# Patient Record
Sex: FEMALE | Race: WHITE | NOT HISPANIC OR LATINO | Employment: OTHER | URBAN - METROPOLITAN AREA
[De-identification: names, ages, dates, MRNs, and addresses within clinical notes are randomized per-mention and may not be internally consistent; named-entity substitution may affect disease eponyms.]

---

## 2017-01-10 ENCOUNTER — OFFICE VISIT (OUTPATIENT)
Dept: OBGYN | Facility: CLINIC | Age: 37
End: 2017-01-10
Attending: NURSE PRACTITIONER
Payer: COMMERCIAL

## 2017-01-10 VITALS
TEMPERATURE: 96.3 F | WEIGHT: 196.8 LBS | HEART RATE: 96 BPM | SYSTOLIC BLOOD PRESSURE: 100 MMHG | HEIGHT: 68 IN | BODY MASS INDEX: 29.83 KG/M2 | DIASTOLIC BLOOD PRESSURE: 66 MMHG

## 2017-01-10 DIAGNOSIS — N61.0 MASTITIS IN FEMALE: Primary | ICD-10-CM

## 2017-01-10 DIAGNOSIS — B37.89 YEAST INFECTION OF NIPPLE, POSTPARTUM: ICD-10-CM

## 2017-01-10 PROCEDURE — 99211 OFF/OP EST MAY X REQ PHY/QHP: CPT | Mod: ZF

## 2017-01-10 RX ORDER — CLINDAMYCIN HCL 300 MG
300 CAPSULE ORAL 3 TIMES DAILY
Qty: 30 CAPSULE | Refills: 0 | Status: SHIPPED | OUTPATIENT
Start: 2017-01-10 | End: 2017-01-20

## 2017-01-10 RX ORDER — CLOTRIMAZOLE 1 %
CREAM (GRAM) TOPICAL 2 TIMES DAILY
Qty: 15 G | Refills: 1 | Status: SHIPPED | OUTPATIENT
Start: 2017-01-10 | End: 2017-05-19

## 2017-01-10 ASSESSMENT — PAIN SCALES - GENERAL: PAINLEVEL: SEVERE PAIN (6)

## 2017-01-10 NOTE — NURSING NOTE
Chief Complaint   Patient presents with     RECHECK   right breast pain- fighting mastitis for one week-  Had a lump in november-  Now this  Started one week ago   Fever comes and goes   Taking garlic  Honey    Now back.

## 2017-01-10 NOTE — PROGRESS NOTES
"Progress Note    SUBJECTIVE:  Priyanka Lundberg is an 36 year old , who presents for breast symptoms.    She is 6 months postpartum and exclusively breastfeeding (delivery date 2016)    1) Priyanka previously had a left upper outer breast lump and deep left axillary lump evaluated by clinical exam and ultrasound on 2017.  Findings were benign and there was no recommended imaging follow-up beyond routine mammogram starting at age 40. Prisca reports today that the lump in her left breast has resolved but the lump in her left axilla has gotten larger.  It has been present for ~1 month.    2) Right breast symptoms were first noted 1 week ago. Swelling, tenderness, and erythema were first noted on the right side of her right breast; this improved, but then the same symptoms appeared on the left side of her breast.  Pain today is a 6/10.  She reports feeling ~ 3 clogged milk ducts (2 on the right side of her nipple, near areola, and one on the left side of her breast), but these have now resolved.  Last week she felt like she had a fever, chills, body aches, and was fatigued and, again, yesterday, she felt these same symptoms, although she never took her temperature.  She has tried garlic, honey, heat packs, and has taken warm baths.  Overall the redness and swelling have decreased from yesterday and she is feeling less \"flu-like.\"  She has never had mastitis before.      3) Priyanka notes that her right areola became swollen 2-3 days ago.  Her right nipple is very itchy, with burning and stabbing pains.  Denies having any blisters or cracking.  Her son has not been diagnosed with thrush.  He was last at the doctor 1 month ago.    Priyanka is breastfeeding ~6-8 times per day.  She reports her son has had trouble latching since he was born.  She recently started switching positions for breastfeeding, otherwise had previously always used one type of hold.      Menstrual History: no menses since she " delivered  Contraception: condoms    Never had a mammogram  Denies hx of breast disease.    Significant Family hx:  - Maternal grandmother with breast cancer s/p mastectomy at age 79 (also had gastric and skin cancer)  - No hx of ovarian cancer  - Paternal grandfather had colon cancer    HISTORY:  Prescription Medications as of 1/10/2017             clindamycin (CLEOCIN) 300 MG capsule Take 1 capsule (300 mg) by mouth 3 times daily for 10 days    clotrimazole (LOTRIMIN) 1 % cream Apply topically 2 times daily    Prenatal Vit-Fe Fumarate-FA (PRENATAL MULTIVITAMIN  PLUS IRON) 27-0.8 MG TABS Take 1 tablet by mouth daily    cholecalciferol (VITAMIN D3) 5000 UNITS CAPS capsule Take 1 capsule (5,000 Units) by mouth daily Take one capsule daily.    ibuprofen (ADVIL,MOTRIN) 400 MG tablet Take 1-2 tablets (400-800 mg) by mouth every 6 hours as needed for other (cramping)        Allergies   Allergen Reactions     Lemon Flavor      Mustard Seed      Onion Difficulty breathing and GI Disturbance     [place] GI Disturbance     Penicillins Hives     Vanilla GI Disturbance     Immunization History   Administered Date(s) Administered     TDAP (BOOSTRIX AGES 10-64) 2016     Obstetric History       T1      TAB0   SAB0   E0   M0   L1      Past Medical History   Diagnosis Date     Hx of abnormal cervical Pap smear      Abdominal pain      abnormal histamine problem, multiple food allergies     Joint pain      was to have rheumatologist     Breathing problem 2007     shortness of breath after eating, ?allergies     Asthma      has rx for inhaler but says it doesnt work     Chronic nausea      IBS (irritable bowel syndrome)      lots of mucus in bowel with occassional bleeding     Heart murmur      closed by time she was 1     Anxiety and depression      on xanax     ADHD (attention deficit hyperactivity disorder)      on aderol     Past Surgical History   Procedure Laterality Date     No history of surgery   "     Family History   Problem Relation Age of Onset     Coronary Artery Disease Paternal Grandfather      Coronary Artery Disease Maternal Grandmother      Coronary Artery Disease Mother      Hypertension Father      Hyperlipidemia Father      CEREBROVASCULAR DISEASE Maternal Grandmother      Prostate Cancer Maternal Grandfather      Breast Cancer Maternal Grandmother      Other Cancer Maternal Grandmother      stomach, skin     Colon Cancer Paternal Grandfather      MENTAL ILLNESS Mother      bipolar, schitophrenic     MENTAL ILLNESS Sister      bipolar     Anxiety Disorder Mother      Anxiety Disorder Sister      Asthma Sister      OSTEOPOROSIS Mother      Genetic Disorder Father      scleraderma     Thyroid Disease Mother      DIABETES Mother      DIABETES Father      DIABETES Paternal Grandmother      Social History     Social History     Marital Status:      Spouse Name: N/A     Number of Children: N/A     Years of Education: N/A   ROS  ROS: 10 point ROS neg other than the symptoms noted above in the HPI.    EXAM:  Blood pressure 100/66, pulse 96, temperature 96.3  F (35.7  C), height 1.715 m (5' 7.5\"), weight 89.268 kg (196 lb 12.8 oz), currently breastfeeding. Body mass index is 30.35 kg/(m^2).  General appearance: Pleasant female in no acute distress; appears anxious  Neurological: normal gait, no tremor  Breast: Examined in a sitting and lying position.  Symmetrical without visible distortion; nipple inversion, nipple discharge, breast dimpling, or puckering.  No jaundice.     Right breast: erythema, warmth, and swelling at approximately 2-3 o'clock position and 8-9 o'clock position that extends approximately 7cm from nipple and are dense to palpation.  Areola swollen at ~2-3 o'clock. No discrete/ dominant  masses palpable.     Left breast: without swelling, erythema, or tenderness; no abnormal masses       Left Axilla: unable to palpate any axillary masses on deep palpation.    Right Axilla: " Slightly swollen lymph node noted on deep palpation    ASSESSMENT:  Encounter Diagnoses   Name Primary?     Mastitis in female Yes     Yeast infection of nipple, postpartum       PLAN:   Mastitis Treatment: Clindamycin 300mg TID x 10 days (Type of Antibiotic Treatment chosen based on allergy to Penicillin).  Patient was counseled that she should expect to see improvement in her symptoms in 48-72 hours.  Nipple Candiasis: Topical Miconazole BID x 2 weeks.  Patient to apply this to right nipple.  She was counseled to apply the cream after breastfeeding and to wipe off any excess residual noted on nipple if present prior to next feeding.  Patient instructed to notify her son's pediatrician that she is being treated for a yeast infection.  Encouraged breastfeeding ~every 3 hours and pumping as needed to empty breasts completely.  Encouraged breastfeeding in different positions and breast massage.  May apply heat prior to breastfeeding and cold compresses as needed to decrease swelling.   Reviewed warning signs and symptoms of an abscess to watch for.   Discussed patient's concern for increasing size of left axillary lymph node with Dr. Thornton. Left axillary area without palpable masses or lymphadenopathy today.  Given that benign-appearing lymph nodes were identified in the left axilla near the area of palpable concern by ultrasound on 12/7/16, Dr. Thornton stated that there was no indication for further imaging at this time.      Recommended follow-up with one of the midwives in 1 week.  Priyanka Verbalized understanding and agreement with visit plan.    25 minutes was spent in direct contact with the patient and > 50% of the time in patient education and coordination of care.    Desiree Gomes, JULIANNE, APRN, WHNP

## 2017-01-10 NOTE — Clinical Note
"1/10/2017       RE: Priyanka Lundberg  [address]  [place] [address]     Dear Colleague,    Thank you for referring your patient, Priyanka Lundberg, to the [facility] at [facility]. Please see a copy of my visit note below.    Progress Note    SUBJECTIVE:  Priyanka Lundberg is an 36 year old , who presents for breast symptoms.    She is 6 months postpartum and exclusively breastfeeding (delivery date 2016)    1) Priyanka previously had a left upper outer breast lump and deep left axillary lump evaluated by clinical exam and ultrasound on 2017.  Findings were benign and there was no recommended imaging follow-up beyond routine mammogram starting at age 40. Prisca reports today that the lump in her left breast has resolved but the lump in her left axilla has gotten larger.  It has been present for ~1 month.    2) Right breast symptoms were first noted 1 week ago. Swelling, tenderness, and erythema were first noted on the right side of her right breast; this improved, but then the same symptoms appeared on the left side of her breast.  Pain today is a 6/10.  She reports feeling ~ 3 clogged milk ducts (2 on the right side of her nipple, near areola, and one on the left side of her breast), but these have now resolved.  Last week she felt like she had a fever, chills, body aches, and was fatigued and, again, yesterday, she felt these same symptoms, although she never took her temperature.  She has tried garlic, honey, heat packs, and has taken warm baths.  Overall the redness and swelling have decreased from yesterday and she is feeling less \"flu-like.\"  She has never had mastitis before.      3) Priyanka notes that her right areola became swollen 2-3 days ago.  Her right nipple is very itchy, with burning and stabbing pains.  Denies having any blisters or cracking.  Her son has not been diagnosed with thrush.  He was last at the doctor 1 " month ago.    Priyanka is breastfeeding ~6-8 times per day.  She reports her son has had trouble latching since he was born.  She recently started switching positions for breastfeeding, otherwise had previously always used one type of hold.      Menstrual History: no menses since she delivered  Contraception: condoms    Never had a mammogram  Denies hx of breast disease.    Significant Family hx:  - Maternal grandmother with breast cancer s/p mastectomy at age 79 (also had gastric and skin cancer)  - No hx of ovarian cancer  - Paternal grandfather had colon cancer    HISTORY:  Prescription Medications as of 1/10/2017             clindamycin (CLEOCIN) 300 MG capsule Take 1 capsule (300 mg) by mouth 3 times daily for 10 days    clotrimazole (LOTRIMIN) 1 % cream Apply topically 2 times daily    Prenatal Vit-Fe Fumarate-FA (PRENATAL MULTIVITAMIN  PLUS IRON) 27-0.8 MG TABS Take 1 tablet by mouth daily    cholecalciferol (VITAMIN D3) 5000 UNITS CAPS capsule Take 1 capsule (5,000 Units) by mouth daily Take one capsule daily.    ibuprofen (ADVIL,MOTRIN) 400 MG tablet Take 1-2 tablets (400-800 mg) by mouth every 6 hours as needed for other (cramping)        Allergies   Allergen Reactions     Lemon Flavor      Mustard Seed      Onion Difficulty breathing and GI Disturbance     [place] GI Disturbance     Penicillins Hives     Vanilla GI Disturbance     Immunization History   Administered Date(s) Administered     TDAP (BOOSTRIX AGES 10-64) 2016     Obstetric History       T1      TAB0   SAB0   E0   M0   L1      Past Medical History   Diagnosis Date     Hx of abnormal cervical Pap smear      Abdominal pain      abnormal histamine problem, multiple food allergies     Joint pain      was to have rheumatologist     Breathing problem 2007     shortness of breath after eating, ?allergies     Asthma      has rx for inhaler but says it doesnt work     Chronic nausea      IBS (irritable bowel syndrome)       "lots of mucus in bowel with occassional bleeding     Heart murmur      closed by time she was 1     Anxiety and depression      on xanax     ADHD (attention deficit hyperactivity disorder)      on aderol     Past Surgical History   Procedure Laterality Date     No history of surgery       Family History   Problem Relation Age of Onset     Coronary Artery Disease Paternal Grandfather      Coronary Artery Disease Maternal Grandmother      Coronary Artery Disease Mother      Hypertension Father      Hyperlipidemia Father      CEREBROVASCULAR DISEASE Maternal Grandmother      Prostate Cancer Maternal Grandfather      Breast Cancer Maternal Grandmother      Other Cancer Maternal Grandmother      stomach, skin     Colon Cancer Paternal Grandfather      MENTAL ILLNESS Mother      bipolar, schitophrenic     MENTAL ILLNESS Sister      bipolar     Anxiety Disorder Mother      Anxiety Disorder Sister      Asthma Sister      OSTEOPOROSIS Mother      Genetic Disorder Father      scleraderma     Thyroid Disease Mother      DIABETES Mother      DIABETES Father      DIABETES Paternal Grandmother      Social History     Social History     Marital Status:      Spouse Name: N/A     Number of Children: N/A     Years of Education: N/A   ROS  ROS: 10 point ROS neg other than the symptoms noted above in the HPI.    EXAM:  Blood pressure 100/66, pulse 96, temperature 96.3  F (35.7  C), height 1.715 m (5' 7.5\"), weight 89.268 kg (196 lb 12.8 oz), currently breastfeeding. Body mass index is 30.35 kg/(m^2).  General appearance: Pleasant female in no acute distress; appears anxious  Neurological: normal gait, no tremor  Breast: Examined in a sitting and lying position.  Symmetrical without visible distortion; nipple inversion, nipple discharge, breast dimpling, or puckering.  No jaundice.     Right breast: erythema, warmth, and swelling at approximately 2-3 o'clock position and 8-9 o'clock position that extends approximately 7cm from " nipple and are dense to palpation.  Areola swollen at ~2-3 o'clock. No discrete/ dominant  masses palpable.     Left breast: without swelling, erythema, or tenderness; no abnormal masses       Left Axilla: unable to palpate any axillary masses on deep palpation.    Right Axilla: Slightly swollen lymph node noted on deep palpation    ASSESSMENT:  Encounter Diagnoses   Name Primary?     Mastitis in female Yes     Yeast infection of nipple, postpartum       PLAN:   Mastitis Treatment: Clindamycin 300mg TID x 10 days (Type of Antibiotic Treatment chosen based on allergy to Penicillin).  Patient was counseled that she should expect to see improvement in her symptoms in 48-72 hours.  Nipple Candiasis: Topical Miconazole BID x 2 weeks.  Patient to apply this to right nipple.  She was counseled to apply the cream after breastfeeding and to wipe off any excess residual noted on nipple if present prior to next feeding.  Patient instructed to notify her son's pediatrician that she is being treated for a yeast infection.  Encouraged breastfeeding ~every 3 hours and pumping as needed to empty breasts completely.  Encouraged breastfeeding in different positions and breast massage.  May apply heat prior to breastfeeding and cold compresses as needed to decrease swelling.   Reviewed warning signs and symptoms of an abscess to watch for.   Discussed patient's concern for increasing size of left axillary lymph node with Dr. Thornton. Left axillary area without palpable masses or lymphadenopathy today.  Given that benign-appearing lymph nodes were identified in the left axilla near the area of palpable concern by ultrasound on 12/7/16, Dr. Thornton stated that there was no indication for further imaging at this time.      Recommended follow-up with one of the midwives in 1 week.  Priyanka Verbalized understanding and agreement with visit plan.    25 minutes was spent in direct contact with the patient and > 50% of the time in patient  education and coordination of care.    Desiree Gomes, DNP, APRN, WHNP

## 2017-01-16 DIAGNOSIS — R79.89 LOW VITAMIN D LEVEL: Primary | ICD-10-CM

## 2017-01-18 ENCOUNTER — ONCOLOGY VISIT (OUTPATIENT)
Dept: ONCOLOGY | Facility: CLINIC | Age: 37
End: 2017-01-18
Attending: FAMILY MEDICINE
Payer: COMMERCIAL

## 2017-01-18 VITALS
SYSTOLIC BLOOD PRESSURE: 99 MMHG | HEART RATE: 73 BPM | DIASTOLIC BLOOD PRESSURE: 64 MMHG | TEMPERATURE: 98.8 F | OXYGEN SATURATION: 96 % | WEIGHT: 198.1 LBS | RESPIRATION RATE: 18 BRPM | BODY MASS INDEX: 30.55 KG/M2

## 2017-01-18 DIAGNOSIS — R22.31 AXILLARY LUMP, RIGHT: Primary | ICD-10-CM

## 2017-01-18 ASSESSMENT — PAIN SCALES - GENERAL: PAINLEVEL: MILD PAIN (2)

## 2017-01-18 NOTE — NURSING NOTE
"Priyanka Lundberg is a 36 year old female who presents for:  Chief Complaint   Patient presents with     Oncology Clinic Visit     Lump or Mass in Breast        Initial Vitals:  BP 99/64 mmHg  Pulse 73  Temp(Src) 98.8  F (37.1  C) (Oral)  Resp 18  Wt 89.858 kg (198 lb 1.6 oz)  SpO2 96% Estimated body mass index is 30.55 kg/(m^2) as calculated from the following:    Height as of 1/10/17: 1.715 m (5' 7.5\").    Weight as of this encounter: 89.858 kg (198 lb 1.6 oz).. There is no height on file to calculate BSA. BP completed using cuff size: large  Mild Pain (2) No LMP recorded. Patient is not currently having periods (Reason: Postpartum). Allergies and medications reviewed.     Medications: Medication refills not needed today.  Pharmacy name entered into [email or web address]: [id number] [place], [place] - [address] [place] [address] AV    Comments: Patient is at a pain level of 2 today, bilateral breast pain.     6 minutes for nursing intake (face to face time)   Dorothy Johnson CMA         "

## 2017-01-18 NOTE — PROGRESS NOTES
Priyanka is a 36 year old female who is 6 months postpartum and exclusively breastfeeding. She  was seen one month ago at the breast center when she noticed a left upper outer breast lump and deep left axillary lump - on US  normal lymph nodes were found and no other masses of concern.     She developed mastitis 2 weeks ago. She began treating this naturally with foods such as raw garlic. She was prescribed antibiotics 1 week ago but never took them because the mastitis was improving. She has right breast burning around the nipple that was diagnosed as a yeast infection. She is treating this naturally with vinegar. She is still breast feeding. She denies fever. She has right breast heaviness even after nursing. She also right axillary lymph nodes that she can palpate. She continues to palpate left breast lumps and left axillary lymph nodes.     Breast Cancer Risk Profile: Age: 36 year old, Gravity 1, Parity 1. Nursing her 6 month old.  Age at first birth 35.  Menarche 9.  LMP: premenopausal.  Previous Breast Biopsy # 0.  Personal History of Cancer No.  Family History; Breast cancer maternal grandmother with breast cancer s/p mastectomy at age 79, also with gastric and skin cancer; ovarian cancer negative; Colon Cancer paternal grandfather.  Hormones:  Negative   ROS  General:  None  Head/eyes:  None  Ears/Nose/Throat:  None  Breast: Nursing - lump(s)   Cardiovascular:  History of heart murmur  Respiratory:  None  Gastrointestinal:  Abdominal pain  Musculoskeletal:  Joint pain  Skin:  None  Neurological:  None  Mental Health:  Anxiety and depression  Endocrine:  None     Past Medical History:  Past Medical History   Diagnosis Date     Hx of abnormal cervical Pap smear 2011     Abdominal pain 2011     abnormal histamine problem, multiple food allergies     Joint pain 2015     was to have rheumatologist     Breathing problem 2007     shortness of breath after eating, ?allergies     Asthma      has rx for inhaler but  says it doesnt work     Chronic nausea      IBS (irritable bowel syndrome)      lots of mucus in bowel with occassional bleeding     Heart murmur      closed by time she was 1     Anxiety and depression      on xanax     ADHD (attention deficit hyperactivity disorder)      on aderol   Past Surgical History:  none  Medications:  Current Outpatient Prescriptions   Medication Sig Dispense Refill     cholecalciferol (VITAMIN D3) 5000 UNITS CAPS capsule Take 1 capsule (5,000 Units) by mouth daily Take one capsule daily. 90 capsule 3     clindamycin (CLEOCIN) 300 MG capsule Take 1 capsule (300 mg) by mouth 3 times daily for 10 days 30 capsule 0     clotrimazole (LOTRIMIN) 1 % cream Apply topically 2 times daily 15 g 1     ibuprofen (ADVIL,MOTRIN) 400 MG tablet Take 1-2 tablets (400-800 mg) by mouth every 6 hours as needed for other (cramping) 60 tablet 0     Prenatal Vit-Fe Fumarate-FA (PRENATAL MULTIVITAMIN  PLUS IRON) 27-0.8 MG TABS Take 1 tablet by mouth daily 30 tablet 12     Family Hx:   Family History   Problem Relation Age of Onset     Coronary Artery Disease Paternal Grandfather      Coronary Artery Disease Maternal Grandmother      Coronary Artery Disease Mother      Hypertension Father      Hyperlipidemia Father      CEREBROVASCULAR DISEASE Maternal Grandmother      Prostate Cancer Maternal Grandfather      Breast Cancer Maternal Grandmother      Other Cancer Maternal Grandmother      stomach, skin     Colon Cancer Paternal Grandfather      MENTAL ILLNESS Mother      bipolar, schitophrenic     MENTAL ILLNESS Sister      bipolar     Anxiety Disorder Mother      Anxiety Disorder Sister      Asthma Sister      OSTEOPOROSIS Mother      Genetic Disorder Father      scleraderma     Thyroid Disease Mother      DIABETES Mother      DIABETES Father      DIABETES Paternal Grandmother      Social History   Substance Use Topics     Smoking status: Never Smoker      Smokeless tobacco: Not on file     Alcohol Use: 0.0 oz/week      0 Standard drinks or equivalent per week      Comment: outside of pregnancy, social   Vitals:   There were no vitals taken for this visit.  Physical Exam:  Constitutional: no distress, comfortable and pleasant   Pyschological: Alert/Oriented  Eyes: anicteric, normal extra-ocular movements  Breast: Examined in a sitting and lying position.  Symmetrical without visible distortion, swelling or rashes.  No nipple inversion, nipple discharge, breast dimpling or puckering.  Breast tissue is heterogenous dense [nursing breasts].  No abnormal masses noted. Two right axillary lymph nodes palpated.   Skin: no concerning lesions, no jaundice  Neurological: Normal gait, no tremor  RESULTS:  FINDINGS:  Right US 2017L    No suspicious findings in the left breast upper outer  quadrant area of palpable concern. Benign-appearing lymph nodes are identified in the left axilla, near  the areas of palpable concern.IMPRESSION: BI-RADS CATEGORY: 2 - Benign Finding(s)  Diagnoses and associated orders for this visit:  36 year old  6 month post partum nursing with a axillary lump, right  right axillary ultrasound today:  1) Reviewed images today with radiologist and no concerning findings.    2) Reassurance to patient that US is normal and lymph nodes are normal findings  3) Advised follow-up with any new concerns

## 2017-03-23 ENCOUNTER — OFFICE VISIT (OUTPATIENT)
Dept: OBGYN | Facility: CLINIC | Age: 37
End: 2017-03-23
Attending: OBSTETRICS & GYNECOLOGY
Payer: COMMERCIAL

## 2017-03-23 VITALS
HEIGHT: 68 IN | WEIGHT: 197.1 LBS | HEART RATE: 74 BPM | DIASTOLIC BLOOD PRESSURE: 74 MMHG | BODY MASS INDEX: 29.87 KG/M2 | SYSTOLIC BLOOD PRESSURE: 107 MMHG

## 2017-03-23 DIAGNOSIS — M25.50 PAIN IN JOINT, MULTIPLE SITES: ICD-10-CM

## 2017-03-23 DIAGNOSIS — N63.0 LUMP OR MASS IN BREAST: ICD-10-CM

## 2017-03-23 DIAGNOSIS — R23.9 SKIN CHANGE: ICD-10-CM

## 2017-03-23 DIAGNOSIS — R45.86 MOOD CHANGES: ICD-10-CM

## 2017-03-23 DIAGNOSIS — N94.89 VAGINAL BURNING: ICD-10-CM

## 2017-03-23 DIAGNOSIS — N64.4 BREAST PAIN: Primary | ICD-10-CM

## 2017-03-23 DIAGNOSIS — K58.9 IRRITABLE BOWEL SYNDROME, UNSPECIFIED TYPE: ICD-10-CM

## 2017-03-23 PROCEDURE — 99212 OFFICE O/P EST SF 10 MIN: CPT | Mod: ZF

## 2017-03-23 RX ORDER — FLUCONAZOLE 150 MG/1
150 TABLET ORAL ONCE
Qty: 1 TABLET | Refills: 0 | Status: SHIPPED | OUTPATIENT
Start: 2017-03-23 | End: 2017-03-23

## 2017-03-23 NOTE — LETTER
"3/23/2017       RE: Priyanka Lundberg  [address]  [place] [address]     Dear Colleague,    Thank you for referring your patient, Priyanka Lundberg, to the [facility] at [facility]. Please see a copy of my visit note below.    Gynecology Visit Note  3/23/17    Reason for visit: Multiple concerns    S: Patient is a 35 yo  who presents today with multiple concerns.  First, she has not been taking her Adderall for ADHD because of breastfeeding.  Because of this, she has been starting to feel more off from a mood standpoint and feels on edge sometimes.  She is wondering options for addressing this.  She also desires to have a mammogram.  She has had multiple issues with bilateral swollen axillary lymph nodes secondary to recurrent mastitis postpartum.  She last had an US of the left axillary lymph node in 1/2017 which was normal.  She continues to feel bumps and pain and is very worried about potential for cancer.      In addition to these issues, Priyanka has a history fo join pain and burning as well as IBS and was to have a colonoscopy prior to finding out she was pregnant.  She would like to establish care with a PCP to address these issues.  She also would like a dermatology referral as she has multiple moles and has some new ones that have developed and would like to have a skin check.    Lastly, patient states she has some burning at her vaginal opening that is c/w prior yeast infection she had with pregnancy.  Would like treatment for this.    O:  Vitals:    03/23/17 0753   BP: 107/74   Pulse: 74   Weight: 89.4 kg (197 lb 1.6 oz)   Height: 1.715 m (5' 7.5\")     General: NAD, A&Ox3  Resp: Non-labored breathing    PHQ-9: 12  BIN-7: 10    A/P: 35 yo  presents with multiple concerns  1) Mood changes: History of ADHD, on Adderall in past but not currently taking due to breastfeeding.  Discussed with her PHQ-9 and BIN-7 could consider " psychology referral versus starting low dose SSRI.  Patient would prefer to start with non-medical treatment, given referral to Psychology today.  2) Breast lumps and pain: Continued without improvement.  Ordered diagnostic mammogram and bilateral US today.  3) Joint pain/IBS: [facility] to [facility] care  4) Skin changes: Dermatology referral given today  5) Yeast vaginitis: Given Rx for Diflucan, if symptoms do not improve should return for exam and evaluation  6) RTC as needed with any concerns    Ana Paula Mcmahan MD

## 2017-03-23 NOTE — MR AVS SNAPSHOT
After Visit Summary   3/23/2017    Priyanka Lundberg    MRN: [id number]           Patient Information     Date Of Birth          1980        Visit Information        Provider Department      3/23/2017 7:45 AM Ana Paula Mcmahan MD [facility]        Today's Diagnoses     Breast pain    -  1    Mood changes (H)        Lump or mass in breast        Skin change        Pain in joint, multiple sites        Irritable bowel syndrome, unspecified type        Vaginal burning           Follow-ups after your visit        Additional Services     DERMATOLOGY REFERRAL       Your provider has referred you to: [facility]: Dermatology Clinic [facility] [phone]   [email or web address]    Please be aware that coverage of these services is subject to the terms and limitations of your health insurance plan.  Call member services at your health plan with any benefit or coverage questions.      Please bring the following with you to your appointment:    (1) Any X-Rays, CTs or MRIs which have been performed.  Contact the facility where they were done to arrange for  prior to your scheduled appointment.    (2) List of current medications  (3) This referral request   (4) Any documents/labs given to you for this referral            FAMILY PRACTICE REFERRAL       Your provider has referred you to: [facility]: [facility] [phone]   [email or web address]    Please be aware that coverage of these services is subject to the terms and limitations of your health insurance plan.  Call member services at your health plan with any benefit or coverage questions.      Please bring the following with you to your appointment:    (1) Any X-Rays, CTs or MRIs which have been performed.  Contact the facility where they were done to arrange for  prior to your scheduled appointment.    (2) List of current medications    (3) This referral request   (4) Any documents/labs given to you for this referral            PSYCHOLOGY REFERRAL       Your provider has referred you to:  Dr. Denise    Please be aware that coverage of these services is subject to the terms and limitations of your health insurance plan.  Call member services at your health plan with any benefit or coverage questions.      Please bring the following to your appointment:    >>   Any x-rays, CTs or MRIs which have been performed.  Contact the facility where they were done to arrange for  prior to your scheduled appointment.   >>   List of current medications   >>   This referral request   >>   Any documents/labs given to you for this referral                  Future tests that were ordered for you today     Open Future Orders        Priority Expected Expires Ordered    Mammo diagnostic  digital (bilateral) Routine  3/23/2018 3/23/2017    US Breast Bilateral Complete 4 Quadrants Routine  3/23/2018 3/23/2017            Who to contact     Please call your clinic at [phone] to:    Ask questions about your health    Make or cancel appointments    Discuss your medicines    Learn about your test results    Speak to your doctor   If you have compliments or concerns about an experience at your clinic, or if you wish to file a complaint, please contact [facility] Patient Relations at [phone] or email us at [email or web address]         Additional Information About Your Visit        Planbox[employer] Information     [employer] gives you secure access to your electronic health record. If you see a primary care provider, you can also send messages to your care team and make appointments. If you have questions, please call your primary care clinic.  If you do not have a primary care provider, please call [phone] and they will assist you.      [employer] is an electronic gateway that provides easy, online access to your medical records.  "With MyChart, you can request a clinic appointment, read your test results, renew a prescription or communicate with your care team.     To access your existing account, please contact your [facility] or call [phone] for assistance.        Care EveryWhere ID     This is your Care EveryWhere ID. This could be used by other organizations to access your [facility] medical records  [id number]        Your Vitals Were     Pulse Height BMI (Body Mass Index)             74 1.715 m (5' 7.5\") 30.41 kg/m2          Blood Pressure from Last 3 Encounters:   03/23/17 107/74   01/18/17 99/64   01/10/17 100/66    Weight from Last 3 Encounters:   03/23/17 89.4 kg (197 lb 1.6 oz)   01/18/17 89.9 kg (198 lb 1.6 oz)   01/10/17 89.3 kg (196 lb 12.8 oz)              We Performed the Following     DERMATOLOGY REFERRAL     FAMILY PRACTICE REFERRAL     PSYCHOLOGY REFERRAL          Today's Medication Changes          These changes are accurate as of: 3/23/17  8:15 AM.  If you have any questions, ask your nurse or doctor.               Start taking these medicines.        Dose/Directions    fluconazole 150 MG tablet   Commonly known as:  DIFLUCAN   Used for:  Vaginal burning   Started by:  Ana Paula Mcmahan MD        Dose:  150 mg   Take 1 tablet (150 mg) by mouth once for 1 dose   Quantity:  1 tablet   Refills:  0            Where to get your medicines      These medications were sent to [place] [address] [place] [address] [address]     Phone:  [phone]     fluconazole 150 MG tablet                Primary Care Provider    Physician No Ref-Primary       No address on file        Thank you!     Thank you for choosing [facility]  for your care. Our goal is always to provide you with excellent care. Hearing back from our patients is one way we can continue to improve our services. Please take a few minutes to complete the written survey " that you may receive in the mail after your visit with us. Thank you!             Your Updated Medication List - Protect others around you: Learn how to safely use, store and throw away your medicines at www.disposemymeds.org.          This list is accurate as of: 3/23/17  8:15 AM.  Always use your most recent med list.                   Brand Name Dispense Instructions for use    cholecalciferol 5000 UNITS Caps capsule    vitamin D3    90 capsule    Take 1 capsule (5,000 Units) by mouth daily Take one capsule daily.       clotrimazole 1 % cream    LOTRIMIN    15 g    Apply topically 2 times daily       fluconazole 150 MG tablet    DIFLUCAN    1 tablet    Take 1 tablet (150 mg) by mouth once for 1 dose       ibuprofen 400 MG tablet    ADVIL/MOTRIN    60 tablet    Take 1-2 tablets (400-800 mg) by mouth every 6 hours as needed for other (cramping)       prenatal multivitamin  plus iron 27-0.8 MG Tabs per tablet     30 tablet    Take 1 tablet by mouth daily

## 2017-03-23 NOTE — PROGRESS NOTES
"Gynecology Visit Note  3/23/17    Reason for visit: Multiple concerns    S: Patient is a 35 yo  who presents today with multiple concerns.  First, she has not been taking her Adderall for ADHD because of breastfeeding.  Because of this, she has been starting to feel more off from a mood standpoint and feels on edge sometimes.  She is wondering options for addressing this.  She also desires to have a mammogram.  She has had multiple issues with bilateral swollen axillary lymph nodes secondary to recurrent mastitis postpartum.  She last had an US of the left axillary lymph node in 1/2017 which was normal.  She continues to feel bumps and pain and is very worried about potential for cancer.      In addition to these issues, Priyanka has a history fo join pain and burning as well as IBS and was to have a colonoscopy prior to finding out she was pregnant.  She would like to establish care with a PCP to address these issues.  She also would like a dermatology referral as she has multiple moles and has some new ones that have developed and would like to have a skin check.    Lastly, patient states she has some burning at her vaginal opening that is c/w prior yeast infection she had with pregnancy.  Would like treatment for this.    O:  Vitals:    03/23/17 0753   BP: 107/74   Pulse: 74   Weight: 89.4 kg (197 lb 1.6 oz)   Height: 1.715 m (5' 7.5\")     General: NAD, A&Ox3  Resp: Non-labored breathing    PHQ-9: 12  BIN-7: 10    A/P: 35 yo  presents with multiple concerns  1) Mood changes: History of ADHD, on Adderall in past but not currently taking due to breastfeeding.  Discussed with her PHQ-9 and BIN-7 could consider psychology referral versus starting low dose SSRI.  Patient would prefer to start with non-medical treatment, given referral to Psychology today.  2) Breast lumps and pain: Continued without improvement.  Ordered diagnostic mammogram and bilateral US today.  3) Joint pain/IBS: [facility] to [facility] care  4) Skin changes: Dermatology referral given today  5) Yeast vaginitis: Given Rx for Diflucan, if symptoms do not improve should return for exam and evaluation  6) RTC as needed with any concerns    Ana Paula Mcmahan MD

## 2017-05-19 ENCOUNTER — APPOINTMENT (OUTPATIENT)
Dept: GENERAL RADIOLOGY | Facility: CLINIC | Age: 37
End: 2017-05-19
Attending: EMERGENCY MEDICINE

## 2017-05-19 ENCOUNTER — HOSPITAL ENCOUNTER (EMERGENCY)
Facility: CLINIC | Age: 37
Discharge: HOME | End: 2017-05-19
Attending: EMERGENCY MEDICINE | Admitting: EMERGENCY MEDICINE

## 2017-05-19 VITALS
BODY MASS INDEX: 30.4 KG/M2 | DIASTOLIC BLOOD PRESSURE: 72 MMHG | OXYGEN SATURATION: 98 % | WEIGHT: 197 LBS | SYSTOLIC BLOOD PRESSURE: 114 MMHG | RESPIRATION RATE: 18 BRPM | TEMPERATURE: 97.7 F

## 2017-05-19 DIAGNOSIS — R07.9 CHEST PAIN, UNSPECIFIED TYPE: ICD-10-CM

## 2017-05-19 LAB
ANION GAP SERPL CALCULATED.3IONS-SCNC: 7 MMOL/L (ref 3–14)
BASOPHILS # BLD AUTO: 0.1 10E9/L (ref 0–0.2)
BASOPHILS NFR BLD AUTO: 1 %
BUN SERPL-MCNC: 20 MG/DL (ref 7–30)
CALCIUM SERPL-MCNC: 9 MG/DL (ref 8.5–10.1)
CHLORIDE SERPL-SCNC: 106 MMOL/L (ref 94–109)
CO2 SERPL-SCNC: 30 MMOL/L (ref 20–32)
CREAT SERPL-MCNC: 0.72 MG/DL (ref 0.52–1.04)
DIFFERENTIAL METHOD BLD: NORMAL
EOSINOPHIL # BLD AUTO: 0.2 10E9/L (ref 0–0.7)
EOSINOPHIL NFR BLD AUTO: 3.2 %
ERYTHROCYTE [DISTWIDTH] IN BLOOD BY AUTOMATED COUNT: 13.3 % (ref 10–15)
GFR SERPL CREATININE-BSD FRML MDRD: 89 ML/MIN/1.7M2
GLUCOSE SERPL-MCNC: 101 MG/DL (ref 70–99)
HCT VFR BLD AUTO: 39.5 % (ref 35–47)
HGB BLD-MCNC: 13.3 G/DL (ref 11.7–15.7)
IMM GRANULOCYTES # BLD: 0 10E9/L (ref 0–0.4)
IMM GRANULOCYTES NFR BLD: 0.2 %
INTERPRETATION ECG - MUSE: NORMAL
LYMPHOCYTES # BLD AUTO: 2.9 10E9/L (ref 0.8–5.3)
LYMPHOCYTES NFR BLD AUTO: 47.8 %
MCH RBC QN AUTO: 29.6 PG (ref 26.5–33)
MCHC RBC AUTO-ENTMCNC: 33.7 G/DL (ref 31.5–36.5)
MCV RBC AUTO: 88 FL (ref 78–100)
MONOCYTES # BLD AUTO: 0.4 10E9/L (ref 0–1.3)
MONOCYTES NFR BLD AUTO: 6.9 %
NEUTROPHILS # BLD AUTO: 2.5 10E9/L (ref 1.6–8.3)
NEUTROPHILS NFR BLD AUTO: 40.9 %
NRBC # BLD AUTO: 0 10*3/UL
NRBC BLD AUTO-RTO: 0 /100
PLATELET # BLD AUTO: 234 10E9/L (ref 150–450)
POTASSIUM SERPL-SCNC: 3.7 MMOL/L (ref 3.4–5.3)
RBC # BLD AUTO: 4.5 10E12/L (ref 3.8–5.2)
SODIUM SERPL-SCNC: 143 MMOL/L (ref 133–144)
TROPONIN I SERPL-MCNC: NORMAL UG/L (ref 0–0.04)
WBC # BLD AUTO: 6 10E9/L (ref 4–11)

## 2017-05-19 PROCEDURE — 99285 EMERGENCY DEPT VISIT HI MDM: CPT | Mod: 25 | Performed by: EMERGENCY MEDICINE

## 2017-05-19 PROCEDURE — 93005 ELECTROCARDIOGRAM TRACING: CPT | Performed by: EMERGENCY MEDICINE

## 2017-05-19 PROCEDURE — 85025 COMPLETE CBC W/AUTO DIFF WBC: CPT | Performed by: EMERGENCY MEDICINE

## 2017-05-19 PROCEDURE — 93010 ELECTROCARDIOGRAM REPORT: CPT | Mod: Z6 | Performed by: EMERGENCY MEDICINE

## 2017-05-19 PROCEDURE — 80048 BASIC METABOLIC PNL TOTAL CA: CPT | Performed by: EMERGENCY MEDICINE

## 2017-05-19 PROCEDURE — 71020 XR CHEST 2 VW: CPT

## 2017-05-19 PROCEDURE — 84484 ASSAY OF TROPONIN QUANT: CPT | Performed by: EMERGENCY MEDICINE

## 2017-05-19 ASSESSMENT — ENCOUNTER SYMPTOMS
SHORTNESS OF BREATH: 0
CHILLS: 0
HEADACHES: 0
NAUSEA: 0
FEVER: 0
DYSURIA: 0
PALPITATIONS: 1
ABDOMINAL PAIN: 0
HEMATURIA: 0
VOMITING: 0
DIARRHEA: 0

## 2017-05-19 NOTE — ED AVS SNAPSHOT
[facility], Emergency Department    [address]    [place] [address]    Phone:  [phone]    Fax:  [phone]                                       Priyanka Lundberg   MRN: [id number]    Department:  [facility], Emergency Department   Date of Visit:  5/19/2017           Patient Information     Date Of Birth          1980        Your diagnoses for this visit were:     Chest pain, unspecified type        You were seen by Jameson Bro MD.        Discharge Instructions       Please make an appointment to follow up with Your Primary Care Provider as soon as possible.       *CHEST PAIN, UNCERTAIN CAUSE    Based on your exam today, the exact cause of your chest pain is not certain. Your condition does not seem serious at this time, and your pain does not appear to be coming from your heart. However, sometimes the signs of a serious problem take more time to appear. Therefore, watch for the warning signs listed below.  HOME CARE:  1. Rest today and avoid strenuous activity.  2. Take any prescribed medicine as directed.  FOLLOW UP with your doctor in 1-3 days.   GET PROMPT MEDICAL ATTENTION if any of the following occur:    A change in the type of pain: if it feels different, becomes more severe, lasts longer, or begins to spread into your shoulder, arm, neck, jaw or back    Shortness of breath or increased pain with breathing    Weakness, dizziness, or fainting    Cough with blood or dark colored sputum (phlegm)    Fever over 101  F (38.3  C)    Swelling, pain or redness in one leg    [address] [place] [address]. All rights reserved. This information is not intended as a substitute for professional medical care. Always follow your healthcare professional's instructions.      Future Appointments        Provider Department Dept Phone Center    6/6/2017 4:00 PM Richelle Braden MD [facility] [phone] [facility]    7/3/2017 3:00 PM  Tori Denise, PhD LP [facility]  [phone] [facility]      24 Hour Appointment Hotline       To make an appointment at any [facility], call [phone] ([phone]). If you don't have a family doctor or clinic, we will help you find one. [facility] are conveniently located to serve the needs of you and your family.             Review of your medicines      Our records show that you are taking the medicines listed below. If these are incorrect, please call your family doctor or clinic.        Dose / Directions Last dose taken    cholecalciferol 5000 UNITS Caps capsule   Commonly known as:  vitamin D3   Dose:  5000 Units   Quantity:  90 capsule        Take 1 capsule (5,000 Units) by mouth daily Take one capsule daily.   Refills:  3        ibuprofen 400 MG tablet   Commonly known as:  ADVIL/MOTRIN   Dose:  400-800 mg   Quantity:  60 tablet        Take 1-2 tablets (400-800 mg) by mouth every 6 hours as needed for other (cramping)   Refills:  0        prenatal multivitamin  plus iron 27-0.8 MG Tabs per tablet   Dose:  1 tablet   Quantity:  30 tablet        Take 1 tablet by mouth daily   Refills:  12                Procedures and tests performed during your visit     Basic metabolic panel    CBC with platelets differential    Chest XR,  PA & LAT    EKG 12 lead    Troponin I      Orders Needing Specimen Collection     None      Pending Results     Date and Time Order Name Status Description    5/19/2017 0014 EKG 12 lead Preliminary             Pending Culture Results     No orders found from 5/17/2017 to 5/20/2017.            Pending Results Instructions     If you had any lab results that were not finalized at the time of your Discharge, you can call the ED Lab Result RN at [phone]. You will be contacted by this team for any positive Lab results or changes in treatment. The nurses are available 7 days a week from 10A to 6:30P.  You can leave a message 24 hours per day  and they will return your call.        Thank you for choosing [facility]       Thank you for choosing [facility] for your care. Our goal is always to provide you with excellent care. Hearing back from our patients is one way we can continue to improve our services. Please take a few minutes to complete the written survey that you may receive in the mail after you visit with us. Thank you!        mSpothart Information     [email or web address] gives you secure access to your electronic health record. If you see a primary care provider, you can also send messages to your care team and make appointments. If you have questions, please call your primary care clinic.  If you do not have a primary care provider, please call [phone] and they will assist you.        Care EveryWhere ID     This is your Care EveryWhere ID. This could be used by other organizations to access your [facility] medical records  [id number]        After Visit Summary       This is your record. Keep this with you and show to your community pharmacist(s) and doctor(s) at your next visit.

## 2017-05-19 NOTE — ED AVS SNAPSHOT
[facility], Emergency Department    [address]    [facility] [address]    Phone:  [phone]    Fax:  [phone]                                       Priyanka Lundberg   MRN: [id number]    Department:  [facility], Emergency Department   Date of Visit:  5/19/2017           After Visit Summary Signature Page     I have received my discharge instructions, and my questions have been answered. I have discussed any challenges I see with this plan with the nurse or doctor.    ..........................................................................................................................................  Patient/Patient Representative Signature      ..........................................................................................................................................  Patient Representative Print Name and Relationship to Patient    ..................................................               ................................................  Date                                            Time    ..........................................................................................................................................  Reviewed by Signature/Title    ...................................................              ..............................................  Date                                                            Time

## 2017-05-19 NOTE — ED PROVIDER NOTES
"  History     Chief Complaint   Patient presents with     Chest Pain     HPI  Priyanka Lundberg is a 37 year old female with a medical history significant for anxiety who presents to the emergency department with a 4 day history of diffuse, dull chest pain and palpitations. Patient relates a history of intermittent chest pressure since giving birth 11 months ago. She states that she would develop a chest pressure \"like being choked, that blood collecting feeling from my chest up\" that was present when supine. Patient reports that this pressure became more of a pain on Monday (4 days ago) and has persisted since. She notes it has also been present when standing over the last 4 days as well, and states that the pain worsens with exertion. She also notes several days of fluttering, beat-skipping palpitations. Patient reports a history of hypokalemia and pleurisy, and notes that her palpitations and chest pain are reminiscent of both, respectively. She reports no history of diabetes, MI, or stroke. She is not on oral birth control. No family history of coagulopathy. She notes that her grandmother had an MI at age 40.     I have reviewed the Medications, Allergies, Past Medical and Surgical History, and Social History in the [employer] system.    PAST MEDICAL HISTORY:   Past Medical History:   Diagnosis Date     Abdominal pain 2011    abnormal histamine problem, multiple food allergies     ADHD (attention deficit hyperactivity disorder)     on aderol     Anxiety and depression     on xanax     Breathing problem 2007    shortness of breath after eating, ?allergies     Chronic nausea      Heart murmur     closed by time she was 1     Hx of abnormal cervical Pap smear 2011     IBS (irritable bowel syndrome)     lots of mucus in bowel with occassional bleeding     Joint pain 2015    was to have rheumatologist       PAST SURGICAL HISTORY:   Past Surgical History:   Procedure Laterality Date     NO HISTORY OF SURGERY         FAMILY " HISTORY:   Family History   Problem Relation Age of Onset     Coronary Artery Disease Paternal Grandfather      Colon Cancer Paternal Grandfather      Coronary Artery Disease Maternal Grandmother      CEREBROVASCULAR DISEASE Maternal Grandmother      Breast Cancer Maternal Grandmother      Other Cancer Maternal Grandmother      stomach, skin     Coronary Artery Disease Mother      MENTAL ILLNESS Mother      bipolar, schitophrenic     Anxiety Disorder Mother      OSTEOPOROSIS Mother      Thyroid Disease Mother      DIABETES Mother      Hypertension Father      Hyperlipidemia Father      Genetic Disorder Father      scleraderma     DIABETES Father      Prostate Cancer Maternal Grandfather      MENTAL ILLNESS Sister      bipolar     Anxiety Disorder Sister      Asthma Sister      DIABETES Paternal Grandmother        SOCIAL HISTORY:   Social History   Substance Use Topics     Smoking status: Never Smoker     Smokeless tobacco: Not on file     Alcohol use 0.0 oz/week     0 Standard drinks or equivalent per week      Comment: outside of pregnancy, social       Patient's Medications   New Prescriptions    No medications on file   Previous Medications    CHOLECALCIFEROL (VITAMIN D3) 5000 UNITS CAPS CAPSULE    Take 1 capsule (5,000 Units) by mouth daily Take one capsule daily.    IBUPROFEN (ADVIL,MOTRIN) 400 MG TABLET    Take 1-2 tablets (400-800 mg) by mouth every 6 hours as needed for other (cramping)    PRENATAL VIT-FE FUMARATE-FA (PRENATAL MULTIVITAMIN  PLUS IRON) 27-0.8 MG TABS    Take 1 tablet by mouth daily   Modified Medications    No medications on file   Discontinued Medications    CLOTRIMAZOLE (LOTRIMIN) 1 % CREAM    Apply topically 2 times daily          Allergies   Allergen Reactions     Lemon Flavor      Mustard Seed      Onion Difficulty breathing and GI Disturbance     Dawson GI Disturbance     Penicillins Hives     Vanilla GI Disturbance       Review of Systems   Constitutional: Negative for chills and fever.    Respiratory: Negative for shortness of breath.    Cardiovascular: Positive for chest pain and palpitations.   Gastrointestinal: Negative for abdominal pain, diarrhea, nausea and vomiting.   Genitourinary: Negative for dysuria and hematuria.   Skin: Negative for rash.   Neurological: Negative for headaches.       Physical Exam   BP: 134/71  Heart Rate: 69  Temp: 97.7  F (36.5  C)  Resp: 18  Weight: 89.4 kg (197 lb)  SpO2: 96 %  Physical Exam   Constitutional: She is oriented to person, place, and time. No distress.   HENT:   Head: Normocephalic and atraumatic.   Mouth/Throat: Oropharynx is clear and moist. No oropharyngeal exudate.   Eyes: Conjunctivae are normal. Pupils are equal, round, and reactive to light.   Neck: Normal range of motion. Neck supple.   Cardiovascular: Normal rate and intact distal pulses.    Pulmonary/Chest: Effort normal. No respiratory distress. She has no wheezes. She has no rales. She exhibits no tenderness.   Abdominal: Soft. There is no tenderness. There is no rebound and no guarding.   Musculoskeletal: Normal range of motion. She exhibits no edema.   Neurological: She is alert and oriented to person, place, and time.   Skin: Skin is warm and dry. No rash noted. She is not diaphoretic.   Psychiatric: She has a normal mood and affect. Her behavior is normal. Thought content normal.   Nursing note and vitals reviewed.      ED Course     ED Course     Procedures       12:20 AM  The patient was seen and examined by Jameson Bro MD in Room 3.          EKG Interpretation:      Interpreted by Jameson Bro  Time reviewed: 030  Symptoms at time of EKG: chest pain  Rhythm: normal sinus   Rate: normal  Axis: normal  Ectopy: none  Conduction: normal  ST Segments/ T Waves: No ST-T wave changes  Q Waves: none  Comparison to prior: Unchanged    Clinical Impression: normal EKG          Critical Care time:  none               Labs Ordered and Resulted from Time of ED Arrival Up to the Time of  Departure from the ED - No data to display         Assessments & Plan (with Medical Decision Making)   1.  Chest pain    26 yo F who presents with heart flutters and chest pain.  She has had some variation of the symptoms for several months.  Her EKG and troponin showed no evidence of ischemia.  A chest xray was normal.  She has no risk factors for PE and her vital signs are normal.  Given the chronicity of her symptoms, she will be discharged with primary care follow up for possible outpatient stress test or other evaluation.    I have reviewed the nursing notes.    I have reviewed the findings, diagnosis, plan and need for follow up with the patient.    New Prescriptions    No medications on file       Final diagnoses:   None     IRubens, am serving as a trained medical scribe to document services personally performed by Jameson Bro MD, based on the provider's statements to me.   Jameson SANDOVAL MD, was physically present and have reviewed and verified the accuracy of this note documented by Rubens Quezada.  5/19/2017   [facility], [place], EMERGENCY DEPARTMENT     Jameson Bro MD  05/19/17 8439

## 2017-05-19 NOTE — DISCHARGE INSTRUCTIONS
Please make an appointment to follow up with Your Primary Care Provider as soon as possible.       *CHEST PAIN, UNCERTAIN CAUSE    Based on your exam today, the exact cause of your chest pain is not certain. Your condition does not seem serious at this time, and your pain does not appear to be coming from your heart. However, sometimes the signs of a serious problem take more time to appear. Therefore, watch for the warning signs listed below.  HOME CARE:  1. Rest today and avoid strenuous activity.  2. Take any prescribed medicine as directed.  FOLLOW UP with your doctor in 1-3 days.   GET PROMPT MEDICAL ATTENTION if any of the following occur:    A change in the type of pain: if it feels different, becomes more severe, lasts longer, or begins to spread into your shoulder, arm, neck, jaw or back    Shortness of breath or increased pain with breathing    Weakness, dizziness, or fainting    Cough with blood or dark colored sputum (phlegm)    Fever over 101  F (38.3  C)    Swelling, pain or redness in one leg    [id number] [place] [address]. All rights reserved. This information is not intended as a substitute for professional medical care. Always follow your healthcare professional's instructions.

## 2017-06-06 ENCOUNTER — OFFICE VISIT (OUTPATIENT)
Dept: DERMATOLOGY | Facility: CLINIC | Age: 37
End: 2017-06-06

## 2017-06-06 VITALS — DIASTOLIC BLOOD PRESSURE: 68 MMHG | SYSTOLIC BLOOD PRESSURE: 123 MMHG | HEART RATE: 66 BPM

## 2017-06-06 DIAGNOSIS — L70.8 OTHER ACNE: Primary | ICD-10-CM

## 2017-06-06 DIAGNOSIS — D22.9 BENIGN NEVUS: ICD-10-CM

## 2017-06-06 RX ORDER — CLINDAMYCIN PHOSPHATE 10 UG/ML
LOTION TOPICAL DAILY
Qty: 60 ML | Refills: 5 | Status: SHIPPED | OUTPATIENT
Start: 2017-06-06 | End: 2017-08-23

## 2017-06-06 ASSESSMENT — PAIN SCALES - GENERAL: PAINLEVEL: NO PAIN (0)

## 2017-06-06 NOTE — NURSING NOTE
"Dermatology Rooming Note    Priyanka Lundberg's goals for this visit include:   Chief Complaint   Patient presents with     Derm Problem     Priyanka comes to clinic today for a skin exam. States \"I wanted some moles checked.\"     Claudia Short, [facility]    "

## 2017-06-06 NOTE — MR AVS SNAPSHOT
After Visit Summary   6/6/2017    Priyanka Lundberg    MRN: [id number]           Patient Information     Date Of Birth          1980        Visit Information        Provider Department      6/6/2017 4:00 PM Richelle Braden MD [facility]        Today's Diagnoses     Other acne    -  1      Care Instructions    Try non-comedogenic makeup lines. Almay    Dermatology Bleach Bath instructions--reduces bacteria and inflammation    Type: Regular bleach used for clothing    1/4 cup concentrated bleach  or 1/2 cup plain bleach for a full tub 2- 3 times weekly        If child is swimming at least 2 - 3 times weekly bleach baths are not needed.     The ABCDEs of Melanoma    Skin cancer can develop anywhere on the skin. Ask someone for help when checking your skin, especially in hard to see places. If you notice a mole different from others, or that changes, enlarges, itches, or bleeds (even if it is small), you should see a dermatologist.          Recommendations for dry skin and dermatitis   1. Bathe or shower daily in lukewarm water  2. Use a gentle non-soap detergent cleanser  - Soaps are alkaline (which can irritate sensitive skin) and remove natural moisturizing factors   - Recommended products, in no particular order, include:   - Bars:    - Aveeno Moisturizing Bar    - Cetaphil Gentle Cleansing Bar    - Dove Sensitive Skin Unscented Beauty Bar    - Olay Ultra Moisture Bar   - Liquid Cleansers:    - Aquanil Cleanser    - CeraVe Hydrating Cleanser    - Cetaphil Gentle Skin Cleanser  - Avoid scented soaps or bath additives unless your doctor tells you otherwise  - Focus on washing the face, underarms, and underwear areas; other sites usually do not need frequent washing  3. Rinse off thoroughly, then pat dry until skin is slightly damp  4. Apply moisturizer to damp skin within 3-5 minutes of exiting the bath/shower  - Recommended products, in no particular order, include:   - Lotions  (thinner/lighter, but may be less effective)    - AmLactin Cerapeutic Restoring Body Lotion    - CeraVe Facial Moisturizing Lotion (AM and/or PM)    - Lubriderm Advanced Therapy Lotion   - Creams (thicker, likely the best balance of effectiveness and feel)    - AmLactin Ultra Hydrating Body Cream    - Aveeno Eczema Therapy Moisturizing Cream    - Aveeno Eczema Therapy Itch Relief Balm    - CeraVe Itch Relief Moisturizing Cream   - Ointments (thickest)    - Vaseline  5. If prescribed a topical steroid medication, this may be applied before or after the moisturizer (whichever order you prefer)  6. Reapply moisturizer one or two additional times throughout the day when dry skin is present; once this improves, reduce to daily or every other day as needed to prevent recurrence  7. If dry skin or dermatitis is present on the hands, keep moisturizer near the sink and apply after washing and drying your hands  8. A humidifier may be helpful during the winter months (when ambient humidity is very low)            Follow-ups after your visit        Follow-up notes from your care team     Return in about 6 months (around 12/6/2017).      Your next 10 appointments already scheduled     Jul 03, 2017  3:00 PM CDT   New Patient Visit with Tori Denise, PhD    [facility]  ([facility])    [place] [address]  [address] [id number] [phone]              Who to contact     Please call your clinic at [phone] to:    Ask questions about your health    Make or cancel appointments    Discuss your medicines    Learn about your test results    Speak to your doctor   If you have compliments or concerns about an experience at your clinic, or if you wish to file a complaint, please contact [facility] Patient Relations at [phone] or email us at [email or web address]         Additional Information About Your Visit         [employer] Information     [employer] gives you secure access to your electronic health record. If you see a primary care provider, you can also send messages to your care team and make appointments. If you have questions, please call your primary care clinic.  If you do not have a primary care provider, please call [phone] and they will assist you.      [employer] is an electronic gateway that provides easy, online access to your medical records. With [employer], you can request a clinic appointment, read your test results, renew a prescription or communicate with your care team.     To access your existing account, please contact your [facility] or call [phone] for assistance.        Care EveryWhere ID     This is your Care EveryWhere ID. This could be used by other organizations to access your [facility] medical records  [id number]        Your Vitals Were     Pulse                   66            Blood Pressure from Last 3 Encounters:   06/06/17 123/68   05/19/17 114/72   03/23/17 107/74    Weight from Last 3 Encounters:   05/19/17 89.4 kg (197 lb)   03/23/17 89.4 kg (197 lb 1.6 oz)   01/18/17 89.9 kg (198 lb 1.6 oz)              Today, you had the following     No orders found for display         Today's Medication Changes          These changes are accurate as of: 6/6/17  4:36 PM.  If you have any questions, ask your nurse or doctor.               Start taking these medicines.        Dose/Directions    clindamycin 1 % lotion   Commonly known as:  CLEOCIN T   Used for:  Other acne   Started by:  Richelle Braden MD        Apply topically daily   Quantity:  60 mL   Refills:  5            Where to get your medicines      These medications were sent to [facility] [address] [place] [address] & [address] [address]    Hours:  24-hours Phone:  [phone]     clindamycin 1 % lotion                 Primary Care Provider    Physician No Ref-Primary       No address on file        Thank you!     Thank you for choosing [facility] DERMATOLOGY  for your care. Our goal is always to provide you with excellent care. Hearing back from our patients is one way we can continue to improve our services. Please take a few minutes to complete the written survey that you may receive in the mail after your visit with us. Thank you!             Your Updated Medication List - Protect others around you: Learn how to safely use, store and throw away your medicines at www.disposemymeds.org.          This list is accurate as of: 6/6/17  4:36 PM.  Always use your most recent med list.                   Brand Name Dispense Instructions for use    cholecalciferol 5000 UNITS Caps capsule    vitamin D3    90 capsule    Take 1 capsule (5,000 Units) by mouth daily Take one capsule daily.       clindamycin 1 % lotion    CLEOCIN T    60 mL    Apply topically daily       ibuprofen 400 MG tablet    ADVIL/MOTRIN    60 tablet    Take 1-2 tablets (400-800 mg) by mouth every 6 hours as needed for other (cramping)       prenatal multivitamin  plus iron 27-0.8 MG Tabs per tablet     30 tablet    Take 1 tablet by mouth daily

## 2017-06-06 NOTE — LETTER
"6/6/2017       RE: Priyanka Lundberg  [address]  [place] [address]     Dear Colleague,    Thank you for referring your patient, Priyanka Lundberg, to the [facility] at [facility]. Please see a copy of my visit note below.    [facility] Dermatology Note      Dermatology Problem List:  1.Acne vulgaris  2. Family history of melanoma    Encounter Date: Jun 6, 2017    CC:   Chief Complaint   Patient presents with     Derm Problem     Priyanka comes to clinic today for a skin exam. States \"I wanted some moles checked.\"         History of Present Illness:  Ms. Priyanka Lundberg is a 37 year old female who presents as a referral from Dr. Ana Paula Mcmahan. She is here for evaluation of some dark spots. She also notes hx of problem skin. She notes tiny cysts all over her body that seem to get infected. She notes frequent ingrown hairs on her lower legs. Currently she uses gentle soap. She has tried edwin and fields and amlactin. She notes dark spots on her right hip and right shoulder. She denies any pain or bleeding. Does have some itching.     Past Medical History:   Patient Active Problem List   Diagnosis     Elderly primigravida in first trimester     HRP (high risk pregnancy), first trimester     First trimester bleeding     Gastrointestinal problem     Joint pain     L4-L5 disc bulge     Attention deficit hyperactivity disorder (ADHD), combined type     Depression with anxiety     Vaginal discharge     Supervision of high-risk pregnancy of elderly multigravida, second trimester     + GBS culture at 36 weeks: may not opt for IV abx. (risks reviewed)     Encounter for triage in pregnant patient     PROM (premature rupture of membranes)     Postpartum care and examination     History of abnormal cervical Pap smear     Past Medical History:   Diagnosis Date     Abdominal pain 2011    abnormal histamine problem, multiple food allergies     ADHD " (attention deficit hyperactivity disorder)     on aderol     Anxiety and depression     on xanax     Breathing problem 2007    shortness of breath after eating, ?allergies     Chronic nausea      Heart murmur     closed by time she was 1     Hx of abnormal cervical Pap smear 2011     IBS (irritable bowel syndrome)     lots of mucus in bowel with occassional bleeding     Joint pain 2015    was to have rheumatologist     Past Surgical History:   Procedure Laterality Date     NO HISTORY OF SURGERY         Social History:  The patient works as a . The patient denies use of tanning beds.    Family History:  Mother, aunt, and grandmother with melanoma.   Family history of atopy.     Medications:  Current Outpatient Prescriptions   Medication Sig Dispense Refill     cholecalciferol (VITAMIN D3) 5000 UNITS CAPS capsule Take 1 capsule (5,000 Units) by mouth daily Take one capsule daily. 90 capsule 3     ibuprofen (ADVIL,MOTRIN) 400 MG tablet Take 1-2 tablets (400-800 mg) by mouth every 6 hours as needed for other (cramping) 60 tablet 0     Prenatal Vit-Fe Fumarate-FA (PRENATAL MULTIVITAMIN  PLUS IRON) 27-0.8 MG TABS Take 1 tablet by mouth daily 30 tablet 12        Allergies   Allergen Reactions     Lemon Flavor      Mustard Seed      Onion Difficulty breathing and GI Disturbance     Bartholomew GI Disturbance     Penicillins Hives     Vanilla GI Disturbance         Review of Systems:  General: No recent infections, fevers, chills, malaise, arthralgias, unexplained weight/appetite changes  Skin: No new/changing moles, nothing bleeding/nonhealing/painful/tender/rapidly-growing.  Lymphatic: No subcutaneous lumps/bumps.      Physical exam:  Vitals: /68 (BP Location: Left arm, Patient Position: Chair, Cuff Size: Adult Regular)  Pulse 66  GEN: Well-appearing female, no discomfort or distress, cooperative with the exam  SKIN: Total skin excluding the undergarment areas was performed. The exam included the head/face,  neck, both arms, chest, back, abdomen, both legs, digits and/or nails.   -There are superifical acneiform papules with intermixed open and closed comedones on the face. Scattered inflammatory papules, resolving hyperpigmented macules on the lower legs upper extremities, chest, back.   -Few regular brown pigmented macules and papules are identified on the chest, back, upper extremities, right hip.   -Scattered brown macules on sun exposed areas.  -No other lesions of concern on areas examined.     Impression/Plan:  1. Acne vulgaris. Could be a hormonal component as patient complains of excessive hair growth on the face. Would consider checking hormone levels when she stops breast feeding. Likely also an intermittent component of folliculitis on the lower legs and upper extremities.  In the meantime, our treatment options are a bit limited. Will start topical clindamycin for the face and scattered areas on the body as needed if this is approved by her pediatrician. Discussed starting bleach baths as a safe, easy addition to her skin care regimen that will reduce bacterial load and incidence of secondary infections. Discussed gentle skin care and non-comedogenic makeup lines.   - clindamycin lotion daily prn  - bleach baths 2-3x per week    2. benign nevi  - ABCDs of melanoma were discussed and self skin checks were advised.   - encouraged daily sunscreen use      CC Dr. Ana Paula Mcmahan on close of this encounter.    Follow-up: 6 months.      Discussed and examined with [facility] staff dermatologist Dr. Richelle Braden.    Ana Paula Downing MD  PGY-2, Dermatology      Staff Involved:  Resident(Ana Paula Downing)/Staff(as above)      Patient was seen and examined with the dermatology resident. I agree with the history, review of systems, physical examination, assessments and plan.    Richelle Braden MD  Professor and  Chair  Department of Dermatology  [facility]

## 2017-06-06 NOTE — PATIENT INSTRUCTIONS
Try non-comedogenic makeup lines. [place]    Dermatology Bleach Bath instructions--reduces bacteria and inflammation    Type: Regular bleach used for clothing    1/4 cup concentrated bleach  or 1/2 cup plain bleach for a full tub 2- 3 times weekly        If child is swimming at least 2 - 3 times weekly bleach baths are not needed.     The ABCDEs of Melanoma    Skin cancer can develop anywhere on the skin. Ask someone for help when checking your skin, especially in hard to see places. If you notice a mole different from others, or that changes, enlarges, itches, or bleeds (even if it is small), you should see a dermatologist.          Recommendations for dry skin and dermatitis   1. Bathe or shower daily in lukewarm water  2. Use a gentle non-soap detergent cleanser  - Soaps are alkaline (which can irritate sensitive skin) and remove natural moisturizing factors   - Recommended products, in no particular order, include:   - Bars:    - Aveeno Moisturizing Bar    - Cetaphil Gentle Cleansing Bar    - Dove Sensitive Skin Unscented Beauty Bar    - Olay Ultra Moisture Bar   - Liquid Cleansers:    - Aquanil Cleanser    - CeraVe Hydrating Cleanser    - Cetaphil Gentle Skin Cleanser  - Avoid scented soaps or bath additives unless your doctor tells you otherwise  - Focus on washing the face, underarms, and underwear areas; other sites usually do not need frequent washing  3. Rinse off thoroughly, then pat dry until skin is slightly damp  4. Apply moisturizer to damp skin within 3-5 minutes of exiting the bath/shower  - Recommended products, in no particular order, include:   - Lotions (thinner/lighter, but may be less effective)    - AmLactin Cerapeutic Restoring Body Lotion    - CeraVe Facial Moisturizing Lotion (AM and/or PM)    - Lubriderm Advanced Therapy Lotion   - Creams (thicker, likely the best balance of effectiveness and feel)    - AmLactin Ultra Hydrating Body Cream    - Aveeno Eczema Therapy Moisturizing Cream    -  Aveeno Eczema Therapy Itch Relief Balm    - CeraVe Itch Relief Moisturizing Cream   - Ointments (thickest)    - Vaseline  5. If prescribed a topical steroid medication, this may be applied before or after the moisturizer (whichever order you prefer)  6. Reapply moisturizer one or two additional times throughout the day when dry skin is present; once this improves, reduce to daily or every other day as needed to prevent recurrence  7. If dry skin or dermatitis is present on the hands, keep moisturizer near the sink and apply after washing and drying your hands  8. A humidifier may be helpful during the winter months (when ambient humidity is very low)

## 2017-06-06 NOTE — PROGRESS NOTES
"[facility] Dermatology Note      Dermatology Problem List:  1.Acne vulgaris  2. Family history of melanoma    Encounter Date: Jun 6, 2017    CC:   Chief Complaint   Patient presents with     Derm Problem     Priyanka comes to clinic today for a skin exam. States \"I wanted some moles checked.\"         History of Present Illness:  Ms. Priyanka Lundberg is a 37 year old female who presents as a referral from Dr. Ana Paula Mcmahan. She is here for evaluation of some dark spots. She also notes hx of problem skin. She notes tiny cysts all over her body that seem to get infected. She notes frequent ingrown hairs on her lower legs. Currently she uses gentle soap. She has tried edwin and fields and amlactin. She notes dark spots on her right hip and right shoulder. She denies any pain or bleeding. Does have some itching.     Past Medical History:   Patient Active Problem List   Diagnosis     Elderly primigravida in first trimester     HRP (high risk pregnancy), first trimester     First trimester bleeding     Gastrointestinal problem     Joint pain     L4-L5 disc bulge     Attention deficit hyperactivity disorder (ADHD), combined type     Depression with anxiety     Vaginal discharge     Supervision of high-risk pregnancy of elderly multigravida, second trimester     + GBS culture at 36 weeks: may not opt for IV abx. (risks reviewed)     Encounter for triage in pregnant patient     PROM (premature rupture of membranes)     Postpartum care and examination     History of abnormal cervical Pap smear     Past Medical History:   Diagnosis Date     Abdominal pain 2011    abnormal histamine problem, multiple food allergies     ADHD (attention deficit hyperactivity disorder)     on aderol     Anxiety and depression     on xanax     Breathing problem 2007    shortness of breath after eating, ?allergies     Chronic nausea      Heart murmur     closed by time she was 1     Hx of abnormal cervical Pap smear 2011     IBS " (irritable bowel syndrome)     lots of mucus in bowel with occassional bleeding     Joint pain 2015    was to have rheumatologist     Past Surgical History:   Procedure Laterality Date     NO HISTORY OF SURGERY         Social History:  The patient works as a . The patient denies use of tanning beds.    Family History:  Mother, aunt, and grandmother with melanoma.   Family history of atopy.     Medications:  Current Outpatient Prescriptions   Medication Sig Dispense Refill     cholecalciferol (VITAMIN D3) 5000 UNITS CAPS capsule Take 1 capsule (5,000 Units) by mouth daily Take one capsule daily. 90 capsule 3     ibuprofen (ADVIL,MOTRIN) 400 MG tablet Take 1-2 tablets (400-800 mg) by mouth every 6 hours as needed for other (cramping) 60 tablet 0     Prenatal Vit-Fe Fumarate-FA (PRENATAL MULTIVITAMIN  PLUS IRON) 27-0.8 MG TABS Take 1 tablet by mouth daily 30 tablet 12        Allergies   Allergen Reactions     Lemon Flavor      Mustard Seed      Onion Difficulty breathing and GI Disturbance     Henderson GI Disturbance     Penicillins Hives     Vanilla GI Disturbance         Review of Systems:  General: No recent infections, fevers, chills, malaise, arthralgias, unexplained weight/appetite changes  Skin: No new/changing moles, nothing bleeding/nonhealing/painful/tender/rapidly-growing.  Lymphatic: No subcutaneous lumps/bumps.      Physical exam:  Vitals: /68 (BP Location: Left arm, Patient Position: Chair, Cuff Size: Adult Regular)  Pulse 66  GEN: Well-appearing female, no discomfort or distress, cooperative with the exam  SKIN: Total skin excluding the undergarment areas was performed. The exam included the head/face, neck, both arms, chest, back, abdomen, both legs, digits and/or nails.   -There are superifical acneiform papules with intermixed open and closed comedones on the face. Scattered inflammatory papules, resolving hyperpigmented macules on the lower legs upper extremities, chest, back.   -Few  regular brown pigmented macules and papules are identified on the chest, back, upper extremities, right hip.   -Scattered brown macules on sun exposed areas.  -No other lesions of concern on areas examined.     Impression/Plan:  1. Acne vulgaris. Could be a hormonal component as patient complains of excessive hair growth on the face. Would consider checking hormone levels when she stops breast feeding. Likely also an intermittent component of folliculitis on the lower legs and upper extremities.  In the meantime, our treatment options are a bit limited. Will start topical clindamycin for the face and scattered areas on the body as needed if this is approved by her pediatrician. Discussed starting bleach baths as a safe, easy addition to her skin care regimen that will reduce bacterial load and incidence of secondary infections. Discussed gentle skin care and non-comedogenic makeup lines.   - clindamycin lotion daily prn  - bleach baths 2-3x per week    2. benign nevi  - ABCDs of melanoma were discussed and self skin checks were advised.   - encouraged daily sunscreen use      CC Dr. Ana Paula Mcmahan on close of this encounter.    Follow-up: 6 months.      Discussed and examined with [facility] staff dermatologist Dr. Richelle Braden.    Ana Paula Downing MD  PGY-2, Dermatology      Staff Involved:  Resident(Ana Paula Downing)/Staff(as above)      Patient was seen and examined with the dermatology resident. I agree with the history, review of systems, physical examination, assessments and plan.    Richelle Braden MD  Professor and  Chair  Department of Dermatology  [facility]

## 2017-07-03 ENCOUNTER — OFFICE VISIT (OUTPATIENT)
Dept: PSYCHOLOGY | Facility: CLINIC | Age: 37
End: 2017-07-03
Attending: PSYCHOLOGIST
Payer: COMMERCIAL

## 2017-07-03 DIAGNOSIS — F43.23 ADJUSTMENT DISORDER WITH MIXED ANXIETY AND DEPRESSED MOOD: Primary | ICD-10-CM

## 2017-07-03 PROCEDURE — 90791 PSYCH DIAGNOSTIC EVALUATION: CPT | Mod: ZP | Performed by: PSYCHOLOGIST

## 2017-07-03 NOTE — MR AVS SNAPSHOT
After Visit Summary   7/3/2017    Priyanka Lundberg    MRN: [id number]           Patient Information     Date Of Birth          1980        Visit Information        Provider Department      7/3/2017 3:00 PM Tori Denise, PhD [facility]         Today's Diagnoses     Adjustment disorder with mixed anxiety and depressed mood    -  1       Follow-ups after your visit        Your next 10 appointments already scheduled     Jul 31, 2017  3:00 PM CDT   Return Visit with Tori Denise PhD LP   [facility]  ([facility])    [facility]  [address]  [address]  [facility] [address]   [phone]            Aug 07, 2017  2:00 PM CDT   Return Visit with Tori Denise PhD LP   [facility]  ([facility])    [facility]  [address]  [address]  [facility] [address] [phone]            Aug 14, 2017  2:00 PM CDT   Return Visit with Tori Denise PhD    [facility]  ([facility])    [facility]  [address]  [address]  [facility] [address] [phone]            Aug 23, 2017  1:40 PM CDT   New Patient Visit with Sydney Ruiz MD   [facility] ([facility])    [address], [facility]  [facility] [address]   [phone]              Who to contact     Please call your clinic at [phone] to:    Ask questions about your health    Make or cancel appointments    Discuss your medicines    Learn about your test results    Speak to your doctor   If you have compliments or concerns about an experience at your clinic, or if you wish to file a complaint, please contact [facility] Patient Relations at [phone] or email us at [email or web address]         Additional Information About Your Visit        Clemente  Information     [email or web address] gives you secure access to your electronic health record. If you see a primary care provider, you can also send messages to your care team and make appointments. If you have questions, please call your primary care clinic.  If you do not have a primary care provider, please call [phone] and they will assist you.      [email or web address] is an electronic gateway that provides easy, online access to your medical records. With [email or web address], you can request a clinic appointment, read your test results, renew a prescription or communicate with your care team.     To access your existing account, please contact your [facility] or call [phone] for assistance.        Care EveryWhere ID     This is your Care EveryWhere ID. This could be used by other organizations to access your [facility] medical records  [id number]         Blood Pressure from Last 3 Encounters:   06/06/17 123/68   05/19/17 114/72   03/23/17 107/74    Weight from Last 3 Encounters:   05/19/17 89.4 kg (197 lb)   03/23/17 89.4 kg (197 lb 1.6 oz)   01/18/17 89.9 kg (198 lb 1.6 oz)              Today, you had the following     No orders found for display       Primary Care Provider    Physician No Ref-Primary       No address on file        Equal Access to Services     CHARISSE KERNS : Hadii sarah ku hadasho Soomaali, waaxda luqadaha, qaybta kaalmada adeegyada, cecy lopez . So [facility] [phone].    ATENCIÓN: Si habla español, tiene a urban disposición servicios gratuitos de asistencia lingüística. Llame al [phone].    We comply with applicable federal civil rights laws and [place] laws. We do not discriminate on the basis of race, color, national origin, age, disability sex, sexual orientation or gender identity.            Thank you!     Thank you for choosing [facility]   for your care. Our goal is always to provide you with excellent care. Hearing back from  our patients is one way we can continue to improve our services. Please take a few minutes to complete the written survey that you may receive in the mail after your visit with us. Thank you!             Your Updated Medication List - Protect others around you: Learn how to safely use, store and throw away your medicines at www.disposemymeds.org.          This list is accurate as of: 7/3/17  7:30 PM.  Always use your most recent med list.                   Brand Name Dispense Instructions for use Diagnosis    cholecalciferol 5000 UNITS Caps capsule    vitamin D3    90 capsule    Take 1 capsule (5,000 Units) by mouth daily Take one capsule daily.    Low vitamin D level       clindamycin 1 % lotion    CLEOCIN T    60 mL    Apply topically daily    Other acne       ibuprofen 400 MG tablet    ADVIL/MOTRIN    60 tablet    Take 1-2 tablets (400-800 mg) by mouth every 6 hours as needed for other (cramping)     (normal spontaneous vaginal delivery)       prenatal multivitamin  plus iron 27-0.8 MG Tabs per tablet     30 tablet    Take 1 tablet by mouth daily    HRP (high risk pregnancy), first trimester, Elderly primigravida in first trimester

## 2017-07-03 NOTE — PROGRESS NOTES
"INITIAL PSYCHOLOGY INTERVIEW AND TREATMENT PLAN (seen for 50 min)  Referred by: Self  Identifying information. Ms Lundberg is a 36yo  (2005) woman and mother of one 2yo son.  She was raised since the age of 8 yrs in [place], [place], attended college in [employer] (2137-3918/[employer]), return to [place] and on October 20, 2015, moved to [place] because she \"hated\" living in [place] (\"negative pit of depression\").  Her , Gregorio, whom she began dating in 1996, was born and raised in [place], initially objected to moving. The couple now live in [facility] in a loft/Art Space (\"love it\"). She has an older sister who lives in [place].  Parents live in [place]; both are described as \"alcoholic\"; mother has significant psychiatric problems; father is retired from the .  She is employed one day/week as a ; also works as a .  Her , whom she has know for 20+ yrs,  has a \"corporate job\" as a .  The couple had marital counseling in 2011.  She last saw an individual therapist in [place] (2011).  Information is estimated to be valid and reliable but incomplete.  Presenting problem.  \"attention deficit disorder. . ADD. . Short fuse. . Irritable with everyone. . Rage boiling up with my .  . say what's on my mind. . come across quirky\"  History of presenting problem.   ND#1.  Mood variability/emotional issues.  Ms Lundberg describes having medication for ADD in high school but not now because she is breast feeding.  She evidently was on Aderol for about 2 yrs while living in [place]. She describes needing to learn to manage her anger more effectively and that she has had \"anger my whole life\".   She describes herself as \"self-aware\", has had long-standing contact with mental health providers (\"in therapy since age 8 or 9\", \"grew up in the psych world\") because her mother is \"bi-polar and schizophrenic\".  She suggest that while living in [place] she " "was involved in drug use.   IMPRESSION. Challenging family history; reports more discontent in living in [place] than in living with her family.   AZ#2.  Relationship issues:  1) Frustration with .  Ms Lundberg notes how she sees herself as \"taking care of\" her .  She has become increasingly aware the past 10+ years that he is \"depressed\" and she feels as if she absorbs his mood.  She describes herself as \"walking on egg shells\" and that she is \"getting bratty\" and critical.   He complains about his job, which he \"hates\", but \"does nothing\" to change it or to change jobs.  She also describes him as \"amazing, sensitive, an alexx, loving, kind\".   2)  Mother.  Currently dying of cirrhosis which she reports of her having taken Depo coat; addicted to opiates, over weight (350#).  She sees her mother as her \"main frustration\".    3).  Few friends.  Sees herself as attracting \"dramatic\" people.  IMPRESSION. Frustration and dissatisfaction associated with relationships.  Additional family and personal history.  1)  - one of 5 children; \"hit by a care\", believes he has \"memory problems\".  Health.  \"terrible\" - arthritis, \"bad back\" (since age 15 yrs/soccer, dance team).  Has been referred for PT (\"helped if I did it\").  Mental status.  Ms. Lundberg came to the appointment with her infant son.  She was neatly/casually dressed, somewhat over weight, has red streaked hair, and presents herself in a spontaneous, intense, direct, and rapid manner. She is aware of and values direct communication.  She also values seeing a therapist - \"always. . someone to vent to, an anchor of sanity\".   Thought processes were somewhat scattered; it is difficult to assess to what extent as she was caring for her son at the same time as she was describing her concerns and this was distracting.  Attention, concentration, memory, orientation, and eye contact were satisfactory. Affect: intense.  Mood:  Dissatisfied, resentful/angry by " self-report, frustrated.  The overall impression is of an out-spoken, candid women who experienced growing up in a dysfunctional family; has taken steps to change her life; and is uncomfortable with her role in her own current family, including with her relationship with her .  Diagnosis    Axis I.  Adjustment disorder  Axis II.  Deferred  Axis III. Arthritis.  Axis IV. Psychosocial stress:  MODERATE - relationship issues, dysfuntional family of origin  Axis V.  GAF past yr:  85 Current GAF:  85  PLAN.  RT for follow up.  NOTE.  This is not until the end of July due to provider's schedule; this was discussed with her.

## 2017-07-08 PROBLEM — L70.8 OTHER ACNE: Status: ACTIVE | Noted: 2017-07-08

## 2017-07-08 PROBLEM — D22.9 BENIGN NEVUS: Status: ACTIVE | Noted: 2017-07-08

## 2017-08-23 ENCOUNTER — OFFICE VISIT (OUTPATIENT)
Dept: FAMILY MEDICINE | Facility: CLINIC | Age: 37
End: 2017-08-23

## 2017-08-23 VITALS
SYSTOLIC BLOOD PRESSURE: 116 MMHG | HEART RATE: 91 BPM | TEMPERATURE: 98.6 F | BODY MASS INDEX: 29.56 KG/M2 | DIASTOLIC BLOOD PRESSURE: 71 MMHG | OXYGEN SATURATION: 95 % | WEIGHT: 195.04 LBS | HEIGHT: 68 IN

## 2017-08-23 DIAGNOSIS — K59.09 OTHER CONSTIPATION: Primary | ICD-10-CM

## 2017-08-23 DIAGNOSIS — M79.10 MUSCLE ACHE: ICD-10-CM

## 2017-08-23 DIAGNOSIS — J35.8 TONSIL STONE: ICD-10-CM

## 2017-08-23 ASSESSMENT — PATIENT HEALTH QUESTIONNAIRE - PHQ9: SUM OF ALL RESPONSES TO PHQ QUESTIONS 1-9: 9

## 2017-08-23 ASSESSMENT — PAIN SCALES - GENERAL: PAINLEVEL: EXTREME PAIN (8)

## 2017-08-23 NOTE — PATIENT INSTRUCTIONS
Metamucil powder   Use it daily    Miralax powder  Use 1 cap daily x 4-6 days then back off if needed for loose stool And use 2-3x per week    Dulcolax Suppository from below to move stool out.     Irritated anus , itchy, use 1% hydrocortisone OINTMENT and apply at bedtime  Disposable wipes  External hemorrhoid  Soak in bath

## 2017-08-23 NOTE — MR AVS SNAPSHOT
After Visit Summary   8/23/2017    Priyanka Lundberg    MRN: [id number]           Patient Information     Date Of Birth          1980        Visit Information        Provider Department      8/23/2017 1:40 PM Sydney Ruiz MD [facility]        Today's Diagnoses     Other constipation    -  1    Tonsil stone        Muscle ache          Care Instructions    Metamucil powder   Use it daily    Miralax powder  Use 1 cap daily x 4-6 days then back off if needed for loose stool And use 2-3x per week    Dulcolax Suppository from below to move stool out.     Irritated anus , itchy, use 1% hydrocortisone OINTMENT and apply at bedtime  Disposable wipes  External hemorrhoid  Soak in bath          Follow-ups after your visit        Additional Services     GASTROENTEROLOGY ADULT REF CONSULT ONLY       Preferred Location:     Gilda Ballard   [facility] [phone]    Please be aware that coverage of these services is subject to the terms and limitations of your health insurance plan.  Call member services at your health plan with any benefit or coverage questions.  Any procedures must be performed at a [place] facility OR coordinated by your clinic's referral office.    Please bring the following with you to your appointment:    (1) Any X-Rays, CTs or MRIs which have been performed.  Contact the facility where they were done to arrange for  prior to your scheduled appointment.    (2) List of current medications   (3) This referral request   (4) Any documents/labs given to you for this referral            [facility] PT, HAND, AND CHIROPRACTIC REFERRAL       **This order will print in the [facility] Scheduling Office**    Physical Therapy, Hand Therapy and Chiropractic Care are available through:    *[facility]  *[facility]  *[facility]    Call one number to schedule at any of the above locations: [phone].    Your provider has  referred you to: Physical Therapy at [facility] or [facility] [place] [place]    Indication/Reason for Referral: upper back and neck pain  Onset of Illness: 2 yr  Therapy Orders: Evaluate and Treat  Special Programs: None  Special Request: Modalities: Myofascial release  None    [place] Tim      Additional Comments for the Therapist or Chiropractor: none    Please be aware that coverage of these services is subject to the terms and limitations of your health insurance plan.  Call member services at your health plan with any benefit or coverage questions.      Please bring the following to your appointment:    *Your personal calendar for scheduling future appointments  *Comfortable clothing            OTOLARYNGOLOGY REFERRAL       Your provider has referred you to: [facility] (Otolaryngology) - [place] [phone]  [email or web address]    Please be aware that coverage of these services is subject to the terms and limitations of your health insurance plan.  Call member services at your health plan with any benefit or coverage questions.      Please bring the following with you to your appointment:    (1) Any X-Rays, CTs or MRIs which have been performed.  Contact the facility where they were done to arrange for  prior to your scheduled appointment.   (2) List of current medications  (3) This referral request   (4) Any documents/labs given to you for this referral                  Future tests that were ordered for you today     Open Future Orders        Priority Expected Expires Ordered    XR Abdomen 2 Views Routine 8/23/2017 8/23/2018 8/23/2017            Who to contact     Please call your clinic at [phone] to:    Ask questions about your health    Make or cancel appointments    Discuss your medicines    Learn about your test results    Speak to your doctor   If you have compliments or concerns about an experience at your clinic, or if you wish to file  "a complaint, please contact [facility] Patient Relations at [phone] or email us at [email or web address]         Additional Information About Your Visit        [employer] Information     [employer] gives you secure access to your electronic health record. If you see a primary care provider, you can also send messages to your care team and make appointments. If you have questions, please call your primary care clinic.  If you do not have a primary care provider, please call [phone] and they will assist you.      [employer] is an electronic gateway that provides easy, online access to your medical records. With [employer], you can request a clinic appointment, read your test results, renew a prescription or communicate with your care team.     To access your existing account, please contact your [facility] or call [phone] for assistance.        Care EveryWhere ID     This is your Care EveryWhere ID. This could be used by other organizations to access your [place] medical records  [id number]        Your Vitals Were     Pulse Temperature Height Last Period Pulse Oximetry Breastfeeding?    91 98.6  F (37  C) (Oral) 5' 7.52\" (171.5 cm) 08/01/2017 (Exact Date) 95% Yes    BMI (Body Mass Index)                   30.08 kg/m2            Blood Pressure from Last 3 Encounters:   08/23/17 116/71   06/06/17 123/68   05/19/17 114/72    Weight from Last 3 Encounters:   08/23/17 195 lb 0.6 oz (88.5 kg)   05/19/17 197 lb (89.4 kg)   03/23/17 197 lb 1.6 oz (89.4 kg)              We Performed the Following     GASTROENTEROLOGY ADULT REF CONSULT ONLY     [facility] PT, HAND, AND CHIROPRACTIC REFERRAL     OTOLARYNGOLOGY REFERRAL        Primary Care Provider    Physician No Ref-Primary       No address on file        Equal Access to Services     CHARISSE KERNS : [place]ola [place], shima domingo, cecy ledbetter " ah. So [facility] [phone].    ATENCIÓN: Si habla sydnee, tiene a urban disposición servicios gratuitos de asistencia lingüística. Trey al [phone].    We comply with applicable federal civil rights laws and [place] laws. We do not discriminate on the basis of race, color, national origin, age, disability sex, sexual orientation or gender identity.            Thank you!     Thank you for choosing [facility]  for your care. Our goal is always to provide you with excellent care. Hearing back from our patients is one way we can continue to improve our services. Please take a few minutes to complete the written survey that you may receive in the mail after your visit with us. Thank you!             Your Updated Medication List - Protect others around you: Learn how to safely use, store and throw away your medicines at www.disposemymeds.org.          This list is accurate as of: 8/23/17  2:40 PM.  Always use your most recent med list.                   Brand Name Dispense Instructions for use Diagnosis    cholecalciferol 5000 UNITS Caps capsule    vitamin D3    90 capsule    Take 1 capsule (5,000 Units) by mouth daily Take one capsule daily.    Low vitamin D level       prenatal multivitamin plus iron 27-0.8 MG Tabs per tablet     30 tablet    Take 1 tablet by mouth daily    HRP (high risk pregnancy), first trimester, Elderly primigravida in first trimester

## 2017-08-23 NOTE — NURSING NOTE
"37 year old  Chief Complaint   Patient presents with     [facility] Care     Wants to set up new primary care.        Blood pressure 116/71, pulse 91, temperature 98.6  F (37  C), temperature source Oral, height 5' 7.52\" (171.5 cm), weight 195 lb 0.6 oz (88.5 kg), last menstrual period 08/01/2017, SpO2 95 %, currently breastfeeding. Body mass index is 30.08 kg/(m^2).  Patient Active Problem List   Diagnosis     Elderly primigravida in first trimester     HRP (high risk pregnancy), first trimester     First trimester bleeding     Gastrointestinal problem     Joint pain     L4-L5 disc bulge     Attention deficit hyperactivity disorder (ADHD), combined type     Depression with anxiety     Vaginal discharge     Supervision of high-risk pregnancy of elderly multigravida, second trimester     + GBS culture at 36 weeks: may not opt for IV abx. (risks reviewed)     Encounter for triage in pregnant patient     PROM (premature rupture of membranes)     Postpartum care and examination     History of abnormal cervical Pap smear     Other acne     Benign nevus       Wt Readings from Last 2 Encounters:   08/23/17 195 lb 0.6 oz (88.5 kg)   05/19/17 197 lb (89.4 kg)     BP Readings from Last 3 Encounters:   08/23/17 116/71   06/06/17 123/68   05/19/17 114/72         Current Outpatient Prescriptions   Medication     cholecalciferol (VITAMIN D3) 5000 UNITS CAPS capsule     Prenatal Vit-Fe Fumarate-FA (PRENATAL MULTIVITAMIN  PLUS IRON) 27-0.8 MG TABS     No current facility-administered medications for this visit.        Social History   Substance Use Topics     Smoking status: Never Smoker     Smokeless tobacco: Never Used     Alcohol use 0.0 oz/week     0 Standard drinks or equivalent per week      Comment: outside of pregnancy, social       There are no preventive care reminders to display for this patient.    Lab Results   Component Value Date    PAP NIL 09/08/2016       Chloé Lobo MA  August 23, 2017 2:05 PM    "

## 2017-08-23 NOTE — PROGRESS NOTES
Priyanka Lundberg is a 37 year old female here to establish care. She is for the following issues:    Abdominal pain/constipation  Priyanka is a 37-year-old woman who reports long-standing history of abdominal pain, bloating and constipation. This dates back to 2015. She had a workup in [place] with a gastroenterologist. They recommended a FODMAP diet  she admitted that she is not following this diet. At one point, and allergist told her she was having abdominal symptoms due to ingestion of peppers, onions, cantaloupe and peaches. Despite this news, she does not avoid these she reports a history of hemorrhoids and she wonders if she has prolapsing hemorrhoids. They are not painful or itchy. No current rectal bleeding. She gave birth to a baby boy one year ago. She is not currently using anything for constipation. We discussed using daily fiber, Miramax and Dulcolax suppositories if needed. She points to the right side of her abdomen and reports fullness and pressure their. Appetite has been slightly dampened but no vomiting.  She sometimes experiences a searing pain on the right hand side of her abdomen and is wondering if that is her gallbladder. Stools are sometimes pale and appearance but she has no stools and she does not correlate this sensation with intake of food. She reports she was supposed to have a colonoscopy 2 years ago but never went.    She stools 3x per week. Feels like her right lower abdomen is solid and uncomfortable. Sometimes passes little pellets, hx of hemorrhoids. Her mom had hx of prolapsed bowel.  She reports rectal leakage and has to wipe a lot. Rectal weston is irritated. Bright red. Was to have a colonoscopy (2 yrs ago) but it got cancelled. She only saw the GI doctors once in [place] but would like to find a GI doctor here.    Muscle Aches  She has a lot of muscular aching in her neck and upper back. She gets tension headaches. She sees a massage therapist. We discussed stress management and  "stretching exercises. She owns a therarcane. She is interested in myofascial release. She reports her right arm sometimes feels numb. No weakness    Tonsillar stones  She has a history of tonsillar stones and has recurrent throat irritation. Is occurring today, no current fever, no exudates. She is asking to see an ENT doctor to discuss. For now, I recommended warm saltwater gargles.    Prenatal counseling  She is currently breast-feeding her infant son. She is interested in becoming pregnant in the near future. She just resumed her menstrual cycles 2 months ago. Not currently using any contraception. She is on a prenatal vitamin.    HCM  she is up-to-date on pap, follows with OB[facility]GYN at the [facility]. Normal in 2016    Anxiety  She reports she has a history of anxiety and panic attacks. She is not on medication and she has not done any counseling. She reports that she eats when she is feeling stressed. Does not purge. She tells me her father was diagnosed yesterday with a neuroendocrine tumor. He is going to go to [facility]. He is 67 years old.    Social  , works as a hairdresser   Not sexually active  One son, born 7/2016    Habits  Tob: none  Etoh; None  Caffeine: 1/day  Calcium: cashew milk 2 per day    Allergies   She was told she was allergic to all peppers, onions, canteloupe, peaches, in [place]. This was diagnosed in 2015. She was told to eat a FODMAP diet. Allergist told her it caused abdominal inflammation, was having chronic bloating and constipation.    Lip complaint  She has a 6 month history of feeling a \"raw\" rough spot on upper right lip, no bleeding    PMH, PSH, FH, medications, allergies and immunizations are updated this visit.    Patient Active Problem List   Diagnosis     Gastrointestinal problem     Joint pain     L4-L5 disc bulge     Attention deficit hyperactivity disorder (ADHD), combined type     Depression with anxiety     Vaginal discharge     History of abnormal " "cervical Pap smear     Other acne     Benign nevus       Current Outpatient Prescriptions   Medication Sig Dispense Refill     cholecalciferol (VITAMIN D3) 5000 UNITS CAPS capsule Take 1 capsule (5,000 Units) by mouth daily Take one capsule daily. 90 capsule 3     Prenatal Vit-Fe Fumarate-FA (PRENATAL MULTIVITAMIN  PLUS IRON) 27-0.8 MG TABS Take 1 tablet by mouth daily 30 tablet 12       Allergies   Allergen Reactions     Food      Cantaloupe, cucumbers, green beans, and peppers.      Lemon Flavor      Mustard Seed      Onion Difficulty breathing and GI Disturbance     B[place] GI Disturbance     Penicillins Hives     Vanilla GI Disturbance        EXAM  /71 (BP Location: Right arm, Patient Position: Chair, Cuff Size: Adult Regular)  Pulse 91  Temp 98.6  F (37  C) (Oral)  Ht 5' 7.52\" (171.5 cm)  Wt 195 lb 0.6 oz (88.5 kg)  LMP 08/01/2017 (Exact Date)  SpO2 95%  Breastfeeding? Yes  BMI 30.08 kg/m2  Gen: Alert, pleasant, NAD, Overweight  HEENT:  Conjunctiva nl, TM normal bilaterally, OP clear. Tonsils are +1 bilaterally with pits, no exudates.  COR: S1,S2, no murmur  Lungs: CTA bilaterally, no rhonchi, wheezes or rales  Abdomen: generalized diffuse tenderness  Ext: no peripheral edema, pulses full  MS: point tenderness with palpation over SCM bilaterally, tight paracervical, subocciptial, trapezius m and rhomboids.     Assessment:  (K59.09) Other constipation  (primary encounter diagnosis)  Comment: chronic history of constipation  Plan: XR Abdomen 2 Views, GASTROENTEROLOGY ADULT REF         CONSULT ONLY        Recommend xray, daily fiber supplement, use of dulcolax suppository once or twice weekly. Daily use of Miralax.   Referred her on to see GI clinic.    (J35.8) Tonsil stone  Comment: longstanding history, local irritation with tonsillar stones  Plan: OTOLARYNGOLOGY REFERRAL        Refer to ENT for evaluation. Recommend warm salt water gargles    (M79.1) Muscle ache  Comment: neck and upper back " pain,  Plan: [facility] PT, HAND, AND CHIROPRACTIC REFERRAL        Refer for myofascial release with [facility] , demonstrated stretches today. Continue use of theracane    Total visit time today 40 minutes.  More than 50% of the time was spent in counseling on constipation, muscular aches, review of past history , family hx, surgical history.      Sydney Ruiz MD  Internal Medicine/Pediatrics

## 2017-09-19 ENCOUNTER — PRE VISIT (OUTPATIENT)
Dept: OTOLARYNGOLOGY | Facility: CLINIC | Age: 37
End: 2017-09-19

## 2017-09-19 NOTE — TELEPHONE ENCOUNTER
1.  Date/reason for appt: 10/6/17 - tonsil stones     2.  Referring provider: Dr. Sydney Ruiz    3.  Call to patient (Yes / No - short description): no    4.  Previous care at: [facility]

## 2017-09-22 ENCOUNTER — OFFICE VISIT (OUTPATIENT)
Dept: FAMILY MEDICINE | Facility: CLINIC | Age: 37
End: 2017-09-22

## 2017-09-22 VITALS
OXYGEN SATURATION: 94 % | TEMPERATURE: 98.1 F | HEIGHT: 68 IN | HEART RATE: 74 BPM | WEIGHT: 197.75 LBS | SYSTOLIC BLOOD PRESSURE: 95 MMHG | BODY MASS INDEX: 29.97 KG/M2 | DIASTOLIC BLOOD PRESSURE: 65 MMHG

## 2017-09-22 DIAGNOSIS — K64.4 EXTERNAL HEMORRHOIDS: ICD-10-CM

## 2017-09-22 DIAGNOSIS — R05.9 COUGH: Primary | ICD-10-CM

## 2017-09-22 DIAGNOSIS — Z23 NEEDS FLU SHOT: ICD-10-CM

## 2017-09-22 DIAGNOSIS — T78.40XS ALLERGIC REACTION, SEQUELA: ICD-10-CM

## 2017-09-22 RX ORDER — ALBUTEROL SULFATE 90 UG/1
2 AEROSOL, METERED RESPIRATORY (INHALATION) EVERY 4 HOURS PRN
Qty: 1 INHALER | Refills: 1 | Status: SHIPPED | OUTPATIENT
Start: 2017-09-22 | End: 2018-03-31

## 2017-09-22 ASSESSMENT — PAIN SCALES - GENERAL: PAINLEVEL: SEVERE PAIN (6)

## 2017-09-22 NOTE — NURSING NOTE
"37 year old  Chief Complaint   Patient presents with     Rectal Problem     Lesion     lip       Blood pressure 95/65, pulse 74, temperature 98.1  F (36.7  C), temperature source Oral, height 5' 7.52\" (171.5 cm), weight 197 lb 12 oz (89.7 kg), last menstrual period 08/01/2017, SpO2 94 %, currently breastfeeding. Body mass index is 30.5 kg/(m^2).  Patient Active Problem List   Diagnosis     Gastrointestinal problem     Joint pain     L4-L5 disc bulge     Attention deficit hyperactivity disorder (ADHD), combined type     Depression with anxiety     Vaginal discharge     History of abnormal cervical Pap smear     Other acne     Benign nevus       Wt Readings from Last 2 Encounters:   09/22/17 197 lb 12 oz (89.7 kg)   08/23/17 195 lb 0.6 oz (88.5 kg)     BP Readings from Last 3 Encounters:   09/22/17 95/65   08/23/17 116/71   06/06/17 123/68         Current Outpatient Prescriptions   Medication     cholecalciferol (VITAMIN D3) 5000 UNITS CAPS capsule     Prenatal Vit-Fe Fumarate-FA (PRENATAL MULTIVITAMIN  PLUS IRON) 27-0.8 MG TABS     No current facility-administered medications for this visit.        Social History   Substance Use Topics     Smoking status: Never Smoker     Smokeless tobacco: Never Used     Alcohol use 0.0 oz/week     0 Standard drinks or equivalent per week      Comment: outside of pregnancy, social       Health Maintenance Due   Topic Date Due     LIPID MONITORING Q1 YEAR  08/28/2013     INFLUENZA VACCINE (SYSTEM ASSIGNED)  09/01/2017       Lab Results   Component Value Date    PAP NIL 09/08/2016 September 22, 2017 4:36 PM    [facility] [place]      1.  Has the patient received the information for the influenza vaccine? YES    2.  Does the patient have any of the following contraindications?     Allergy to eggs? No     Allergic reaction to previous influenza vaccines? No     Any other problems to previous influenza vaccines? No     Paralyzed by Guillain-[place] " syndrome? No     Currently pregnant? NO     Current moderate or severe illness? No     Allergy to contact lens solution? No    3.  The vaccine has been administered in the usual fashion and the patient was instructed to wait 10 minutes before leaving the building in the event of an allergic reaction: YES    Vaccination given by Andrzej Ordonez CMA  .  Recorded by ANDRZEJ ORDONEZ

## 2017-09-22 NOTE — MR AVS SNAPSHOT
After Visit Summary   9/22/2017    Priyanka Lundberg    MRN: [id number]           Patient Information     Date Of Birth          1980        Visit Information        Provider Department      9/22/2017 4:20 PM Sydney Ruiz MD [facility]        Today's Diagnoses     Lesion of lip    -  1    Other constipation        Needs flu shot        Cough        Allergic reaction, sequela        External hemorrhoids          Care Instructions    Belly pain, constipation  Miralax powder, softens the stool   use 3-4 days in a row, then use 2-3 times a week    Fiber, continue daily  25-30gram per day      Hives on lip  Take Benadryl 25mg dose every 4-6 hr  (works fastest)  OR  Zyrtec 10mg daily OR Claritin 10mg    If you can't breathe, tongue swelling and throat swelling, call 911    Stomach pain, near the navel.   Think irritated stomach lining.  Could take Zantac or Prilosec daily for symptoms  TUMs is ok  Avoiding foods that trigger, like spicy, acidy food, citrus                Follow-ups after your visit        Additional Services     ALLERGY/ASTHMA ADULT REFERRAL       Your provider has referred you to:       [facility]: [facility] [place] [phone]   [email or web address]  [facility] [place] [phone]   [email or web address]    Please be aware that coverage of these services is subject to the terms and limitations of your health insurance plan.  Call member services at your health plan with any benefit or coverage questions.      Please bring the following with you to your appointment:    (1) Any X-Rays, CTs or MRIs which have been performed.  Contact the facility where they were done to arrange for  prior to your scheduled appointment.    (2) List of current medications  (3) This referral request   (4) Any documents/labs given to you for this referral            Colorectal Surgery Referral - [facility]       Your provider  has referred you to:     [facility] - [phone]  [place]    Please be aware that coverage of these services is subject to the terms and limitations of your health insurance plan.  Call member services at your health plan with any benefit or coverage questions.      Please bring the following to your appointment:    >>   Any x-rays, CTs or MRIs which have been performed.    >>   List of current medications   >>   This referral request   >>   Any documents/labs given to you for this referral                    Your next 10 appointments already scheduled     Oct 04, 2017  4:40 PM CDT   (Arrive by 4:25 PM)   [facility] with Ravi Roberts PT   [facility] ([facility])    [address] [place] [address] [address] [facility] [address] [phone]            Oct 06, 2017  4:00 PM CDT   (Arrive by 3:45 PM)   New Patient Visit with Rubens Anderson MD   [facility] ([facility])    [address] [place] [address] [phone]            Jan 03, 2018  5:30 PM CST   (Arrive by 5:15 PM)   New Patient Visit with SALLIE Casillas CNP   [facility] ([facility])    [address] [place] [address] [phone]              Who to contact     Please call your clinic at [phone] to:    Ask questions about your health    Make or cancel appointments    Discuss your medicines    Learn about your test results    Speak to your doctor   If you have compliments or concerns about an experience at your clinic, or if you wish to file a complaint, please contact [facility] Patient Relations at [phone] or email us at [email or web address]         Additional Information About Your Visit        MyChart Information     [employer] gives you secure access to your  "electronic health record. If you see a primary care provider, you can also send messages to your care team and make appointments. If you have questions, please call your primary care clinic.  If you do not have a primary care provider, please call [phone] and they will assist you.      [employer] is an electronic gateway that provides easy, online access to your medical records. With [employer], you can request a clinic appointment, read your test results, renew a prescription or communicate with your care team.     To access your existing account, please contact your [facility] or call [phone] for assistance.        Care EveryWhere ID     This is your Care EveryWhere ID. This could be used by other organizations to access your [facility] medical records  [id number]        Your Vitals Were     Pulse Temperature Height Last Period Pulse Oximetry BMI (Body Mass Index)    74 98.1  F (36.7  C) (Oral) 5' 7.52\" (171.5 cm) 08/01/2017 (Exact Date) 94% 30.5 kg/m2       Blood Pressure from Last 3 Encounters:   09/22/17 95/65   08/23/17 116/71   06/06/17 123/68    Weight from Last 3 Encounters:   09/22/17 197 lb 12 oz (89.7 kg)   08/23/17 195 lb 0.6 oz (88.5 kg)   05/19/17 197 lb (89.4 kg)              We Performed the Following     ALLERGY/ASTHMA ADULT REFERRAL     Colorectal [facility] Referral - [facility]     HC FLU VAC PRESRV FREE QUAD SPLIT VIR 3+YRS IM     INJECTION INTRAMUSCULAR OR SUB-Q          Today's Medication Changes          These changes are accurate as of: 9/22/17  5:15 PM.  If you have any questions, ask your nurse or doctor.               Start taking these medicines.        Dose/Directions    albuterol 108 (90 BASE) MCG/ACT Inhaler   Commonly known as:  PROAIR HFA/PROVENTIL HFA/VENTOLIN HFA   Used for:  Cough   Started by:  Sydney Ruiz MD        Dose:  2 puff   Inhale 2 puffs into the lungs every 4 hours as needed for shortness of breath / " dyspnea or wheezing   Quantity:  1 Inhaler   Refills:  1            Where to get your medicines      These medications were sent to [facility] [id number] [facility] - [place], [place] - [address] [facility]  [address] [facility] [address]     Phone:  [phone]     albuterol 108 (90 BASE) MCG/ACT Inhaler                Primary Care Provider Office Phone # Fax #    Sydney Crista Ruiz [phone]       [address] [place] [address]        Equal Access to Services     CHARISSE KERNS : Hadii aad ku hadasho Soomaali, waaxda luqadaha, qaybta kaalmada adeegyada, waxay idiin haypanchito lopez . So [facility] [phone].    ATENCIÓN: Si habla español, tiene a urban disposición servicios gratuitos de asistencia lingüística. [facility] [phone].    We comply with applicable federal civil rights laws and [place] laws. We do not discriminate on the basis of race, color, national origin, age, disability sex, sexual orientation or gender identity.            Thank you!     Thank you for choosing [facility]  for your care. Our goal is always to provide you with excellent care. Hearing back from our patients is one way we can continue to improve our services. Please take a few minutes to complete the written survey that you may receive in the mail after your visit with us. Thank you!             Your Updated Medication List - Protect others around you: Learn how to safely use, store and throw away your medicines at www.disposemymeds.org.          This list is accurate as of: 9/22/17  5:15 PM.  Always use your most recent med list.                   Brand Name Dispense Instructions for use Diagnosis    albuterol 108 (90 BASE) MCG/ACT Inhaler    PROAIR HFA/PROVENTIL HFA/VENTOLIN HFA    1 Inhaler    Inhale 2 puffs into the lungs every 4 hours as needed for shortness of breath / dyspnea or wheezing    Cough       cholecalciferol 5000 UNITS Caps capsule    vitamin D3    90 capsule    Take 1 capsule  (5,000 Units) by mouth daily Take one capsule daily.    Low vitamin D level       prenatal multivitamin plus iron 27-0.8 MG Tabs per tablet     30 tablet    Take 1 tablet by mouth daily    HRP (high risk pregnancy), first trimester, Elderly primigravida in first trimester

## 2017-09-22 NOTE — PATIENT INSTRUCTIONS
Belly pain, constipation  Miralax powder, softens the stool   use 3-4 days in a row, then use 2-3 times a week    Fiber, continue daily  25-30gram per day      Hives on lip  Take Benadryl 25mg dose every 4-6 hr  (works fastest)  OR  Zyrtec 10mg daily OR Claritin 10mg    If you can't breathe, tongue swelling and throat swelling, call 911    Stomach pain, near the navel.   Think irritated stomach lining.  Could take Zantac or Prilosec daily for symptoms  TUMs is ok  Avoiding foods that trigger, like spicy, acidy food, citrus

## 2017-09-22 NOTE — PROGRESS NOTES
Priyanka Lundberg is a 37 year old female here for the following issues:    Constipation   Priyanka is a 36 yo woman here for persistent constipation and hemorrhoids. She reports RUQ pain after eating fatty foods. Had ER visit this past May and xray was done . Points to navel and just to the right of her navel as to location of pain. No dysphagia.  She takes ibuprofen when she has discomfort.  She has used miralax and fiber supplements which seemed to help but she stopped using them. She would like to see colorectal surgeon as she is concerned she has prolapsing hemorrhoids. She has a previous history of C diff colitis, treated in 2014. She is worried she has food allergies.     Habits:  Caffeine 1/ day  Etoh: none  NSAID use occasionally    Viral symptoms  Feeling feverish today. Headaches off an on. Sometime so intense she will awaken from sleep.  Sometimes just a pain at the left parietal lobe. No vision changes, no vomiting. No weakness or paresthesias. She reports a previous hx of pleurisy. She has chest tightness and burning. She has used inhalers in the past. Denies current wheezing. She reports strong smells will trigger symptoms.     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 Father was just diagnosed with neuroendocrine tumor. She has a 2 yo child at home.  PHQ9 score = 6    Food allergies  She reports she is allergic to many foods such as onion, peaches, vanilla, mustard seed and lemon flavor. Also has problems with eating cantaloupe, cucumbers, green beans, peppers. She would like referral to see an allergist. She reports a history of developing hives over her top lip. Not present today, denies tongue swelling or difficulty breathing.     Patient Active Problem List   Diagnosis     Gastrointestinal problem     Joint pain     L4-L5 disc bulge     Attention deficit hyperactivity disorder  "(ADHD), combined type     Depression with anxiety     Vaginal discharge     History of abnormal cervical Pap smear     Other acne     Benign nevus       Current Outpatient Prescriptions   Medication Sig Dispense Refill     cholecalciferol (VITAMIN D3) 5000 UNITS CAPS capsule Take 1 capsule (5,000 Units) by mouth daily Take one capsule daily. 90 capsule 3     Prenatal Vit-Fe Fumarate-FA (PRENATAL MULTIVITAMIN  PLUS IRON) 27-0.8 MG TABS Take 1 tablet by mouth daily 30 tablet 12       Allergies   Allergen Reactions     Food      Cantaloupe, cucumbers, green beans, and peppers.      Lemon Flavor      Mustard Seed      Onion Difficulty breathing and GI Disturbance     [place] GI Disturbance     Penicillins Hives     Vanilla GI Disturbance        EXAM  BP 95/65 (BP Location: Right arm, Patient Position: Chair, Cuff Size: Adult Large)  Pulse 74  Temp 98.1  F (36.7  C) (Oral)  Ht 5' 7.52\" (171.5 cm)  Wt 197 lb 12 oz (89.7 kg)  LMP 08/01/2017 (Exact Date)  SpO2 94%  BMI 30.5 kg/m2  Gen: Alert, pleasant, NAD  HEENT: no lip or tongue swelling  Neck without LAD  COR: S1,S2, no murmur  Lungs: CTA bilaterally, no rhonchi, wheezes or rales  Abdomen: Soft, non tender, normal bowel sounds  Ext: no peripheral edema, pulses full      Assessment:  (R05) Cough (primary encourter dx)  Comment: chest tightness, cough  Plan: albuterol (PROAIR HFA/PROVENTIL HFA/VENTOLIN         HFA) 108 (90 BASE) MCG/ACT Inhaler        rx for albuterol MDI with spacer, discussed how to properly use medication    (Z23) Needs flu shot  Comment: per pt request  Plan: HC FLU VAC PRESRV FREE QUAD SPLIT VIR 3+YRS IM,        INJECTION INTRAMUSCULAR OR SUB-Q        given    (T78.40XS) Allergic reaction, sequela  Comment: hives over upper lip in the past. Sensitivity to lots of foods, vanilla  Plan: ALLERGY/ASTHMA ADULT REFERRAL        Refer to allergist for full evaluation , treatment plan    (K64.4) External hemorrhoids  Comment: longstanding issue  Plan: " [facility]        Recommend stool softeners, fiber supplement. Refer to colon and rectal surgeon to evaluate for prolapse    Sydney Ruiz MD  Internal Medicine/Pediatrics

## 2017-10-03 ASSESSMENT — PATIENT HEALTH QUESTIONNAIRE - PHQ9: SUM OF ALL RESPONSES TO PHQ QUESTIONS 1-9: 6

## 2017-11-22 ENCOUNTER — MYC MEDICAL ADVICE (OUTPATIENT)
Dept: FAMILY MEDICINE | Facility: CLINIC | Age: 37
End: 2017-11-22

## 2017-11-22 DIAGNOSIS — R20.2 PARESTHESIAS: Primary | ICD-10-CM

## 2017-11-22 DIAGNOSIS — R43.1 UNUSUAL SMELL IN NOSE: ICD-10-CM

## 2017-11-24 NOTE — TELEPHONE ENCOUNTER
KELLY Grimes,     I will send back to pt saying you are out until Monday, was thinking she could see another here today, but she is in [place] through this Holiday weekend.  I will Mychart her back that you are unavailable for comment until Monday. Also told her can go to ER where she is at if concerned about acuity of the headaches and changes in her body.       Radha

## 2017-12-09 ENCOUNTER — OFFICE VISIT (OUTPATIENT)
Dept: FAMILY MEDICINE | Facility: CLINIC | Age: 37
End: 2017-12-09

## 2017-12-09 VITALS
BODY MASS INDEX: 30.09 KG/M2 | DIASTOLIC BLOOD PRESSURE: 71 MMHG | TEMPERATURE: 97.7 F | OXYGEN SATURATION: 95 % | HEART RATE: 73 BPM | SYSTOLIC BLOOD PRESSURE: 121 MMHG | WEIGHT: 195.12 LBS

## 2017-12-09 DIAGNOSIS — J20.9 ACUTE BRONCHITIS, UNSPECIFIED ORGANISM: Primary | ICD-10-CM

## 2017-12-09 RX ORDER — AZITHROMYCIN 250 MG/1
TABLET, FILM COATED ORAL
Qty: 6 TABLET | Refills: 0 | Status: SHIPPED | OUTPATIENT
Start: 2017-12-09 | End: 2018-01-12

## 2017-12-09 RX ORDER — GUAIFENESIN 600 MG/1
TABLET, EXTENDED RELEASE ORAL
Qty: 30 TABLET | Refills: 0 | Status: SHIPPED | OUTPATIENT
Start: 2017-12-09 | End: 2018-01-12

## 2017-12-09 ASSESSMENT — PAIN SCALES - GENERAL: PAINLEVEL: NO PAIN (0)

## 2017-12-09 NOTE — PROGRESS NOTES
Piryanka Lundberg is a 37 year old female here for the following issues:    Cough   Priyanka started haivng a dry  cough 4 days ago. She is caring for her elderly parents. He father is currently undergoing chemotherapy for a neuroendocrine tumor. He dad was hospitalized this week due to high fever. Her mother was admitted to hospital at the [facility] . He mother has cirrhosis of the liver, she has bleeding complications .     No fever or chills, sore throat for a day.   Tingling in back and chest.  Albuterol inhaler, did not fill it yet  Chest  feels tight but no wheezing.   Some darker purulent cough.     Patient Active Problem List   Diagnosis     Gastrointestinal problem     Joint pain     L4-L5 disc bulge     Attention deficit hyperactivity disorder (ADHD), combined type     Depression with anxiety     Vaginal discharge     History of abnormal cervical Pap smear     Other acne     Benign nevus     History of Clostridium difficile colitis       Current Outpatient Prescriptions   Medication Sig Dispense Refill     albuterol (PROAIR HFA/PROVENTIL HFA/VENTOLIN HFA) 108 (90 BASE) MCG/ACT Inhaler Inhale 2 puffs into the lungs every 4 hours as needed for shortness of breath / dyspnea or wheezing 1 Inhaler 1     cholecalciferol (VITAMIN D3) 5000 UNITS CAPS capsule Take 1 capsule (5,000 Units) by mouth daily Take one capsule daily. 90 capsule 3     Prenatal Vit-Fe Fumarate-FA (PRENATAL MULTIVITAMIN  PLUS IRON) 27-0.8 MG TABS Take 1 tablet by mouth daily 30 tablet 12       Allergies   Allergen Reactions     Food      Cantaloupe, cucumbers, green beans, and peppers.      Lemon Flavor      Mustard Seed      Onion Difficulty breathing and GI Disturbance     [place] GI Disturbance     Penicillins Hives     Vanilla GI Disturbance        EXAM  /71 (BP Location: Right arm, Patient Position: Chair, Cuff Size: Adult Regular)  Pulse 73  Temp 97.7  F (36.5  C) (Oral)  Wt 195 lb 1.9 oz (88.5 kg)  LMP 11/20/2017 (Exact Date)   SpO2 95%  Breastfeeding? Yes  BMI 30.09 kg/m2  Gen: appears fatigued, but able to speak in full sentences  HEENT:  Conjunctiva nl, TM normal bilaterally, OP clear, no posterior erythema  COR: S1,S2, no murmur  Lungs: CTA bilaterally, no rhonchi, wheezes or rales      Assessment:  (J20.9) Acute bronchitis, unspecified organism  (primary encounter diagnosis)  Comment: coughing x 10 days, now with dark mucous, caring for elderly parents who are immunesupressed  Plan: azithromycin (ZITHROMAX) 250 MG tablet,         guaiFENesin (MUCINEX) 600 MG 12 hr tablet        Gave Rxbret MD if not feeling better over next 2-4 days    Sydney Ruiz MD  Internal Medicine/Pediatrics

## 2017-12-09 NOTE — NURSING NOTE
37 year old  Chief Complaint   Patient presents with     URI     Chest congestion and coughing up white and green phlegm. Onset Tuesday this week.       Blood pressure 121/71, pulse 73, temperature 97.7  F (36.5  C), temperature source Oral, weight 195 lb 1.9 oz (88.5 kg), last menstrual period 11/20/2017, SpO2 95 %, currently breastfeeding. Body mass index is 30.09 kg/(m^2).  Patient Active Problem List   Diagnosis     Gastrointestinal problem     Joint pain     L4-L5 disc bulge     Attention deficit hyperactivity disorder (ADHD), combined type     Depression with anxiety     Vaginal discharge     History of abnormal cervical Pap smear     Other acne     Benign nevus     History of Clostridium difficile colitis       Wt Readings from Last 2 Encounters:   12/09/17 195 lb 1.9 oz (88.5 kg)   09/22/17 197 lb 12 oz (89.7 kg)     BP Readings from Last 3 Encounters:   12/09/17 121/71   09/22/17 95/65   08/23/17 116/71         Current Outpatient Prescriptions   Medication     albuterol (PROAIR HFA/PROVENTIL HFA/VENTOLIN HFA) 108 (90 BASE) MCG/ACT Inhaler     cholecalciferol (VITAMIN D3) 5000 UNITS CAPS capsule     Prenatal Vit-Fe Fumarate-FA (PRENATAL MULTIVITAMIN  PLUS IRON) 27-0.8 MG TABS     No current facility-administered medications for this visit.        Social History   Substance Use Topics     Smoking status: Never Smoker     Smokeless tobacco: Never Used     Alcohol use 0.0 oz/week     0 Standard drinks or equivalent per week      Comment: outside of pregnancy, social       Health Maintenance Due   Topic Date Due     LIPID MONITORING Q1 YEAR  08/28/2013       Lab Results   Component Value Date    PAP NIL 09/08/2016       Chloé Lobo MA  December 9, 2017 10:30 AM

## 2017-12-09 NOTE — MR AVS SNAPSHOT
After Visit Summary   12/9/2017    Priyanka Lundberg    MRN: [id number]           Patient Information     Date Of Birth          1980        Visit Information        Provider Department      12/9/2017 10:20 AM Sydney Ruiz MD [facility]        Today's Diagnoses     Acute bronchitis, unspecified organism    -  1       Follow-ups after your visit        Your next 10 appointments already scheduled     Dec 28, 2017  7:40 AM CST   (Arrive by 7:25 AM)   [facility] Spine with Ayaka Mars PT   [facility] [place] ([facility])    [address] [place] [address] 5th [facility] [id number] [phone]            Jan 03, 2018  5:30 PM CST   (Arrive by 5:15 PM)   New Patient Visit with SALLIE Casillas [facility] ([facility])    [address] [place] [address]  4th [facility] [id number] [phone]            Jan 12, 2018  7:30 AM CST   (Arrive by 7:15 AM)   New Patient Visit with Demetri Denis MD   [facility] Neurology ([facility])    [address] [place] [address]  3rd [facility] [id number] [phone]            Jan 25, 2018  8:00 AM CST   (Arrive by 7:45 AM)   New Patient Visit with Diana Boothe MD   [facility] Nose [facility] Throat ([facility])    [address] [place] [address] [id number] [phone]              Who to contact     Please call your clinic at [phone] to:    Ask questions about your health    Make or cancel appointments    Discuss your medicines    Learn about your test results    Speak to your doctor   If you have compliments or concerns about an experience at your clinic, or if you wish to file a complaint, please contact [facility] Patient Relations at [phone] or email us at [email or web address]         Additional Information About  Your Visit        Glancehart Information     FreshT gives you secure access to your electronic health record. If you see a primary care provider, you can also send messages to your care team and make appointments. If you have questions, please call your primary care clinic.  If you do not have a primary care provider, please call [phone] and they will assist you.      FreshT is an electronic gateway that provides easy, online access to your medical records. With FreshT, you can request a clinic appointment, read your test results, renew a prescription or communicate with your care team.     To access your existing account, please contact your [facility] or call [phone] for assistance.        Care EveryWhere ID     This is your Care EveryWhere ID. This could be used by other organizations to access your [place] medical records  [id number]        Your Vitals Were     Pulse Temperature Last Period Pulse Oximetry Breastfeeding? BMI (Body Mass Index)    73 97.7  F (36.5  C) (Oral) 11/20/2017 (Exact Date) 95% Yes 30.09 kg/m2       Blood Pressure from Last 3 Encounters:   12/09/17 121/71   09/22/17 95/65   08/23/17 116/71    Weight from Last 3 Encounters:   12/09/17 195 lb 1.9 oz (88.5 kg)   09/22/17 197 lb 12 oz (89.7 kg)   08/23/17 195 lb 0.6 oz (88.5 kg)              Today, you had the following     No orders found for display         Today's Medication Changes          These changes are accurate as of: 12/9/17 10:46 AM.  If you have any questions, ask your nurse or doctor.               Start taking these medicines.        Dose/Directions    azithromycin 250 MG tablet   Commonly known as:  ZITHROMAX   Used for:  Acute bronchitis, unspecified organism   Started by:  Sydney Ruiz MD        Two tablets first day, then one tablet daily for four days.   Quantity:  6 tablet   Refills:  0       guaiFENesin 600 MG 12 hr tablet   Commonly known as:  MUCINEX   Used for:   Acute bronchitis, unspecified organism   Started by:  Sydney Ruiz MD        Take 600mg twice daily   Quantity:  30 tablet   Refills:  0            Where to get your medicines      These medications were sent to [facility] [address] [place], [place] - [address] [address]     Phone:  [phone]     azithromycin 250 MG tablet    guaiFENesin 600 MG 12 hr tablet                Primary Care Provider Office Phone # Fax #    Sydney Ruiz [phone]       [address] [place] [address]        Equal Access to Services     [facility]: Hadii aad ku hadasho Soomaali, waaxda luqadaha, qaybta kaalmada adeegyada, cecy lopez . So [facility] [phone].    ATENCIÓN: Si habla español, tiene a urban disposición servicios gratuitos de asistencia lingüística. [facility] [phone].    We comply with applicable federal civil rights laws and [place] laws. We do not discriminate on the basis of race, color, national origin, age, disability, sex, sexual orientation, or gender identity.            Thank you!     Thank you for choosing [facility]  for your care. Our goal is always to provide you with excellent care. Hearing back from our patients is one way we can continue to improve our services. Please take a few minutes to complete the written survey that you may receive in the mail after your visit with us. Thank you!             Your Updated Medication List - Protect others around you: Learn how to safely use, store and throw away your medicines at www.disposemymeds.org.          This list is accurate as of: 12/9/17 10:46 AM.  Always use your most recent med list.                   Brand Name Dispense Instructions for use Diagnosis    albuterol 108 (90 BASE) MCG/ACT Inhaler    PROAIR HFA/PROVENTIL HFA/VENTOLIN HFA    1 Inhaler    Inhale 2 puffs into the lungs every 4 hours as needed for shortness of breath / dyspnea or  wheezing    Cough       azithromycin 250 MG tablet    ZITHROMAX    6 tablet    Two tablets first day, then one tablet daily for four days.    Acute bronchitis, unspecified organism       cholecalciferol 5000 UNITS Caps capsule    vitamin D3    90 capsule    Take 1 capsule (5,000 Units) by mouth daily Take one capsule daily.    Low vitamin D level       guaiFENesin 600 MG 12 hr tablet    MUCINEX    30 tablet    Take 600mg twice daily    Acute bronchitis, unspecified organism       prenatal multivitamin plus iron 27-0.8 MG Tabs per tablet     30 tablet    Take 1 tablet by mouth daily    HRP (high risk pregnancy), first trimester, Elderly primigravida in first trimester

## 2017-12-18 NOTE — TELEPHONE ENCOUNTER
APPT INFO    Date /Time: 1/3/18  5:30PM    Reason for Appt: Sara ESTRADA    Ref Provider/Clinic: Other constipation   Are there internal records? Yes/No?  IF YES, list clinic names: [facility] Amor ESTRADA (referring) / Images in PACS      Are there outside records? Yes/No? Yes    Patient Contact (Y/N) & Call Details: No, pt has recs at [facility]   Action: Faxed cover sheet to re. recs      OUTSIDE RECORDS CHECKLIST     CLINIC NAME COMMENTS REC (x) IMG (x)   [facility]  Transferred recs scanned in Epic / Images in PACS  10/17/14, 6/17/15, 6/26/15  Received recs & fwd to [facility] Clinic X X

## 2017-12-19 NOTE — TELEPHONE ENCOUNTER
Records Received From:  [facility]     DATE/EXAM/LOCATION  (specify location if different)   Office Notes: 7/30/15, 6/17/15, 7/15/15, 6/11/15   Labs: 2015   Stool Test: 7/1/15

## 2017-12-28 ENCOUNTER — THERAPY VISIT (OUTPATIENT)
Dept: PHYSICAL THERAPY | Facility: CLINIC | Age: 37
End: 2017-12-28
Payer: COMMERCIAL

## 2017-12-28 DIAGNOSIS — M54.2 CERVICAL PAIN: Primary | ICD-10-CM

## 2017-12-28 DIAGNOSIS — M54.6 BILATERAL THORACIC BACK PAIN, UNSPECIFIED CHRONICITY: ICD-10-CM

## 2017-12-28 PROCEDURE — 97530 THERAPEUTIC ACTIVITIES: CPT | Mod: GP | Performed by: PHYSICAL THERAPIST

## 2017-12-28 PROCEDURE — 97110 THERAPEUTIC EXERCISES: CPT | Mod: GP | Performed by: PHYSICAL THERAPIST

## 2017-12-28 PROCEDURE — 97162 PT EVAL MOD COMPLEX 30 MIN: CPT | Mod: GP | Performed by: PHYSICAL THERAPIST

## 2017-12-28 NOTE — LETTER
"DEPARTMENT OF HEALTH AND HUMAN SERVICES  CENTERS FOR MEDICARE & MEDICAID SERVICES    PLAN/UPDATED PLAN OF PROGRESS FOR OUTPATIENT REHABILITATION    PATIENTS NAME:  Priyanka Lundberg     : 1980    PROVIDER NUMBER:    [id number]    [id number]     PROVIDER NAME: [facility] PHYSICAL THERAP[facility] [place]    MEDICAL RECORD NUMBER: [id number]     START OF CARE DATE:  SOC Date: 17   TYPE:  PT    PRIMARY/TREATMENT DIAGNOSIS: (Pertinent Medical Diagnosis)  Cervical pain  Bilateral thoracic back pain, unspecified chronicity    VISITS FROM START OF CARE:  Rxs Used: 1     KEY PT FINDINGS:  1) Decreased thoracic rotation bilaterally (left>right)  2) Impaired scapular strength  3) Trigger points and tight bands present in UT, LS and cervical paraspinals    Physical Therapy Initial Evaluation: Subjective History     Injury/Condition Details:  Presenting Complaint Body pain - neck, upper back, low back, hip   Onset Timing/Date 2017 - Date of most recent MD visit  2017 - Date of MD order  5 years ago - pain in the upper back   Mechanism Unknown reasons, cannot recall trauma. Reports has had 2+ decades of pain so she is used to the pain now.      Symptom Behavior Details    Primary Symptoms Constant symptoms; worsen with activity, pain (Location: \"Zigzag pattern\" right sided medial scapula to left lower neck and right upper neck - all reported to be knots, Quality: Sharp and Aching/Throbbing), stiffness, weakness. Numbness and tingling in the arms all the time (into index and pinky) and is increased with sleeping.    Worst Pain 10/10 (with \"mostly anything\")   Symptom Provocators Sleeping, reaching, lifting   Best Pain 5/10    Symptom Relievers Swimming, activity that is zero gravity   Time of day dependent? No   Recent symptom change? no change in symptoms         PATIENTS NAME:  Priyanka Lundberg   : 1980    Prior Testing/Intervention for current condition:  Prior Tests None   Prior Treatment PT -  in [place]      Lifestyle & General Medical History:  Employment  - 4 hours per week due to back pain.    Usual physical activities  (within past year) Has a 1 year old, does not exercise - due to lack of time and motivation.    Orthopaedic history 20 years ago back injury with hip injury.    Notable medical history See Epic Chart - overweight.      Objective:  Standing Alignment:    Cervical/Thoracic:  Forward head and thoracic kyphosis increased  Shoulder/UE:  Rounded shoulders  Flexibility/Screens:   Positive screens:  Cervical and ThoracicNegative screens: Shoulder   Upper Extremity:    Decreased left upper extremity flexibility at:  Pectoralis Major and Pectoralis Minor  Decreased right upper extremity flexibility present at:  Pectoralis Major and Pectoralis Minor  Spine:  Decreased left spine flexibility:  Upper Trap and Levator  Decreased right spine flexibility:  Upper Trap and Levator  Cervical/Thoracic Evaluation  AROM:  AROM Cervical:  Flexion:            No limitation  Extension:       No limitation  Rotation:         Left: Restricted but without pain     Right: No restriction, no pain  Side Bend:      Left: No restriction     Right:  No restriction  AROM Thoracic:  Flexion:               NT  Extension:          NT  Rotation:            Left: Significant restriction     Right: Significant restriction    Strength: Middle trap: 4-/5 bilaterally Rhomboid: 4-/5 bilaterally, Lower trap: 3/5 bilaterally  Headaches: none  Cervical Myotomes:  not assessed  Cervical Dermatomes:  not assessed  Cervical Palpation:    Tenderness present at Left:    Upper Trap; Levator; Erector Spinae and Suboccipitals  Tenderness present at Right:    Upper Trap; Levator; Erector Spinae and Suboccipitals    Assessment/Plan:    Patient is a 37 year old female with cervical and thoracic complaints.    Patient has the following significant findings with corresponding treatment plan.                Diagnosis 1:  Upper back  pain  Pain -  hot/cold therapy, manual therapy, STS, splint/taping/bracing/orthotics, self management and education  PATIENTS NAME:  Priyanka Lundberg   : 1980    Decreased ROM/flexibility - manual therapy, therapeutic exercise and home program  Decreased joint mobility - manual therapy, therapeutic exercise and home program  Decreased strength - therapeutic exercise, therapeutic activities and home program  Impaired muscle performance - neuro re-education and home program  Decreased function - therapeutic activities and home program  Impaired posture - neuro re-education and home program  Therapy Evaluation Codes:   1) History comprised of:   Personal factors that impact the plan of care:      Age, Coping style and Time since onset of symptoms.    Comorbidity factors that impact the plan of care are:      Pain at night/rest and Weakness.     Medications impacting care: None.  2) Examination of Body Systems comprised of:   Body structures and functions that impact the plan of care:      Cervical spine and Thoracic Spine.   Activity limitations that impact the plan of care are:      Bathing, Dressing, Lifting, Sleeping and Laying down.  3) Clinical presentation characteristics are:   Evolving/Changing.  4) Decision-Making    Moderate complexity using standardized patient assessment instrument and/or measureable assessment of functional outcome.  Cumulative Therapy Evaluation is: Moderate complexity.  Previous and current functional limitations:  (See Goal Flow Sheet for this information)    Short term and Long term goals: (See Goal Flow Sheet for this information)   Communication ability:  Patient appears to be able to clearly communicate and understand verbal and written communication and follow directions correctly.  Treatment Explanation - The following has been discussed with the patient:   RX ordered/plan of care  Anticipated outcomes  Possible risks and side effects  This patient would benefit from PT  "intervention to resume normal activities.   Rehab potential is good.  Frequency:  1 X week, once daily  Duration:  for 4 weeks  Discharge Plan:  Achieve all LTG.  Independent in home treatment program.  Reach maximal therapeutic benefit.  Please refer to the daily flowsheet for treatment today, total treatment time and time spent performing 1:1 timed codes.                     PATIENTS NAME:  Priyanka Lundberg   : 1980              Caregiver Signature/Credentials _____________________________ Date ________       Treating Provider: Ayaka Mars DPT, OCS   I have reviewed and certified the need for these services and plan of treatment while under my care.        PHYSICIAN'S SIGNATURE:   _____________________________________  Date___________    Sydney Ruiz MD    Certification period:  Beginning of Cert date period: 17 to  End of Cert period date: 18     Functional Level Progress Report: Please see attached \"Goal Flow sheet for Functional level.\"    ____X____ Continue Services or       ________ DC Services                Service dates: From  SOC Date: 17 date to present                         "

## 2017-12-28 NOTE — MR AVS SNAPSHOT
After Visit Summary   12/28/2017    Priyanka Lundberg    MRN: [id number]           Patient Information     Date Of Birth          1980        Visit Information        Provider Department      12/28/2017 7:40 AM Ayaka Mars PT [facility] [place]        Today's Diagnoses     Cervical pain    -  1    Bilateral thoracic back pain, unspecified chronicity           Follow-ups after your visit        Your next 10 appointments already scheduled     Jan 04, 2018  7:40 AM CST   [place] Spine with PARTHA Yun [facility] [place] ([facility])    [address] [place] [id number] [phone]            Jan 11, 2018  7:40 AM CST   [place] Spine with PARTHA Yun [facility] [place] ([facility])    [address] [place] [id number] [phone]            Jan 12, 2018  7:30 AM CST   (Arrive by 7:15 AM)   New Patient Visit with Demetri Denis MD   [facility] Neurology ([facility])    [address] [place] [id number] [phone]            Jan 25, 2018  8:00 AM CST   (Arrive by 7:45 AM)   New Patient Visit with Diana Boothe MD   [facility] Ear Nose and Throat ([facility])    [address] [place] [phone] [phone]            Feb 01, 2018  7:40 AM CST   [place] Spine with STEFFI Wiley [facility] [place] ([facility])    [address] [place] [address] [phone]              Who to contact     If you have questions or need follow up information about today's clinic visit or your schedule please contact [facility] PHYSICAL THERAPY [place] directly at [phone].  Normal or non-critical lab and imaging results will be communicated to you by MyChart, letter or phone  within 4 business days after the clinic has received the results. If you do not hear from us within 7 days, please contact the clinic through [employer] or phone. If you have a critical or abnormal lab result, we will notify you by phone as soon as possible.  Submit refill requests through [employer] or call your pharmacy and they will forward the refill request to us. Please allow 3 business days for your refill to be completed.          Additional Information About Your Visit        ScheduleSoftharAptana Information     [employer] gives you secure access to your electronic health record. If you see a primary care provider, you can also send messages to your care team and make appointments. If you have questions, please call your primary care clinic.  If you do not have a primary care provider, please call [phone] and they will assist you.        Care EveryWhere ID     This is your Care EveryWhere ID. This could be used by other organizations to access your [place] medical records  [id number]        Your Vitals Were     Last Period                   11/20/2017 (Exact Date)            Blood Pressure from Last 3 Encounters:   12/09/17 121/71   09/22/17 95/65   08/23/17 116/71    Weight from Last 3 Encounters:   12/09/17 88.5 kg (195 lb 1.9 oz)   09/22/17 89.7 kg (197 lb 12 oz)   08/23/17 88.5 kg (195 lb 0.6 oz)              We Performed the Following     HC PT EVAL, MODERATE COMPLEXITY     [place] CERT REPORT     THERAPEUTIC ACTIVITIES     THERAPEUTIC EXERCISES        Primary Care Provider Office Phone # Fax #    Sydney Crista Ruiz [phone]       [address] [place] [address]        Equal Access to Services     [facility]: Hadii aad ku hadasho Soomaali, waaxda luqadaha, qaybta kaalmada cecy sofia . So [facility] [phone].    ATENCIÓN: Si habla español, tiene a urban disposición servicios gratuitos de asistencia lingüística. Llame al [phone].    We comply  with applicable federal civil rights laws and [place] laws. We do not discriminate on the basis of race, color, national origin, age, disability, sex, sexual orientation, or gender identity.            Thank you!     Thank you for choosing [facility] PHYSICAL THERAPY [facility]  for your care. Our goal is always to provide you with excellent care. Hearing back from our patients is one way we can continue to improve our services. Please take a few minutes to complete the written survey that you may receive in the mail after your visit with us. Thank you!             Your Updated Medication List - Protect others around you: Learn how to safely use, store and throw away your medicines at www.disposemymeds.org.          This list is accurate as of: 12/28/17  2:21 PM.  Always use your most recent med list.                   Brand Name Dispense Instructions for use Diagnosis    albuterol 108 (90 BASE) MCG/ACT Inhaler    PROAIR HFA/PROVENTIL HFA/VENTOLIN HFA    1 Inhaler    Inhale 2 puffs into the lungs every 4 hours as needed for shortness of breath / dyspnea or wheezing    Cough       azithromycin 250 MG tablet    ZITHROMAX    6 tablet    Two tablets first day, then one tablet daily for four days.    Acute bronchitis, unspecified organism       cholecalciferol 5000 UNITS Caps capsule    vitamin D3    90 capsule    Take 1 capsule (5,000 Units) by mouth daily Take one capsule daily.    Low vitamin D level       guaiFENesin 600 MG 12 hr tablet    MUCINEX    30 tablet    Take 600mg twice daily    Acute bronchitis, unspecified organism       prenatal multivitamin plus iron 27-0.8 MG Tabs per tablet     30 tablet    Take 1 tablet by mouth daily    HRP (high risk pregnancy), first trimester, Elderly primigravida in first trimester

## 2017-12-28 NOTE — PROGRESS NOTES
"KEY PT FINDINGS:  1) Decreased thoracic rotation bilaterally (left>right)  2) Impaired scapular strength  3) Trigger points and tight bands present in UT, LS and cervical paraspinals    Physical Therapy Initial Evaluation: Subjective History     Injury/Condition Details:  Presenting Complaint Body pain - neck, upper back, low back, hip   Onset Timing/Date 12/19/2017 - Date of most recent MD visit  8/23/2017 - Date of MD order  5 years ago - pain in the upper back   Mechanism Unknown reasons, cannot recall trauma. Reports has had 2+ decades of pain so she is used to the pain now.      Symptom Behavior Details    Primary Symptoms Constant symptoms; worsen with activity, pain (Location: \"Zigzag pattern\" right sided medial scapula to left lower neck and right upper neck - all reported to be knots, Quality: Sharp and Aching/Throbbing), stiffness, weakness. Numbness and tingling in the arms all the time (into index and pinky) and is increased with sleeping.    Worst Pain 10/10 (with \"mostly anything\")   Symptom Provocators Sleeping, reaching, lifting   Best Pain 5/10    Symptom Relievers Swimming, activity that is zero gravity   Time of day dependent? No   Recent symptom change? no change in symptoms     Prior Testing/Intervention for current condition:  Prior Tests None   Prior Treatment PT - 2012/2013 in [place].      Lifestyle & General Medical History:  Employment  - 4 hours per week due to back pain.    Usual physical activities  (within past year) Has a 1 year old, does not exercise - due to lack of time and motivation.    Orthopaedic history 20 years ago back injury with hip injury.    Notable medical history See Epic Chart - overweight.        HPI                    Objective:  Standing Alignment:    Cervical/Thoracic:  Forward head and thoracic kyphosis increased  Shoulder/UE:  Rounded shoulders                  Flexibility/Screens:   Positive screens:  Cervical and ThoracicNegative screens: " Shoulder   Upper Extremity:    Decreased left upper extremity flexibility at:  Pectoralis Major and Pectoralis Minor    Decreased right upper extremity flexibility present at:  Pectoralis Major and Pectoralis Minor    Spine:  Decreased left spine flexibility:  Upper Trap and Levator    Decreased right spine flexibility:  Upper Trap and Levator                  Cervical/Thoracic Evaluation    AROM:  AROM Cervical:    Flexion:            No limitation  Extension:       No limitation  Rotation:         Left: Restricted but without pain     Right: No restriction, no pain  Side Bend:      Left: No restriction     Right:  No restriction  AROM Thoracic:    Flexion:               NT  Extension:          NT  Rotation:            Left: Significant restriction     Right: Significant restriction    Strength: Middle trap: 4-/5 bilaterally Rhomboid: 4-/5 bilaterally, Lower trap: 3/5 bilaterally  Headaches: none  Cervical Myotomes:  not assessed                      Cervical Dermatomes:  not assessed                    Cervical Palpation:    Tenderness present at Left:    Upper Trap; Levator; Erector Spinae and Suboccipitals  Tenderness present at Right:    Upper Trap; Levator; Erector Spinae and Suboccipitals                                                  General     ROS    Assessment/Plan:    Patient is a 37 year old female with cervical and thoracic complaints.    Patient has the following significant findings with corresponding treatment plan.                Diagnosis 1:  Upper back pain  Pain -  hot/cold therapy, manual therapy, STS, splint/taping/bracing/orthotics, self management and education  Decreased ROM/flexibility - manual therapy, therapeutic exercise and home program  Decreased joint mobility - manual therapy, therapeutic exercise and home program  Decreased strength - therapeutic exercise, therapeutic activities and home program  Impaired muscle performance - neuro re-education and home program  Decreased  function - therapeutic activities and home program  Impaired posture - neuro re-education and home program    Therapy Evaluation Codes:   1) History comprised of:   Personal factors that impact the plan of care:      Age, Coping style and Time since onset of symptoms.    Comorbidity factors that impact the plan of care are:      Pain at night/rest and Weakness.     Medications impacting care: None.  2) Examination of Body Systems comprised of:   Body structures and functions that impact the plan of care:      Cervical spine and Thoracic Spine.   Activity limitations that impact the plan of care are:      Bathing, Dressing, Lifting, Sleeping and Laying down.  3) Clinical presentation characteristics are:   Evolving/Changing.  4) Decision-Making    Moderate complexity using standardized patient assessment instrument and/or measureable assessment of functional outcome.  Cumulative Therapy Evaluation is: Moderate complexity.    Previous and current functional limitations:  (See Goal Flow Sheet for this information)    Short term and Long term goals: (See Goal Flow Sheet for this information)     Communication ability:  Patient appears to be able to clearly communicate and understand verbal and written communication and follow directions correctly.  Treatment Explanation - The following has been discussed with the patient:   RX ordered/plan of care  Anticipated outcomes  Possible risks and side effects  This patient would benefit from PT intervention to resume normal activities.   Rehab potential is good.    Frequency:  1 X week, once daily  Duration:  for 4 weeks  Discharge Plan:  Achieve all LTG.  Independent in home treatment program.  Reach maximal therapeutic benefit.    Please refer to the daily flowsheet for treatment today, total treatment time and time spent performing 1:1 timed codes.

## 2018-01-02 ENCOUNTER — CARE COORDINATION (OUTPATIENT)
Dept: GASTROENTEROLOGY | Facility: CLINIC | Age: 38
End: 2018-01-02

## 2018-01-02 NOTE — PROGRESS NOTES
Pt transferred from the triage line.  Pt said she has had an appt since August and was called and her appt was cancelled.  Pt rescheduled for 330 on January 3rd.

## 2018-01-03 ENCOUNTER — OFFICE VISIT (OUTPATIENT)
Dept: GASTROENTEROLOGY | Facility: CLINIC | Age: 38
End: 2018-01-03
Payer: COMMERCIAL

## 2018-01-03 ENCOUNTER — PRE VISIT (OUTPATIENT)
Dept: GASTROENTEROLOGY | Facility: CLINIC | Age: 38
End: 2018-01-03

## 2018-01-03 VITALS
OXYGEN SATURATION: 97 % | BODY MASS INDEX: 30.4 KG/M2 | WEIGHT: 200.6 LBS | SYSTOLIC BLOOD PRESSURE: 119 MMHG | HEART RATE: 80 BPM | TEMPERATURE: 98.3 F | HEIGHT: 68 IN | DIASTOLIC BLOOD PRESSURE: 63 MMHG

## 2018-01-03 DIAGNOSIS — R10.31 ABDOMINAL PAIN, RIGHT LOWER QUADRANT: ICD-10-CM

## 2018-01-03 DIAGNOSIS — F50.819 BINGE EATING DISORDER: ICD-10-CM

## 2018-01-03 DIAGNOSIS — K59.00 CONSTIPATION, UNSPECIFIED CONSTIPATION TYPE: ICD-10-CM

## 2018-01-03 DIAGNOSIS — R11.0 NAUSEA: Primary | ICD-10-CM

## 2018-01-03 DIAGNOSIS — K62.5 RECTAL BLEEDING: ICD-10-CM

## 2018-01-03 RX ORDER — POLYETHYLENE GLYCOL 3350 17 G/17G
1 POWDER, FOR SOLUTION ORAL DAILY
Qty: 510 G | Refills: 1 | Status: SHIPPED | OUTPATIENT
Start: 2018-01-03 | End: 2018-01-12

## 2018-01-03 ASSESSMENT — PAIN SCALES - GENERAL: PAINLEVEL: MODERATE PAIN (4)

## 2018-01-03 NOTE — PATIENT INSTRUCTIONS
"You have both lactose intolerance AND  You also have constipation from the dairy protein.    You have history of binge eating and of ADHD.    Referral is made for endocrinology : medical weight management.  These locations include [facility] and also [facility] (not sure where).    exercise  Decreases anxiety  Decreases pain  Deceases craving    You will need yoga/ movement/     Soluble fiber sops up water and gives soft stool and formed stool.    This also supports healthy gut bacteria.    Is a prebiotic that supports the \"pro-biotics\"  Psyllium (Metamucil) is most natural.   Powder - mix and drink immediately, -  or use the capsules or tablets.  or  Wheat dextrin (Benefiber) which has no taste or texture.   Available as powder, capsule, chewable.  When in doubt, return to powder.  If using the fiber discussed in clinic, start as discussed in clinic.  Or: Take the dose on the label.  If it causes distress, start at half the dose and work up to full dose.  If you have no improvement after two weeks, increase the dose and be sure to use it twice daily.  You may feel bloat at the beginning.   Improvement comes gradually.  Continue this regularly for a couple months before deciding whether it is helpful for you.    OR    At some point, when are you ready to eat nothing that you know causes reactions?    Colonoscopy and upper GI endoscopy (EGD) are recommended.  If you do not receive a call to schedule this within two business days, please call Endoscopy .     Return to GI Clinic 3 days after the biopsies. .        [facility]     Until February 22, 2018 [facility]   For follow-up appointments call Lilly [phone]  For GI Nurse Triage  [phone], then, \"For Medical Questions, option 3\"          "

## 2018-01-03 NOTE — NURSING NOTE
"Chief Complaint   Patient presents with     Consult     New Consultation       Vitals:    01/03/18 1535   BP: 119/63   BP Location: Left arm   Patient Position: Chair   Cuff Size: Adult Regular   Pulse: 80   Temp: 98.3  F (36.8  C)   SpO2: 97%   Weight: 200 lb 9.6 oz   Height: 5' 7.52\"       Body mass index is 30.94 kg/(m^2).    Kely Johnston                          "

## 2018-01-03 NOTE — TELEPHONE ENCOUNTER
APPT INFO    Date /Time: 1/12/18   Reason for Appt: Paresthesia/Smelling Things Not Present   Ref Provider/Clinic: Dr Ruiz Gibbon   Are there internal records? Yes/No?  IF YES, list clinic names: Yes  See Above   Are there outside records? Yes/No? No   Patient Contact (Y/N) & Call Details: No referred   Action: Reviewed records; Records are in EPIC     OUTSIDE RECORDS CHECKLIST     CLINIC NAME COMMENTS REC (x) IMG (x)

## 2018-01-03 NOTE — MR AVS SNAPSHOT
"              After Visit Summary   1/3/2018    Priyanka Lundberg    MRN: [id number]           Patient Information     Date Of Birth          1980        Visit Information        Provider Department      1/3/2018 3:30 PM Brooke Ballard, APRN CNP [facility]        Today's Diagnoses     Nausea    -  1    Abdominal pain, right lower quadrant        Binge eating disorder        Constipation, unspecified constipation type        Rectal bleeding          Care Instructions    You have both lactose intolerance AND  You also have constipation from the dairy protein.    You have history of binge eating and of ADHD.    Referral is made for endocrinology : medical weight management.  These locations include [facility] and also [facility] (not sure where).    exercise  Decreases anxiety  Decreases pain  Deceases craving    You will need yoga/ movement/     Soluble fiber sops up water and gives soft stool and formed stool.    This also supports healthy gut bacteria.    Is a prebiotic that supports the \"pro-biotics\"  Psyllium (Metamucil) is most natural.   Powder - mix and drink immediately, -  or use the capsules or tablets.  or  Wheat dextrin (Benefiber) which has no taste or texture.   Available as powder, capsule, chewable.  When in doubt, return to powder.  If using the fiber discussed in clinic, start as discussed in clinic.  Or: Take the dose on the label.  If it causes distress, start at half the dose and work up to full dose.  If you have no improvement after two weeks, increase the dose and be sure to use it twice daily.  You may feel bloat at the beginning.   Improvement comes gradually.  Continue this regularly for a couple months before deciding whether it is helpful for you.    OR    At some point, when are you ready to eat nothing that you know causes reactions?    Colonoscopy and upper GI endoscopy (EGD) are recommended.  If you do not receive a call to schedule this within two " "business days, please call Endoscopy .     Return to GI Clinic 3 days after the biopsies. .        [facility]     Until February 22, 2018 [facility]   For follow-up appointments call Lilly, [phone]  For GI Nurse Triage  [phone], then, \"For Medical Questions, option 3\"                  Follow-ups after your visit        Additional Services     ENDOCRINOLOGY ADULT REFERRAL       Your provider has referred you to: medical weight management    Please be aware that coverage of these services is subject to the terms and limitations of your health insurance plan.  Call member services at your health plan with any benefit or coverage questions.      Please bring the following to your appointment:    >>   Any x-rays, CTs or MRIs which have been performed.  Contact the facility where they were done to arrange for  prior to your scheduled appointment.  Any new CT, MRI or other procedures ordered by your specialist must be performed at a [facility] facility or coordinated by your clinic's referral office.    >>   List of current medications   >>   This referral request   >>   Any documents/labs given to you for this referral            GASTROENTEROLOGY ADULT REF PROCEDURE ONLY       Last Lab Result: Creatinine (mg/dL)       Date                     Value                 05/19/2017               0.72             ----------  Body mass index is 30.94 kg/(m^2).     Needed:  No  Language:  English    Patient will be contacted to schedule procedure.     Please be aware that coverage of these services is subject to the terms and limitations of your health insurance plan.  Call member services at your health plan with any benefit or coverage questions.  Any procedures must be performed at a [facility] facility OR coordinated by your clinic's referral office.    Please bring the following with you to your appointment:    (1) Any X-Rays, CTs or MRIs which have been performed. "  Contact the facility where they were done to arrange for  prior to your scheduled appointment.    (2) List of current medications   (3) This referral request   (4) Any documents/labs given to you for this referral                  Your next 10 appointments already scheduled     Jan 04, 2018  7:40 AM CST   [place] [facility] with Jia Kovacs PTA   [facility] [place] ([facility])    [address] [place] [address]   [phone]            Jan 11, 2018  7:40 AM CST   [place] [facility] with Jia Kovacs PTA   [facility] [place] ([facility])    [address] [place] [address]   [phone]            Jan 12, 2018  7:30 AM CST   (Arrive by 7:15 AM)   New Patient Visit with Demetri Denis MD   [facility] ([facility])    [address] [place] [address]  [facility] [address]   [phone]            Jan 25, 2018  8:00 AM CST   (Arrive by 7:45 AM)   New Patient Visit with Diana Boothe MD   [facility] ([facility])    [address] [place] [address]   [phone]            Feb 01, 2018  7:40 AM CST   [facility] with Ayaka Mars PT   [facility] [place] ([facility])    [address] [place] [address]   [phone]              Future tests that were ordered for you today     Open Future Orders        Priority Expected Expires Ordered    GASTROENTEROLOGY ADULT REF PROCEDURE ONLY Routine 1/12/2018 1/31/2018 1/3/2018    US Abdomen Limited Routine  1/3/2019 1/3/2018    Tissue transglutaminase antibody IgA Routine 1/12/2018 1/31/2018 1/3/2018    Deamidated Gliadin Peptide [place] IgA IgG Routine 1/12/2018 1/31/2018 1/3/2018    IgA Routine 1/12/2018 1/31/2018 1/3/2018    H pylori breath test Routine  "1/12/2018 1/31/2018 1/3/2018            Who to contact     Please call your clinic at [phone] to:    Ask questions about your health    Make or cancel appointments    Discuss your medicines    Learn about your test results    Speak to your doctor   If you have compliments or concerns about an experience at your clinic, or if you wish to file a complaint, please contact [facility] Patient Relations at [phone] or email us at [email or web address]         Additional Information About Your Visit        [employer] Information     [employer] gives you secure access to your electronic health record. If you see a primary care provider, you can also send messages to your care team and make appointments. If you have questions, please call your primary care clinic.  If you do not have a primary care provider, please call [phone] and they will assist you.      [employer] is an electronic gateway that provides easy, online access to your medical records. With [employer], you can request a clinic appointment, read your test results, renew a prescription or communicate with your care team.     To access your existing account, please contact your [facility] or call [phone] for assistance.        Care EveryWhere ID     This is your Care EveryWhere ID. This could be used by other organizations to access your [facility] medical records  [id number]        Your Vitals Were     Pulse Temperature Height Last Period Pulse Oximetry BMI (Body Mass Index)    80 98.3  F (36.8  C) 1.715 m (5' 7.52\") 11/20/2017 (Exact Date) 97% 30.94 kg/m2       Blood Pressure from Last 3 Encounters:   01/03/18 119/63   12/09/17 121/71   09/22/17 95/65    Weight from Last 3 Encounters:   01/03/18 91 kg (200 lb 9.6 oz)   12/09/17 88.5 kg (195 lb 1.9 oz)   09/22/17 89.7 kg (197 lb 12 oz)              We Performed the Following     ENDOCRINOLOGY ADULT REFERRAL          Today's Medication " Changes          These changes are accurate as of: 1/3/18  5:14 PM.  If you have any questions, ask your nurse or doctor.               Start taking these medicines.        Dose/Directions    polyethylene glycol powder   Commonly known as:  MIRALAX   Used for:  Constipation, unspecified constipation type   Started by:  Brooke Ballard APRN CNP        Dose:  1 capful   Take 17 g (1 capful) by mouth daily   Quantity:  510 g   Refills:  1            Where to get your medicines      These medications were sent to [place] [address] [place] [address]  [address] [facility] [address]     Phone:  [phone]     polyethylene glycol powder                Primary Care Provider Office Phone # Fax #    Sydney Ruiz [phone]       0 [address] [place] [address]        Equal Access to Services     CHARISSE KERNS : Nicola kwok Somilo, waaxda luqadaha, qaybta kaalmahollie sofia, cecy lopez . So [facility] [phone].    ATENCIÓN: Si habla español, tiene a urban disposición servicios gratuitos de asistencia lingüística. Trey al [phone].    We comply with applicable federal civil rights laws and [place] laws. We do not discriminate on the basis of race, color, national origin, age, disability, sex, sexual orientation, or gender identity.            Thank you!     Thank you for choosing [facility]  for your care. Our goal is always to provide you with excellent care. Hearing back from our patients is one way we can continue to improve our services. Please take a few minutes to complete the written survey that you may receive in the mail after your visit with us. Thank you!             Your Updated Medication List - Protect others around you: Learn how to safely use, store and throw away your medicines at www.disposemymeds.org.          This list is accurate as of: 1/3/18  5:14 PM.  Always use your  most recent med list.                   Brand Name Dispense Instructions for use Diagnosis    albuterol 108 (90 BASE) MCG/ACT Inhaler    PROAIR HFA/PROVENTIL HFA/VENTOLIN HFA    1 Inhaler    Inhale 2 puffs into the lungs every 4 hours as needed for shortness of breath / dyspnea or wheezing    Cough       azithromycin 250 MG tablet    ZITHROMAX    6 tablet    Two tablets first day, then one tablet daily for four days.    Acute bronchitis, unspecified organism       cholecalciferol 5000 UNITS Caps capsule    vitamin D3    90 capsule    Take 1 capsule (5,000 Units) by mouth daily Take one capsule daily.    Low vitamin D level       guaiFENesin 600 MG 12 hr tablet    MUCINEX    30 tablet    Take 600mg twice daily    Acute bronchitis, unspecified organism       polyethylene glycol powder    MIRALAX    510 g    Take 17 g (1 capful) by mouth daily    Constipation, unspecified constipation type       prenatal multivitamin plus iron 27-0.8 MG Tabs per tablet     30 tablet    Take 1 tablet by mouth daily    HRP (high risk pregnancy), first trimester, Elderly primigravida in first trimester

## 2018-01-03 NOTE — LETTER
"1/3/2018       RE: Priyanka Lundberg  [address]  [place] [address]     Dear Colleague,    Thank you for referring your patient, Priyanka Lundberg, to the [facility] at [facility]. Please see a copy of my visit note below.    CLINIC VISIT NOTE      CHIEF COMPLAINT:  Irritable bowel syndrome.      HISTORY OF PRESENT ILLNESS:  Priyanka (\" Villalobos-You'll \" ) is a 37-year-old woman given the diagnosis of IBS a couple years ago in [place], and advised to use the low-FODMAP diet.  She reports never having had a history of colonoscopy or upper GI endoscopy, but that colonoscopy was being planned as she moved to [place].      Priyanka has history of constipation and bloat, but above all has a dull right-sided pain at all times with nausea, which flares with increase in right-sided pain intensely for a few hours.  She has constant nausea for 4 years, but no vomiting as she does not allow it.  She may also have increase in nausea with ingestion, including even with water in the morning.  At times, she has an increase in the pain, the nausea, and then sees more blood on stool.  She knows that she does not tolerate bell peppers, vanilla or onion, as she has swelling of the lips from these.  She had allergy testing done with other foods listed as allergies, though she may tolerate some of them.      Priyanka has had bright red blood per rectum on a number of occasions, but not at all necessarily with firm or hard stool.  She does have hemorrhoids, but they have not felt as though there were active at the time of blood.  Rather, the bright red blood per rectum has seemed to occur after the intense increase in right lower quadrant pain.      Priyanka has tried the low-FODMAP diet for a while, and felt no better.  She does have increase in heartburn from some foods, but not others, but has not yet found what allows her abdomen to feel better.  She has joint " "pain always, and was told she has arthritis, though she is only 37-years old.  She did have a history of C. difficile colitis about 3 years ago, but none since.      MEDICAL HISTORY:  Reviewed and updated.  Attention deficit, anxiety and depression, joint pain, history of kidney stone, IBS, abdominal pain seeming separate from the IBS.  Chronic nausea of several years.      SURGICAL HISTORY:  Negative.     Medications: reviewed. Avoiding medication(s) for adhd since before pregnancy and now while lactating.  Adverse reactions: reviewed.     FAMILY HISTORY:  Father with rheumatoid arthritis and scleroderma, DM type 2, and others.  Mother with probable fibromyalgia, bipolar and schizophrenia, and others.  A sister with bipolar, anxiety, asthma, others.  Maternal grandmother with stomach cancer and skin cancer.  Paternal grandfather with colon cancer, age unknown.  Paternal aunt with scleroderma.      SOCIAL HISTORY:  Priyanka is a never tobacco user with low-dose social alcohol outside of pregnancy.  She does not use street drugs.  She is  with 1 son, 17 months.      REVIEW OF SYSTEMS:  Denies symptoms of acute illness or localized infection.  Describes difficulty in following a routine.  Describes low self-control with binge eating of unhealthy foods, including even foods she knows she may react to.  Reactions include redness and swelling of the lips from some foods.   Most significant as in HPI.  Not currently having any faintness or blackout type symptoms.  No numbness or tingling at this time.      OBJECTIVE:   VITAL SIGNS:  Reviewed.  BMI 31.  Has weighed higher once at least, has weight swings.  /63 (BP Location: Left arm, Patient Position: Chair, Cuff Size: Adult Regular)  Pulse 80  Temp 98.3  F (36.8  C)  Ht 1.715 m (5' 7.52\")  Wt 91 kg (200 lb 9.6 oz)  LMP 11/20/2017 (Exact Date)  SpO2 97%  BMI 30.94 kg/m2    GENERAL:  Clean, casually groomed and dressed, well-nourished woman who shows no " distress.  She is loquacious and represents herself well.   EYES:  Clear.   HEAD AND NECK:  Without adenopathy or nodularity.   BACK:  Without tenderness to palpitation or percussion.   CHEST:  Has good excursion and grossly normal breath sounds.   ABDOMEN:  Soft, minimally uncomfortable to firm palpation and percussion mid and left, tender to palpation in the right lower quadrant and mid right abdomen.  Phlegmon is not appreciated.   EXTREMITIES:  Without edema, cyanosis or clubbing.   and radial pulses are strong and equal.  Knee DTRs strong with good return.      RESULTS:  Where seen, all results are normal, including blood counts and basic metabolic panel.  This includes basic hemoglobin, WBC in 2015 and CBC in 2016, both seen through [facility].      ASSESSMENT:  A 37-year-old woman with a number of GI complaints including persistent low-level pain and tenderness in the right lower quadrant with exacerbations, which she associates with soft stool with blood and with increase in her nausea.  There is autoimmune disease in the family.  She has not had benefit from the low-FODMAP diet in the past, and has had only minimal benefit with avoiding foods that cause reactions.  Her food reactions that have been documented are oral allergy food symptoms.  She might possibly also have some abdominal food symptoms, though these are not clear.  She does have a history of erratic ingestion and binging, but never of bulimia.  Her weight has fluctuated significantly.  She has never had treatment for eating disorder, and attributes part of her eating disorder to her attention deficit.  She has not been on medication for attention deficit since just prior to pregnancy, and is still nursing at this time.      PLAN:   1.  Referral to Endocrinology for medical weight management.  She must decide when she will end her nursing in order to take medications.   2.  Laboratory studies to include tTG IgA, deamidated gliadin IgA  and IgG, IgA, H. pylori breath test.   3.  Ultrasound of the abdomen.   4.  Sed rate and CRP.   5.  MiraLax - provided as trial against the hard stool days.   6.  Gastroenterology referral for procedure, EGD and colonoscopy due to nausea and due to right lower quadrant pain, as well as bright red blood per rectum not associated with discomfort with her hemorrhoids, but rather with her right lower quadrant.      We discussed at some point having her really truly make an effort to avoid all foods that she knows she has a reaction to , as well as those to which she was told she has testable skin prick testing reaction to, at least for some time to establish which reactions represent real GI tract reaction.  I did not further detail food elimination and reintroduction.      Constantin was not earlier planning on an additional pregnancy or infant, but is now glad to be parenting with her , and they are discussing whether they may pursue that.      We discussed the assessment and plan, ways to eliminate or confirm any GI disease at this point prior to embarking on more aggressive management.  If she is indeed found to have inflammatory bowel disease, she will be referred to an IBD specialist within our group.      Total visit 45 minutes, with counseling time 25 minutes.         SALLIE BUTT CNP             D: 2018 16:37   T: 2018 07:40   MT: PRETTY      Name:     CONSTANTIN NUR   MRN:      [id number]        Account:      [id number]   :      1980           Service Date: 2018      Document: [id number]

## 2018-01-04 ENCOUNTER — THERAPY VISIT (OUTPATIENT)
Dept: PHYSICAL THERAPY | Facility: CLINIC | Age: 38
End: 2018-01-04
Payer: COMMERCIAL

## 2018-01-04 DIAGNOSIS — K59.00 CONSTIPATION, UNSPECIFIED CONSTIPATION TYPE: ICD-10-CM

## 2018-01-04 DIAGNOSIS — R11.0 NAUSEA: ICD-10-CM

## 2018-01-04 DIAGNOSIS — R10.31 ABDOMINAL PAIN, RIGHT LOWER QUADRANT: ICD-10-CM

## 2018-01-04 DIAGNOSIS — M54.6 BILATERAL THORACIC BACK PAIN, UNSPECIFIED CHRONICITY: ICD-10-CM

## 2018-01-04 DIAGNOSIS — M54.2 CERVICAL PAIN: ICD-10-CM

## 2018-01-04 LAB
CRP SERPL-MCNC: <2.9 MG/L (ref 0–8)
ERYTHROCYTE [SEDIMENTATION RATE] IN BLOOD BY WESTERGREN METHOD: 7 MM/H (ref 0–20)
IGA SERPL-MCNC: 192 MG/DL (ref 70–380)

## 2018-01-04 PROCEDURE — 97530 THERAPEUTIC ACTIVITIES: CPT | Mod: GP | Performed by: PHYSICAL THERAPY ASSISTANT

## 2018-01-04 PROCEDURE — 97110 THERAPEUTIC EXERCISES: CPT | Mod: GP | Performed by: PHYSICAL THERAPY ASSISTANT

## 2018-01-05 NOTE — PROGRESS NOTES
"CLINIC VISIT NOTE      CHIEF COMPLAINT:  Irritable bowel syndrome.      HISTORY OF PRESENT ILLNESS:  Priyanka (\" Villalobos-You'll \" ) is a 37-year-old woman given the diagnosis of IBS a couple years ago in [place], and advised to use the low-FODMAP diet.  She reports never having had a history of colonoscopy or upper GI endoscopy, but that colonoscopy was being planned as she moved to [place].      Priyanka has history of constipation and bloat, but above all has a dull right-sided pain at all times with nausea, which flares with increase in right-sided pain intensely for a few hours.  She has constant nausea for 4 years, but no vomiting as she does not allow it.  She may also have increase in nausea with ingestion, including even with water in the morning.  At times, she has an increase in the pain, the nausea, and then sees more blood on stool.  She knows that she does not tolerate bell peppers, vanilla or onion, as she has swelling of the lips from these.  She had allergy testing done with other foods listed as allergies, though she may tolerate some of them.      Priyanka has had bright red blood per rectum on a number of occasions, but not at all necessarily with firm or hard stool.  She does have hemorrhoids, but they have not felt as though there were active at the time of blood.  Rather, the bright red blood per rectum has seemed to occur after the intense increase in right lower quadrant pain.      Priyanka has tried the low-FODMAP diet for a while, and felt no better.  She does have increase in heartburn from some foods, but not others, but has not yet found what allows her abdomen to feel better.  She has joint pain always, and was told she has arthritis, though she is only 37-years old.  She did have a history of C. difficile colitis about 3 years ago, but none since.      MEDICAL HISTORY:  Reviewed and updated.  Attention deficit, anxiety and depression, joint pain, history of kidney stone, IBS, abdominal pain " "seeming separate from the IBS.  Chronic nausea of several years.      SURGICAL HISTORY:  Negative.     Medications: reviewed. Avoiding medication(s) for adhd since before pregnancy and now while lactating.  Adverse reactions: reviewed.     FAMILY HISTORY:  Father with rheumatoid arthritis and scleroderma, DM type 2, and others.  Mother with probable fibromyalgia, bipolar and schizophrenia, and others.  A sister with bipolar, anxiety, asthma, others.  Maternal grandmother with stomach cancer and skin cancer.  Paternal grandfather with colon cancer, age unknown.  Paternal aunt with scleroderma.      SOCIAL HISTORY:  Priyanka is a never tobacco user with low-dose social alcohol outside of pregnancy.  She does not use street drugs.  She is  with 1 son, 17 months.      REVIEW OF SYSTEMS:  Denies symptoms of acute illness or localized infection.  Describes difficulty in following a routine.  Describes low self-control with binge eating of unhealthy foods, including even foods she knows she may react to.  Reactions include redness and swelling of the lips from some foods.   Most significant as in HPI.  Not currently having any faintness or blackout type symptoms.  No numbness or tingling at this time.      OBJECTIVE:   VITAL SIGNS:  Reviewed.  BMI 31.  Has weighed higher once at least, has weight swings.  /63 (BP Location: Left arm, Patient Position: Chair, Cuff Size: Adult Regular)  Pulse 80  Temp 98.3  F (36.8  C)  Ht 1.715 m (5' 7.52\")  Wt 91 kg (200 lb 9.6 oz)  LMP 11/20/2017 (Exact Date)  SpO2 97%  BMI 30.94 kg/m2    GENERAL:  Clean, casually groomed and dressed, well-nourished woman who shows no distress.  She is loquacious and represents herself well.   EYES:  Clear.   HEAD AND NECK:  Without adenopathy or nodularity.   BACK:  Without tenderness to palpitation or percussion.   CHEST:  Has good excursion and grossly normal breath sounds.   ABDOMEN:  Soft, minimally uncomfortable to firm palpation " and percussion mid and left, tender to palpation in the right lower quadrant and mid right abdomen.  Phlegmon is not appreciated.   EXTREMITIES:  Without edema, cyanosis or clubbing.   and radial pulses are strong and equal.  Knee DTRs strong with good return.      RESULTS:  Where seen, all results are normal, including blood counts and basic metabolic panel.  This includes basic hemoglobin, WBC in 2015 and CBC in 2016, both seen through [facility].      ASSESSMENT:  A 37-year-old woman with a number of GI complaints including persistent low-level pain and tenderness in the right lower quadrant with exacerbations, which she associates with soft stool with blood and with increase in her nausea.  There is autoimmune disease in the family.  She has not had benefit from the low-FODMAP diet in the past, and has had only minimal benefit with avoiding foods that cause reactions.  Her food reactions that have been documented are oral allergy food symptoms.  She might possibly also have some abdominal food symptoms, though these are not clear.  She does have a history of erratic ingestion and binging, but never of bulimia.  Her weight has fluctuated significantly.  She has never had treatment for eating disorder, and attributes part of her eating disorder to her attention deficit.  She has not been on medication for attention deficit since just prior to pregnancy, and is still nursing at this time.      PLAN:   1.  Referral to Endocrinology for medical weight management.  She must decide when she will end her nursing in order to take medications.   2.  Laboratory studies to include tTG IgA, deamidated gliadin IgA and IgG, IgA, H. pylori breath test.   3.  Ultrasound of the abdomen.   4.  Sed rate and CRP.   5.  MiraLax - provided as trial against the hard stool days.   6.  Gastroenterology referral for procedure, EGD and colonoscopy due to nausea and due to right lower quadrant pain, as well as bright red blood per  rectum not associated with discomfort with her hemorrhoids, but rather with her right lower quadrant.      We discussed at some point having her really truly make an effort to avoid all foods that she knows she has a reaction to , as well as those to which she was told she has testable skin prick testing reaction to, at least for some time to establish which reactions represent real GI tract reaction.  I did not further detail food elimination and reintroduction.      Constantin was not earlier planning on an additional pregnancy or infant, but is now glad to be parenting with her , and they are discussing whether they may pursue that.      We discussed the assessment and plan, ways to eliminate or confirm any GI disease at this point prior to embarking on more aggressive management.  If she is indeed found to have inflammatory bowel disease, she will be referred to an IBD specialist within our group.      Total visit 45 minutes, with counseling time 25 minutes.         SALLIE BUTT CNP             D: 2018 16:37   T: 2018 07:40   MT: PRETTY      Name:     CONSTANTIN NUR   MRN:      [id number]        Account:      [id number]   :      1980           Service Date: 2018      Document: [id number]

## 2018-01-08 LAB
GLIADIN IGA SER-ACNC: 2 U/ML
GLIADIN IGG SER-ACNC: <1 U/ML
TTG IGA SER-ACNC: 1 U/ML

## 2018-01-12 ENCOUNTER — OFFICE VISIT (OUTPATIENT)
Dept: NEUROLOGY | Facility: CLINIC | Age: 38
End: 2018-01-12
Payer: COMMERCIAL

## 2018-01-12 ENCOUNTER — PRE VISIT (OUTPATIENT)
Dept: NEUROLOGY | Facility: CLINIC | Age: 38
End: 2018-01-12

## 2018-01-12 VITALS
OXYGEN SATURATION: 97 % | HEART RATE: 76 BPM | WEIGHT: 200.2 LBS | BODY MASS INDEX: 30.34 KG/M2 | HEIGHT: 68 IN | DIASTOLIC BLOOD PRESSURE: 78 MMHG | SYSTOLIC BLOOD PRESSURE: 117 MMHG

## 2018-01-12 DIAGNOSIS — G47.00 INSOMNIA, UNSPECIFIED TYPE: ICD-10-CM

## 2018-01-12 DIAGNOSIS — R11.0 NAUSEA: ICD-10-CM

## 2018-01-12 DIAGNOSIS — K59.00 CONSTIPATION, UNSPECIFIED CONSTIPATION TYPE: ICD-10-CM

## 2018-01-12 DIAGNOSIS — G44.219 EPISODIC TENSION-TYPE HEADACHE, NOT INTRACTABLE: Primary | ICD-10-CM

## 2018-01-12 ASSESSMENT — PAIN SCALES - GENERAL: PAINLEVEL: MODERATE PAIN (4)

## 2018-01-12 NOTE — PROGRESS NOTES
"REASON FOR VISIT:   This patient is a 37-year-old right-handed woman seen at the request of Dr. Ruiz for neurologic consultation with chief complaint of headache and also abnormal smells.        HISTORY OF PRESENT ILLNESS:   She reports that about 2 years ago she began to get headaches.  These would awaken her at 3:00 a.m. every night.  She then became pregnant.  Her sleep has become more broken up since she delivered her child.  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  She cannot say that she will awaken at night with the headache anymore because her disrupted sleep.  However, she continues to get the headache.  The headache evolved into a scalp tenderness.  She has headaches pretty much on a daily basis.  They are right temporal headache and also over the left eyebrow.  She describes it as a constant tension type headache.  She gets light flashes when she is trying to sleep.  Last weekend she was not feeling very well.  She was looking at cars.  She developed a \"television static\" in the right peripheral vision that lasted about 5 minutes.  She had had a headache both before and after this occurred.  Two summers ago she also had a similar episode of visual obscuration.  There was also a time when she was smelling cigarette smoke even though there was none in evidence.  Sometimes this would occur sporadically for an episode where her  might walk in and she would say she smelled like cigarette smoke, although he was not smoking.  Then, she had an episode that went on for about 2 weeks where she smelled it continuously.  That symptom seems to have resolved.  She does get a pins and needles in both her arms as well.  This is intermittent.  She has a sensation of sensory disturbance through her rib cage.  She continues to have these symptoms of sensory disturbance.  She does get occasional ringing in her ears.  She has no problem with speech, swallowing or with bowel or bladder control.  Her diet is " marginal.  Sometimes she has a good diet, and other times she will just be eating junk food on the run.  Her sleep is poor.  Her mood is short tempered, but not depressed.      PAST MEDICAL HISTORY:   Largely noncontributory.  She does not have high blood pressure, diabetes, thyroid or asthma.  She has not had pertinent surgery or trauma to the head or neck.  She is not pregnant.  She does not drink or smoke.  She is not on chronic medications.        SOCIAL HISTORY:   She is  with 1 child.  She was working as a , but now is mostly taking care of her parents.        FAMILY HISTORY:   Noncontributory.      PHYSICAL EXAMINATION:   GENERAL:  The patient is cooperative and in no distress.   VITAL SIGNS:  Her blood pressure is 117/78.     There are no carotid bruits but auscultation of the heart shows S1, S2, without murmur, rub or gallop.  Chest is clear to auscultation.     NEUROLOGIC:   The patient is alert, oriented and lucid.  Cranial nerve testing shows full visual fields to confrontation.  Funduscopic exam shows sharp discs bilaterally.  Visual acuity is 20/20 bilaterally.  Eye movements are complete and conjugate without nystagmus.  Pupils react to light.  Facies are symmetric.  Tongue protrudes in the midline.  Motor evaluation shows no pronator drift, normal finger tapping, finger-nose-finger and heel-knee-shin.  She has well preserved strength in the arms, hands, legs and feet.  Muscle stretch reflexes are normal-reduced and symmetric.  Toes are downgoing.  Sensory exam shows preserved vibration and temperature.  Romberg sign is absent.  She can walk on her heels, toes and tandem.      ASSESSMENT:  Headache and sensory disturbance.      DISCUSSION:  This patient is seen for evaluation of headache and sensory disturbance.  The sensory disturbance includes intermittent numbness and tingling in the arms especially but also abnormal smells that are not present.  The smell symptom has pretty much  gone away.  Her exam is normal.  Etiology of these symptoms is not clear to me.  For evaluation, I am going to obtain an MRI scan of the brain to evaluate the headache and make sure there is no structural lesion.  She does have prominent symptoms of insomnia and I am going to have her evaluated by a sleep consultant.  I will see her in followup in a month for reexamination.  I did discuss briefly medications with her, but she would just as soon hold off for now and I think that is reasonable.      Otilio Denis MD      cc:   Sydney Ruiz MD   [address] [address]         OTILIO DENIS MD             D: 2018 08:15   T: 2018 09:24   MT: KRIS      Name:     CONSTANTIN NUR   MRN:      [id number]        Account:      [id number]   :      1980           Service Date: 2018      Document: [id number]           addendum: reviewed MRI brain with pt.  Some sinus changes right maxillary/ethmoid, brain is normal.  F/u as sched.

## 2018-01-12 NOTE — MR AVS SNAPSHOT
After Visit Summary   1/12/2018    Priyanka Lundberg    MRN: [id number]           Patient Information     Date Of Birth          1980        Visit Information        Provider Department      1/12/2018 7:30 AM Demetri Denis MD [facility] Neurology        Today's Diagnoses     Episodic tension-type headache, not intractable    -  1    Insomnia, unspecified type           Follow-ups after your visit        Additional Services     SLEEP EVALUATION & MANAGEMENT REFERRAL - ADULT -Other (Respond in commments) ([facility])       Please be aware that coverage of these services is subject to the terms and limitations of your health insurance plan.  Call member services at your health plan with any benefit or coverage questions.      Please bring the following to your appointment:    >>   List of current medications   >>   This referral request   >>   Any documents/labs given to you for this referral                      Follow-up notes from your care team     Return in about 4 weeks (around 2/9/2018).      Your next 10 appointments already scheduled     Jan 21, 2018  9:15 AM CST   (Arrive by 9:00 AM)   MR BRAIN W/O & W CONTRAST with [id number]   [facility] ([facility])    [address] [place] [address] [phone]           Take your medicines as usual, unless your doctor tells you not to. Bring a list of your current medicines to your exam (including vitamins, minerals and over-the-counter drugs).  You will be given intravenous contrast for this exam. To prepare:   The day before your exam, drink extra fluids at least six 8-ounce glasses (unless your doctor tells you to restrict your fluids).   Have a blood test (creatinine test) within 30 days of your exam. Go to your clinic or Diagnostic Imaging Department for this test.  The MRI machine uses a strong magnet. Please wear clothes without metal (snaps, zippers). A sweatsuit works  well, or we may give you a hospital gown.  Please remove any body piercings and hair extensions before you arrive. You will also remove watches, jewelry, hairpins, wallets, dentures, partial dental plates and hearing aids. You may wear contact lenses, and you may be able to wear your rings. We have a safe place to keep your personal items, but it is safer to leave them at home.   **IMPORTANT** THE INSTRUCTIONS BELOW ARE ONLY FOR THOSE PATIENTS WHO HAVE BEEN TOLD THEY WILL RECEIVE SEDATION OR GENERAL ANESTHESIA DURING THEIR MRI PROCEDURE:  IF YOU WILL RECEIVE SEDATION (take medicine to help you relax during your exam):   You must get the medicine from your doctor before you arrive. Bring the medicine to the exam. Do not take it at home.   Arrive one hour early. Bring someone who can take you home after the test. Your medicine will make you sleepy. After the exam, you may not drive, take a bus or take a taxi by yourself.   No eating 8 hours before your exam. You may have clear liquids up until 4 hours before your exam. (Clear liquids include water, clear tea, black coffee and fruit juice without pulp.)  IF YOU WILL RECEIVE ANESTHESIA (be asleep for your exam):   Arrive 1 1/2 hours early. Bring someone who can take you home after the test. You may not drive, take a bus or take a taxi by yourself.   No eating 8 hours before your exam. You may have clear liquids up until 4 hours before your exam. (Clear liquids include water, clear tea, black coffee and fruit juice without pulp.)  Please call the Imaging Department at your exam site with any questions.            Jan 23, 2018  8:00 AM CST   New Sleep Patient with Lyndon Patterson MD   [facility] [place] Sleep Study ([facility])    [address] [place] [address]   [phone]            Jan 24, 2018  7:40 AM CST   [facility] PARTHA [facility] [place] ([facility])    [address] 5th Floor  [facility] [address]   [phone]            Jan 25, 2018  8:00 AM CST   (Arrive by 7:45 AM)   New Patient Visit with Diana Boothe MD   [facility] Ear Nose and Throat ([facility])    [address]  4th Floor  [facility] [address]   [phone]            Feb 22, 2018 10:30 AM CST   (Arrive by 10:15 AM)   Return Visit with Demetri Denis MD   [facility] Neurology ([facility])    [address] [place] [address]  3rd Floor  [facility] [address] [phone]              Future tests that were ordered for you today     Open Future Orders        Priority Expected Expires Ordered    SLEEP EVALUATION & MANAGEMENT REFERRAL - ADULT -Other (Respond in commments) ([facility]) Routine  1/12/2019 1/12/2018    MRI Brain w & w/o contrast Routine  1/12/2019 1/12/2018            Who to contact     Please call your clinic at [phone] to:    Ask questions about your health    Make or cancel appointments    Discuss your medicines    Learn about your test results    Speak to your doctor   If you have compliments or concerns about an experience at your clinic, or if you wish to file a complaint, please contact [facility] Patient Relations at [phone] or email us at [email or web address]         Additional Information About Your Visit        [employer] Information     Interwise gives you secure access to your electronic health record. If you see a primary care provider, you can also send messages to your care team and make appointments. If you have questions, please call your primary care clinic.  If you do not have a primary care provider, please call [phone] and they will assist you.      Interwise is an electronic gateway that provides easy, online access to your medical records. With Interwise, you can request a clinic appointment, read your  "test results, renew a prescription or communicate with your care team.     To access your existing account, please contact your [facility] or call [phone] for assistance.        Care EveryWhere ID     This is your Care EveryWhere ID. This could be used by other organizations to access your [facility] medical records  [id number]        Your Vitals Were     Pulse Height Pulse Oximetry BMI (Body Mass Index)          76 1.715 m (5' 7.5\") 97% 30.89 kg/m2         Blood Pressure from Last 3 Encounters:   01/12/18 117/78   01/03/18 119/63   12/09/17 121/71    Weight from Last 3 Encounters:   01/12/18 90.8 kg (200 lb 3.2 oz)   01/03/18 91 kg (200 lb 9.6 oz)   12/09/17 88.5 kg (195 lb 1.9 oz)               Primary Care Provider Office Phone # Fax #    Sydney Crista Ruiz [phone]        [address] [place] [address]        Equal Access to Services     [facility]: Hadii aad ku hadasho Soomaali, waaxda luqadaha, qaybta kaalmada adeegyada, cecy lopez . So [facility] [phone].    ATENCIÓN: Si habla español, tiene a urban disposición servicios gratuitos de asistencia lingüística. Anyiame al [phone].    We comply with applicable federal civil rights laws and [place] laws. We do not discriminate on the basis of race, color, national origin, age, disability, sex, sexual orientation, or gender identity.            Thank you!     Thank you for choosing [facility] NEUROLOGY  for your care. Our goal is always to provide you with excellent care. Hearing back from our patients is one way we can continue to improve our services. Please take a few minutes to complete the written survey that you may receive in the mail after your visit with us. Thank you!             Your Updated Medication List - Protect others around you: Learn how to safely use, store and throw away your medicines at www.disposemymeds.org.          This list is accurate as " of: 1/12/18  8:15 AM.  Always use your most recent med list.                   Brand Name Dispense Instructions for use Diagnosis    albuterol 108 (90 BASE) MCG/ACT Inhaler    PROAIR HFA/PROVENTIL HFA/VENTOLIN HFA    1 Inhaler    Inhale 2 puffs into the lungs every 4 hours as needed for shortness of breath / dyspnea or wheezing    Cough       cholecalciferol 5000 UNITS Caps capsule    vitamin D3    90 capsule    Take 1 capsule (5,000 Units) by mouth daily Take one capsule daily.    Low vitamin D level       prenatal multivitamin plus iron 27-0.8 MG Tabs per tablet     30 tablet    Take 1 tablet by mouth daily    HRP (high risk pregnancy), first trimester, Elderly primigravida in first trimester

## 2018-01-12 NOTE — LETTER
"1/12/2018       RE: Priyanka Lundberg  [address]  [place] [address]     Dear Colleague,    Thank you for referring your patient, Priyanka Lundberg, to the [facility] NEUROLOGY at [facility]. Please see a copy of my visit note below.    REASON FOR VISIT:   This patient is a 37-year-old right-handed woman seen at the request of Dr. Ruiz for neurologic consultation with chief complaint of headache and also abnormal smells.        HISTORY OF PRESENT ILLNESS:   She reports that about 2 years ago she began to get headaches.  These would awaken her at 3:00 a.m. every night.  She then became pregnant.  Her sleep has become more broken up since she delivered her child.  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  She cannot say that she will awaken at night with the headache anymore because her disrupted sleep.  However, she continues to get the headache.  The headache evolved into a scalp tenderness.  She has headaches pretty much on a daily basis.  They are right temporal headache and also over the left eyebrow.  She describes it as a constant tension type headache.  She gets light flashes when she is trying to sleep.  Last weekend she was not feeling very well.  She was looking at cars.  She developed a \"television static\" in the right peripheral vision that lasted about 5 minutes.  She had had a headache both before and after this occurred.  Two summers ago she also had a similar episode of visual obscuration.  There was also a time when she was smelling cigarette smoke even though there was none in evidence.  Sometimes this would occur sporadically for an episode where her  might walk in and she would say she smelled like cigarette smoke, although he was not smoking.  Then, she had an episode that went on for about 2 weeks where she smelled it continuously.  That symptom seems to have resolved.  She does get a pins and needles in both her arms as well.  " This is intermittent.  She has a sensation of sensory disturbance through her rib cage.  She continues to have these symptoms of sensory disturbance.  She does get occasional ringing in her ears.  She has no problem with speech, swallowing or with bowel or bladder control.  Her diet is marginal.  Sometimes she has a good diet, and other times she will just be eating junk food on the run.  Her sleep is poor.  Her mood is short tempered, but not depressed.      PAST MEDICAL HISTORY:   Largely noncontributory.  She does not have high blood pressure, diabetes, thyroid or asthma.  She has not had pertinent surgery or trauma to the head or neck.  She is not pregnant.  She does not drink or smoke.  She is not on chronic medications.        SOCIAL HISTORY:   She is  with 1 child.  She was working as a , but now is mostly taking care of her parents.        FAMILY HISTORY:   Noncontributory.      PHYSICAL EXAMINATION:   GENERAL:  The patient is cooperative and in no distress.   VITAL SIGNS:  Her blood pressure is 117/78.     There are no carotid bruits but auscultation of the heart shows S1, S2, without murmur, rub or gallop.  Chest is clear to auscultation.     NEUROLOGIC:   The patient is alert, oriented and lucid.  Cranial nerve testing shows full visual fields to confrontation.  Funduscopic exam shows sharp discs bilaterally.  Visual acuity is 20/20 bilaterally.  Eye movements are complete and conjugate without nystagmus.  Pupils react to light.  Facies are symmetric.  Tongue protrudes in the midline.  Motor evaluation shows no pronator drift, normal finger tapping, finger-nose-finger and heel-knee-shin.  She has well preserved strength in the arms, hands, legs and feet.  Muscle stretch reflexes are normal-reduced and symmetric.  Toes are downgoing.  Sensory exam shows preserved vibration and temperature.  Romberg sign is absent.  She can walk on her heels, toes and tandem.      ASSESSMENT:  Headache  and sensory disturbance.      DISCUSSION:  This patient is seen for evaluation of headache and sensory disturbance.  The sensory disturbance includes intermittent numbness and tingling in the arms especially but also abnormal smells that are not present.  The smell symptom has pretty much gone away.  Her exam is normal.  Etiology of these symptoms is not clear to me.  For evaluation, I am going to obtain an MRI scan of the brain to evaluate the headache and make sure there is no structural lesion.  She does have prominent symptoms of insomnia and I am going to have her evaluated by a sleep consultant.  I will see her in followup in a month for reexamination.  I did discuss briefly medications with her, but she would just as soon hold off for now and I think that is reasonable.         OTILIO DENIS MD             D: 2018 08:15   T: 2018 09:24   MT: KRIS      Name:     CONSTANTIN NUR   MRN:      [id number]        Account:      [id number]   :      1980           Service Date: 2018      Document: [id number]       Again, thank you for allowing me to participate in the care of your patient.      Sincerely,    Otilio Denis MD    cc:   Sydney Ruiz MD   [address] [address]

## 2018-01-12 NOTE — TELEPHONE ENCOUNTER
APPT INFO    Date /Time: 1/25/18 at 8AM   Reason for Appt: Tonsil stones   Ref Provider/Clinic: Sydney Ruiz   Are there internal records? Yes/No?  IF YES, list clinic names: [facility]   Are there outside records? Yes/No? No   Patient Contact (Y/N) & Call Details: No   Action: Chart reviewed

## 2018-01-21 ENCOUNTER — RADIANT APPOINTMENT (OUTPATIENT)
Dept: MRI IMAGING | Facility: CLINIC | Age: 38
End: 2018-01-21
Attending: PSYCHIATRY & NEUROLOGY
Payer: COMMERCIAL

## 2018-01-21 DIAGNOSIS — G44.219 EPISODIC TENSION-TYPE HEADACHE, NOT INTRACTABLE: ICD-10-CM

## 2018-01-21 RX ORDER — GADOBUTROL 604.72 MG/ML
10 INJECTION INTRAVENOUS ONCE
Status: DISCONTINUED | OUTPATIENT
Start: 2018-01-21 | End: 2018-01-21 | Stop reason: CLARIF

## 2018-01-22 LAB — UREA BREATH TEST QL: 36 DPM

## 2018-01-24 ENCOUNTER — CARE COORDINATION (OUTPATIENT)
Dept: NEUROLOGY | Facility: CLINIC | Age: 38
End: 2018-01-24

## 2018-01-24 NOTE — PROGRESS NOTES
Pt of Dr. Denis wanting to discuss MR results  Received: Yesterday       Coby Finch Angela, RN; Gretta Reed RN       Phone Number: [phone]                     Pt of Dr. Denis wanting to discuss MR results.   Pt can be reached at [phone].     Thank you!   Donte         1/24/18:  Message sent to Dr. Denis asking him to contact the patient with her results.

## 2018-01-25 ENCOUNTER — PRE VISIT (OUTPATIENT)
Dept: OTOLARYNGOLOGY | Facility: CLINIC | Age: 38
End: 2018-01-25

## 2018-02-12 ENCOUNTER — TELEPHONE (OUTPATIENT)
Dept: GASTROENTEROLOGY | Facility: CLINIC | Age: 38
End: 2018-02-12

## 2018-02-12 DIAGNOSIS — K59.09 OTHER CONSTIPATION: Primary | ICD-10-CM

## 2018-02-12 NOTE — TELEPHONE ENCOUNTER
Patient scheduled for colonoscopy and upper EGD    Indication for procedure. Abdominal pain and constipation    Referring Provider. LEONOR Ballard NP    ? yes    Arrival time verified? 9:30 am    Facility location verified? [address] [facility]    Instructions given regarding prep and procedure    Prep Type Golytely    Are you taking any anticoagulants or blood thinners? no    Instructions given? Yes, verbally    Electronic implanted devices? no    Pre procedure teaching completed? Yes    Transportation from procedure? Yes,     H&P / Pre op physical completed? Na    Stephanie Modi RN

## 2018-02-13 ENCOUNTER — TELEPHONE (OUTPATIENT)
Dept: GASTROENTEROLOGY | Facility: CLINIC | Age: 38
End: 2018-02-13

## 2018-02-13 ENCOUNTER — RADIANT APPOINTMENT (OUTPATIENT)
Dept: ULTRASOUND IMAGING | Facility: CLINIC | Age: 38
End: 2018-02-13
Attending: REGISTERED NURSE
Payer: COMMERCIAL

## 2018-02-13 DIAGNOSIS — R11.0 NAUSEA: ICD-10-CM

## 2018-02-13 DIAGNOSIS — R10.31 ABDOMINAL PAIN, RIGHT LOWER QUADRANT: ICD-10-CM

## 2018-02-13 NOTE — TELEPHONE ENCOUNTER
APPT INFO     Date /Time: 3/8/18 at 8 AM   Reason for Appt: Tonsil stones   Ref Provider/Clinic: Sydney Ruiz   Are there internal records? Yes/No?  IF YES, list clinic names: [facility]   Are there outside records? Yes/No? No   Patient Contact (Y/N) & Call Details: No   Action: Chart reviewed

## 2018-02-14 ENCOUNTER — HOSPITAL ENCOUNTER (OUTPATIENT)
Facility: AMBULATORY SURGERY CENTER | Age: 38
End: 2018-02-14
Attending: INTERNAL MEDICINE
Payer: COMMERCIAL

## 2018-02-14 ENCOUNTER — SURGERY (OUTPATIENT)
Age: 38
End: 2018-02-14

## 2018-02-14 VITALS
HEIGHT: 68 IN | DIASTOLIC BLOOD PRESSURE: 64 MMHG | SYSTOLIC BLOOD PRESSURE: 110 MMHG | WEIGHT: 200 LBS | OXYGEN SATURATION: 97 % | TEMPERATURE: 98.3 F | HEART RATE: 77 BPM | BODY MASS INDEX: 30.31 KG/M2 | RESPIRATION RATE: 15 BRPM

## 2018-02-14 LAB
COLONOSCOPY: NORMAL
UPPER GI ENDOSCOPY: NORMAL

## 2018-02-14 PROCEDURE — 88305 TISSUE EXAM BY PATHOLOGIST: CPT | Performed by: INTERNAL MEDICINE

## 2018-02-14 RX ORDER — DIPHENHYDRAMINE HYDROCHLORIDE 50 MG/ML
INJECTION INTRAMUSCULAR; INTRAVENOUS PRN
Status: DISCONTINUED | OUTPATIENT
Start: 2018-02-14 | End: 2018-02-14 | Stop reason: HOSPADM

## 2018-02-14 RX ORDER — ONDANSETRON 2 MG/ML
4 INJECTION INTRAMUSCULAR; INTRAVENOUS
Status: DISCONTINUED | OUTPATIENT
Start: 2018-02-14 | End: 2018-02-15 | Stop reason: HOSPADM

## 2018-02-14 RX ORDER — FENTANYL CITRATE 50 UG/ML
INJECTION, SOLUTION INTRAMUSCULAR; INTRAVENOUS PRN
Status: DISCONTINUED | OUTPATIENT
Start: 2018-02-14 | End: 2018-02-14 | Stop reason: HOSPADM

## 2018-02-14 RX ORDER — LIDOCAINE 40 MG/G
CREAM TOPICAL
Status: DISCONTINUED | OUTPATIENT
Start: 2018-02-14 | End: 2018-02-15 | Stop reason: HOSPADM

## 2018-02-14 RX ADMIN — DIPHENHYDRAMINE HYDROCHLORIDE 50 MG: 50 INJECTION INTRAMUSCULAR; INTRAVENOUS at 12:04

## 2018-02-14 RX ADMIN — FENTANYL CITRATE 50 MCG: 50 INJECTION, SOLUTION INTRAMUSCULAR; INTRAVENOUS at 12:32

## 2018-02-14 RX ADMIN — FENTANYL CITRATE 50 MCG: 50 INJECTION, SOLUTION INTRAMUSCULAR; INTRAVENOUS at 12:09

## 2018-02-14 RX ADMIN — FENTANYL CITRATE 50 MCG: 50 INJECTION, SOLUTION INTRAMUSCULAR; INTRAVENOUS at 12:16

## 2018-02-14 RX ADMIN — FENTANYL CITRATE 50 MCG: 50 INJECTION, SOLUTION INTRAMUSCULAR; INTRAVENOUS at 12:07

## 2018-02-14 NOTE — IP AVS SNAPSHOT
[facility]    [address] [place] [address]    Phone:  [phone]    Fax:  [phone]                                       After Visit Summary   2/14/2018    Priyanka Lundberg    MRN: [id number]           After Visit Summary Signature Page     I have received my discharge instructions, and my questions have been answered. I have discussed any challenges I see with this plan with the nurse or doctor.    ..........................................................................................................................................  Patient/Patient Representative Signature      ..........................................................................................................................................  Patient Representative Print Name and Relationship to Patient    ..................................................               ................................................  Date                                            Time    ..........................................................................................................................................  Reviewed by Signature/Title    ...................................................              ..............................................  Date                                                            Time

## 2018-02-14 NOTE — DISCHARGE INSTRUCTIONS
Discharge Instructions after Colonoscopy or Sigmoidoscopy       Today you had a __x__ Colonoscopy ____ Sigmoidoscopy       Activity and Diet   You were given medicine for pain. You may be dizzy or sleepy.   For 24 hours:     Do not drive or use heavy equipment.     Do not make important decisions.     Do not drink any alcohol.   You may return to your normal diet and medicines.       Discomfort     Air was placed in your colon during the exam in order to see it. Walking helps to pass the air.     You may take Tylenol (acetaminophen) for pain unless your doctor has told you not to.   Do not take aspirin or ibuprofen (Advil, Motrin, or other anti-inflammatory   drugs) for __7___ days.       Follow-up   _x___ We took small tissue samples or polyps to study. Your doctor will call you with the results within two weeks.       When to call:       Call right away if you have:     Unusual pain in belly or chest pain not relieved with passing air.     More than 1 to 2 Tablespoons of bleeding from your rectum.     Fever above 100.6  F (37.5  C).       If you have severe pain, bleeding, or shortness of breath, go to an emergency room.       If you have questions, call:   Monday to Friday, 7 a.m. to 4:30 p.m.   Endoscopy: [phone] (We may have to call you back)       After hours Hospital: [phone] (Ask for the GI fellow on call) Discharge Instructions after Colonoscopy or Sigmoidoscopy

## 2018-02-14 NOTE — IP AVS SNAPSHOT
MRN:[id number]                      After Visit Summary   2/14/2018    Priyanka Lundberg    MRN: [id number]           Thank you!     Thank you for choosing [facility] for your care. Our goal is always to provide you with excellent care. Hearing back from our patients is one way we can continue to improve our services. Please take a few minutes to complete the written survey that you may receive in the mail after you visit with us. Thank you!        Patient Information     Date Of Birth          1980        About your hospital stay     You were admitted on:  February 14, 2018 You last received care in the:  [facility]    You were discharged on:  February 14, 2018       Who to Call     For medical emergencies, please call 911.  For non-urgent questions about your medical care, please call your primary care provider or clinic, [phone]  For questions related to your surgery, please call your surgery clinic        Attending Provider     Provider Specialty    Joan Willson MD Hepatology       Primary Care Provider Office Phone # Fax #    Sydney Crista Ruiz [phone]      Your next 10 appointments already scheduled     Feb 15, 2018  8:00 AM CST   (Arrive by 7:45 AM)   [facility] with [facility] ([facility])    [address] [place] [id number] [phone]            Feb 22, 2018 10:30 AM CST   (Arrive by 10:15 AM)   Return Visit with Demetri Denis MD   [facility] Neurology ([facility])    [address] [place] [id number] [phone]            Mar 08, 2018  8:00 AM CST   (Arrive by 7:45 AM)   New Patient Visit with Diana Boothe MD   [facility] Ear Nose and Throat ([facility])    [address] [place] [id number]  "  [phone]              Further instructions from your care team       Discharge Instructions after Colonoscopy or Sigmoidoscopy       Today you had a __x__ Colonoscopy ____ Sigmoidoscopy       Activity and Diet   You were given medicine for pain. You may be dizzy or sleepy.   For 24 hours:     Do not drive or use heavy equipment.     Do not make important decisions.     Do not drink any alcohol.   You may return to your normal diet and medicines.       Discomfort     Air was placed in your colon during the exam in order to see it. Walking helps to pass the air.     You may take Tylenol (acetaminophen) for pain unless your doctor has told you not to.   Do not take aspirin or ibuprofen (Advil, Motrin, or other anti-inflammatory   drugs) for __7___ days.       Follow-up   _x___ We took small tissue samples or polyps to study. Your doctor will call you with the results within two weeks.       When to call:       Call right away if you have:     Unusual pain in belly or chest pain not relieved with passing air.     More than 1 to 2 Tablespoons of bleeding from your rectum.     Fever above 100.6  F (37.5  C).       If you have severe pain, bleeding, or shortness of breath, go to an emergency room.       If you have questions, call:   Monday to Friday, 7 a.m. to 4:30 p.m.   Endoscopy: [phone] (We may have to call you back)       After hours Hospital: [phone] (Ask for the GI fellow on call) Discharge Instructions after Colonoscopy or Sigmoidoscopy           Pending Results     No orders found from 2/12/2018 to 2/15/2018.            Admission Information     Date & Time Provider Department Dept. Phone    2/14/2018 Joan Willson MD [facility] [phone]      Your Vitals Were     Blood Pressure Pulse Temperature Respirations Height Weight    121/70 77 98.6  F (37  C) (Temporal) 16 1.715 m (5' 7.5\") 90.7 kg (200 lb)    Pulse Oximetry BMI (Body Mass Index)                97% 30.86 " kg/m2          MyChart Information     [employer]ie gives you secure access to your electronic health record. If you see a primary care provider, you can also send messages to your care team and make appointments. If you have questions, please call your primary care clinic.  If you do not have a primary care provider, please call [phone] and they will assist you.      Exie is an electronic gateway that provides easy, online access to your medical records. With [employer], you can request a clinic appointment, read your test results, renew a prescription or communicate with your care team.     To access your existing account, please contact your [facility] or call [phone] for assistance.        Care EveryWhere ID     This is your Care EveryWhere ID. This could be used by other organizations to access your [place] medical records  [id number]        Equal Access to Services     CHARISSE KERNS : Nicola Mesa, shima domingo, nathalie sofia, cecy marcum. So [facility] [phone].    ATENCIÓN: Si habla español, tiene a urban disposición servicios gratuitos de asistencia lingüística. Trey al [phone].    We comply with applicable federal civil rights laws and [place] laws. We do not discriminate on the basis of race, color, national origin, age, disability, sex, sexual orientation, or gender identity.               Review of your medicines      UNREVIEWED medicines. Ask your doctor about these medicines        Dose / Directions    albuterol 108 (90 BASE) MCG/ACT Inhaler   Commonly known as:  PROAIR HFA/PROVENTIL HFA/VENTOLIN HFA   Used for:  Cough        Dose:  2 puff   Inhale 2 puffs into the lungs every 4 hours as needed for shortness of breath / dyspnea or wheezing   Quantity:  1 Inhaler   Refills:  1       cholecalciferol 5000 UNITS Caps capsule   Commonly known as:  vitamin D3   Used for:  Low vitamin D level        Dose:   5000 Units   Take 1 capsule (5,000 Units) by mouth daily Take one capsule daily.   Quantity:  90 capsule   Refills:  3       prenatal multivitamin plus iron 27-0.8 MG Tabs per tablet   Used for:  HRP (high risk pregnancy), first trimester, Elderly primigravida in first trimester        Dose:  1 tablet   Take 1 tablet by mouth daily   Quantity:  30 tablet   Refills:  12                Protect others around you: Learn how to safely use, store and throw away your medicines at www.disposemymeds.org.             Medication List: This is a list of all your medications and when to take them. Check marks below indicate your daily home schedule. Keep this list as a reference.      Medications           Morning Afternoon Evening Bedtime As Needed    albuterol 108 (90 BASE) MCG/ACT Inhaler   Commonly known as:  PROAIR HFA/PROVENTIL HFA/VENTOLIN HFA   Inhale 2 puffs into the lungs every 4 hours as needed for shortness of breath / dyspnea or wheezing                                cholecalciferol 5000 UNITS Caps capsule   Commonly known as:  vitamin D3   Take 1 capsule (5,000 Units) by mouth daily Take one capsule daily.                                prenatal multivitamin plus iron 27-0.8 MG Tabs per tablet   Take 1 tablet by mouth daily

## 2018-02-14 NOTE — OR NURSING
Pt tolerated EGD with biopsies as well as a colonoscopy with biopsies and single polypectomy, very well. Piolyp was removed with a regular biopsie forcep

## 2018-02-15 ENCOUNTER — OFFICE VISIT (OUTPATIENT)
Dept: GASTROENTEROLOGY | Facility: CLINIC | Age: 38
End: 2018-02-15
Payer: COMMERCIAL

## 2018-02-15 VITALS
TEMPERATURE: 98.7 F | DIASTOLIC BLOOD PRESSURE: 62 MMHG | HEIGHT: 68 IN | WEIGHT: 204.2 LBS | SYSTOLIC BLOOD PRESSURE: 101 MMHG | HEART RATE: 78 BPM | OXYGEN SATURATION: 90 % | BODY MASS INDEX: 30.95 KG/M2

## 2018-02-15 DIAGNOSIS — R63.0 LOSS OF APPETITE: ICD-10-CM

## 2018-02-15 DIAGNOSIS — R11.0 NAUSEA: Primary | ICD-10-CM

## 2018-02-15 DIAGNOSIS — K59.00 CONSTIPATION, UNSPECIFIED CONSTIPATION TYPE: ICD-10-CM

## 2018-02-15 DIAGNOSIS — R33.9 INCOMPLETE BLADDER EMPTYING: ICD-10-CM

## 2018-02-15 DIAGNOSIS — R39.15 URINARY URGENCY: ICD-10-CM

## 2018-02-15 DIAGNOSIS — K64.9 HEMORRHOIDS, UNSPECIFIED HEMORRHOID TYPE: ICD-10-CM

## 2018-02-15 LAB — COPATH REPORT: NORMAL

## 2018-02-15 RX ORDER — SUCRALFATE 1 G/1
1 TABLET ORAL 4 TIMES DAILY PRN
Qty: 120 TABLET | Refills: 1 | Status: SHIPPED | OUTPATIENT
Start: 2018-02-15 | End: 2018-03-31

## 2018-02-15 ASSESSMENT — ENCOUNTER SYMPTOMS
TREMORS: 0
HYPERTENSION: 0
EYE WATERING: 0
SORE THROAT: 0
ORTHOPNEA: 1
MYALGIAS: 1
DYSURIA: 0
EYE IRRITATION: 0
NAUSEA: 1
VOMITING: 0
NECK PAIN: 1
BLOATING: 1
DIZZINESS: 0
LEG PAIN: 0
LOSS OF CONSCIOUSNESS: 0
SNORES LOUDLY: 1
FATIGUE: 1
DOUBLE VISION: 0
MEMORY LOSS: 0
SHORTNESS OF BREATH: 1
WEAKNESS: 0
EYE REDNESS: 0
MUSCLE CRAMPS: 0
BACK PAIN: 1
NUMBNESS: 1
POOR WOUND HEALING: 0
MUSCLE WEAKNESS: 1
WEIGHT LOSS: 0
DIARRHEA: 1
PARALYSIS: 0
COUGH: 0
FEVER: 0
POLYDIPSIA: 0
TINGLING: 1
ARTHRALGIAS: 1
DECREASED APPETITE: 0
STIFFNESS: 1
FLANK PAIN: 0
ALTERED TEMPERATURE REGULATION: 1
SEIZURES: 0
EYE PAIN: 0
BOWEL INCONTINENCE: 0
SINUS CONGESTION: 1
NIGHT SWEATS: 0
CHILLS: 0
ABDOMINAL PAIN: 1
SPUTUM PRODUCTION: 1
TASTE DISTURBANCE: 0
POLYPHAGIA: 0
HEMOPTYSIS: 0
HEMATURIA: 0
LIGHT-HEADEDNESS: 0
SKIN CHANGES: 0
EXERCISE INTOLERANCE: 0
SPEECH CHANGE: 0
CONSTIPATION: 1
WHEEZING: 0
DYSPNEA ON EXERTION: 0
SYNCOPE: 0
HOARSE VOICE: 0
RECTAL PAIN: 0
PALPITATIONS: 1
HEADACHES: 1
JAUNDICE: 0
TROUBLE SWALLOWING: 0
DISTURBANCES IN COORDINATION: 0
SINUS PAIN: 1
SMELL DISTURBANCE: 1
HEARTBURN: 1
NAIL CHANGES: 1
SLEEP DISTURBANCES DUE TO BREATHING: 0
POSTURAL DYSPNEA: 1
WEIGHT GAIN: 1
HALLUCINATIONS: 0
HYPOTENSION: 0
BLOOD IN STOOL: 1
DIFFICULTY URINATING: 1
INCREASED ENERGY: 0
NECK MASS: 0
COUGH DISTURBING SLEEP: 0

## 2018-02-15 ASSESSMENT — PAIN SCALES - GENERAL: PAINLEVEL: NO PAIN (0)

## 2018-02-15 NOTE — LETTER
2/15/2018       RE: Priyanka Lundberg  [address]  [place] [address]     Dear Colleague,    Thank you for referring your patient, Priyanka Lundberg, to the [facility] at [facility]. Please see a copy of my visit note below.    Service Date: 02/15/2018      CHIEF COMPLAINT:  Followup regarding irritable bowel syndrome, nausea, loss of appetite, abdominal complaints.      HISTORY OF PRESENT ILLNESS:  Priyanka (Umu'elizabeth) is a 37-year-old woman given the diagnosis of IBS several years ago in [place] and advised to use the low FODMAP diet.  She was ordered to a colonoscopy but did not succeed in that, as they moved to [place].  Her history is primarily one of constipation and bloat and dull right-sided pain at all times with nausea which flares in intensity for a few hours.  She has had constant nausea for 4 years, but no vomiting as she does not allow it.  For further details, please see previous notes.  She repeats that she has nausea on awakening, worsened with drinking liquids but does not allow herself to vomit.  She repeats that she has history of erratic ingestions and bingeing but never of bulimia.  Her abdominal pains include primarily right lower quadrant pain, right mid abdomen pain, bilateral lower pain which is somewhat reduced after a bowel movement.  She started the MiraLax advised a month ago and has had improved stool with much less hardness.  Her abdominal complaints continue about the same, however.      Priyanka spent a lot of January with her father who had a difficult death.  He  last Friday.  Nonetheless, she was able to complete her ultrasound this week, as well as her upper GI endoscopy and colonoscopy yesterday.  She thought the procedures went quite well.      MEDICAL HISTORY, MEDICATIONS, ADVERSE DRUG REACTIONS:  Reviewed.      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      REVIEW OF SYSTEMS:  She describes eating emotionally.  Has some head congestion with ear ringing and sinus pain, change in sense of smell, no severe illness.  She denies any significant anxiety is contributing to symptoms.  She has heartburn and nausea, both, abdominal pain, bloat, constipation and diarrhea, no change or increase in blood in stool.      OBJECTIVE:   VITAL SIGNS:  Reviewed.  Blood pressure is low to normal, normal heart rate.  She has no pain today.  BMI 31.   GENERAL:  Clean, casually groomed and dressed woman who is very pleasant and who shows some insight.   EYES:  Clear.   RESPIRATORY:  Breathing is calm and grossly normal.   ABDOMEN:  Nontender, examined only in the chair.      RESULTS:  Upper GI endoscopy visually normal with gastric biopsies showing some reactive gastropathy.      Colonoscopy with external and internal hemorrhoids, nonthrombosed, a minute sessile polyp in the descending colon now proved to be hyperplastic.      ASSESSMENT:  A 37-year-old woman with chronic nausea, various abdominal pains including some lower bilateral abdominal pains of a couple of hours which decreased after bowel movement, right abdominal pain with some burning sensation, right lower quadrant pain, which is at times more significant and is never gone, which makes her wonder about endometriosis, though she is not willing to undergo exploratory laparotomy.  She does have persistent nausea every morning which is worsened by ingestion of water and resolves adequately such that she can eat by lunchtime.  She does not have a specific upper abdominal discomfort increased after meals in a usual gallbladder function timeframe.  However, she also does not use ibuprofen, aspirin, naproxen or alcohol.  She has had relatives with gallbladder disease including her mother with cholecystectomy early 40s and father with neuroendocrine tumor in the gallbladder.      PLAN:   1.  HIDA scan.   2.  Gastric  emptying study.   3.  Trial of sucralfate as if for bile in the stomach.   4.  Referral to the [facility] regarding her constipation and also urinary urgency and her sense of incomplete bladder emptying.   5.  Return to GI Clinic is suggested, though it is not urgent.  She has met Dr. Willson.  I am sorry he is not a luminal provider.  We discussed the luminal providers that she may come to.   6.  Constantin is interested in consulting with Colon and Rectal Surgery for hemorrhoid treatment.      We discussed the assessment and plan.  I reviewed the results.  Written notes were provided.      Return to GI Clinic is suggested in 4 months, which she may cancel if not needed.      Total visit 25 minutes with counseling time 15 minutes.         SALLIE BUTT CNP             D: 02/15/2018   T: 2018   MT: IRIS      Name:     CONSTANTIN NUR   MRN:      [id number]        Account:      [id number]   :      1980           Service Date: 02/15/2018      Document: [id number]        Again, thank you for allowing me to participate in the care of your patient.      Sincerely,    SALLIE Casillas CNP

## 2018-02-15 NOTE — PATIENT INSTRUCTIONS
"Soluble fiber sops up water and gives soft stool and formed stool.   Often decreases or resolves cramping   This also supports healthy gut bacteria.    Is a prebiotic that supports the \"pro-biotics\"  Psyllium (Metamucil) is most natural.   Powder - mix and drink immediately, -  or use the capsules or tablets.  or  Wheat dextrin (Benefiber) which has no taste or texture.   Available as powder, capsule, chewable.  When in doubt, return to powder.  Take the dose on the label.  If it causes distress, start at half the dose and work up to full dose.  If you have no improvement after two weeks, increase the dose and be sure to use it twice daily.  You may feel bloat at the beginning.   Improvement comes gradually.  Continue this regularly for a couple months before deciding whether it is helpful for you.    This may be used with polyethylene glycol (Miralax)   Perhaps you can evenutualy get off the polyethylene glycol (Miralax)         [[[ Against visceral sensitivity and chronic pain without disease some types of chronic nausea    amitriptyline - 10 mg (very low dose), is most commonly used.    take every night. start with 1 pill     after two weeks, if no benefit then increase to two pills      report at 4 weeks whether it works at this dose.     Return to clinic in 6-8 weeks.     (Usual dose is 20, 25, or 30 mg.      Maximum dose in GI is 50 mg daily) ]]]      Because of your nausea  In order to study the gall bladder function of filling and emptying, a Nuclear Medicine study called \"hepatobiliary scan\" or \"hida scan\" is done.    To prepare:  Do not eat or drink anything for four hours before the study.  Check in at the [facility] waiting room, in the  [facility].  [address] [facility] [place]  To schedule or re-schedule your appointment, call .      Gastric emptying study  A Gastric Emptying study is recommended.    A gastric emptying scan checks the time it takes for your stomach to " empty. You will eat scrambled eggs and toast that contain a tiny amount of radioactive material. We will then take a set of pictures for the next 11/2 to 4 hours. Please allow 2 to 5 hours for this exam.    You may schedule this at .  Come to the [facility] waiting room.    Preparation  Do not eat or drink after midnight before your study.  Do not use medications against nausea for 48 hours before this study.   These include ondansetron (Zofran), prochlorperazine (Compazine) and others.  If you are using metoclopramide (Reglan) or erythromycin, call GI if you do not know  whether you are to be on the medication or off of it for the study.    IF you have bile coming back up to the stomach, that could explain some abnormal biopsy reports from the stomach and the nausea and any gastroparesis.    NAUSEA may be from constipation /fecal overload, gall bladder dysfunction, stomach acid, gastroparesis, anxiety, some meds, ....    sometimes nausea is from acid. Consider trial of omeprazole (Prilosec) 20 mg or lansoprazole (Prevacid) 30 mg once daily   Or ranitidine (Zantac) 150 mg two times daily.    Follow lifestyle precautions against acid reflux (GERD)    Do not overeat.   Avoid meals and snacks within three or four hours of lying down.   Avoid alcohol and mint. Decrease or quit smoking.   Do not drink carbonation - it IS acid. Decrease or quit caffeine.   If you have nighttime symptoms, elevate the head of the bed    first 3 inches, then 6 to 8 inches.    A foam wedge from the hip may be helpful, if a person lies on their back.    Pillows do NOT work. The entire back must be straight.   Lose weight if you are overweight.    Even ten pounds weight loss can make a difference.  Some people also have specific triggers: tomato, spice, chocolate, citrus/orange juice.      Gall bladder symptoms are primarily shortly after meals and gradulally resolving over a couple hours, above  the waist, mid or right upper abdomen     Nausea from gall bladder dysfunction may also be from bile coming back up into the stomach at additional times.    A portion of your nausea may be related to bile in the stomach.  Sucralfate binds the bile.  Take this 3-4 times daily   Always at bedtime.   Between meals, especially on an empty stomach.   Two hours after a meal, an hour or more before a meal.    If you are on levothyroxine,  you must take that first thing in the morning and start this 2 hours later.  Sucralfate slurry: place pill in a cup with 1-2 Tablespoons water. Let sit a few minutes. Mash the pill.   Take 4 times a day, always at bedtime and also between meals.      Against constipation   Water, exercise, especially after meals,  soluble fiber,  polyethylene glycol (Miralax)     [[[ Trial of linaclotide (Linzess) against constipation    NOT COVERED, nor is (Amitiza) lubiprostone.  Linaclotide (Linzess) against constipation  Stop everything else against constipation when you start this.  After three days, you may restart other agents as needed.  Take one pill daily, in AM 1/2 hour or one hour before you eat.   Taking this closer to your meal increases risk of diarrhea reaction.]]]       Consider making an appointment with Colon and Rectal Surgery regarding hemorrhoids.  An option to consider is banding of hemorrhoids. This is a procedure done in clinic.   There is no colon preparation for this treatment and no need to take additional time off work. It usually requires two or three brief visits.    To schedule with Colon and Rectal Surgery, call [phone].  To schedule regarding hemorrhoids, ask for Heather Stephens NP.      [facility]   Does studies to see whether the nerves and muscles of the rectum and pelvic floor function well for passing bowel movements.  It is located at [address] [place] [address] on [facility] [place].  If you have not heard from them within two weeks,  call GI as below.  ( If you have scheduled with them and need to change the appointment:   Call  )    If there is entero (intestine) to gastro (stomach) reflux of bile to the stomach  A portion of your nausea may be related to bile in the stomach.  Sucralfate binds the bile.  Take this 3-4 times daily   Always at bedtime.   Between meals, especially on an empty stomach.   Two hours after a meal, an hour or more before a meal.    [[[ If you are on levothyroxine,  you must take that first thing in the morning and start this 2 hours later. ]]]        [facility]     Until February 22, 2018 shelter  For appointments, call .    To speak with a GI nurse press option 3.

## 2018-02-15 NOTE — MR AVS SNAPSHOT
"              After Visit Summary   2/15/2018    Priyanka Lundberg    MRN: [id number]           Patient Information     Date Of Birth          1980        Visit Information        Provider Department      2/15/2018 8:00 AM Brooke Ballard, APRN CNP [facility]        Today's Diagnoses     Nausea    -  1    Loss of appetite        Constipation, unspecified constipation type        Urinary urgency        Incomplete bladder emptying          Care Instructions    Soluble fiber sops up water and gives soft stool and formed stool.   Often decreases or resolves cramping   This also supports healthy gut bacteria.    Is a prebiotic that supports the \"pro-biotics\"  Psyllium (Metamucil) is most natural.   Powder - mix and drink immediately, -  or use the capsules or tablets.  or  Wheat dextrin (Benefiber) which has no taste or texture.   Available as powder, capsule, chewable.  When in doubt, return to powder.  Take the dose on the label.  If it causes distress, start at half the dose and work up to full dose.  If you have no improvement after two weeks, increase the dose and be sure to use it twice daily.  You may feel bloat at the beginning.   Improvement comes gradually.  Continue this regularly for a couple months before deciding whether it is helpful for you.    This may be used with polyethylene glycol (Miralax)   Perhaps you can evenutualy get off the polyethylene glycol (Miralax)         [[[ Against visceral sensitivity and chronic pain without disease some types of chronic nausea    amitriptyline - 10 mg (very low dose), is most commonly used.    take every night. start with 1 pill     after two weeks, if no benefit then increase to two pills      report at 4 weeks whether it works at this dose.     Return to clinic in 6-8 weeks.     (Usual dose is 20, 25, or 30 mg.      Maximum dose in GI is 50 mg daily) ]]]      Because of your nausea  In order to study the gall bladder function of " "filling and emptying, a Nuclear Medicine study called \"hepatobiliary scan\" or \"hida scan\" is done.    To prepare:  Do not eat or drink anything for four hours before the study.  Check in at the [facility] waiting room, in the  [facility].  [address] [facility]  To schedule or re-schedule your appointment, call .      Gastric emptying study  A Gastric Emptying study is recommended.    A gastric emptying scan checks the time it takes for your stomach to empty. You will eat scrambled eggs and toast that contain a tiny amount of radioactive material. We will then take a set of pictures for the next 11/2 to 4 hours. Please allow 2 to 5 hours for this exam.    You may schedule this at .  Come to the [facility] waiting room.    Preparation  Do not eat or drink after midnight before your study.  Do not use medications against nausea for 48 hours before this study.   These include ondansetron (Zofran), prochlorperazine (Compazine) and others.  If you are using metoclopramide (Reglan) or erythromycin, call GI if you do not know  whether you are to be on the medication or off of it for the study.    IF you have bile coming back up to the stomach, that could explain some abnormal biopsy reports from the stomach and the nausea and any gastroparesis.    NAUSEA may be from constipation /fecal overload, gall bladder dysfunction, stomach acid, gastroparesis, anxiety, some meds, ....    sometimes nausea is from acid. Consider trial of omeprazole (Prilosec) 20 mg or lansoprazole (Prevacid) 30 mg once daily   Or ranitidine (Zantac) 150 mg two times daily.    Follow lifestyle precautions against acid reflux (GERD)    Do not overeat.   Avoid meals and snacks within three or four hours of lying down.   Avoid alcohol and mint. Decrease or quit smoking.   Do not drink carbonation - it IS acid. Decrease or quit caffeine.   If you have nighttime symptoms, " elevate the head of the bed    first 3 inches, then 6 to 8 inches.    A foam wedge from the hip may be helpful, if a person lies on their back.    Pillows do NOT work. The entire back must be straight.   Lose weight if you are overweight.    Even ten pounds weight loss can make a difference.  Some people also have specific triggers: tomato, spice, chocolate, citrus/orange juice.      Gall bladder symptoms are primarily shortly after meals and gradulally resolving over a couple hours, above the waist, mid or right upper abdomen     Nausea from gall bladder dysfunction may also be from bile coming back up into the stomach at additional times.    A portion of your nausea may be related to bile in the stomach.  Sucralfate binds the bile.  Take this 3-4 times daily   Always at bedtime.   Between meals, especially on an empty stomach.   Two hours after a meal, an hour or more before a meal.    If you are on levothyroxine,  you must take that first thing in the morning and start this 2 hours later.  Sucralfate slurry: place pill in a cup with 1-2 Tablespoons water. Let sit a few minutes. Mash the pill.   Take 4 times a day, always at bedtime and also between meals.      Against constipation   Water, exercise, especially after meals,  soluble fiber,  polyethylene glycol (Miralax)     [[[ Trial of linaclotide (Linzess) against constipation    NOT COVERED, nor is (Amitiza) lubiprostone.  Linaclotide (Linzess) against constipation  Stop everything else against constipation when you start this.  After three days, you may restart other agents as needed.  Take one pill daily, in AM 1/2 hour or one hour before you eat.   Taking this closer to your meal increases risk of diarrhea reaction.]]]       Consider making an appointment with Colon and Rectal Surgery regarding hemorrhoids.  An option to consider is banding of hemorrhoids. This is a procedure done in clinic.   There is no colon preparation for this treatment and no need to  take additional time off work. It usually requires two or three brief visits.    To schedule with Colon and Rectal Surgery, call [phone].  To schedule regarding hemorrhoids, ask for Heather Stephens NP.      [facility]   Does studies to see whether the nerves and muscles of the rectum and pelvic floor function well for passing bowel movements.  It is located at [address] [place] [address] on [facility] in [place].  If you have not heard from them within two weeks, call GI as below.  ( If you have scheduled with them and need to change the appointment:   Call  )    If there is entero (intestine) to gastro (stomach) reflux of bile to the stomach  A portion of your nausea may be related to bile in the stomach.  Sucralfate binds the bile.  Take this 3-4 times daily   Always at bedtime.   Between meals, especially on an empty stomach.   Two hours after a meal, an hour or more before a meal.    [[[ If you are on levothyroxine,  you must take that first thing in the morning and start this 2 hours later. ]]]        [facility]     Until February 22, 2018 prison  For appointments, call .    To speak with a GI nurse press option 3.                   Follow-ups after your visit        Additional Services     Other Specialty Referral                 Follow-up notes from your care team     Return in about 4 months (around 6/15/2018), or if symptoms worsen or fail to improve.      Your next 10 appointments already scheduled     Feb 22, 2018 10:30 AM CST   (Arrive by 10:15 AM)   Return Visit with Demetri Denis MD   [facility] ([facility])    [address] [place] [address] [id number] [phone]            Mar 08, 2018  8:00 AM CST   (Arrive by 7:45 AM)   New Patient Visit with Diana Boothe MD   [facility] ([facility])     "[address] [id number] [phone]              Future tests that were ordered for you today     Open Future Orders        Priority Expected Expires Ordered    NM Hepatobiliary Scan w GB EF STAT  2/15/2019 2/15/2018    NM Gastric Emptying STAT  2/15/2019 2/15/2018            Who to contact     Please call your clinic at [phone] to:    Ask questions about your health    Make or cancel appointments    Discuss your medicines    Learn about your test results    Speak to your doctor            Additional Information About Your Visit        [employer] Information     [employer] gives you secure access to your electronic health record. If you see a primary care provider, you can also send messages to your care team and make appointments. If you have questions, please call your primary care clinic.  If you do not have a primary care provider, please call [phone] and they will assist you.      [employer] is an electronic gateway that provides easy, online access to your medical records. With [employer], you can request a clinic appointment, read your test results, renew a prescription or communicate with your care team.     To access your existing account, please contact your [facility] or call [phone] for assistance.        Care EveryWhere ID     This is your Care EveryWhere ID. This could be used by other organizations to access your [facility] medical records  [id number]        Your Vitals Were     Pulse Temperature Height Pulse Oximetry BMI (Body Mass Index)       78 98.7  F (37.1  C) 5' 7.5\" 90% 31.51 kg/m2        Blood Pressure from Last 3 Encounters:   02/15/18 101/62   02/14/18 110/64   01/12/18 117/78    Weight from Last 3 Encounters:   02/15/18 204 lb 3.2 oz   02/14/18 200 lb   01/12/18 200 lb 3.2 oz              We Performed the Following     Other Specialty Referral          Today's Medication Changes          These changes are accurate as " of 2/15/18  9:34 AM.  If you have any questions, ask your nurse or doctor.               Start taking these medicines.        Dose/Directions    sucralfate 1 GM tablet   Commonly known as:  CARAFATE   Used for:  Nausea   Started by:  Brooke Ballard APRN CNP        Dose:  1 g   Take 1 tablet (1 g) by mouth 4 times daily as needed   Quantity:  120 tablet   Refills:  1            Where to get your medicines      Some of these will need a paper prescription and others can be bought over the counter.  Ask your nurse if you have questions.     Bring a paper prescription for each of these medications     sucralfate 1 GM tablet                Primary Care Provider Office Phone # Fax #    Sydney Ruiz [phone]       [address] [place] [id number]        Equal Access to Services     CHARISSE KERNS : Nicola Mesa, wafranklin luqadaha, qaybta kaalmahollie sofia, cecy lopez . So [facility] [phone].    ATENCIÓN: Si habla español, tiene a urban disposición servicios gratuitos de asistencia lingüística. Llame al [phone].    We comply with applicable federal civil rights laws and [place] laws. We do not discriminate on the basis of race, color, national origin, age, disability, sex, sexual orientation, or gender identity.            Thank you!     Thank you for choosing [facility]  for your care. Our goal is always to provide you with excellent care. Hearing back from our patients is one way we can continue to improve our services. Please take a few minutes to complete the written survey that you may receive in the mail after your visit with us. Thank you!             Your Updated Medication List - Protect others around you: Learn how to safely use, store and throw away your medicines at www.disposemymeds.org.          This list is accurate as of 2/15/18  9:34 AM.  Always use your most recent med list.                    Brand Name Dispense Instructions for use Diagnosis    albuterol 108 (90 BASE) MCG/ACT Inhaler    PROAIR HFA/PROVENTIL HFA/VENTOLIN HFA    1 Inhaler    Inhale 2 puffs into the lungs every 4 hours as needed for shortness of breath / dyspnea or wheezing    Cough       cholecalciferol 5000 UNITS Caps capsule    vitamin D3    90 capsule    Take 1 capsule (5,000 Units) by mouth daily Take one capsule daily.    Low vitamin D level       prenatal multivitamin plus iron 27-0.8 MG Tabs per tablet     30 tablet    Take 1 tablet by mouth daily    HRP (high risk pregnancy), first trimester, Elderly primigravida in first trimester       sucralfate 1 GM tablet    CARAFATE    120 tablet    Take 1 tablet (1 g) by mouth 4 times daily as needed    Nausea

## 2018-02-15 NOTE — NURSING NOTE
"No chief complaint on file.      Vitals:    02/15/18 0817   BP: 101/62   BP Location: Left arm   Patient Position: Chair   Cuff Size: Adult Regular   Pulse: 78   Temp: 98.7  F (37.1  C)   SpO2: 90%   Weight: 204 lb 3.2 oz   Height: 5' 7.5\"       Body mass index is 31.51 kg/(m^2).      Kely Johnston                          "

## 2018-02-16 NOTE — PROGRESS NOTES
Service Date: 02/15/2018      CHIEF COMPLAINT:  Followup regarding irritable bowel syndrome, nausea, loss of appetite, abdominal complaints.      HISTORY OF PRESENT ILLNESS:  Priyanka (Wilfredo-You'elizabeth) is a 37-year-old woman given the diagnosis of IBS several years ago in [place] and advised to use the low FODMAP diet.  She was ordered to a colonoscopy but did not succeed in that, as they moved to [place].  Her history is primarily one of constipation and bloat and dull right-sided pain at all times with nausea which flares in intensity for a few hours.  She has had constant nausea for 4 years, but no vomiting as she does not allow it.  For further details, please see previous notes.  She repeats that she has nausea on awakening, worsened with drinking liquids but does not allow herself to vomit.  She repeats that she has history of erratic ingestions and bingeing but never of bulimia.  Her abdominal pains include primarily right lower quadrant pain, right mid abdomen pain, bilateral lower pain which is somewhat reduced after a bowel movement.  She started the MiraLax advised a month ago and has had improved stool with much less hardness.  Her abdominal complaints continue about the same, however.      Priyanka spent a lot of January with her father who had a difficult death.  He  last Friday.  Nonetheless, she was able to complete her ultrasound this week, as well as her upper GI endoscopy and colonoscopy yesterday.  She thought the procedures went quite well.      MEDICAL HISTORY, MEDICATIONS, ADVERSE DRUG REACTIONS:  Reviewed.      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      REVIEW OF SYSTEMS:  She describes eating emotionally.  Has some head congestion with ear ringing and sinus pain, change in sense of smell, no severe illness.  She denies any significant anxiety is contributing to symptoms.  She has heartburn and nausea, both, abdominal pain, bloat, constipation  and diarrhea, no change or increase in blood in stool.      OBJECTIVE:   VITAL SIGNS:  Reviewed.  Blood pressure is low to normal, normal heart rate.  She has no pain today.  BMI 31.   GENERAL:  Clean, casually groomed and dressed woman who is very pleasant and who shows some insight.   EYES:  Clear.   RESPIRATORY:  Breathing is calm and grossly normal.   ABDOMEN:  Nontender, examined only in the chair.      RESULTS:  Upper GI endoscopy visually normal with gastric biopsies showing some reactive gastropathy.      Colonoscopy with external and internal hemorrhoids, nonthrombosed, a minute sessile polyp in the descending colon now proved to be hyperplastic.      ASSESSMENT:  A 37-year-old woman with chronic nausea, various abdominal pains including some lower bilateral abdominal pains of a couple of hours which decreased after bowel movement, right abdominal pain with some burning sensation, right lower quadrant pain, which is at times more significant and is never gone, which makes her wonder about endometriosis, though she is not willing to undergo exploratory laparotomy.  She does have persistent nausea every morning which is worsened by ingestion of water and resolves adequately such that she can eat by lunchtime.  She does not have a specific upper abdominal discomfort increased after meals in a usual gallbladder function timeframe.  However, she also does not use ibuprofen, aspirin, naproxen or alcohol.  She has had relatives with gallbladder disease including her mother with cholecystectomy early 40s and father with neuroendocrine tumor in the gallbladder.      PLAN:   1.  HIDA scan.   2.  Gastric emptying study.   3.  Trial of sucralfate as if for bile in the stomach.   4.  Referral to [facility] regarding her constipation and also urinary urgency and her sense of incomplete bladder emptying.   5.  Return to GI Clinic is suggested, though it is not urgent.  She has met Dr. Willson.  I am sorry he  is not a luminal provider.  We discussed the luminal providers that she may come to.   6.  Constantin is interested in consulting with Colon and Rectal Surgery for hemorrhoid treatment.      We discussed the assessment and plan.  I reviewed the results.  Written notes were provided.      Return to GI Clinic is suggested in 4 months, which she may cancel if not needed.      Total visit 25 minutes with counseling time 15 minutes.         SALLIE BUTT CNP             D: 02/15/2018   T: 2018   MT: IRIS      Name:     CONSTANTIN NUR   MRN:      [id number]        Account:      [id number]   :      1980           Service Date: 02/15/2018      Document: [id number]

## 2018-02-22 ENCOUNTER — OFFICE VISIT (OUTPATIENT)
Dept: NEUROLOGY | Facility: CLINIC | Age: 38
End: 2018-02-22
Payer: COMMERCIAL

## 2018-02-22 VITALS
BODY MASS INDEX: 30.51 KG/M2 | HEART RATE: 51 BPM | WEIGHT: 201.3 LBS | SYSTOLIC BLOOD PRESSURE: 112 MMHG | HEIGHT: 68 IN | DIASTOLIC BLOOD PRESSURE: 51 MMHG

## 2018-02-22 DIAGNOSIS — R11.0 NAUSEA: ICD-10-CM

## 2018-02-22 DIAGNOSIS — R20.9 SENSORY DISORDER: Primary | ICD-10-CM

## 2018-02-22 DIAGNOSIS — R20.9 SENSORY DISORDER: ICD-10-CM

## 2018-02-22 DIAGNOSIS — K59.00 CONSTIPATION, UNSPECIFIED CONSTIPATION TYPE: ICD-10-CM

## 2018-02-22 DIAGNOSIS — R63.0 LOSS OF APPETITE: ICD-10-CM

## 2018-02-22 LAB
TSH SERPL DL<=0.005 MIU/L-ACNC: 1.84 MU/L (ref 0.4–4)
VIT B12 SERPL-MCNC: 616 PG/ML (ref 193–986)

## 2018-02-22 ASSESSMENT — PAIN SCALES - GENERAL: PAINLEVEL: MODERATE PAIN (4)

## 2018-02-22 NOTE — LETTER
"2018       RE: Priyanka Lundberg  [address]  [place] [address]     Dear Colleague,    Thank you for referring your patient, Priyanka Lundberg, to the [facility] at [facility]. Please see a copy of my visit note below.    Service Date: 2018      INTERVAL HISTORY:   This patient is seen in follow up with headache and sensory disturbance.  I saw the patient about a month ago.  Her exam at that time was unremarkable.  I did obtain an MRI scan of the brain to evaluate for the headache.  I reviewed those images with her today.  There are some sinus changes in the right ethmoid and right maxillary region.  She does not have any other abnormalities noted on the MRI scan.  She reports the headaches persist.  Her father is recently  after having cancer.  She also complains of multifocal, migrating and transitory joint aches.  She has sensory disturbance that also is migratory and intermittent.  Today it is on her right face but sometimes it is in her hands.  The symptom \"moves around.\"  She also has the intermittent abnormal smell of burnt cigarettes.  She complains of muscle stiffness, spasm and muscle knots.  She is in the midst of a GI evaluation.  She does report occasional symptoms of dizziness.  She used to have anxiety and would get dizziness when she was anxious.  The difference is that in previous times she would also be tapping her foot or tapping her finger when she was dizzy but now she does not carry out those motor actions.      PHYSICAL EXAMINATION:   GENERAL:   The patient is cooperative and in no distress.     VITAL SIGNS:   Her blood pressure is 112/51.     NEUROLOGIC:   Visual acuity is 20/20 bilaterally.  Funduscopic exam shows sharp discs bilaterally.  Eye movements are complete and conjugate.  No nystagmus is noted.  Facies are symmetric.  There is no pronator drift.  Finger tapping, finger-nose-finger and heel-knee-shin are " normal.  She easily walks on her heels, toes and tandem.  Sensory testing shows preserved vibration, temperature and graphesthesia in the hands bilaterally.  Gait is unremarkable.      ASSESSMENT:   1.  Concerns about headache and sensory disturbance.      DISCUSSION:  This patient is seen in followup with headache and sensory disturbance.  Her exam on these points is normal.  Her workup to date has been nondiagnostic.  I am going to check a vitamin B12 level.  If she does not get any answer from her GI workup, she is going to consider seeing a rheumatologist.  I think that is a reasonable next step.  I have no other recommendations for her at this time.  I will see her in followup in 3 months, or sooner if there are problems.      D: 2018   T: 2018   MT: KRIS      Name:     CONSTANTIN NUR   MRN:      [id number]        Account:      [id number]   :      1980           Service Date: 2018      Document: [id number]           addendum: reviewed normal B12 level 600 with pt.    Again, thank you for allowing me to participate in the care of your patient.      Sincerely,    Demetri Denis MD

## 2018-02-22 NOTE — MR AVS SNAPSHOT
After Visit Summary   2/22/2018    Priyanka Lundberg    MRN: [id number]           Patient Information     Date Of Birth          1980        Visit Information        Provider Department      2/22/2018 10:30 AM Demetri Denis MD [facility] Neurology        Today's Diagnoses     Sensory disorder    -  1       Follow-ups after your visit        Follow-up notes from your care team     Return in about 3 months (around 5/22/2018).      Your next 10 appointments already scheduled     Feb 22, 2018 11:00 AM CST   LAB with  LAB   [facility] Lab ([facility])    [address] [place] [address]  1st [facility] [id number] [phone]           Please do not eat 10-12 hours before your appointment if you are coming in fasting for labs on lipids, cholesterol, or glucose (sugar). This does not apply to pregnant women. Water, hot tea and black coffee (with nothing added) are okay. Do not drink other fluids, diet soda or chew gum.            Feb 28, 2018  8:00 AM CST   New Sleep Patient with Lyndon Patterson MD   [facility] [place], Sleep Study ([facility])    [address] [place] [id number] [phone]            Mar 08, 2018  8:00 AM CST   (Arrive by 7:45 AM)   New Patient Visit with Diana Boothe MD   [facility] Ear Nose and Throat ([facility])    [address] [place] [address]  4th [facility] [id number] [phone]            May 31, 2018  9:00 AM CDT   (Arrive by 8:45 AM)   Return Visit with Demetri Denis MD   [facility] Neurology ([facility])    [address] [place] [address]  3rd [facility] [id number] [phone]              Future tests that were ordered for you today     Open Future Orders        Priority Expected Expires Ordered    Vitamin B12 Routine  2/22/2019 2/22/2018    Methylmalonic acid Routine  2/22/2019 2/22/2018        "     Who to contact     Please call your clinic at [phone] to:    Ask questions about your health    Make or cancel appointments    Discuss your medicines    Learn about your test results    Speak to your doctor            Additional Information About Your Visit        [employer] Information     [employer] gives you secure access to your electronic health record. If you see a primary care provider, you can also send messages to your care team and make appointments. If you have questions, please call your primary care clinic.  If you do not have a primary care provider, please call [phone] and they will assist you.      [employer] is an electronic gateway that provides easy, online access to your medical records. With [employer], you can request a clinic appointment, read your test results, renew a prescription or communicate with your care team.     To access your existing account, please contact your [facility] or call [phone] for assistance.        Care EveryWhere ID     This is your Care EveryWhere ID. This could be used by other organizations to access your [place] medical records  [id number]        Your Vitals Were     Pulse Height BMI (Body Mass Index)             51 1.715 m (5' 7.5\") 31.06 kg/m2          Blood Pressure from Last 3 Encounters:   02/22/18 112/51   02/15/18 101/62   02/14/18 110/64    Weight from Last 3 Encounters:   02/22/18 91.3 kg (201 lb 4.8 oz)   02/15/18 92.6 kg (204 lb 3.2 oz)   02/14/18 90.7 kg (200 lb)               Primary Care Provider Office Phone # Fax #    Sydneyreynaldo Ruiz [phone]       [address] [place] [id number]        Equal Access to Services     GEOFF [facility] : Hadii sarah Mesa, shima domingo, cecy ledbetter. So [facility] [phone].    ATENCIÓN: Si habla español, tiene a urban disposición servicios gratuitos de asistencia lingüística. Llame " al [phone].    We comply with applicable federal civil rights laws and [place] laws. We do not discriminate on the basis of race, color, national origin, age, disability, sex, sexual orientation, or gender identity.            Thank you!     Thank you for choosing [facility] NEUROLOGY  for your care. Our goal is always to provide you with excellent care. Hearing back from our patients is one way we can continue to improve our services. Please take a few minutes to complete the written survey that you may receive in the mail after your visit with us. Thank you!             Your Updated Medication List - Protect others around you: Learn how to safely use, store and throw away your medicines at www.disposemymeds.org.          This list is accurate as of 2/22/18 10:41 AM.  Always use your most recent med list.                   Brand Name Dispense Instructions for use Diagnosis    albuterol 108 (90 BASE) MCG/ACT Inhaler    PROAIR HFA/PROVENTIL HFA/VENTOLIN HFA    1 Inhaler    Inhale 2 puffs into the lungs every 4 hours as needed for shortness of breath / dyspnea or wheezing    Cough       cholecalciferol 5000 UNITS Caps capsule    vitamin D3    90 capsule    Take 1 capsule (5,000 Units) by mouth daily Take one capsule daily.    Low vitamin D level       prenatal multivitamin plus iron 27-0.8 MG Tabs per tablet     30 tablet    Take 1 tablet by mouth daily    HRP (high risk pregnancy), first trimester, Elderly primigravida in first trimester       sucralfate 1 GM tablet    CARAFATE    120 tablet    Take 1 tablet (1 g) by mouth 4 times daily as needed    Nausea

## 2018-02-22 NOTE — PROGRESS NOTES
"Service Date: 2018      INTERVAL HISTORY:   This patient is seen in follow up with headache and sensory disturbance.  I saw the patient about a month ago.  Her exam at that time was unremarkable.  I did obtain an MRI scan of the brain to evaluate for the headache.  I reviewed those images with her today.  There are some sinus changes in the right ethmoid and right maxillary region.  She does not have any other abnormalities noted on the MRI scan.  She reports the headaches persist.  Her father is recently  after having cancer.  She also complains of multifocal, migrating and transitory joint aches.  She has sensory disturbance that also is migratory and intermittent.  Today it is on her right face but sometimes it is in her hands.  The symptom \"moves around.\"  She also has the intermittent abnormal smell of burnt cigarettes.  She complains of muscle stiffness, spasm and muscle knots.  She is in the midst of a GI evaluation.  She does report occasional symptoms of dizziness.  She used to have anxiety and would get dizziness when she was anxious.  The difference is that in previous times she would also be tapping her foot or tapping her finger when she was dizzy but now she does not carry out those motor actions.      PHYSICAL EXAMINATION:   GENERAL:   The patient is cooperative and in no distress.     VITAL SIGNS:   Her blood pressure is 112/51.     NEUROLOGIC:   Visual acuity is 20/20 bilaterally.  Funduscopic exam shows sharp discs bilaterally.  Eye movements are complete and conjugate.  No nystagmus is noted.  Facies are symmetric.  There is no pronator drift.  Finger tapping, finger-nose-finger and heel-knee-shin are normal.  She easily walks on her heels, toes and tandem.  Sensory testing shows preserved vibration, temperature and graphesthesia in the hands bilaterally.  Gait is unremarkable.      ASSESSMENT:   1.  Concerns about headache and sensory disturbance.      DISCUSSION:  This patient is " seen in followup with headache and sensory disturbance.  Her exam on these points is normal.  Her workup to date has been nondiagnostic.  I am going to check a vitamin B12 level.  If she does not get any answer from her GI workup, she is going to consider seeing a rheumatologist.  I think that is a reasonable next step.  I have no other recommendations for her at this time.  I will see her in followup in 3 months, or sooner if there are problems.      MD OTILIO Erwin MD             D: 2018   T: 2018   MT: KRIS      Name:     CONSTANTIN NUR   MRN:      [id number]        Account:      [id number]   :      1980           Service Date: 2018      Document: [id number]           addendum: reviewed normal B12 level 600 with pt.

## 2018-02-25 LAB — METHYLMALONATE SERPL-SCNC: <0.1 UMOL/L (ref 0–0.4)

## 2018-03-08 ENCOUNTER — OFFICE VISIT (OUTPATIENT)
Dept: OTOLARYNGOLOGY | Facility: CLINIC | Age: 38
End: 2018-03-08
Payer: COMMERCIAL

## 2018-03-08 ENCOUNTER — PRE VISIT (OUTPATIENT)
Dept: OTOLARYNGOLOGY | Facility: CLINIC | Age: 38
End: 2018-03-08

## 2018-03-08 VITALS — WEIGHT: 201 LBS | BODY MASS INDEX: 30.46 KG/M2 | HEIGHT: 68 IN

## 2018-03-08 DIAGNOSIS — J35.8 TONSIL STONE: ICD-10-CM

## 2018-03-08 DIAGNOSIS — J32.0 CHRONIC MAXILLARY SINUSITIS: Primary | ICD-10-CM

## 2018-03-08 DIAGNOSIS — G47.00 INSOMNIA, UNSPECIFIED TYPE: ICD-10-CM

## 2018-03-08 ASSESSMENT — PAIN SCALES - GENERAL: PAINLEVEL: MILD PAIN (3)

## 2018-03-08 NOTE — PROGRESS NOTES
Otolaryngology Adult Consultation    Patient: Priyanka Lundberg  : 1980      HPI:  Priyanka Lundberg is a 37 year old female seen today in the Otolaryngology Clinic for tonsils stones and chronic sinusitis.  The patient reports that she has had tonsil stones for almost all her life.  However, last summer she felt like there was a tonsil stone that on her left side that she has not been able to get out.  She chronically feels something stuck in the back of her throat.  She is also noticing that she seems to be getting less tonsil stones or is not able to get as many tonsil stones out as possible.  The sensation of something stuck in the back of her throat waxes and wanes.  She does feel it almost every day, but it is less bothersome to her now than when it initially occurred.      In the interim, she also had an MRI done to evaluate for headaches.  She has headaches around the temple region that are worse in the morning.  The MRI showed right maxillary sinusitis and ethmoid sinusitis.  She is wondering if her sinuses are causing her headaches.  Associated symptoms include postnasal drip.  She has some nasal discharge that is whitish.  She denies any facial pain, however.         Medications:  Current Outpatient Rx   Medication Sig Dispense Refill     sucralfate (CARAFATE) 1 GM tablet Take 1 tablet (1 g) by mouth 4 times daily as needed 120 tablet 1     albuterol (PROAIR HFA/PROVENTIL HFA/VENTOLIN HFA) 108 (90 BASE) MCG/ACT Inhaler Inhale 2 puffs into the lungs every 4 hours as needed for shortness of breath / dyspnea or wheezing 1 Inhaler 1     cholecalciferol (VITAMIN D3) 5000 UNITS CAPS capsule Take 1 capsule (5,000 Units) by mouth daily Take one capsule daily. 90 capsule 3     Prenatal Vit-Fe Fumarate-FA (PRENATAL MULTIVITAMIN  PLUS IRON) 27-0.8 MG TABS Take 1 tablet by mouth daily 30 tablet 12       Allergies: Food; Lemon flavor; Mustard seed; Onion; Peach; Penicillins; and Vanilla     PMH:  Past Medical  History:   Diagnosis Date     Abdominal pain 2011    abnormal histamine problem, multiple food allergies     ADHD (attention deficit hyperactivity disorder)     on aderol     Anxiety and depression     on xanax     Arthritis      Breathing problem 2007    shortness of breath after eating, ?allergies     Chronic nausea      Gastroesophageal reflux disease      Heart murmur     closed by time she was 1     Hx of abnormal cervical Pap smear 2011     IBS (irritable bowel syndrome)     lots of mucus in bowel with occassional bleeding     Joint pain 2015    was to have rheumatologist     Kidney stone on right side 2010     Meniere's disease     hyperacusis     Migraines      Reduced vision      Tinnitus        PSH:  Past Surgical History:   Procedure Laterality Date     COLONOSCOPY N/A 2/14/2018    Procedure: COMBINED COLONOSCOPY, SINGLE OR MULTIPLE BIOPSY/POLYPECTOMY BY BIOPSY;  colon and egd;  Surgeon: Joan Willson MD;  Location: [facility] OR     ESOPHAGOSCOPY, GASTROSCOPY, DUODENOSCOPY (EGD), COMBINED N/A 2/14/2018    Procedure: COMBINED ESOPHAGOSCOPY, GASTROSCOPY, DUODENOSCOPY (EGD), BIOPSY SINGLE OR MULTIPLE;;  Surgeon: Joan Willson MD;  Location: [facility] OR     NO HISTORY OF SURGERY         FH:  Family History   Problem Relation Age of Onset     Coronary Artery Disease Paternal Grandfather      Colon Cancer Paternal Grandfather      CANCER Paternal Grandfather      Coronary Artery Disease Maternal Grandmother      CEREBROVASCULAR DISEASE Maternal Grandmother      Breast Cancer Maternal Grandmother      Other Cancer Maternal Grandmother      stomach, skin     CANCER Maternal Grandmother      MENTAL ILLNESS Mother      bipolar, schizophrenia     Anxiety Disorder Mother      OSTEOPOROSIS Mother      Thyroid Disease Mother      DIABETES Mother      Neurofibromatosis Mother      more likely fibromyalgia     Arthritis Mother      Back Pain Mother      Osteoarthritis Mother      Obesity Mother      Cirrhosis Mother      from fatty  liver. with esophageal varices, ..     Gallbladder Disease Mother      cholecystectomy     Hypertension Father      Hyperlipidemia Father      DIABETES Father      Other Cancer Father 67     Neuroendocrine tumor, ? from gall bladder, stage 4  1/2018     Migraines Father      Scleroderma Father      Rheumatoid Arthritis Father      Obesity Father      CANCER Father      Prostate Cancer Maternal Grandfather      CANCER Maternal Grandfather      MENTAL ILLNESS Sister      bipolar     Anxiety Disorder Sister      Asthma Sister      eczema     Neurologic Disorder Sister      Cervical Dystonia, treated with Botox     DIABETES Paternal Grandmother      CANCER Paternal Grandmother      Scleroderma Paternal Aunt      CANCER Other      CANCER Other      Asthma Sister      MENTAL ILLNESS Sister         SH:  Social History   Substance Use Topics     Smoking status: Never Smoker     Smokeless tobacco: Never Used     Alcohol use 0.0 oz/week      Comment: outside of pregnancy, social       Review of Systems   ENT ROS 3/7/2018   Constitutional Unexplained fatigue, Problems with sleep   Neurology Dizzy spells, Numbness, Headache   Ears, Nose, Throat Ringing/noise in ears, Nasal congestion or drainage   Cardiopulmonary Chest pain   Gastrointestinal/Genitourinary Heartburn/indigestion   Musculoskeletal Sore or stiff joints, Back pain, Neck pain   Hematologic Lymph node swelling       Physical Exam:    GEN:  The patient is alert, oriented and in no acute distress.  HEAD:  Head, face scalp is grossly normal.  EARS:  Ears demonstrate normal canals, auricles and tympanic membranes.  NOSE:  External nose is straight, skin is normal.                Intranasal exam (anterior rhinoscopy) reveals normal moist mucosa, turbinate                  tissue without edema, erythema or crusting.  Septum mainly in the midline.  ORAL:  Oral cavity shows healthy mucosa with out ulceration, masses or other lesions                involving the tongue,  palate, buccal mucosa, floor of mouth or gingiva.   Tonsils 1+, symmetric; no stones seen  NECK:   Neck is without adenopathy, thyroid or salivary gland masses.  PUL: normal work of breathing; no stridor  CV: RRR; no peripheral edema  M/S: normal muscle tone     Imaging:  IMAGING:  I personally and independently reviewed the MRI images from 1/21/2018.  This shows inflammation of the sinuses lining of the right maxillary sinus and ethmoid sinuses.  All other sinuses are clear.      ASSESSMENT AND PLAN:  The patient presents with two issues.    1.  In terms of her tonsil stones, I discussed with her options for management which include observation and self-cleaning versus tonsillectomy.  We discussed what was involved in a tonsillectomy.  The patient at this time is not interested in a tonsillectomy.  I would recommend she continue self-cleaning as much as possible.      2. She does have right chronic maxillary and ethmoid sinusitis.  She is relatively asymptomatic.  I do not think her sinuses are the cause of her headaches given that her headaches are more temporally related and not having actual facial pain or pressure.  It may be partially responsible for some of the postnasal drip that she is experiencing.  We discussed management with antibiotics and steroids.  The patient is reluctant to go on medical management at this time because she is breastfeeding.  Therefore, I am going to recommend getting another MRI scan of her sinuses to see if there has been any interval change compared to the January scan.  If there is improvement, then I think we can continue with conservative management with observation.  If is the same or worse, then we will need to consider going to an antibiotic and steroid regimen.  In the meantime, I recommend that she try some nasal sinus rinses in order to help with her postnasal drip.  We will call her with the results of the MRI scan.      JH/ms     I spent a total of 35 minutes  face-to-face with Priyanka Lundberg during today's office visit.  Over 50% of this time was spent counseling the patient on and/or coordinating care as documented in my assessment and plan.

## 2018-03-08 NOTE — PATIENT INSTRUCTIONS
Plan of Care:  1. Dr. Boothe has ordered an MRI for you.  We can schedule this for you today.  You will be called with those results  2. Please do [employer] Sinus rinse once or twice daily-see instructions below    Clinic Information:  1. To schedule an appointment please call [phone], option 1  2. To talk to a Triage RN please call [phone] select option 3        Cleaning of the Nasal or Sinus Cavity     Please follow all three steps.  Nasal irrigation (cleaning) should be done 3-4 times/day.     Preparation:  1.  Wash your hands  2.  We suggest that you buy the [employer] Sinus Rinse Kit  3.  Use distilled water or tap water that has been boiled and brought to room temperature.  4.  Fill the irrigation bottle with room temperature water (distilled or boiled tap water) and add mixture (pre made packet or homemade recipe).                             If you wish to make a homemade recipe:                             Mix 1/4 teaspoon salt with 1/4 teaspoon baking soda in each bottle.     Cleansing Irrigation/Sinus Rinse:     1.  Lean forward over a sink and insert the rinse bottle applicator into the right side of your nose.     2.  Tilt head down and to the left side.  With your mouth open (breathing through your mouth), gently direct the water around the inside of your nose until clear fluid starts to drain from the opposite nostril.  This is called flushing or irrigation.     3.  When you have used 1/4 to 1/2 or the solution, switch to the left nostril.     4.  To irrigate the left nostril, tilt your head down and to the right side.  With your mouth open (breathing through your mouth),  gently direct the water around the inside of your nose until clear fluid starts to drain from the opposite nostril.      Cleaning the Equipment:  1.  Throw away any leftover solution  2.  Clean the rinse bottle and cap with clear water. Air dry.        Call the ENT Clinic if you have any questions or concerns at [phone]

## 2018-03-08 NOTE — NURSING NOTE
Chief Complaint   Patient presents with     Consult     tonsil stones     Hafsa Schmidt Medical Assistant

## 2018-03-08 NOTE — MR AVS SNAPSHOT
After Visit Summary   3/8/2018    Priyanka Lundberg    MRN: [id number]           Patient Information     Date Of Birth          1980        Visit Information        Provider Department      3/8/2018 8:00 AM Diana Boothe MD [facility]        Today's Diagnoses     Chronic maxillary sinusitis    -  1    Insomnia, unspecified type        Tonsil stone          Care Instructions    Plan of Care:  1. Dr. Boothe has ordered an MRI for you.  We can schedule this for you today.  You will be called with those results  2. Please do NeilMed Sinus rinse once or twice daily-see instructions below    Clinic Information:  1. To schedule an appointment please call [phone], option 1  2. To talk to a Triage RN please call [phone] select option 3        Cleaning of the Nasal or Sinus Cavity     Please follow all three steps.  Nasal irrigation (cleaning) should be done 3-4 times/day.     Preparation:  1.  Wash your hands  2.  We suggest that you buy the NeilMed Sinus Rinse Kit  3.  Use distilled water or tap water that has been boiled and brought to room temperature.  4.  Fill the irrigation bottle with room temperature water (distilled or boiled tap water) and add mixture (pre made packet or homemade recipe).                             If you wish to make a homemade recipe:                             Mix 1/4 teaspoon salt with 1/4 teaspoon baking soda in each bottle.     Cleansing Irrigation/Sinus Rinse:     1.  Lean forward over a sink and insert the rinse bottle applicator into the right side of your nose.     2.  Tilt head down and to the left side.  With your mouth open (breathing through your mouth), gently direct the water around the inside of your nose until clear fluid starts to drain from the opposite nostril.  This is called flushing or irrigation.     3.  When you have used 1/4 to 1/2 or the solution, switch to the left nostril.     4.  To irrigate the left nostril,  tilt your head down and to the right side.  With your mouth open (breathing through your mouth),  gently direct the water around the inside of your nose until clear fluid starts to drain from the opposite nostril.      Cleaning the Equipment:  1.  Throw away any leftover solution  2.  Clean the rinse bottle and cap with clear water. Air dry.        Call the ENT Clinic if you have any questions or concerns at [phone]          Follow-ups after your visit        Your next 10 appointments already scheduled     Mar 09, 2018  8:00 AM CST   (Arrive by 7:45 AM)   NM GASTRIC EMPTYING UU UR MG with UUNM4   [facility] ([facility])    [address] [address] [phone]           Please bring a list of your medicines to the exam. (Include vitamins, minerals and over-the-counter drugs.) You should wear comfortable clothes. Leave your valuables at home. Please bring related prior results and films.  Tell your doctor:   If you are breastfeeding or may be pregnant.   If you have had a barium test within the past few days. Barium may change the results of certain exams.  No food or drink (including water) for 4 hours prior to the exam.  Please call your Imaging Department at your exam site with any questions.            Mar 12, 2018  7:30 AM CDT   (Arrive by 7:15 AM)   NM HEPATOBILIARY SCAN W GB EF with UUNM2   [facility] ([facility])    [address] [address] [phone]           Please bring a list of your medicines to the exam. (Include vitamins, minerals and over-the-counter drugs.) You should wear comfortable clothes. Leave your valuables at home. Please bring related prior results and films.  Tell your doctor:   If you are breastfeeding or may be pregnant.   If you have had a test including barium within the past 48 hours. Barium may  change the results of certain exams.   If you think you may need sedation (medicine to help you relax).  4 hours before your exam: stop drinking and eating. Stop all morphine or morphine derivatives.  Please call your Imaging Department at your exam site with any questions.            Mar 14, 2018  6:15 PM CDT   (Arrive by 6:00 PM)   New Patient Visit with SALLIE Mcintosh CNP   [facility] ([facility])    [address]  4th Floor  [place] [place] [id number] [phone]            Mar 16, 2018  8:20 AM CDT   [facility] with Shahbaz Alonso PT   [facility] ([facility])    [address] [place] [address] [phone]            May 31, 2018  9:00 AM CDT   (Arrive by 8:45 AM)   Return Visit with Demetri Denis MD   [facility] ([facility])    [address]  3rd Floor  [place] [address] [phone]              Future tests that were ordered for you today     Open Future Orders        Priority Expected Expires Ordered    MR Sinus Face w/o & w Contrast Routine  3/8/2019 3/8/2018            Who to contact     Please call your clinic at [phone] to:    Ask questions about your health    Make or cancel appointments    Discuss your medicines    Learn about your test results    Speak to your doctor            Additional Information About Your Visit        DoApp Information     DoApp gives you secure access to your electronic health record. If you see a primary care provider, you can also send messages to your care team and make appointments. If you have questions, please call your primary care clinic.  If you do not have a primary care provider, please call [phone] and they will assist you.      DoApp is an electronic gateway that provides easy, online access to your medical records. With DoApp, you can request a  "clinic appointment, read your test results, renew a prescription or communicate with your care team.     To access your existing account, please contact your [facility] or call [phone] for assistance.        Care EveryWhere ID     This is your Care EveryWhere ID. This could be used by other organizations to access your [employer] medical records  [id number]        Your Vitals Were     Height BMI (Body Mass Index)                1.715 m (5' 7.52\") 31 kg/m2           Blood Pressure from Last 3 Encounters:   02/22/18 112/51   02/15/18 101/62   02/14/18 110/64    Weight from Last 3 Encounters:   03/08/18 91.2 kg (201 lb)   02/22/18 91.3 kg (201 lb 4.8 oz)   02/15/18 92.6 kg (204 lb 3.2 oz)              We Performed the Following     SLEEP EVALUATION & MANAGEMENT REFERRAL - ADULT -Other (Respond in commments) ([facility])        Primary Care Provider Office Phone # Fax #    Sydney Ruiz [phone]       [address] [place] [address]        Equal Access to Services     [facility]: Hadii sarah ku hadasho Soangelali, waaxda luqadaha, qaybta kaalmada adedeshawn, cecy lopez . So [facility] [phone].    ATENCIÓN: Si habla español, tiene a urban disposición servicios gratuitos de asistencia lingüística. [facility] [phone].    We comply with applicable federal civil rights laws and [place] laws. We do not discriminate on the basis of race, color, national origin, age, disability, sex, sexual orientation, or gender identity.            Thank you!     Thank you for choosing [facility]  for your care. Our goal is always to provide you with excellent care. Hearing back from our patients is one way we can continue to improve our services. Please take a few minutes to complete the written survey that you may receive in the mail after your visit with us. Thank you!             Your Updated Medication List - " Protect others around you: Learn how to safely use, store and throw away your medicines at www.disposemymeds.org.          This list is accurate as of 3/8/18  9:02 AM.  Always use your most recent med list.                   Brand Name Dispense Instructions for use Diagnosis    albuterol 108 (90 BASE) MCG/ACT Inhaler    PROAIR HFA/PROVENTIL HFA/VENTOLIN HFA    1 Inhaler    Inhale 2 puffs into the lungs every 4 hours as needed for shortness of breath / dyspnea or wheezing    Cough       cholecalciferol 5000 UNITS Caps capsule    vitamin D3    90 capsule    Take 1 capsule (5,000 Units) by mouth daily Take one capsule daily.    Low vitamin D level       prenatal multivitamin plus iron 27-0.8 MG Tabs per tablet     30 tablet    Take 1 tablet by mouth daily    HRP (high risk pregnancy), first trimester, Elderly primigravida in first trimester       sucralfate 1 GM tablet    CARAFATE    120 tablet    Take 1 tablet (1 g) by mouth 4 times daily as needed    Nausea

## 2018-03-08 NOTE — LETTER
3/8/2018       RE: Priyanka Lundberg  [address]  [place] [address]     Dear Colleague,    Thank you for referring your patient, Priyanka Lundberg, to the [facility] at [facility]. Please see a copy of my visit note below.    Otolaryngology Adult Consultation    Patient: Priyanka Lundberg  : 1980      HPI:  Priyanka Lundberg is a 37 year old female seen today in the Otolaryngology Clinic for tonsils stones and chronic sinusitis.  The patient reports that she has had tonsil stones for almost all her life.  However, last summer she felt like there was a tonsil stone that on her left side that she has not been able to get out.  She chronically feels something stuck in the back of her throat.  She is also noticing that she seems to be getting less tonsil stones or is not able to get as many tonsil stones out as possible.  The sensation of something stuck in the back of her throat waxes and wanes.  She does feel it almost every day, but it is less bothersome to her now than when it initially occurred.      In the interim, she also had an MRI done to evaluate for headaches.  She has headaches around the temple region that are worse in the morning.  The MRI showed right maxillary sinusitis and ethmoid sinusitis.  She is wondering if her sinuses are causing her headaches.  Associated symptoms include postnasal drip.  She has some nasal discharge that is whitish.  She denies any facial pain, however.         Medications:  Current Outpatient Rx   Medication Sig Dispense Refill     sucralfate (CARAFATE) 1 GM tablet Take 1 tablet (1 g) by mouth 4 times daily as needed 120 tablet 1     albuterol (PROAIR HFA/PROVENTIL HFA/VENTOLIN HFA) 108 (90 BASE) MCG/ACT Inhaler Inhale 2 puffs into the lungs every 4 hours as needed for shortness of breath / dyspnea or wheezing 1 Inhaler 1     cholecalciferol (VITAMIN D3) 5000 UNITS CAPS capsule Take 1 capsule (5,000  Units) by mouth daily Take one capsule daily. 90 capsule 3     Prenatal Vit-Fe Fumarate-FA (PRENATAL MULTIVITAMIN  PLUS IRON) 27-0.8 MG TABS Take 1 tablet by mouth daily 30 tablet 12       Allergies: Food; Lemon flavor; Mustard seed; Onion; Peach; Penicillins; and Vanilla     PMH:  Past Medical History:   Diagnosis Date     Abdominal pain 2011    abnormal histamine problem, multiple food allergies     ADHD (attention deficit hyperactivity disorder)     on aderol     Anxiety and depression     on xanax     Arthritis      Breathing problem 2007    shortness of breath after eating, ?allergies     Chronic nausea      Gastroesophageal reflux disease      Heart murmur     closed by time she was 1     Hx of abnormal cervical Pap smear 2011     IBS (irritable bowel syndrome)     lots of mucus in bowel with occassional bleeding     Joint pain 2015    was to have rheumatologist     Kidney stone on right side 2010     Meniere's disease     hyperacusis     Migraines      Reduced vision      Tinnitus        PSH:  Past Surgical History:   Procedure Laterality Date     COLONOSCOPY N/A 2/14/2018    Procedure: COMBINED COLONOSCOPY, SINGLE OR MULTIPLE BIOPSY/POLYPECTOMY BY BIOPSY;  colon and egd;  Surgeon: Joan Willson MD;  Location: [facility] OR     ESOPHAGOSCOPY, GASTROSCOPY, DUODENOSCOPY (EGD), COMBINED N/A 2/14/2018    Procedure: COMBINED ESOPHAGOSCOPY, GASTROSCOPY, DUODENOSCOPY (EGD), BIOPSY SINGLE OR MULTIPLE;;  Surgeon: Joan Willson MD;  Location: [facility] OR     NO HISTORY OF SURGERY         FH:  Family History   Problem Relation Age of Onset     Coronary Artery Disease Paternal Grandfather      Colon Cancer Paternal Grandfather      CANCER Paternal Grandfather      Coronary Artery Disease Maternal Grandmother      CEREBROVASCULAR DISEASE Maternal Grandmother      Breast Cancer Maternal Grandmother      Other Cancer Maternal Grandmother      stomach, skin     CANCER Maternal Grandmother      MENTAL ILLNESS Mother      bipolar,  schizophrenia     Anxiety Disorder Mother      OSTEOPOROSIS Mother      Thyroid Disease Mother      DIABETES Mother      Neurofibromatosis Mother      more likely fibromyalgia     Arthritis Mother      Back Pain Mother      Osteoarthritis Mother      Obesity Mother      Cirrhosis Mother      from fatty liver. with esophageal varices, ..     Gallbladder Disease Mother      cholecystectomy     Hypertension Father      Hyperlipidemia Father      DIABETES Father      Other Cancer Father 67     Neuroendocrine tumor, ? from gall bladder, stage 4  1/2018     Migraines Father      Scleroderma Father      Rheumatoid Arthritis Father      Obesity Father      CANCER Father      Prostate Cancer Maternal Grandfather      CANCER Maternal Grandfather      MENTAL ILLNESS Sister      bipolar     Anxiety Disorder Sister      Asthma Sister      eczema     Neurologic Disorder Sister      Cervical Dystonia, treated with Botox     DIABETES Paternal Grandmother      CANCER Paternal Grandmother      Scleroderma Paternal Aunt      CANCER Other      CANCER Other      Asthma Sister      MENTAL ILLNESS Sister         SH:  Social History   Substance Use Topics     Smoking status: Never Smoker     Smokeless tobacco: Never Used     Alcohol use 0.0 oz/week      Comment: outside of pregnancy, social       Review of Systems   ENT ROS 3/7/2018   Constitutional Unexplained fatigue, Problems with sleep   Neurology Dizzy spells, Numbness, Headache   Ears, Nose, Throat Ringing/noise in ears, Nasal congestion or drainage   Cardiopulmonary Chest pain   Gastrointestinal/Genitourinary Heartburn/indigestion   Musculoskeletal Sore or stiff joints, Back pain, Neck pain   Hematologic Lymph node swelling       Physical Exam:    GEN:  The patient is alert, oriented and in no acute distress.  HEAD:  Head, face scalp is grossly normal.  EARS:  Ears demonstrate normal canals, auricles and tympanic membranes.  NOSE:  External nose is straight, skin is normal.                 Intranasal exam (anterior rhinoscopy) reveals normal moist mucosa, turbinate                  tissue without edema, erythema or crusting.  Septum mainly in the midline.  ORAL:  Oral cavity shows healthy mucosa with out ulceration, masses or other lesions                involving the tongue, palate, buccal mucosa, floor of mouth or gingiva.   Tonsils 1+, symmetric; no stones seen  NECK:   Neck is without adenopathy, thyroid or salivary gland masses.  PUL: normal work of breathing; no stridor  CV: RRR; no peripheral edema  M/S: normal muscle tone     Imaging:  IMAGING:  I personally and independently reviewed the MRI images from 1/21/2018.  This shows inflammation of the sinuses lining of the right maxillary sinus and ethmoid sinuses.  All other sinuses are clear.      ASSESSMENT AND PLAN:  The patient presents with two issues.    1.  In terms of her tonsil stones, I discussed with her options for management which include observation and self-cleaning versus tonsillectomy.  We discussed what was involved in a tonsillectomy.  The patient at this time is not interested in a tonsillectomy.  I would recommend she continue self-cleaning as much as possible.      2. She does have right chronic maxillary and ethmoid sinusitis.  She is relatively asymptomatic.  I do not think her sinuses are the cause of her headaches given that her headaches are more temporally related and not having actual facial pain or pressure.  It may be partially responsible for some of the postnasal drip that she is experiencing.  We discussed management with antibiotics and steroids.  The patient is reluctant to go on medical management at this time because she is breastfeeding.  Therefore, I am going to recommend getting another MRI scan of her sinuses to see if there has been any interval change compared to the January scan.  If there is improvement, then I think we can continue with conservative management with observation.  If is the same  or worse, then we will need to consider going to an antibiotic and steroid regimen.  In the meantime, I recommend that she try some nasal sinus rinses in order to help with her postnasal drip.  We will call her with the results of the MRI scan.      JH/ms     I spent a total of 35 minutes face-to-face with Priyanak Lundberg during today's office visit.  Over 50% of this time was spent counseling the patient on and/or coordinating care as documented in my assessment and plan.        Again, thank you for allowing me to participate in the care of your patient.      Sincerely,    Diana Boothe MD

## 2018-03-09 ENCOUNTER — HOSPITAL ENCOUNTER (OUTPATIENT)
Dept: NUCLEAR MEDICINE | Facility: CLINIC | Age: 38
Setting detail: NUCLEAR MEDICINE
Discharge: HOME | End: 2018-03-09
Attending: REGISTERED NURSE | Admitting: REGISTERED NURSE
Payer: COMMERCIAL

## 2018-03-09 DIAGNOSIS — R11.0 NAUSEA: ICD-10-CM

## 2018-03-09 DIAGNOSIS — R63.0 LOSS OF APPETITE: ICD-10-CM

## 2018-03-09 PROCEDURE — 34300033 ZZH RX 343: Performed by: REGISTERED NURSE

## 2018-03-09 PROCEDURE — A9541 TC99M SULFUR COLLOID: HCPCS | Performed by: REGISTERED NURSE

## 2018-03-09 PROCEDURE — 78264 GASTRIC EMPTYING IMG STUDY: CPT

## 2018-03-09 RX ADMIN — Medication 2 MILLICURIE: at 08:21

## 2018-03-12 ENCOUNTER — HOSPITAL ENCOUNTER (OUTPATIENT)
Dept: NUCLEAR MEDICINE | Facility: CLINIC | Age: 38
Setting detail: NUCLEAR MEDICINE
Discharge: HOME | End: 2018-03-12
Attending: REGISTERED NURSE | Admitting: REGISTERED NURSE
Payer: COMMERCIAL

## 2018-03-12 DIAGNOSIS — R11.0 NAUSEA: ICD-10-CM

## 2018-03-12 DIAGNOSIS — R63.0 LOSS OF APPETITE: ICD-10-CM

## 2018-03-12 PROCEDURE — A9537 TC99M MEBROFENIN: HCPCS | Performed by: REGISTERED NURSE

## 2018-03-12 PROCEDURE — 78227 HEPATOBIL SYST IMAGE W/DRUG: CPT

## 2018-03-12 PROCEDURE — 34300033 ZZH RX 343: Performed by: REGISTERED NURSE

## 2018-03-12 RX ORDER — KIT FOR THE PREPARATION OF TECHNETIUM TC 99M MEBROFENIN 45 MG/10ML
4.8-7.2 INJECTION, POWDER, LYOPHILIZED, FOR SOLUTION INTRAVENOUS ONCE
Status: COMPLETED | OUTPATIENT
Start: 2018-03-12 | End: 2018-03-12

## 2018-03-12 RX ADMIN — MEBROFENIN 5.2 MCI.: 45 INJECTION, POWDER, LYOPHILIZED, FOR SOLUTION INTRAVENOUS at 09:13

## 2018-03-14 ENCOUNTER — OFFICE VISIT (OUTPATIENT)
Dept: SURGERY | Facility: CLINIC | Age: 38
End: 2018-03-14
Payer: COMMERCIAL

## 2018-03-14 VITALS
TEMPERATURE: 98.6 F | BODY MASS INDEX: 30.49 KG/M2 | DIASTOLIC BLOOD PRESSURE: 75 MMHG | WEIGHT: 201.2 LBS | HEIGHT: 68 IN | SYSTOLIC BLOOD PRESSURE: 126 MMHG | OXYGEN SATURATION: 97 % | HEART RATE: 83 BPM

## 2018-03-14 DIAGNOSIS — K59.09 OTHER CONSTIPATION: ICD-10-CM

## 2018-03-14 DIAGNOSIS — K62.5 HEMORRHAGE OF RECTUM AND ANUS: Primary | ICD-10-CM

## 2018-03-14 DIAGNOSIS — K64.8 INTERNAL HEMORRHOIDS: ICD-10-CM

## 2018-03-14 DIAGNOSIS — K64.8 HEMORRHOID PROLAPSE: ICD-10-CM

## 2018-03-14 ASSESSMENT — PAIN SCALES - GENERAL: PAINLEVEL: NO PAIN (0)

## 2018-03-14 NOTE — PROGRESS NOTES
Colon and Rectal Surgery Consult Clinic Note    Date: 3/14/2018     Referring provider:  SALLIE Casillas CNP  [address] [facility] [address]  [place] [address]     RE: Priyanka Lundberg  : 1980  EMANI: 3/14/2018    Priyanka Lundberg is a very pleasant 37 year old female with a PMH of longstanding abdominal pain, ADHD, anxiety, GERD, and IBS who presents today for evaluation of hemorrhoids.    Priyanka followed with Brooke Ballard for her IBS. She recently had a HIDA scan and gastric emptying study. She has had longstanding constipation and thinks she has developed hemorrhoids from his.  She thinks she has had hemorrhoids for more than 20 years.  She always feels unclean and that she cannot fully clean outside due to the extra tissue.  With straining with hard bowel movements, she does have tissue that comes out and needs to be manually reduced.  She denies any pain associated with this.  She does notice a small amount of blood when she wipes or that drips into the toilet about every 3 months.  She is not on any blood thinners.  She typically has a bowel movement 3-4 times a week.  She has never had any anorectal procedures in the past.    Assessment/Plan: 37 year old female with long-standing IBS and constipation with hemorrhoid prolapse.  On exam she has grade 3 internal hemorrhoids with some prolapsing component in the right lateral and posterior positions.  No active bleeding on exam she does have bleeding about once every 3 months.  She underwent a colonoscopy in 2018 which was otherwise normal.  Discussed managing hemorrhoids with hemorrhoid banding.  Advised her of the risks of bleeding today and when the band falls off in 1-2 weeks.  Discussed that this will not get rid of the external hemorrhoidal skin tags but is aimed at improving the bleeding and prolapsing component.  Recommended remaining off of any blood thinners and avoiding heavy lifting for the next 2 weeks.  She states an understanding of  these risks and wished to proceed with banding today.  One band was placed in the right posterior position.  She tolerated this well.  We will have her return to clinic in 4 weeks if she has any further bleeding or hemorrhoid prolapse.  We will have her continue to follow with gastroenterology for her IBS as well. Patient's questions were answered to her stated satisfaction and she is in agreement with this plan.    Medical history:  Past Medical History:   Diagnosis Date     Abdominal pain 2011    abnormal histamine problem, multiple food allergies     ADHD (attention deficit hyperactivity disorder)     on aderol     Anxiety and depression     on xanax     Arthritis      Breathing problem 2007    shortness of breath after eating, ?allergies     Chronic nausea      Gastroesophageal reflux disease      Heart murmur     closed by time she was 1     Hx of abnormal cervical Pap smear 2011     IBS (irritable bowel syndrome)     lots of mucus in bowel with occassional bleeding     Joint pain 2015    was to have rheumatologist     Kidney stone on right side 2010     Meniere's disease     hyperacusis     Migraines      Reduced vision      Tinnitus        Surgical history:  Past Surgical History:   Procedure Laterality Date     COLONOSCOPY N/A 2/14/2018    Procedure: COMBINED COLONOSCOPY, SINGLE OR MULTIPLE BIOPSY/POLYPECTOMY BY BIOPSY;  colon and egd;  Surgeon: Joan Willson MD;  [facility]     ESOPHAGOSCOPY, GASTROSCOPY, DUODENOSCOPY (EGD), COMBINED N/A 2/14/2018    Procedure: COMBINED ESOPHAGOSCOPY, GASTROSCOPY, DUODENOSCOPY (EGD), BIOPSY SINGLE OR MULTIPLE;;  Surgeon: Joan Willson MD;  Location: [facility]     NO HISTORY OF SURGERY         Problem list:    Patient Active Problem List    Diagnosis Date Noted     Cervical pain 12/28/2017     Priority: Medium     Bilateral thoracic back pain 12/28/2017     Priority: Medium     Other acne 07/08/2017     Priority: Medium     Benign nevus 07/08/2017     Priority: Medium      History of abnormal cervical Pap smear 09/08/2016     Priority: Medium     Vaginal discharge 03/18/2016     Priority: Medium     Gastrointestinal problem 12/07/2015     Priority: Medium     Has multiple food allergies where she has trouble breathing after she eats, were testing her for celiac and IBS but never followed through with colonoscopy  NEED TO CONSIDER GI REFERRAL       Joint pain 12/07/2015     Priority: Medium     Dad with scleroderma.  Pt was to follow with rheumatologist to R/O RA  NEED TO CONSIDER RHEUM REFERRAL          L4-L5 disc bulge 12/07/2015     Priority: Medium     Has been using PT to manage symptoms       Attention deficit hyperactivity disorder (ADHD), combined type 12/07/2015     Priority: Medium     Stopped Adderal when she found out she was pregnant       Depression with anxiety 12/07/2015     Priority: Medium     Stopped xanax when she found out she was pregnant       History of Clostridium difficile colitis 10/01/2014     Priority: Medium       Medications:  Current Outpatient Prescriptions   Medication Sig Dispense Refill     sucralfate (CARAFATE) 1 GM tablet Take 1 tablet (1 g) by mouth 4 times daily as needed 120 tablet 1     albuterol (PROAIR HFA/PROVENTIL HFA/VENTOLIN HFA) 108 (90 BASE) MCG/ACT Inhaler Inhale 2 puffs into the lungs every 4 hours as needed for shortness of breath / dyspnea or wheezing 1 Inhaler 1     cholecalciferol (VITAMIN D3) 5000 UNITS CAPS capsule Take 1 capsule (5,000 Units) by mouth daily Take one capsule daily. 90 capsule 3     Prenatal Vit-Fe Fumarate-FA (PRENATAL MULTIVITAMIN  PLUS IRON) 27-0.8 MG TABS Take 1 tablet by mouth daily 30 tablet 12       Allergies:  Allergies   Allergen Reactions     Food      Cantaloupe, cucumbers, green beans, and peppers.      Lemon Flavor      Mustard Seed      Onion Difficulty breathing and GI Disturbance     Kingfisher GI Disturbance     Penicillins Hives     Or another medication taken at that time     Vanilla GI Disturbance        Family history:  Family History   Problem Relation Age of Onset     Coronary Artery Disease Paternal Grandfather      Colon Cancer Paternal Grandfather      CANCER Paternal Grandfather      Coronary Artery Disease Maternal Grandmother      CEREBROVASCULAR DISEASE Maternal Grandmother      Breast Cancer Maternal Grandmother      Other Cancer Maternal Grandmother      stomach, skin     CANCER Maternal Grandmother      MENTAL ILLNESS Mother      bipolar, schizophrenia     Anxiety Disorder Mother      OSTEOPOROSIS Mother      Thyroid Disease Mother      DIABETES Mother      Neurofibromatosis Mother      more likely fibromyalgia     Arthritis Mother      Back Pain Mother      Osteoarthritis Mother      Obesity Mother      Cirrhosis Mother      from fatty liver. with esophageal varices, ..     Gallbladder Disease Mother      cholecystectomy     Hypertension Father      Hyperlipidemia Father      DIABETES Father      Other Cancer Father 67     Neuroendocrine tumor, ? from gall bladder, stage 4  1/2018     Migraines Father      Scleroderma Father      Rheumatoid Arthritis Father      Obesity Father      CANCER Father      Prostate Cancer Maternal Grandfather      CANCER Maternal Grandfather      MENTAL ILLNESS Sister      bipolar     Anxiety Disorder Sister      Asthma Sister      eczema     Neurologic Disorder Sister      Cervical Dystonia, treated with Botox     DIABETES Paternal Grandmother      CANCER Paternal Grandmother      Scleroderma Paternal Aunt      CANCER Other      CANCER Other      Asthma Sister      MENTAL ILLNESS Sister        Social history:  Social History   Substance Use Topics     Smoking status: Never Smoker     Smokeless tobacco: Never Used     Alcohol use 0.0 oz/week      Comment: outside of pregnancy, social    Marital status: .  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    Stephanie BROWN LPN                           Physical Examination:  /75  Pulse 83  Temp 98.6  F (37  C) (Oral)  Ht 5' 7.52\"  Wt 201 lb 3.2 oz  SpO2 97%  BMI 31.03 kg/m2  General: alert, oriented, in no acute distress, sitting comfortably  HEENT: mucous membranes moist  Perianal external examination: Exam was chaperoned by Stephanie Hough LPN  Perianal skin: Intact with no excoriation or lichenification.  Lesions: No evidence of an external lesion, nodularity, or induration in the perianal region.  Eversion of buttocks: There was not evidence of an anal fissure. Details: N/A.  Skin tags or external hemorrhoids: Yes: external skin tags present circumferentially. Some hemorrhoid prolapse in the posterior midline .  Digital rectal examination: Was performed.   Sphincter tone: Good.  Palpable lesions: No.  Other: None.  Bimanual examination: was not performed    Anoscopy: Was performed.   Hemorrhoids: Yes. Grade 2-3 internal hemorrhoids without active bleeding  Lesions: No    Procedures:  After discussing the risks and benefits, the patient agreed to proceed with internal hemorrhoidal banding.    Prior to the start of the procedure and with procedural staff participation, I verbally confirmed the patient s identity using two indicators, relevant allergies, that the procedure was appropriate and matched the consent or emergent situation, and that the correct equipment/implants were available. Immediately prior to starting the procedure I conducted the Time Out with the procedural staff and re-confirmed the patient s name, procedure, and site/side. (The Joint Commission universal protocol was followed.)  No    Sedation (Moderate or Deep): None    A suction hemorrhoidal "  was used to place a total of 1 band(s) in the right posterior position(s).    There was no significant bleeding. The patient tolerated the procedure well.    This procedure was performed under a collaborative privileging agreement with Dr. Carroll, Chief of Colon and Rectal Surgery.    Total face to face time was 20 minutes, outside the procedure time, >50% counseling.    SALLIE Méndez, NP-C  Colon and Rectal Surgery   [facility]    This note was created using speech recognition software and may contain unintended word substitutions.

## 2018-03-14 NOTE — NURSING NOTE
"Chief Complaint   Patient presents with     Clinic Care Coordination - Initial     new       Vitals:    03/14/18 1811   BP: 126/75   Pulse: 83   Temp: 98.6  F (37  C)   TempSrc: Oral   SpO2: 97%   Weight: 91.3 kg (201 lb 3.2 oz)   Height: 1.715 m (5' 7.52\")       Body mass index is 31.03 kg/(m^2).    Stephanie BROWN LPN                        "

## 2018-03-14 NOTE — MR AVS SNAPSHOT
After Visit Summary   3/14/2018    Priyanka Lundberg    MRN: [id number]           Patient Information     Date Of Birth          1980        Visit Information        Provider Department      3/14/2018 6:15 PM Heather Barrera APRN [facility] Colon and Rectal Surgery        Today's Diagnoses     Hemorrhage of rectum and anus    -  1    Internal hemorrhoids        Hemorrhoid prolapse        Other constipation           Follow-ups after your visit        Your next 10 appointments already scheduled     Mar 16, 2018  8:20 AM CDT   [facility] with Shahbaz Alonso PT   [facility] ([facility])    [address] [address]   [phone]            Mar 17, 2018 10:00 AM CDT   (Arrive by 9:45 AM)   MyChart Eye Care New with Jodie Oliver OD   [facility] ([facility])    [address]  4th Floor  [facility] [id number] [phone]            Mar 18, 2018  7:45 AM CDT   (Arrive by 7:30 AM)   MR SINUS FACE W/O & W CONTRAST with [facility] ([facility])    [address]  [address] [facility] [id number] [phone]           Take your medicines as usual, unless your doctor tells you not to. Bring a list of your current medicines to your exam (including vitamins, minerals and over-the-counter drugs).  You may or may not receive intravenous (IV) contrast for this exam pending the discretion of the Radiologist.  You do not need to do anything special to prepare.  The MRI machine uses a strong magnet. Please wear clothes without metal (snaps, zippers). A sweatsuit works well, or we may give you a hospital gown.  Please remove any body piercings and hair extensions before you arrive. You will also remove watches, jewelry, hairpins, wallets, dentures, partial dental plates and hearing aids. You may wear contact  lenses, and you may be able to wear your rings. We have a safe place to keep your personal items, but it is safer to leave them at home.  **IMPORTANT** THE INSTRUCTIONS BELOW ARE ONLY FOR THOSE PATIENTS WHO HAVE BEEN PRESCRIBED SEDATION OR GENERAL ANESTHESIA DURING THEIR MRI PROCEDURE:  IF YOUR DOCTOR PRESCRIBED ORAL SEDATION (take medicine to help you relax during your exam):   You must get the medicine from your doctor (oral medication) before you arrive. Bring the medicine to the exam. Do not take it at home. You ll be told when to take it upon arriving for your exam.   Arrive one hour early. Bring someone who can take you home after the test. Your medicine will make you sleepy. After the exam, you may not drive, take a bus or take a taxi by yourself.  IF YOUR DOCTOR PRESCRIBED IV SEDATION:   Arrive one hour early. Bring someone who can take you home after the test. Your medicine will make you sleepy. After the exam, you may not drive, take a bus or take a taxi by yourself.   No eating 6 hours before your exam. You may have clear liquids up until 4 hours before your exam. (Clear liquids include water, clear tea, black coffee and fruit juice without pulp.)  IF YOUR DOCTOR PRESCRIBED ANESTHESIA (be asleep for your exam):   Arrive 1 1/2 hours early. Bring someone who can take you home after the test. You may not drive, take a bus or take a taxi by yourself.   No eating 8 hours before your exam. You may have clear liquids up until 4 hours before your exam. (Clear liquids include water, clear tea, black coffee and fruit juice without pulp.)   You will spend four to five hours in the recovery room.  Please call the Imaging Department at your exam site with any questions.            Mar 23, 2018  8:40 AM CDT   Return Visit with Sydney Ruiz MD   [facility] ([facility])    [address]  [facility] [address]   [phone]            May 31, 2018  9:00  "AM CDT   (Arrive by 8:45 AM)   Return Visit with Demetri Denis MD   [facility] ([facility] [place])    [address]  [address] [facility] [address] [phone]              Who to contact     Please call your clinic at [phone] to:    Ask questions about your health    Make or cancel appointments    Discuss your medicines    Learn about your test results    Speak to your doctor            Additional Information About Your Visit        [employer] Information     [employer] gives you secure access to your electronic health record. If you see a primary care provider, you can also send messages to your care team and make appointments. If you have questions, please call your primary care clinic.  If you do not have a primary care provider, please call [phone] and they will assist you.      [employer] is an electronic gateway that provides easy, online access to your medical records. With [employer], you can request a clinic appointment, read your test results, renew a prescription or communicate with your care team.     To access your existing account, please contact your [facility] or call [phone] for assistance.        Care EveryWhere ID     This is your Care EveryWhere ID. This could be used by other organizations to access your [facility] medical records  [id number]        Your Vitals Were     Pulse Temperature Height Pulse Oximetry BMI (Body Mass Index)       83 98.6  F (37  C) (Oral) 5' 7.52\" 97% 31.03 kg/m2        Blood Pressure from Last 3 Encounters:   03/14/18 126/75   02/22/18 112/51   02/15/18 101/62    Weight from Last 3 Encounters:   03/14/18 201 lb 3.2 oz   03/08/18 201 lb   02/22/18 201 lb 4.8 oz              Today, you had the following     No orders found for display       Primary Care Provider Office Phone # Fax #    Sydney Ruiz [phone] [address] [place] [id number]      "   Equal Access to Services     [facility]red HospitalLATONYA : Hadii aad ku hadkimberlyluz marina Briannamilo, waqamarda luqpayton, qavirginiata kaambercecy bustillos. So [facility] [phone].    ATENCIÓN: Si habla español, tiene a urban disposición servicios gratuitos de asistencia lingüística. Anyiame al [phone].    We comply with applicable federal civil rights laws and [place] laws. We do not discriminate on the basis of race, color, national origin, age, disability, sex, sexual orientation, or gender identity.            Thank you!     Thank you for choosing [facility] COLON AND RECTAL SURGERY  for your care. Our goal is always to provide you with excellent care. Hearing back from our patients is one way we can continue to improve our services. Please take a few minutes to complete the written survey that you may receive in the mail after your visit with us. Thank you!             Your Updated Medication List - Protect others around you: Learn how to safely use, store and throw away your medicines at www.disposemymeds.org.          This list is accurate as of 3/14/18  6:45 PM.  Always use your most recent med list.                   Brand Name Dispense Instructions for use Diagnosis    albuterol 108 (90 BASE) MCG/ACT Inhaler    PROAIR HFA/PROVENTIL HFA/VENTOLIN HFA    1 Inhaler    Inhale 2 puffs into the lungs every 4 hours as needed for shortness of breath / dyspnea or wheezing    Cough       cholecalciferol 5000 UNITS Caps capsule    vitamin D3    90 capsule    Take 1 capsule (5,000 Units) by mouth daily Take one capsule daily.    Low vitamin D level       prenatal multivitamin plus iron 27-0.8 MG Tabs per tablet     30 tablet    Take 1 tablet by mouth daily    HRP (high risk pregnancy), first trimester, Elderly primigravida in first trimester       sucralfate 1 GM tablet    CARAFATE    120 tablet    Take 1 tablet (1 g) by mouth 4 times daily as needed    Nausea

## 2018-03-16 ENCOUNTER — TELEPHONE (OUTPATIENT)
Dept: SURGERY | Facility: CLINIC | Age: 38
End: 2018-03-16

## 2018-03-16 ENCOUNTER — THERAPY VISIT (OUTPATIENT)
Dept: PHYSICAL THERAPY | Facility: CLINIC | Age: 38
End: 2018-03-16
Payer: COMMERCIAL

## 2018-03-16 DIAGNOSIS — M54.6 BILATERAL THORACIC BACK PAIN, UNSPECIFIED CHRONICITY: ICD-10-CM

## 2018-03-16 DIAGNOSIS — M54.2 CERVICAL PAIN: ICD-10-CM

## 2018-03-16 PROCEDURE — 97530 THERAPEUTIC ACTIVITIES: CPT | Mod: GP | Performed by: PHYSICAL THERAPIST

## 2018-03-16 PROCEDURE — 97110 THERAPEUTIC EXERCISES: CPT | Mod: GP | Performed by: PHYSICAL THERAPIST

## 2018-03-16 NOTE — TELEPHONE ENCOUNTER
Patient called in through triage and discussed her symptoms with this RNCC. Patient states after hemorrhoid banding on Wednesday with Heather COOLEY she started to feel some lower abdominal pressure and heaviness, but by Thursday this had passed and changed to a burning sensation in her anus. Patient states last night (3/15) she passed some tissue and a little bit of blood and since then the burning has started. This burning is tolerable but constant. Patient was advised to take tylenol for the pain and also take several sitz baths a day. Patient was also advised to keep her bowel movements soft and to call back on Monday if the burning does not subside. Patient stated understanding and agreement with this new plan of care.

## 2018-03-16 NOTE — MR AVS SNAPSHOT
After Visit Summary   3/16/2018    Priyanka Lundberg    MRN: [id number]           Patient Information     Date Of Birth          1980        Visit Information        Provider Department      3/16/2018 8:20 AM Shahbaz Alonso PT [facility]        Today's Diagnoses     Cervical pain        Bilateral thoracic back pain, unspecified chronicity           Follow-ups after your visit        Your next 10 appointments already scheduled     Mar 17, 2018 10:00 AM CDT   (Arrive by 9:45 AM)   [facility] New with Jodie Oliver OD   [facility] ([facility])    [address]  4th [facility] [id number] [phone]            Mar 18, 2018  7:45 AM CDT   (Arrive by 7:30 AM)   MR SINUS FACE W/O & W CONTRAST with [address] [facility] ([facility])    [address]  1st [facility] [id number] [phone]           Take your medicines as usual, unless your doctor tells you not to. Bring a list of your current medicines to your exam (including vitamins, minerals and over-the-counter drugs).  You may or may not receive intravenous (IV) contrast for this exam pending the discretion of the Radiologist.  You do not need to do anything special to prepare.  The MRI machine uses a strong magnet. Please wear clothes without metal (snaps, zippers). A sweatsuit works well, or we may give you a hospital gown.  Please remove any body piercings and hair extensions before you arrive. You will also remove watches, jewelry, hairpins, wallets, dentures, partial dental plates and hearing aids. You may wear contact lenses, and you may be able to wear your rings. We have a safe place to keep your personal items, but it is safer to leave them at home.  **IMPORTANT** THE INSTRUCTIONS BELOW ARE ONLY FOR THOSE PATIENTS WHO HAVE BEEN PRESCRIBED SEDATION OR GENERAL ANESTHESIA DURING  THEIR MRI PROCEDURE:  IF YOUR DOCTOR PRESCRIBED ORAL SEDATION (take medicine to help you relax during your exam):   You must get the medicine from your doctor (oral medication) before you arrive. Bring the medicine to the exam. Do not take it at home. You ll be told when to take it upon arriving for your exam.   Arrive one hour early. Bring someone who can take you home after the test. Your medicine will make you sleepy. After the exam, you may not drive, take a bus or take a taxi by yourself.  IF YOUR DOCTOR PRESCRIBED IV SEDATION:   Arrive one hour early. Bring someone who can take you home after the test. Your medicine will make you sleepy. After the exam, you may not drive, take a bus or take a taxi by yourself.   No eating 6 hours before your exam. You may have clear liquids up until 4 hours before your exam. (Clear liquids include water, clear tea, black coffee and fruit juice without pulp.)  IF YOUR DOCTOR PRESCRIBED ANESTHESIA (be asleep for your exam):   Arrive 1 1/2 hours early. Bring someone who can take you home after the test. You may not drive, take a bus or take a taxi by yourself.   No eating 8 hours before your exam. You may have clear liquids up until 4 hours before your exam. (Clear liquids include water, clear tea, black coffee and fruit juice without pulp.)   You will spend four to five hours in the recovery room.  Please call the Imaging Department at your exam site with any questions.            Mar 23, 2018  8:40 AM CDT   Return Visit with Sydney Ruiz MD   [facility] ([facility])    [address], [address]  [facility] [address]   [phone]            May 31, 2018  9:00 AM CDT   (Arrive by 8:45 AM)   Return Visit with Demetri Denis MD   [facility] ([facility])    [address] [address] [phone]              Who to contact     If you have questions or  need follow up information about today's clinic visit or your schedule please contact [facility] [place] directly at [phone].  Normal or non-critical lab and imaging results will be communicated to you by MyChart, letter or phone within 4 business days after the clinic has received the results. If you do not hear from us within 7 days, please contact the clinic through PrintFuhart or phone. If you have a critical or abnormal lab result, we will notify you by phone as soon as possible.  Submit refill requests through Mango-Mate or call your pharmacy and they will forward the refill request to us. Please allow 3 business days for your refill to be completed.          Additional Information About Your Visit        PrintFuharMandata (Management & Data Services) Information     Mango-Mate gives you secure access to your electronic health record. If you see a primary care provider, you can also send messages to your care team and make appointments. If you have questions, please call your primary care clinic.  If you do not have a primary care provider, please call [phone] and they will assist you.        Care EveryWhere ID     This is your Care EveryWhere ID. This could be used by other organizations to access your [facility] medical records  [id number]         Blood Pressure from Last 3 Encounters:   03/14/18 126/75   02/22/18 112/51   02/15/18 101/62    Weight from Last 3 Encounters:   03/14/18 91.3 kg (201 lb 3.2 oz)   03/08/18 91.2 kg (201 lb)   02/22/18 91.3 kg (201 lb 4.8 oz)              We Performed the Following     Therapeutic Activities     Therapeutic Exercises        Primary Care Provider Office Phone # Fax #    Sydney Ruiz [phone] [address] [facility] [facility] [address]  [facility] [address]        Equal Access to Services     CHARISSE KERNS : shima Souza, cecy ledbetter. So [facility] [phone].    ATENCIÓN: Gricelda rand  español, tiene a ruban disposición servicios gratuitos de asistencia lingüística. Trey rowland [phone].    We comply with applicable federal civil rights laws and [place] laws. We do not discriminate on the basis of race, color, national origin, age, disability, sex, sexual orientation, or gender identity.            Thank you!     Thank you for choosing [facility]  for your care. Our goal is always to provide you with excellent care. Hearing back from our patients is one way we can continue to improve our services. Please take a few minutes to complete the written survey that you may receive in the mail after your visit with us. Thank you!             Your Updated Medication List - Protect others around you: Learn how to safely use, store and throw away your medicines at www.disposemymeds.org.          This list is accurate as of 3/16/18  8:59 AM.  Always use your most recent med list.                   Brand Name Dispense Instructions for use Diagnosis    albuterol 108 (90 BASE) MCG/ACT Inhaler    PROAIR HFA/PROVENTIL HFA/VENTOLIN HFA    1 Inhaler    Inhale 2 puffs into the lungs every 4 hours as needed for shortness of breath / dyspnea or wheezing    Cough       cholecalciferol 5000 UNITS Caps capsule    vitamin D3    90 capsule    Take 1 capsule (5,000 Units) by mouth daily Take one capsule daily.    Low vitamin D level       prenatal multivitamin plus iron 27-0.8 MG Tabs per tablet     30 tablet    Take 1 tablet by mouth daily    HRP (high risk pregnancy), first trimester, Elderly primigravida in first trimester       sucralfate 1 GM tablet    CARAFATE    120 tablet    Take 1 tablet (1 g) by mouth 4 times daily as needed    Nausea

## 2018-03-16 NOTE — PROGRESS NOTES
S: Pt reports break from PT, death in family and taking care of mother-pulling up from furniture, but otherwise she is somewhat independent.  Pt also has 1 yo toddler whom she carries.  Unable to get am appt at [facility].  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  Back pain is constant-both upper and lower back is spasming pending on what I'm doing.  Pt does massage once a month.  Pt reports constant gnawing pain right scap>greater than left.    O:  incr tone right rhomboid  Rhomboids 4-/5 CARLIE  Fair sitting posture  A: Pt presents with persistent sxs add mid and low back, had a break from PT but no immediate success.    P: follow up in 2 weeks, progress postural ed, scap

## 2018-03-18 ENCOUNTER — RADIANT APPOINTMENT (OUTPATIENT)
Dept: MRI IMAGING | Facility: CLINIC | Age: 38
End: 2018-03-18
Attending: OTOLARYNGOLOGY
Payer: COMMERCIAL

## 2018-03-18 DIAGNOSIS — J32.0 CHRONIC MAXILLARY SINUSITIS: ICD-10-CM

## 2018-03-18 RX ORDER — GADOBUTROL 604.72 MG/ML
7.5 INJECTION INTRAVENOUS ONCE
Status: COMPLETED | OUTPATIENT
Start: 2018-03-18 | End: 2018-03-18

## 2018-03-18 RX ADMIN — GADOBUTROL 7.5 ML: 604.72 INJECTION INTRAVENOUS at 07:50

## 2018-03-18 NOTE — DISCHARGE INSTRUCTIONS
MRI Contrast Discharge Instructions    The IV contrast you received today will pass out of your body in your  urine. This will happen in the next 24 hours. You will not feel this process.  Your urine will not change color.    Drink at least 4 extra glasses of water or juice today (unless your doctor  has restricted your fluids). This reduces the stress on your kidneys.  You may take your regular medicines.    If you are on dialysis: It is best to have dialysis today.    If you have a reaction: Most reactions happen right away. If you have  any new symptoms after leaving the hospital (such as hives or swelling),  call your hospital at the correct number below. Or call your family doctor.  If you have breathing distress or wheezing, call 911.    Special instructions: ***    I have read and understand the above information.    Signature:______________________________________ Date:___________    Staff:__________________________________________ Date:___________     Time:__________    [place] [facility] Departments:    ___[place]: [phone]  ___[facility] [employer] [facility]: [phone]  ___[place]: [phone] ___[facility]: [phone]  ___[facility]: [phone]  ___[facility]: [phone]  ___[phone]  ___[facility]: [phone]  ___[phone]

## 2018-03-23 ENCOUNTER — OFFICE VISIT (OUTPATIENT)
Dept: FAMILY MEDICINE | Facility: CLINIC | Age: 38
End: 2018-03-23
Payer: COMMERCIAL

## 2018-03-23 VITALS
HEART RATE: 78 BPM | DIASTOLIC BLOOD PRESSURE: 61 MMHG | OXYGEN SATURATION: 98 % | BODY MASS INDEX: 31 KG/M2 | SYSTOLIC BLOOD PRESSURE: 92 MMHG | WEIGHT: 201 LBS | TEMPERATURE: 98.5 F

## 2018-03-23 DIAGNOSIS — M25.50 MULTIPLE JOINT PAIN: ICD-10-CM

## 2018-03-23 DIAGNOSIS — E66.811 CLASS 1 OBESITY DUE TO EXCESS CALORIES WITHOUT SERIOUS COMORBIDITY WITH BODY MASS INDEX (BMI) OF 31.0 TO 31.9 IN ADULT: ICD-10-CM

## 2018-03-23 DIAGNOSIS — M79.10 MYALGIA: Primary | ICD-10-CM

## 2018-03-23 DIAGNOSIS — E66.09 CLASS 1 OBESITY DUE TO EXCESS CALORIES WITHOUT SERIOUS COMORBIDITY WITH BODY MASS INDEX (BMI) OF 31.0 TO 31.9 IN ADULT: ICD-10-CM

## 2018-03-23 NOTE — MR AVS SNAPSHOT
After Visit Summary   3/23/2018    Priyanka Lundberg    MRN: [id number]           Patient Information     Date Of Birth          1980        Visit Information        Provider Department      3/23/2018 8:40 AM Sydney Ruiz MD [facility]        Today's Diagnoses     Multiple joint pain    -  1      Care Instructions    Fast-tracker web site  psychology    Health psychology [facility]          Follow-ups after your visit        Your next 10 appointments already scheduled     May 31, 2018  9:00 AM CDT   (Arrive by 8:45 AM)   Return Visit with Demetri Denis MD   [facility] ([facility])    [address] [place] [address]  3rd [facility] [address]   [phone]            Jun 13, 2018  7:00 AM CDT   (Arrive by 6:45 AM)   Return Visit with Nikita Ross PA-C   [facility] ([facility])    [address] [place] [address]  4th [facility] [address]   [phone]              Future tests that were ordered for you today     Open Future Orders        Priority Expected Expires Ordered    Hemoglobin A1c ([place]) Routine 3/24/2018 3/23/2019 3/23/2018    DNA double stranded antibodies Routine 3/24/2018 3/23/2019 3/23/2018    Nichols DOMINIQUE Antibody IgG Routine 3/24/2018 3/23/2019 3/23/2018    Protein  random urine with Creat Ratio Routine 3/24/2018 3/23/2019 3/23/2018    Complement C3 Routine 3/24/2018 3/23/2019 3/23/2018    Complement C4 Routine 3/24/2018 3/23/2019 3/23/2018    SSA Ro DOMINIQUE Antibody IgG Routine 3/24/2018 3/23/2019 3/23/2018    SSB La DOMINIQUE Antibody IgG Routine 3/24/2018 3/23/2019 3/23/2018    Rheumatoid factor Routine 3/24/2018 3/23/2019 3/23/2018            Who to contact     Please call your clinic at [phone] to:    Ask questions about your health    Make or cancel appointments    Discuss your medicines    Learn about your test results    Speak to your doctor            Additional  Information About Your Visit        Nflight Technologyhart Information     mygall gives you secure access to your electronic health record. If you see a primary care provider, you can also send messages to your care team and make appointments. If you have questions, please call your primary care clinic.  If you do not have a primary care provider, please call [phone] and they will assist you.      mygall is an electronic gateway that provides easy, online access to your medical records. With mygall, you can request a clinic appointment, read your test results, renew a prescription or communicate with your care team.     To access your existing account, please contact your [facility] or call [phone] for assistance.        Care EveryWhere ID     This is your Care EveryWhere ID. This could be used by other organizations to access your [facility] medical records  [id number]        Your Vitals Were     Pulse Temperature Last Period Pulse Oximetry BMI (Body Mass Index)       78 98.5  F (36.9  C) (Oral) 03/23/2018 98% 31 kg/m2        Blood Pressure from Last 3 Encounters:   03/23/18 92/61   03/14/18 126/75   02/22/18 112/51    Weight from Last 3 Encounters:   03/23/18 201 lb (91.2 kg)   03/14/18 201 lb 3.2 oz (91.3 kg)   03/08/18 201 lb (91.2 kg)               Primary Care Provider Office Phone # Fax #    Sydney Crista Ruiz [phone]       [address] [place] [address]        Equal Access to Services     CHARISSE KERNS : Hadii aad ku hadasho Soomaali, waaxda luqadaha, qaybta kaalmada adeegyada, cecy lopez . So [facility] [phone].    ATENCIÓN: Si habla sydnee, tiene a urban disposición servicios gratuitos de asistencia lingüística. Llame al [phone].    We comply with applicable federal civil rights laws and [place] laws. We do not discriminate on the basis of race, color, national origin, age, disability, sex, sexual orientation,  or gender identity.            Thank you!     Thank you for choosing [facility]  for your care. Our goal is always to provide you with excellent care. Hearing back from our patients is one way we can continue to improve our services. Please take a few minutes to complete the written survey that you may receive in the mail after your visit with us. Thank you!             Your Updated Medication List - Protect others around you: Learn how to safely use, store and throw away your medicines at www.disposemymeds.org.          This list is accurate as of 3/23/18  9:17 AM.  Always use your most recent med list.                   Brand Name Dispense Instructions for use Diagnosis    albuterol 108 (90 BASE) MCG/ACT Inhaler    PROAIR HFA/PROVENTIL HFA/VENTOLIN HFA    1 Inhaler    Inhale 2 puffs into the lungs every 4 hours as needed for shortness of breath / dyspnea or wheezing    Cough       cholecalciferol 5000 UNITS Caps capsule    vitamin D3    90 capsule    Take 1 capsule (5,000 Units) by mouth daily Take one capsule daily.    Low vitamin D level       prenatal multivitamin plus iron 27-0.8 MG Tabs per tablet     30 tablet    Take 1 tablet by mouth daily    HRP (high risk pregnancy), first trimester, Elderly primigravida in first trimester       sucralfate 1 GM tablet    CARAFATE    120 tablet    Take 1 tablet (1 g) by mouth 4 times daily as needed    Nausea

## 2018-03-23 NOTE — PROGRESS NOTES
Priyanka Lundberg is a 37 year old female here for the following issues:    Muscle and joint pain  Priyanka is a 36 yo women who presents with stiffness in her muscles and joints. She reports it is more noticeable with weather changes. She reports tension headaches that improve with myofascial release. We discussed referral to PT is she would like to do this. She has stiffness in her hands but no red, hot or swollen joints. Stiffness in her forearm muscles. She has pain at her wrists and elbows (medial aspect of right elbow also tender). Bilateral knee pain, medial side but no redness, swelling or warmth. Top to feet are painful, her toes are spared. She is taking ibuprofen, 2 tablets about 3x per week. No associated fevers, no skin rashes.    Obesity  Body mass index is 31 kg/(m^2).  She reports binge eating. No purging or anorexia. She knows she is eating due to emotional stress. Her father  recently and her mother is in poor health. She has a supportive spouse. She is not exercising. She is worried she may develop diabetes and would like to have A1c checked today.     Adjustment disorder  Her father  of a neuroendocrine tumor recently and her mother is in poor health.     PHQ9 score = 13 (no SI)  BIN 7 score = 6      Patient Active Problem List   Diagnosis     Gastrointestinal problem     Joint pain     L4-L5 disc bulge     Attention deficit hyperactivity disorder (ADHD), combined type     Depression with anxiety     Vaginal discharge     History of abnormal cervical Pap smear     Other acne     Benign nevus     History of Clostridium difficile colitis     Cervical pain     Bilateral thoracic back pain       No current outpatient prescriptions on file.       Allergies   Allergen Reactions     Food      Cantaloupe, cucumbers, green beans, and peppers.      Lemon Flavor      Mustard Seed      Onion Difficulty breathing and GI Disturbance     Hinds GI Disturbance     Penicillins Hives     Or another medication  taken at that time     Vanilla GI Disturbance        EXAM  BP 92/61 (BP Location: Right arm, Patient Position: Chair, Cuff Size: Adult Large)  Pulse 78  Temp 98.5  F (36.9  C) (Oral)  Wt 201 lb (91.2 kg)  LMP 03/23/2018  SpO2 98%  BMI 31 kg/m2  Gen: Alert, pleasant, NAD, Overweight  MS: Stiffness with palpation over hands, wrists elbows, epicondyles, upper arm muscles  Trigger points neck and upper back, upper trapezius and rhomboid muscles tender  Medial knee, at area of quadriceps  Insertion bilaterally  Tenderness with palpation over top of feet but no redness, swelling or warmth  Pain with palpation over bilateral SI joints, gluteal muscles bilaterally  Lateral trochanter and IT bands also tender bilaterally    Assessment:  (M79.1) Myalgia  (primary encounter diagnosis)  Comment: generalized muscle tension, aches  Plan: suspect this is fibromyalgia, discussed underlying stressors that my be triggering  Rule out rheumatologic process. Consider treatment with lyrica or gabapentin.  She will return in 4-6 wks for discussion    (M25.50) Multiple joint pain  Comment: pain with palpation over multiple joints   Plan: DNA double stranded antibodies, Nichols DOMINIQUE         Antibody IgG, Protein  random urine with Creat         Ratio, Complement C3, Complement C4, SSA Ro DOMINIQUE        Antibody IgG, SSB La DOMINIQUE Antibody IgG,         Rheumatoid factor        Will check markers for rheumatologic disease. If negative, consider treatment for fibromyalgia    (E66.09,  Z68.31) Class 1 obesity due to excess calories without serious comorbidity with body mass index (BMI) of 31.0 to 31.9 in adult  Comment: Body mass index is 31 kg/(m^2).  Plan: Hemoglobin A1c ([place])          Lab Results   Component Value Date    A1C 4.8 03/28/2018    A1C 5.1 12/07/2015     No evidence for diabetes, still recommend weight loss, exercise program.     Sydney Ruiz MD  Internal Medicine/Pediatrics

## 2018-03-23 NOTE — NURSING NOTE
37 year old  Chief Complaint   Patient presents with     Arthritis     that is getting worse. Pain scale it's about 8.        Blood pressure 92/61, pulse 78, temperature 98.5  F (36.9  C), temperature source Oral, weight 201 lb (91.2 kg), last menstrual period 03/23/2018, SpO2 98 %, currently breastfeeding. Body mass index is 31 kg/(m^2).  Patient Active Problem List   Diagnosis     Gastrointestinal problem     Joint pain     L4-L5 disc bulge     Attention deficit hyperactivity disorder (ADHD), combined type     Depression with anxiety     Vaginal discharge     History of abnormal cervical Pap smear     Other acne     Benign nevus     History of Clostridium difficile colitis     Cervical pain     Bilateral thoracic back pain       Wt Readings from Last 2 Encounters:   03/23/18 201 lb (91.2 kg)   03/14/18 201 lb 3.2 oz (91.3 kg)     BP Readings from Last 3 Encounters:   03/23/18 92/61   03/14/18 126/75   02/22/18 112/51         Current Outpatient Prescriptions   Medication     sucralfate (CARAFATE) 1 GM tablet     albuterol (PROAIR HFA/PROVENTIL HFA/VENTOLIN HFA) 108 (90 BASE) MCG/ACT Inhaler     cholecalciferol (VITAMIN D3) 5000 UNITS CAPS capsule     Prenatal Vit-Fe Fumarate-FA (PRENATAL MULTIVITAMIN  PLUS IRON) 27-0.8 MG TABS     No current facility-administered medications for this visit.        Social History   Substance Use Topics     Smoking status: Never Smoker     Smokeless tobacco: Never Used     Alcohol use 0.0 oz/week      Comment: outside of pregnancy, social       Health Maintenance Due   Topic Date Due     LIPID MONITORING Q1 YEAR  08/28/2013       Lab Results   Component Value Date    PAP NIL 09/08/2016         Nelly Berry CMA  March 23, 2018 8:41 AM

## 2018-03-28 DIAGNOSIS — M25.50 MULTIPLE JOINT PAIN: ICD-10-CM

## 2018-03-28 LAB
C3 SERPL-MCNC: 100 MG/DL (ref 76–169)
C4 SERPL-MCNC: 18 MG/DL (ref 15–50)
CREAT UR-MCNC: 228 MG/DL
ENA SM IGG SER-ACNC: <0.2 AI (ref 0–0.9)
ENA SS-A IGG SER IA-ACNC: <0.2 AI (ref 0–0.9)
ENA SS-B IGG SER IA-ACNC: <0.2 AI (ref 0–0.9)
HBA1C MFR BLD: 4.8 % (ref 0–5.7)
PROT UR-MCNC: 0.08 G/L
PROT/CREAT 24H UR: 0.03 G/G CR (ref 0–0.2)
RHEUMATOID FACT SER NEPH-ACNC: <20 IU/ML (ref 0–20)

## 2018-03-28 PROCEDURE — 86235 NUCLEAR ANTIGEN ANTIBODY: CPT | Performed by: INTERNAL MEDICINE

## 2018-03-28 PROCEDURE — 86225 DNA ANTIBODY NATIVE: CPT | Performed by: INTERNAL MEDICINE

## 2018-03-28 PROCEDURE — 86431 RHEUMATOID FACTOR QUANT: CPT | Performed by: INTERNAL MEDICINE

## 2018-03-28 PROCEDURE — 86160 COMPLEMENT ANTIGEN: CPT | Performed by: INTERNAL MEDICINE

## 2018-03-29 ENCOUNTER — TELEPHONE (OUTPATIENT)
Dept: ENDOCRINOLOGY | Facility: CLINIC | Age: 38
End: 2018-03-29

## 2018-03-29 LAB — DSDNA AB SER-ACNC: 1 IU/ML

## 2018-03-29 NOTE — TELEPHONE ENCOUNTER
To schedulers: Please schedule  for lst available endocrine provider MD in weight management clinic.     Rekha Daily MD  Endocrine triage    ----- Message from Micaela Mendoza sent at 3/28/2018  2:23 PM CDT -----  Regarding: New endo Patient request  Contact: [phone]  [facility] She is still interested to be seen here in Endo. Please take a look and follow up with her at [phone].    Thank you  Micaela Mendoza  Call Center   Please DO NOT send this message and/or reply back to sender. Call Center Representatives DO NOT respond to messages

## 2018-04-03 ASSESSMENT — ANXIETY QUESTIONNAIRES
5. BEING SO RESTLESS THAT IT IS HARD TO SIT STILL: NOT AT ALL
IF YOU CHECKED OFF ANY PROBLEMS ON THIS QUESTIONNAIRE, HOW DIFFICULT HAVE THESE PROBLEMS MADE IT FOR YOU TO DO YOUR WORK, TAKE CARE OF THINGS AT HOME, OR GET ALONG WITH OTHER PEOPLE: NOT DIFFICULT AT ALL
2. NOT BEING ABLE TO STOP OR CONTROL WORRYING: NOT AT ALL
1. FEELING NERVOUS, ANXIOUS, OR ON EDGE: NOT AT ALL
7. FEELING AFRAID AS IF SOMETHING AWFUL MIGHT HAPPEN: NOT AT ALL
GAD7 TOTAL SCORE: 6
3. WORRYING TOO MUCH ABOUT DIFFERENT THINGS: NOT AT ALL
6. BECOMING EASILY ANNOYED OR IRRITABLE: NEARLY EVERY DAY

## 2018-04-03 ASSESSMENT — PATIENT HEALTH QUESTIONNAIRE - PHQ9: 5. POOR APPETITE OR OVEREATING: NEARLY EVERY DAY

## 2018-04-04 ASSESSMENT — ANXIETY QUESTIONNAIRES: GAD7 TOTAL SCORE: 6

## 2018-04-04 ASSESSMENT — PATIENT HEALTH QUESTIONNAIRE - PHQ9: SUM OF ALL RESPONSES TO PHQ QUESTIONS 1-9: 13

## 2018-04-24 ENCOUNTER — OFFICE VISIT (OUTPATIENT)
Dept: ENDOCRINOLOGY | Facility: CLINIC | Age: 38
End: 2018-04-24
Payer: COMMERCIAL

## 2018-04-24 VITALS
SYSTOLIC BLOOD PRESSURE: 124 MMHG | HEART RATE: 81 BPM | HEIGHT: 68 IN | DIASTOLIC BLOOD PRESSURE: 68 MMHG | WEIGHT: 197.5 LBS | OXYGEN SATURATION: 97 % | BODY MASS INDEX: 29.93 KG/M2

## 2018-04-24 DIAGNOSIS — E66.09 CLASS 1 OBESITY DUE TO EXCESS CALORIES WITHOUT SERIOUS COMORBIDITY WITH BODY MASS INDEX (BMI) OF 30.0 TO 30.9 IN ADULT: Primary | ICD-10-CM

## 2018-04-24 DIAGNOSIS — E66.811 CLASS 1 OBESITY DUE TO EXCESS CALORIES WITHOUT SERIOUS COMORBIDITY WITH BODY MASS INDEX (BMI) OF 30.0 TO 30.9 IN ADULT: Primary | ICD-10-CM

## 2018-04-24 NOTE — PROGRESS NOTES
"        New Medical Weight Management Consult    PATIENT:  Priyanka Lundberg  MRN:         [id number]  :         1980  EMANI:         2018    Dear Sara, Sydney Tobin,    I had the pleasure of seeing your patient, Priyanka Lundberg.  Full intake/assessment done to determine barriers to weight loss success and develop a treatment plan.  Priyanka Lundberg is a 38 year old female interested in treatment of medical problems associated with weight.  Her weight today is 197 lbs 8 oz, Body mass index is 30.46 kg/(m^2)., and she has the following co-morbidities: IBS, joints pain, GERD       2018   I have the following co-morbidities associated with obesity: Weight Bearing Joint Pain       Patient Goals Reviewed With Patient 2018   I am interested in attaining a healthier weight to diminish current health problems related to co-morbid conditions: Yes   I am interested in attaining a healthier weight in order to prevent future health problems: Yes       Referring Provider 2018   Please name the provider who referred you to Medical Weight Management.  If you do not know, please answer: \"I Don't Know\". borne       Wt Readings from Last 4 Encounters:   18 89.6 kg (197 lb 8 oz)   18 91.2 kg (201 lb)   18 91.3 kg (201 lb 3.2 oz)   18 91.2 kg (201 lb)     She is [facility] to [facility] for assist on binges eating. She stated that she has had binges eating since her childhood. She said that she has stressful life since she was a kid and used food to comfort her. Her mother has been mentally ill and morbidly obese. Her father was binges due to poverty. She was learned to finish all food in her plate. She stated that she has had stress eating, binges eating, mindlessly eating and sugar/sweet craving all life. She has done several weight loss programs such as Tremayne diet, Soup diet, Weight Watcher, calories counting which worked for her but she regained weight after stopping the programs. " She said that she understands what to do in order to lose weight but she could not implement it well. She gets overwhelmed with stresses easily. She was diagnosed with ADHD and was on Ritalin about 2 years ago which helped her for concentration. It was stopped because of pregnancy. She is currently having 1.5 years old son and she is still nursing him.     She is currently seeing art therapist for 2 sessions. She said that it was ok. She lives with  and son. Her  is not overweight so he is not very supportive for weight loss at this time. She said that her  brought a lot of sweet, high carb food into the house.    Weight History Reviewed With Patient 4/24/2018   How concerned are you about your weight? Very Concerned   I became overweight: As a Child   The following factors have contributed to my weight gain:  Eating Wrong Types of Food, Eating Too Much, Lack of Exercise, Genetic (Runs in the Family)   I have tried the following methods to lose weight: Watching Portions or Calories, Exercise, Weight Watchers, Atkins-type Diet (Low Carb/High Protein)   I have the following family history of obesity/being overweight:  My mother is overwieght, My father is overweight, One or more of my siblings are overweight, Many of my relatives are overweight   Has anyone in your family had weight loss surgery? Yes       Diet Recall Reviewed With Patient 4/24/2018   How many glasses of juice do you drink in a typical day? 0   How many of glasses of milk do you drink in a typical day? 0   How many 8oz glasses of sugar containing drinks such as Todd-Aid/sweet tea do you drink in a day? 0   How many cans/bottles of sugar pop/soda/tea/sports drinks do you drink in a day? 0   How many cans/bottles of diet pop/soda/tea or sports drink do you drink in a day? 0   How often do you have a drink of alcohol? Monthly or Less       Eating Habits Reviewed With Patient 4/24/2018   Generally, my meals include foods like these:  bread, pasta, rice, potatoes, corn, crackers, sweet dessert, pop, or juice. Everyday   Generally, my meals include foods like these: fried meats, brats, burgers, french fries, pizza, cheese, chips, or ice cream. A Few Times a Week   Eat fast food (like [employer], [employer], [employer]). Never   Eat at a buffet or sit-down restaurant. Never   Eat most of my meals in front of the TV or computer. Never   Often skip meals, eat at random times, have no regular eating times. Almost Everyday   Rarely sit down for a meal but snack or graze throughout.  Almost Everyday   Eat extra snacks between meals. Almost Everyday   Eat most of my food at the end of the day. Almost Everyday   Eat in the middle of the night or wake up at night to eat. Never   Eat extra snacks to prevent or correct low blood sugar. Never   Eat to prevent acid reflux or stomach pain. Never   Worry about not having enough food to eat. Never   Have you been to the food shelf at least a few times this year? No   I eat when I am depressed, stressed, anxious, or bored. Everyday   I eat when I am happy or as a reward. Half of the Week   I feel hungry all the time even if I just have eaten. A Few Times a Week   Feeling full is important to me. Almost Everyday   Once I start eating, it is hard to stop. Everyday   I finish all the food on my plate even if I am already full. Everyday   I can't resist eating delicious food or walk past the good food/smell. Everyday   I eat/snack without noticing that I am eating. Everyday   I eat when I am preparing the meal. Everyday   I eat more than usual when I see others eating. Everyday   I have trouble not eating sweets, ice cream, cookies, or chips if they are around the house. Everyday   I think about food all day. Everyday   What foods, if any, do you crave? Sweets/Candy/Chocolate   I feel out of control when eating. Almost Everyday   I eat a large amount of food, like a loaf of bread, a box of cookies, a pint/quart of ice  cream, all at once. Almost Everyday   I eat a large amount of food even when I am not hungry. Almost Everyday   I eat rapidly. Almost Everyday   I eat alone because I feel embarrassed and do not want others to see how much I have eaten. Never   I eat until I am uncomfortably full. Weekly   I feel bad, disgusted, or guilty after I overeat. Weekly   I make myself vomit what I have eaten or use laxatives to get rid of food. Never       Activity/Exercise History Reviewed With Patient 4/24/2018   How much of a typical 12 hour day do you spend sitting? Less Than Half the Day   How much of a typical 12 hour day do you spend lying down? Less Than Half the Day   How much of a typical day do you spend walking/standing? Most of the Day   How many hours (not including work) do you spend on the TV/Video Games/Computer/Tablet/Phone? 1 Hour or Less   How many times a week are you active for the purpose of exercise? Once a Week   How many total minutes do you spend doing some activity for the purpose of exercising when you exercise? Less Than 15 Minutes   What keeps you from being more active? Pain, Shortness of Breath, Lack of Time, Too tired       ROS    PAST MEDICAL HISTORY:  Past Medical History:   Diagnosis Date     Abdominal pain 2011    abnormal histamine problem, multiple food allergies     ADHD (attention deficit hyperactivity disorder)     on aderol     Anxiety and depression     on xanax     Arthritis      Breathing problem 2007    shortness of breath after eating, ?allergies     Chronic nausea      Gastroesophageal reflux disease      Heart murmur     closed by time she was 1     Hx of abnormal cervical Pap smear 2011     IBS (irritable bowel syndrome)     lots of mucus in bowel with occassional bleeding     Joint pain 2015    was to have rheumatologist     Kidney stone on right side 2010     Meniere's disease     hyperacusis     Migraines      Reduced vision      Tinnitus        Work/Social History Reviewed With  "Patient 4/24/2018   My employment status is: Part-Time   My job is: [employer]   How much of your job is spent on the computer or phone? Less Than 50%   What is your marital status? /In a Relationship   If in a relationship, is your significant other overweight? No   Do you have children? Yes   If you have children, are they overweight? No       Mental Health History Reviewed With Patient 4/24/2018   Have you ever been physically or sexually abused? No   How often in the past 2 weeks have you felt little interest or pleasure in doing things? Not at all   Over the past 2 weeks how often have you felt down, depressed, or hopeless? Not at all       Sleep History Reviewed With Patient 4/24/2018   How many hours do you sleep at night? 6   Do you think that you snore loudly or has anybody ever heard you snore loudly (louder than talking or so loud it can be heard behind a shut door)? No   Has anyone seen or heard you stop breathing during your sleep? No   Do you often feel tired, fatigued, or sleepy during the day? Yes       MEDICATIONS:   No current outpatient prescriptions on file.       ALLERGIES:   Allergies   Allergen Reactions     Food      Cantaloupe, cucumbers, green beans, and peppers.      Lemon Flavor      Mustard Seed      Onion Difficulty breathing and GI Disturbance     [place] GI Disturbance     Penicillins Hives     Or another medication taken at that time     Vanilla GI Disturbance       PHYSICAL EXAM:  /68  Pulse 81  Ht 1.715 m (5' 7.52\")  Wt 89.6 kg (197 lb 8 oz)  SpO2 97%  BMI 30.46 kg/m2   A & O x 3  HEENT: NCAT, mucous membranes moist  Respirations unlabored  Location of obesity: Mixed Obesity    ASSESSMENT:  Priyanka is a patient with early onset obesity with significant element of familial/genetic influence and with current health consequences. She does not need aggressive weight loss plan due to binges eating.  Priyanka TAMMY Lundberg endorses binging, eats a high carb diet, eats a high fat " diet, uses food as a reward, uses food as mood management, eats to obtain specific degree of fullness, mostly eats during the evening and has a disorganized meal pattern.    Her problem is complicated by strong craving/reward pathways, a binge eating component and poor lifestyle choices    Her ability to lose weight is impacted by inability to perceive that food intake is at a level that prevents weight loss and lack of confidence.    PLAN:    No meal skipping  Calorie/low fat diet  Meal planning  Increase walking -- 20-30 minutes per day    Craving/Reward   Ancillary testing:  N/A.  Food Plan:  High protein/low carbohydrate and Neutralization diet: patient to identify good, bad, and neutral foods and then to eat only the neutral ones.   Activity Plan:  Exercise after meals.  Supplementary:  Referral to psychology, Andra Kennedy  Medication:  Discussed topiramate as a drug of choice. She is still nursing so I will wait for her to finish nursing before starting on topiramate. She agrees with plan      Set small steps today --   Eat at meal -- BF, lunch and dinner  Eat from plate not from box or large container  Sit down and eat at the dining table    RTC:    6-8 weeks.    TIME: 45 min spent on evaluation, management, counseling, education, & motivational interviewing with greater than 50 % of the total time was spent on counseling and coordinating care    Sincerely,    Pedro Mcneal MD

## 2018-04-24 NOTE — NURSING NOTE
"  Chief Complaint   Patient presents with     Weight Problem     NMWM     Vitals:    04/24/18 0825   BP: 124/68   Pulse: 81   SpO2: 97%   Weight: 197 lb 8 oz   Height: 5' 7.52\"     Body mass index is 30.46 kg/(m^2).  Aguila Santos CMA    "

## 2018-04-24 NOTE — PATIENT INSTRUCTIONS
- make sure you eat at meal -- breakfast, lunch and dinner  - make sure that you eat from your plate. Please portion your food before you eat  - sit down and eat at dining table  - see me in 6-8 weeks  - please see Andra Kennedy psychologist    If you have any questions, please do not hesitate to call Weight management clinic at [phone] or [phone].    Sincerely,    Pedro Mcneal MD  Endocrinology

## 2018-04-24 NOTE — LETTER
"2018       RE: Priyanka Lundberg  [address]  [place] [address]     Dear Colleague,    Thank you for referring your patient, Priyanka Lundberg, to the [facility] MEDICAL WEIGHT MANAGEMENT at [facility]. Please see a copy of my visit note below.            New Medical Weight Management Consult    PATIENT:  Priyanka Lundberg  MRN:         [id number]  :         1980  EMANI:         2018    Dear Sydney Ruiz,    I had the pleasure of seeing your patient, Priyanka Lundberg.  Full intake/assessment done to determine barriers to weight loss success and develop a treatment plan.  Priyanka Lundberg is a 38 year old female interested in treatment of medical problems associated with weight.  Her weight today is 197 lbs 8 oz, Body mass index is 30.46 kg/(m^2)., and she has the following co-morbidities: IBS, joints pain, GERD       2018   I have the following co-morbidities associated with obesity: Weight Bearing Joint Pain       Patient Goals Reviewed With Patient 2018   I am interested in attaining a healthier weight to diminish current health problems related to co-morbid conditions: Yes   I am interested in attaining a healthier weight in order to prevent future health problems: Yes       Referring Provider 2018   Please name the provider who referred you to Medical Weight Management.  If you do not know, please answer: \"I Don't Know\". borne       Wt Readings from Last 4 Encounters:   18 89.6 kg (197 lb 8 oz)   18 91.2 kg (201 lb)   18 91.3 kg (201 lb 3.2 oz)   18 91.2 kg (201 lb)     She is referred to [facility] clinic for assist on binges eating. She stated that she has had binges eating since her childhood. She said that she has stressful life since she was a kid and used food to comfort her. Her mother has been mentally ill and morbidly obese. Her father was binges due to poverty. She was learned to finish all food in " her plate. She stated that she has had stress eating, binges eating, mindlessly eating and sugar/sweet craving all life. She has done several weight loss programs such as Tremayne diet, Soup diet, Weight Watcher, calories counting which worked for her but she regained weight after stopping the programs. She said that she understands what to do in order to lose weight but she could not implement it well. She gets overwhelmed with stresses easily. She was diagnosed with ADHD and was on Ritalin about 2 years ago which helped her for concentration. It was stopped because of pregnancy. She is currently having 1.5 years old son and she is still nursing him.     She is currently seeing art therapist for 2 sessions. She said that it was ok. She lives with  and son. Her  is not overweight so he is not very supportive for weight loss at this time. She said that her  brought a lot of sweet, high carb food into the house.    Weight History Reviewed With Patient 4/24/2018   How concerned are you about your weight? Very Concerned   I became overweight: As a Child   The following factors have contributed to my weight gain:  Eating Wrong Types of Food, Eating Too Much, Lack of Exercise, Genetic (Runs in the Family)   I have tried the following methods to lose weight: Watching Portions or Calories, Exercise, Weight Watchers, Atkins-type Diet (Low Carb/High Protein)   I have the following family history of obesity/being overweight:  My mother is overwieght, My father is overweight, One or more of my siblings are overweight, Many of my relatives are overweight   Has anyone in your family had weight loss surgery? Yes       Diet Recall Reviewed With Patient 4/24/2018   How many glasses of juice do you drink in a typical day? 0   How many of glasses of milk do you drink in a typical day? 0   How many 8oz glasses of sugar containing drinks such as Todd-Aid/sweet tea do you drink in a day? 0   How many cans/bottles of  sugar pop/soda/tea/sports drinks do you drink in a day? 0   How many cans/bottles of diet pop/soda/tea or sports drink do you drink in a day? 0   How often do you have a drink of alcohol? Monthly or Less       Eating Habits Reviewed With Patient 4/24/2018   Generally, my meals include foods like these: bread, pasta, rice, potatoes, corn, crackers, sweet dessert, pop, or juice. Everyday   Generally, my meals include foods like these: fried meats, brats, burgers, french fries, pizza, cheese, chips, or ice cream. A Few Times a Week   Eat fast food (like [employer], [employer], [employer]aco Bell). Never   Eat at a buffet or sit-down restaurant. Never   Eat most of my meals in front of the TV or computer. Never   Often skip meals, eat at random times, have no regular eating times. Almost Everyday   Rarely sit down for a meal but snack or graze throughout.  Almost Everyday   Eat extra snacks between meals. Almost Everyday   Eat most of my food at the end of the day. Almost Everyday   Eat in the middle of the night or wake up at night to eat. Never   Eat extra snacks to prevent or correct low blood sugar. Never   Eat to prevent acid reflux or stomach pain. Never   Worry about not having enough food to eat. Never   Have you been to the food shelf at least a few times this year? No   I eat when I am depressed, stressed, anxious, or bored. Everyday   I eat when I am happy or as a reward. Half of the Week   I feel hungry all the time even if I just have eaten. A Few Times a Week   Feeling full is important to me. Almost Everyday   Once I start eating, it is hard to stop. Everyday   I finish all the food on my plate even if I am already full. Everyday   I can't resist eating delicious food or walk past the good food/smell. Everyday   I eat/snack without noticing that I am eating. Everyday   I eat when I am preparing the meal. Everyday   I eat more than usual when I see others eating. Everyday   I have trouble not eating sweets, ice  cream, cookies, or chips if they are around the house. Everyday   I think about food all day. Everyday   What foods, if any, do you crave? Sweets/Candy/Chocolate   I feel out of control when eating. Almost Everyday   I eat a large amount of food, like a loaf of bread, a box of cookies, a pint/quart of ice cream, all at once. Almost Everyday   I eat a large amount of food even when I am not hungry. Almost Everyday   I eat rapidly. Almost Everyday   I eat alone because I feel embarrassed and do not want others to see how much I have eaten. Never   I eat until I am uncomfortably full. Weekly   I feel bad, disgusted, or guilty after I overeat. Weekly   I make myself vomit what I have eaten or use laxatives to get rid of food. Never       Activity/Exercise History Reviewed With Patient 4/24/2018   How much of a typical 12 hour day do you spend sitting? Less Than Half the Day   How much of a typical 12 hour day do you spend lying down? Less Than Half the Day   How much of a typical day do you spend walking/standing? Most of the Day   How many hours (not including work) do you spend on the TV/Video Games/Computer/Tablet/Phone? 1 Hour or Less   How many times a week are you active for the purpose of exercise? Once a Week   How many total minutes do you spend doing some activity for the purpose of exercising when you exercise? Less Than 15 Minutes   What keeps you from being more active? Pain, Shortness of Breath, Lack of Time, Too tired       ROS    PAST MEDICAL HISTORY:  Past Medical History:   Diagnosis Date     Abdominal pain 2011    abnormal histamine problem, multiple food allergies     ADHD (attention deficit hyperactivity disorder)     on aderol     Anxiety and depression     on xanax     Arthritis      Breathing problem 2007    shortness of breath after eating, ?allergies     Chronic nausea      Gastroesophageal reflux disease      Heart murmur     closed by time she was 1     Hx of abnormal cervical Pap smear 2011  "    IBS (irritable bowel syndrome)     lots of mucus in bowel with occassional bleeding     Joint pain 2015    was to have rheumatologist     Kidney stone on right side 2010     Meniere's disease     hyperacusis     Migraines      Reduced vision      Tinnitus        Work/Social History Reviewed With Patient 4/24/2018   My employment status is: Part-Time   My job is: [employer]   How much of your job is spent on the computer or phone? Less Than 50%   What is your marital status? /In a Relationship   If in a relationship, is your significant other overweight? No   Do you have children? Yes   If you have children, are they overweight? No       Mental Health History Reviewed With Patient 4/24/2018   Have you ever been physically or sexually abused? No   How often in the past 2 weeks have you felt little interest or pleasure in doing things? Not at all   Over the past 2 weeks how often have you felt down, depressed, or hopeless? Not at all       Sleep History Reviewed With Patient 4/24/2018   How many hours do you sleep at night? 6   Do you think that you snore loudly or has anybody ever heard you snore loudly (louder than talking or so loud it can be heard behind a shut door)? No   Has anyone seen or heard you stop breathing during your sleep? No   Do you often feel tired, fatigued, or sleepy during the day? Yes       MEDICATIONS:   No current outpatient prescriptions on file.       ALLERGIES:   Allergies   Allergen Reactions     Food      Cantaloupe, cucumbers, green beans, and peppers.      Lemon Flavor      Mustard Seed      Onion Difficulty breathing and GI Disturbance     [place] GI Disturbance     Penicillins Hives     Or another medication taken at that time     Vanilla GI Disturbance       PHYSICAL EXAM:  /68  Pulse 81  Ht 1.715 m (5' 7.52\")  Wt 89.6 kg (197 lb 8 oz)  SpO2 97%  BMI 30.46 kg/m2   A & O x 3  HEENT: NCAT, mucous membranes moist  Respirations unlabored  Location of obesity: Mixed " Obesity    ASSESSMENT:  Priyanka is a patient with early onset obesity with significant element of familial/genetic influence and with current health consequences. She does not need aggressive weight loss plan due to binges eating.  Priyanka Lundberg endorses binging, eats a high carb diet, eats a high fat diet, uses food as a reward, uses food as mood management, eats to obtain specific degree of fullness, mostly eats during the evening and has a disorganized meal pattern.    Her problem is complicated by strong craving/reward pathways, a binge eating component and poor lifestyle choices    Her ability to lose weight is impacted by inability to perceive that food intake is at a level that prevents weight loss and lack of confidence.    PLAN:    No meal skipping  Calorie/low fat diet  Meal planning  Increase walking -- 20-30 minutes per day    Craving/Reward   Ancillary testing:  N/A.  Food Plan:  High protein/low carbohydrate and Neutralization diet: patient to identify good, bad, and neutral foods and then to eat only the neutral ones.   Activity Plan:  Exercise after meals.  Supplementary:  Referral to psychology, Andra Kennedy  Medication:  Discussed topiramate as a drug of choice. She is still nursing so I will wait for her to finish nursing before starting on topiramate. She agrees with plan      Set small steps today --   Eat at meal -- BF, lunch and dinner  Eat from plate not from box or large container  Sit down and eat at the dining table    RTC:    6-8 weeks.    TIME: 45 min spent on evaluation, management, counseling, education, & motivational interviewing with greater than 50 % of the total time was spent on counseling and coordinating care    Sincerely,    Pedro Mcneal MD

## 2018-04-24 NOTE — MR AVS SNAPSHOT
After Visit Summary   4/24/2018    Priyanka Lundberg    MRN: [id number]           Patient Information     Date Of Birth          1980        Visit Information        Provider Department      4/24/2018 8:00 AM Pedro Mcneal MD [facility] Weight Management        Care Instructions    - make sure you eat at meal -- breakfast, lunch and dinner  - make sure that you eat from your plate. Please portion your food before you eat  - sit down and eat at dining table  - see me in 6-8 weeks  - please see Andra Kennedy, psychologist    If you have any questions, please do not hesitate to call Weight management clinic at [phone] or [phone].    Sincerely,    Pedro Mcneal MD  Endocrinology            Follow-ups after your visit        Your next 10 appointments already scheduled     May 31, 2018  9:00 AM CDT   (Arrive by 8:45 AM)   Return Visit with Demetri Denis MD   [facility] Neurology ([facility])    [address] [place] [address] [phone]            Jun 13, 2018  7:00 AM CDT   (Arrive by 6:45 AM)   Return Visit with Nikita Ross PA-C   [facility]ology [facility] IBD [facility] ([facility])    [address] [place] [address] [phone]              Who to contact     Please call your clinic at [phone] to:    Ask questions about your health    Make or cancel appointments    Discuss your medicines    Learn about your test results    Speak to your doctor            Additional Information About Your Visit        [employer] Information     [employer] gives you secure access to your electronic health record. If you see a primary care provider, you can also send messages to your care team and make appointments. If you have questions, please call your primary care clinic.  If you do not have a primary care provider, please call [phone] and they will assist  "you.      [employer] is an electronic gateway that provides easy, online access to your medical records. With [employer], you can request a clinic appointment, read your test results, renew a prescription or communicate with your care team.     To access your existing account, please contact your [facility] or call [phone] for assistance.        Care EveryWhere ID     This is your Care EveryWhere ID. This could be used by other organizations to access your [place] medical records  [id number]        Your Vitals Were     Pulse Height Pulse Oximetry BMI (Body Mass Index)          81 1.715 m (5' 7.52\") 97% 30.46 kg/m2         Blood Pressure from Last 3 Encounters:   04/24/18 124/68   03/23/18 92/61   03/14/18 126/75    Weight from Last 3 Encounters:   04/24/18 89.6 kg (197 lb 8 oz)   03/23/18 91.2 kg (201 lb)   03/14/18 91.3 kg (201 lb 3.2 oz)              Today, you had the following     No orders found for display       Primary Care Provider Office Phone # Fax #    Sydney Crista Ruiz [phone]       [address] [place] [address]        Equal Access to Services     CHARISSE KERNS : Hadii sarah gauthiero Soangelali, waaxda luqadaha, qaybta kaalmada adeegyada, cecy marcum. So [facility] [phone].    ATENCIÓN: Si habla español, tiene a urban disposición servicios gratuitos de asistencia lingüística. Llame al [phone].    We comply with applicable federal civil rights laws and [place] laws. We do not discriminate on the basis of race, color, national origin, age, disability, sex, sexual orientation, or gender identity.            Thank you!     Thank you for choosing [facility] WEIGHT MANAGEMENT  for your care. Our goal is always to provide you with excellent care. Hearing back from our patients is one way we can continue to improve our services. Please take a few minutes to complete the written survey that you may receive in " the mail after your visit with us. Thank you!             Your Updated Medication List - Protect others around you: Learn how to safely use, store and throw away your medicines at www.disposemymeds.org.      Notice  As of 4/24/2018  9:04 AM    You have not been prescribed any medications.

## 2018-05-05 ENCOUNTER — NURSE TRIAGE (OUTPATIENT)
Dept: NURSING | Facility: CLINIC | Age: 38
End: 2018-05-05

## 2018-05-05 NOTE — TELEPHONE ENCOUNTER
Hx =2 years ago had baby .   UTI symptom of burning , frequency and urgency  last week  . Currently :   T   97.7   forehead ,  lower abdominal pain -constant is 6/10 , no blood in urine . Triage for urination pain female -with disposition of see provider 4 hours  And unable to get back to [place] to check for appt , will go into be seen within 4 hours and considering UC or ED .  .Jolly Babin RN [place] nurse advisors.    Reason for Disposition    Side (flank) or lower back pain present    Additional Information    Negative: Shock suspected (e.g., cold/pale/clammy skin, too weak to stand, low BP, rapid pulse)    Negative: Sounds like a life-threatening emergency to the triager    Negative: Followed a genital area injury    Negative: Taking antibiotic for urinary tract infection (UTI)    Negative: Pregnant    Negative: Postpartum < 1 month    Negative: [1] Unable to urinate (or only a few drops) > 4 hours AND     [2] bladder feels very full (e.g., palpable bladder or strong urge to urinate)    Negative: Patient sounds very sick or weak to the triager    Negative: [1] SEVERE pain with urination  (e.g., excruciating) AND [2] not improved after 2 hours of pain medicine and Sitz bath    Negative: Fever > 100.5 F (38.1 C)    Protocols used: URINATION PAIN - FEMALE-ADULT-

## 2018-05-07 ENCOUNTER — OFFICE VISIT (OUTPATIENT)
Dept: OBGYN | Facility: CLINIC | Age: 38
End: 2018-05-07
Attending: OBSTETRICS & GYNECOLOGY
Payer: COMMERCIAL

## 2018-05-07 ENCOUNTER — HOSPITAL ENCOUNTER (OUTPATIENT)
Dept: ULTRASOUND IMAGING | Facility: CLINIC | Age: 38
Discharge: HOME | End: 2018-05-07
Attending: OBSTETRICS & GYNECOLOGY | Admitting: OBSTETRICS & GYNECOLOGY
Payer: COMMERCIAL

## 2018-05-07 ENCOUNTER — TELEPHONE (OUTPATIENT)
Dept: OBGYN | Facility: CLINIC | Age: 38
End: 2018-05-07

## 2018-05-07 VITALS
SYSTOLIC BLOOD PRESSURE: 105 MMHG | HEART RATE: 71 BPM | HEIGHT: 67 IN | WEIGHT: 203.3 LBS | DIASTOLIC BLOOD PRESSURE: 66 MMHG | BODY MASS INDEX: 31.91 KG/M2

## 2018-05-07 DIAGNOSIS — N39.3 FEMALE STRESS INCONTINENCE: ICD-10-CM

## 2018-05-07 DIAGNOSIS — N76.0 BV (BACTERIAL VAGINOSIS): Primary | ICD-10-CM

## 2018-05-07 DIAGNOSIS — R10.2 PELVIC PAIN IN FEMALE: ICD-10-CM

## 2018-05-07 DIAGNOSIS — B96.89 BV (BACTERIAL VAGINOSIS): Primary | ICD-10-CM

## 2018-05-07 DIAGNOSIS — R10.2 VAGINAL PAIN: ICD-10-CM

## 2018-05-07 DIAGNOSIS — R30.0 DYSURIA: Primary | ICD-10-CM

## 2018-05-07 DIAGNOSIS — R30.0 DYSURIA: ICD-10-CM

## 2018-05-07 LAB
ALBUMIN UR-MCNC: NEGATIVE MG/DL
APPEARANCE UR: CLEAR
BILIRUB UR QL STRIP: NEGATIVE
CLUE CELLS: PRESENT
COLOR UR AUTO: NORMAL
GLUCOSE UR STRIP-MCNC: NEGATIVE MG/DL
HGB UR QL STRIP: NEGATIVE
KETONES UR STRIP-MCNC: NEGATIVE MG/DL
LEUKOCYTE ESTERASE UR QL STRIP: NEGATIVE
NITRATE UR QL: NEGATIVE
PH UR STRIP: 7 PH (ref 5–7)
RBC #/AREA URNS AUTO: 1 /HPF (ref 0–2)
SOURCE: NORMAL
SP GR UR STRIP: 1 (ref 1–1.03)
SQUAMOUS #/AREA URNS AUTO: <1 /HPF (ref 0–1)
TRICHOMONAS (WET PREP): NEGATIVE
UROBILINOGEN UR STRIP-MCNC: NORMAL MG/DL (ref 0–2)
WBC #/AREA URNS AUTO: 0 /HPF (ref 0–5)
YEAST (WET PREP): NEGATIVE

## 2018-05-07 PROCEDURE — G0463 HOSPITAL OUTPT CLINIC VISIT: HCPCS | Mod: ZF

## 2018-05-07 PROCEDURE — 87210 SMEAR WET MOUNT SALINE/INK: CPT | Mod: ZF | Performed by: OBSTETRICS & GYNECOLOGY

## 2018-05-07 PROCEDURE — 87086 URINE CULTURE/COLONY COUNT: CPT | Performed by: OBSTETRICS & GYNECOLOGY

## 2018-05-07 PROCEDURE — 81001 URINALYSIS AUTO W/SCOPE: CPT | Performed by: OBSTETRICS & GYNECOLOGY

## 2018-05-07 RX ORDER — METRONIDAZOLE 7.5 MG/G
1 GEL VAGINAL AT BEDTIME
Qty: 70 G | Refills: 0 | Status: SHIPPED | OUTPATIENT
Start: 2018-05-07 | End: 2019-02-08

## 2018-05-07 NOTE — NURSING NOTE
Chief Complaint   Patient presents with     Follow Up For     Aunt has ovarian cancer and is dying. Concern about cancer. Has vaginal pressure and bladder/urinary symptoms. Constant pain on the right side and is worse during period.       See MYA Cook 5/7/2018

## 2018-05-07 NOTE — TELEPHONE ENCOUNTER
"Patient called to report symptoms of labia/vulva discomfort, swelling, and rubbing, plus increase in low pelvic pressure and UTI symptoms- including urgency, frequency, burning-- was experiencing some back pain but states she felt a UTI \"coming on\" and treated at home with \"lots of hydration\" and now back pain has subsided. Advised pt be seen in clinic today and leave urine sample at lab on way in.    Pt agreed with plan and will present to lab then office visit with Dr. Zhu.    Ordered UA/UC.      "

## 2018-05-07 NOTE — MR AVS SNAPSHOT
"              After Visit Summary   5/7/2018    Priyanka Lundberg    MRN: [id number]           Patient Information     Date Of Birth          1980        Visit Information        Provider Department      5/7/2018 1:00 PM Hien Zhu MD [facility]        Today's Diagnoses     BV (bacterial vaginosis)    -  1    Pelvic pain in female        Female stress incontinence        Vaginal pain           Follow-ups after your visit        Additional Services     PHYSICAL THERAPY REFERRAL       *This therapy referral will be filtered to a centralized scheduling office at [facility] and the patient will receive a call to schedule an appointment at a [facility] location most convenient for them. *     [facility] provides Physical Therapy evaluation and treatment and many specialty services across the [facility] system.  If requesting a specialty program, please choose from the list below.    If you have not heard from the scheduling office within 2 business days, please call [phone] for all locations, with the exception of [place], please call [phone] and [facility], please call [phone]  Treatment: Evaluation & Treatment  Special Instructions/Modalities: none  Special Programs: Incontinence Pelvic Floor Program    Please be aware that coverage of these services is subject to the terms and limitations of your health insurance plan.  Call member services at your health plan with any benefit or coverage questions.      **Note to Provider:  If you are referring outside of [facility] for the therapy appointment, please list the name of the location in the \"special instructions\" above, print the referral and give to the patient to schedule the appointment.                  Follow-up notes from your care team     Return if symptoms worsen or fail to improve.      Your next 10 appointments already scheduled     May 09, 2018 10:40 AM CDT "   Return Visit with Sydney Ruiz MD   [facility] ([facility])    [facility]  [address]  [facility] [address]   [phone]            May 18, 2018  8:40 AM CDT   Return Visit with Sydney Ruiz MD   [facility] ([facility])    [facility]  [address]  [facility] [address]   [phone]            May 31, 2018  9:00 AM CDT   (Arrive by 8:45 AM)   Return Visit with Demetri Denis MD   [facility] ([facility])    [address] [facility] [address]  3rd Floor  [facility] [id number] [phone]            Jun 13, 2018  7:00 AM CDT   (Arrive by 6:45 AM)   Return Visit with Nikita Ross PA-C   [facility] G[facility]enterology and [facility] ([facility])    [address] [place] [address]  4th Floor  [facility] [address]   [phone]            Jun 20, 2018  7:30 AM CDT   (Arrive by 7:15 AM)   [facility] For [facility] Only with Jai Echeverria PT   [facility] ([facility]  )    [address] [place] [address]   [phone]            Jun 27, 2018  8:10 AM CDT   [facility] For [facility] Only with Jia Echeverria PT   [facility] ([facility]  )    [address] [place] [address]   [phone]            Jul 05, 2018  8:10 AM CDT   [facility] Only with Jia Echeverria PT   [facility] ([facility]  )    [address] [place] [address]   [phone]            Aug 14, 2018  8:45 AM CDT   (Arrive by 8:30 AM)   Return Visit with Pedro Mcneal MD   [facility] Medical Weight Management ([facility])    [address]  4th Floor  [facility] [id number] [phone]              Future tests that were ordered for you today     Open Future  "Orders        Priority Expected Expires Ordered    US GYN Complete Transvaginal - 88238 (In Clinic) Routine  9/4/2018 5/7/2018            Who to contact     Please call your clinic at [phone] to:    Ask questions about your health    Make or cancel appointments    Discuss your medicines    Learn about your test results    Speak to your doctor            Additional Information About Your Visit        [employer] Medical Information     [email or web address] gives you secure access to your electronic health record. If you see a primary care provider, you can also send messages to your care team and make appointments. If you have questions, please call your primary care clinic.  If you do not have a primary care provider, please call [phone] and they will assist you.      [email or web address] is an electronic gateway that provides easy, online access to your medical records. With [email or web address], you can request a clinic appointment, read your test results, renew a prescription or communicate with your care team.     To access your existing account, please contact your [facility] or call [phone] for assistance.        Care EveryWhere ID     This is your Care EveryWhere ID. This could be used by other organizations to access your [place] medical records  [id number]        Your Vitals Were     Pulse Height Breastfeeding? BMI (Body Mass Index)          71 1.702 m (5' 7\") No 31.84 kg/m2         Blood Pressure from Last 3 Encounters:   05/07/18 105/66   04/24/18 124/68   03/23/18 92/61    Weight from Last 3 Encounters:   05/07/18 92.2 kg (203 lb 4.8 oz)   04/24/18 89.6 kg (197 lb 8 oz)   03/23/18 91.2 kg (201 lb)              We Performed the Following     PHYSICAL THERAPY REFERRAL     Wet Prep POCT          Today's Medication Changes          These changes are accurate as of 5/7/18 11:59 PM.  If you have any questions, ask your nurse or doctor.               Start taking these medicines.        Dose/Directions    " metroNIDAZOLE 0.75 % vaginal gel   Commonly known as:  METROGEL   Used for:  BV (bacterial vaginosis)   Started by:  Hien Zhu MD        Dose:  1 applicator   Place 1 applicator (5 g) vaginally At Bedtime for 5 days   Quantity:  70 g   Refills:  0            Where to get your medicines      [place] [facility] [place] [address], [place] [address]     Phone:  [phone]     metroNIDAZOLE 0.75 % vaginal gel                Primary Care Provider Office Phone # Fax #    Sydney Ruiz [phone]       [address] [place] [address]        Equal Access to Services     CHARISSE KERNS : Hadii sarah arevalo hadasho Soangelali, waaxda luqadaha, qaybta kaalmada adeegyada, cecy lopez . So [facility] [phone].    ATENCIÓN: Si habla español, tiene a urban disposición servicios gratuitos de asistencia lingüística. [facility] [phone].    We comply with applicable federal civil rights laws and [place] laws. We do not discriminate on the basis of race, color, national origin, age, disability, sex, sexual orientation, or gender identity.            Thank you!     Thank you for choosing [facility]  for your care. Our goal is always to provide you with excellent care. Hearing back from our patients is one way we can continue to improve our services. Please take a few minutes to complete the written survey that you may receive in the mail after your visit with us. Thank you!             Your Updated Medication List - Protect others around you: Learn how to safely use, store and throw away your medicines at www.disposemymeds.org.          This list is accurate as of 5/7/18 11:59 PM.  Always use your most recent med list.                   Brand Name Dispense Instructions for use Diagnosis    metroNIDAZOLE 0.75 % vaginal gel    METROGEL    70 g    Place 1 applicator (5  g) vaginally At Bedtime for 5 days    BV (bacterial vaginosis)

## 2018-05-07 NOTE — LETTER
"5/7/2018       RE: Priyanka Lundberg  [address]  [place] [address]     Dear Colleague,    Thank you for referring your patient, Priyanka Lundberg, to the [facility] at [facility]. Please see a copy of my visit note below.    S:  Pt is a 39 y/o  who presents today for a same day visit with c/o dysuria, right sided pelvic pain/fullness, vaginal discharge and vulvar discomfort.  She notes that the discharge sometimes has an odor.  She also notes LUIS and feels like she night have prolapse.  She does Kegel exercises but has never done PT.  She continues to breastfeed.  Her cycles have returned and are normal.    O: /66 (BP Location: Left arm, Patient Position: Chair)  Pulse 71  Ht 1.702 m (5' 7\")  Wt 92.2 kg (203 lb 4.8 oz)  Breastfeeding? No  BMI 31.84 kg/m2     gen-pleasant, NAD  Pelvic Exam:  Vulva: No external lesions, normal hair distribution, no adenopathy  Vagina: no lesions, moderate amount of clear/white discharge in the vault  Cervix: parous, smooth, pink, no visible lesions  Bimanual exam-small, mobile, midline uterus with no adnexal masses    Split speculum exam reveals minimal descent of the cervix with valsalva, no significant cystocele, slight urethral hypermobility, no significant rectocele    Microscopic wet-mount exam shows clue cells.    A:  39 y/o P0 with c/o dysuria, vaginal discharge, LUIS, pelvic pain and fullness.  Wet prep c/w BV.  No significant prolapse or other abnormalities noted on exam.    P:  UA/UC was sent from the outpatient lab and is still pending.  Will call her if positive.  Offered PO flagyl for her BV, she states she would not take it.  Offered metrogel-she will think about it, Rx sent.  Referral to pelvic floor PT made.  If continued LUIS symptoms after PT, she can return to see Dr. Gonzalez.  Pelvic u/s ordered for further evaluation of her pelvic pain.    Hien Zhu MD, FACOG            "

## 2018-05-08 LAB
BACTERIA SPEC CULT: NO GROWTH
Lab: NORMAL
SPECIMEN SOURCE: NORMAL

## 2018-05-08 NOTE — PROGRESS NOTES
"S:  Pt is a 39 y/o  who presents today for a same day visit with c/o dysuria, right sided pelvic pain/fullness, vaginal discharge and vulvar discomfort.  She notes that the discharge sometimes has an odor.  She also notes LUIS and feels like she night have prolapse.  She does Kegel exercises but has never done PT.  She continues to breastfeed.  Her cycles have returned and are normal.    O: /66 (BP Location: Left arm, Patient Position: Chair)  Pulse 71  Ht 1.702 m (5' 7\")  Wt 92.2 kg (203 lb 4.8 oz)  Breastfeeding? No  BMI 31.84 kg/m2     gen-pleasant, NAD  Pelvic Exam:  Vulva: No external lesions, normal hair distribution, no adenopathy  Vagina: no lesions, moderate amount of clear/white discharge in the vault  Cervix: parous, smooth, pink, no visible lesions  Bimanual exam-small, mobile, midline uterus with no adnexal masses    Split speculum exam reveals minimal descent of the cervix with valsalva, no significant cystocele, slight urethral hypermobility, no significant rectocele    Microscopic wet-mount exam shows clue cells.    A:  39 y/o P0 with c/o dysuria, vaginal discharge, LUIS, pelvic pain and fullness.  Wet prep c/w BV.  No significant prolapse or other abnormalities noted on exam.    P:  UA/UC was sent from the outpatient lab and is still pending.  Will call her if positive.  Offered PO flagyl for her BV, she states she would not take it.  Offered metrogel-she will think about it, Rx sent.  Referral to pelvic floor PT made.  If continued LUIS symptoms after PT, she can return to see Dr. Gonzalez.  Pelvic u/s ordered for further evaluation of her pelvic pain.    Hien Zhu MD, FACOG      "

## 2018-05-17 NOTE — PROGRESS NOTES
"Priyanka Lundberg is a 38 year old female here for the following issues:    Back pain  Priyanka is here to discuss low back pain, that flares with humidity, rain. She reports onset of low back pain age 15, no previous injury but she was diagnosed with bulging disc and had epidural injection which helped. Symptoms come and go. Sometimes she gets right sided low back pain that radiates down past her right knee. She also reports aching of joints, headaches and fatigue. Sometimes the pain makes it hard to get out of bed. She is concerned she has fibromyalgia.     Fatigue  She has very poor quality sleep. She tosses and turns, 30x per night  Low back pain keeps her awake.     Lower pelvic pain  This has been ongoing for the past month. Sometimes she gets midline lower pelvic pain with walking. Cramping \"felt like labor\". She now reports the pain is dissipating and it feels more like a pressure. No bladder changes. She has chronic constipation, but reports this pain felt different. She was seen by the ob/gyn and they referred her on to the pelvc floor clinic. She had pelvic US, unremarkable.     Patient Active Problem List   Diagnosis     Gastrointestinal problem     Joint pain     L4-L5 disc bulge     Attention deficit hyperactivity disorder (ADHD), combined type     Vaginal discharge     History of abnormal cervical Pap smear     Other acne     Benign nevus     History of Clostridium difficile colitis     Cervical pain     Bilateral thoracic back pain     Class 1 obesity due to excess calories without serious comorbidity with body mass index (BMI) of 30.0 to 30.9 in adult       No current outpatient prescriptions on file.       Allergies   Allergen Reactions     Food      Cantaloupe, cucumbers, green beans, and peppers.      Lemon Flavor      Mustard Seed      Onion Difficulty breathing and GI Disturbance     Barber GI Disturbance     Penicillins Hives     Or another medication taken at that time     Vanilla GI Disturbance "        EXAM  /57 (BP Location: Right arm, Patient Position: Sitting, Cuff Size: Adult Large)  Pulse 73  Temp 98.3  F (36.8  C) (Oral)  Wt 199 lb (90.3 kg)  LMP 05/16/2018 (Exact Date)  SpO2 96%  BMI 31.17 kg/m2  Gen: Alert, pleasant, NAD  MS: No tenderness over the bony C, T, or LS spinous bodies  Point tenderness over the paralumbar muscles, bilaterally  Hips: point tenderness with palpation over the right lateral hip and along IT band. Internal and external rotation of the hips are normal.      Assessment:  (M54.41,  G89.29) Chronic right-sided low back pain with right-sided sciatica  (primary encounter diagnosis)  Comment: suspect mechanical back pain  Plan: [facility], HAND, AND CHIROPRACTIC REFERRAL        Refer to [facility] for PT, follow up in 4-6 wks    (Z11.3) Screen for STD (sexually transmitted disease)  Comment: she is requesting routine screening  Plan: Neisseria gonorrhoeae PCR, Chlamydia         trachomatis PCR, CANCELED: Neisseria         gonorrhoeae PCR, CANCELED: Chlamydia         trachomatis PCR        ADDENDUM: all tests negative.    (M25.551) Hip pain, right  Comment: suspect bursitis  Plan: [facility] PT, HAND, AND CHIROPRACTIC REFERRAL        Refer to PT, recommend cool packs, NSAIDs    Sydney Ruiz MD  Internal Medicine/Pediatrics

## 2018-05-18 ENCOUNTER — OFFICE VISIT (OUTPATIENT)
Dept: FAMILY MEDICINE | Facility: CLINIC | Age: 38
End: 2018-05-18
Payer: COMMERCIAL

## 2018-05-18 VITALS
OXYGEN SATURATION: 96 % | SYSTOLIC BLOOD PRESSURE: 100 MMHG | TEMPERATURE: 98.3 F | DIASTOLIC BLOOD PRESSURE: 57 MMHG | WEIGHT: 199 LBS | BODY MASS INDEX: 31.17 KG/M2 | HEART RATE: 73 BPM

## 2018-05-18 DIAGNOSIS — M54.41 CHRONIC RIGHT-SIDED LOW BACK PAIN WITH RIGHT-SIDED SCIATICA: Primary | ICD-10-CM

## 2018-05-18 DIAGNOSIS — Z11.3 SCREEN FOR STD (SEXUALLY TRANSMITTED DISEASE): ICD-10-CM

## 2018-05-18 DIAGNOSIS — G89.29 CHRONIC RIGHT-SIDED LOW BACK PAIN WITH RIGHT-SIDED SCIATICA: Primary | ICD-10-CM

## 2018-05-18 DIAGNOSIS — M25.551 HIP PAIN, RIGHT: ICD-10-CM

## 2018-05-18 ASSESSMENT — PAIN SCALES - GENERAL: PAINLEVEL: MODERATE PAIN (4)

## 2018-05-18 NOTE — MR AVS SNAPSHOT
After Visit Summary   5/18/2018    Priyanka Lundberg    MRN: [id number]           Patient Information     Date Of Birth          1980        Visit Information        Provider Department      5/18/2018 8:40 AM Sydney Ruiz MD [facility]        Today's Diagnoses     Chronic right-sided low back pain with right-sided sciatica    -  1    Screen for STD (sexually transmitted disease)        Hip pain, right           Follow-ups after your visit        Additional Services     [facility] PT, HAND, AND CHIROPRACTIC REFERRAL       **This order will print in the [facility] Scheduling Office**    Physical Therapy, Hand Therapy and Chiropractic Care are available through:    *[facility]  *[facility]  *[facility]    Call one number to schedule at any of the above locations: [phone].    Your provider has referred you to: Physical Therapy at [facility] or [facility]    Indication/Reason for Referral: Hip Pain and Low Back Pain, hx of herniated disc, L4-5 in 2015  Onset of Illness: years  Therapy Orders: Evaluate and Treat  Special Programs: None  Special Request: None    [place] Tim      Additional Comments for the Therapist or Chiropractor:       Please be aware that coverage of these services is subject to the terms and limitations of your health insurance plan.  Call member services at your health plan with any benefit or coverage questions.      Please bring the following to your appointment:    *Your personal calendar for scheduling future appointments  *Comfortable clothing                  Your next 10 appointments already scheduled     May 31, 2018  9:00 AM CDT   (Arrive by 8:45 AM)   Return Visit with Demetri Denis MD   [facility] ([facility])    [address] [place] [address] [phone]            Jun 13, 2018  7:00 AM CDT   (Arrive by 6:45 AM)   Return Visit with Nikita Dodd  JETHRO Ross [facility] ([facility])    [address] [place] [id number] [phone]            Jun 20, 2018  7:30 AM CDT   (Arrive by 7:15 AM)   [facility] For Women Only with Jia Echeverria PT   [facility] ([facility]  )    [address] [facility] [address]   [phone]            Jul 05, 2018  8:10 AM CDT   [facility] For Women Only with Jia Echeverria PT   [facility]    [address] [facility] [address]   [phone]            Jul 11, 2018  8:10 AM CDT   [facility] For Women Only with Jia Echeverria PT   [facility] ([facility]  )    [address] [facility] [id number] [phone]            Aug 14, 2018  8:45 AM CDT   (Arrive by 8:30 AM)   Return Visit with MD REDDY Lara [facility] ([facility])    [address] [place] [id number] [phone]              Who to contact     Please call your clinic at [phone] to:    Ask questions about your health    Make or cancel appointments    Discuss your medicines    Learn about your test results    Speak to your doctor            Additional Information About Your Visit        [facility] Information     [employer] gives you secure access to your electronic health record. If you see a primary care provider, you can also send messages to your care team and make appointments. If you have questions, please call your primary care clinic.  If you do not have a primary care provider, please call [phone] and they will assist you.      [employer] is an electronic gateway that provides easy, online access to your medical records. With [employer], you can request a clinic appointment, read your test results, renew  a prescription or communicate with your care team.     To access your existing account, please contact your [facility] or call [phone] for assistance.        Care EveryWhere ID     This is your Care EveryWhere ID. This could be used by other organizations to access your [place] medical records  [id number]        Your Vitals Were     Pulse Temperature Last Period Pulse Oximetry BMI (Body Mass Index)       73 98.3  F (36.8  C) (Oral) 05/16/2018 (Exact Date) 96% 31.17 kg/m2        Blood Pressure from Last 3 Encounters:   05/18/18 100/57   05/07/18 105/66   04/24/18 124/68    Weight from Last 3 Encounters:   05/18/18 199 lb (90.3 kg)   05/07/18 203 lb 4.8 oz (92.2 kg)   04/24/18 197 lb 8 oz (89.6 kg)              We Performed the Following     Chlamydia trachomatis PCR     [facility], AND CHIR[facility]RACTIC REFERRAL     Neisseria gonorrhoeae PCR        Primary Care Provider Office Phone # Fax #    Sydney Ruiz [phone]       [address] [place] [address]        Equal Access to Services     [facility] Hospital[facility]ATONYA : Hadii aad ku hadasho Soangelali, waaxda luqadaha, qaybta kaalmada adeloganda, cecy lopez . So [facility] [phone].    ATENCIÓN: Si habla español, tiene a urban disposición servicios gratuitos de asistencia lingüística. Llame al [phone].    We comply with applicable federal civil rights laws and [place] laws. We do not discriminate on the basis of race, color, national origin, age, disability, sex, sexual orientation, or gender identity.            Thank you!     Thank you for choosing [facility]  for your care. Our goal is always to provide you with excellent care. Hearing back from our patients is one way we can continue to improve our services. Please take a few minutes to complete the written survey that you may receive in the mail after your visit with us. Thank you!             Your Updated Medication List  - Protect others around you: Learn how to safely use, store and throw away your medicines at www.disposemymeds.org.      Notice  As of 5/18/2018  9:26 AM    You have not been prescribed any medications.

## 2018-05-18 NOTE — NURSING NOTE
38 year old  Chief Complaint   Patient presents with     Pain     Fibromyalgia     Vaginal Problem     BV, took medication for 3 days and then quit.       Blood pressure 100/57, pulse 73, temperature 98.3  F (36.8  C), temperature source Oral, weight 199 lb (90.3 kg), last menstrual period 05/16/2018, SpO2 96 %, not currently breastfeeding. Body mass index is 31.17 kg/(m^2).  Patient Active Problem List   Diagnosis     Gastrointestinal problem     Joint pain     L4-L5 disc bulge     Attention deficit hyperactivity disorder (ADHD), combined type     Vaginal discharge     History of abnormal cervical Pap smear     Other acne     Benign nevus     History of Clostridium difficile colitis     Cervical pain     Bilateral thoracic back pain     Class 1 obesity due to excess calories without serious comorbidity with body mass index (BMI) of 30.0 to 30.9 in adult       Wt Readings from Last 2 Encounters:   05/18/18 199 lb (90.3 kg)   05/07/18 203 lb 4.8 oz (92.2 kg)     BP Readings from Last 3 Encounters:   05/18/18 100/57   05/07/18 105/66   04/24/18 124/68         No current outpatient prescriptions on file.     No current facility-administered medications for this visit.        Social History   Substance Use Topics     Smoking status: Never Smoker     Smokeless tobacco: Never Used     Alcohol use 0.0 oz/week      Comment: outside of pregnancy, social       Health Maintenance Due   Topic Date Due     LIPID MONITORING Q1 YEAR  08/28/2013     MAMMO Q1 YR  05/01/2018       Lab Results   Component Value Date    PAP NIL 09/08/2016         Anatoly Abernathy L.P.N.  May 18, 2018 8:52 AM

## 2018-05-29 DIAGNOSIS — Z11.3 SCREEN FOR STD (SEXUALLY TRANSMITTED DISEASE): ICD-10-CM

## 2018-05-30 LAB
C TRACH DNA SPEC QL NAA+PROBE: NEGATIVE
N GONORRHOEA DNA SPEC QL NAA+PROBE: NEGATIVE
SPECIMEN SOURCE: NORMAL
SPECIMEN SOURCE: NORMAL

## 2018-05-31 ENCOUNTER — OFFICE VISIT (OUTPATIENT)
Dept: NEUROLOGY | Facility: CLINIC | Age: 38
End: 2018-05-31
Payer: COMMERCIAL

## 2018-05-31 VITALS
WEIGHT: 203.4 LBS | SYSTOLIC BLOOD PRESSURE: 119 MMHG | HEART RATE: 74 BPM | DIASTOLIC BLOOD PRESSURE: 58 MMHG | BODY MASS INDEX: 31.92 KG/M2 | HEIGHT: 67 IN

## 2018-05-31 DIAGNOSIS — M54.2 CERVICALGIA: Primary | ICD-10-CM

## 2018-05-31 DIAGNOSIS — G44.209 TENSION HEADACHE: ICD-10-CM

## 2018-05-31 RX ORDER — MULTIPLE VITAMINS W/ MINERALS TAB 9MG-400MCG
1 TAB ORAL DAILY
COMMUNITY
End: 2021-09-16

## 2018-05-31 ASSESSMENT — PAIN SCALES - GENERAL: PAINLEVEL: MODERATE PAIN (5)

## 2018-05-31 NOTE — LETTER
5/31/2018     RE: Priyanka Lundberg  [address] [place] [address]     Dear Colleague,    Thank you for referring your patient, Priyanka Lundberg, to the [facility]UROLOG[facility] at [facility]. Please see a copy of my visit note below.    Service Date: 05/31/2018      INTERVAL HISTORY:  This patient is seen in followup with issues mainly of headache and also sensory disturbance.  The sensory disturbance has not been a major issue for her.  She continues to have the headache.  I last saw her 3 months ago.  At that time she was in the midst of GI and possible Rheumatology workup.  Her primary care did perform rheumatologic studies and I have reviewed those results with her.  This included Nichols DOMINIQUE antibody, Sjogren antibodies, DNA DS, complement levels, and rheumatoid factor as well as hemoglobin A1c.  All these were negative.  She is having some back issues, but that is longstanding.  She continues to have the headaches.  She describes them as right posterior cervical and also occipital.  She has chronic neck issues as well.      PHYSICAL EXAMINATION:   GENERAL:  The patient is cooperative and in no distress.   VITAL SIGNS:  Her blood pressure is 119/58.     NEUROLOGIC:   Her visual acuity is 20/20 bilaterally.  Funduscopic exam shows sharp discs bilaterally.  Eye movements are complete and conjugate.  There is some tenderness in the right posterior cervical region to light palpation and also some in the right occipital region but not so much on the left.      ASSESSMENT:   1.  Headache, likely tension headache or occipital neuralgia.      DISCUSSION:  The patient is seen in follow-up with main issue of headache.  Her exam is unremarkable.  Imaging was performed when I saw the patient earlier and that was nondiagnostic.  At this point, she has no interest in taking medication for the headache.  I am going to refer her to physical therapy to see if any measures could be  employed to try and help give her some pain relief.  I will see her in followup in 3 months, or sooner if there are problems.        Demetri Denis MD      cc:   Sydney Ruiz MD   [address] [address]       D: 2018   T: 2018   MT: KRIS    Name:     CONSTANTIN NUR   MRN:      [id number]        Account:      [id number]   :      1980           Service Date: 2018   Document: [id number]

## 2018-05-31 NOTE — PROGRESS NOTES
Service Date: 05/31/2018      INTERVAL HISTORY:  This patient is seen in followup with issues mainly of headache and also sensory disturbance.  The sensory disturbance has not been a major issue for her.  She continues to have the headache.  I last saw her 3 months ago.  At that time she was in the midst of GI and possible Rheumatology workup.  Her primary care did perform rheumatologic studies and I have reviewed those results with her.  This included Nichols DOMINIQUE antibody, Sjogren antibodies, DNA DS, complement levels, and rheumatoid factor as well as hemoglobin A1c.  All these were negative.  She is having some back issues, but that is longstanding.  She continues to have the headaches.  She describes them as right posterior cervical and also occipital.  She has chronic neck issues as well.      PHYSICAL EXAMINATION:   GENERAL:  The patient is cooperative and in no distress.   VITAL SIGNS:  Her blood pressure is 119/58.     NEUROLOGIC:   Her visual acuity is 20/20 bilaterally.  Funduscopic exam shows sharp discs bilaterally.  Eye movements are complete and conjugate.  There is some tenderness in the right posterior cervical region to light palpation and also some in the right occipital region but not so much on the left.      ASSESSMENT:   1.  Headache, likely tension headache or occipital neuralgia.      DISCUSSION:  The patient is seen in follow-up with main issue of headache.  Her exam is unremarkable.  Imaging was performed when I saw the patient earlier and that was nondiagnostic.  At this point, she has no interest in taking medication for the headache.  I am going to refer her to physical therapy to see if any measures could be employed to try and help give her some pain relief.  I will see her in followup in 3 months, or sooner if there are problems.        Otilio Denis MD      cc:   Sydney Ruiz MD   [address] [address]         OTILIO DENIS MD              D: 2018   T: 2018   MT: KRIS      Name:     CONSTANTIN NUR   MRN:      [id number]        Account:      [id number]   :      1980           Service Date: 2018      Document: [id number]

## 2018-05-31 NOTE — MR AVS SNAPSHOT
"              After Visit Summary   5/31/2018    Priyanka Lundberg    MRN: [id number]           Patient Information     Date Of Birth          1980        Visit Information        Provider Department      5/31/2018 9:00 AM Demetri Densi MD [facility]        Today's Diagnoses     Cervicalgia    -  1    Tension headache           Follow-ups after your visit        Additional Services     PHYSICAL THERAPY REFERRAL       *This therapy referral will be filtered to a centralized scheduling office at [facility] and the patient will receive a call to schedule an appointment at a [facility] location most convenient for them. *     [facility] provides Physical Therapy evaluation and treatment and many specialty services across the [facility] system.  If requesting a specialty program, please choose from the list below.    If you have not heard from the scheduling office within 2 business days, please call [phone] for all locations, with the exception of [place], please call [phone] and [facility], please call [phone]  Treatment: Evaluation & Treatment  Special Instructions/Modalities: none  Special Programs: None    Please be aware that coverage of these services is subject to the terms and limitations of your health insurance plan.  Call member services at your health plan with any benefit or coverage questions.      **Note to Provider:  If you are referring outside of [facility] for the therapy appointment, please list the name of the location in the \"special instructions\" above, print the referral and give to the patient to schedule the appointment.                  Follow-up notes from your care team     Return in about 3 months (around 8/31/2018).      Your next 10 appointments already scheduled     Jun 13, 2018  7:00 AM CDT   (Arrive by 6:45 AM)   Return Visit with Nikita Ross PA-C   [facility] ([facility])    [address] [place] [address]   [phone]            Jun 20, 2018  7:30 AM CDT   (Arrive by 7:15 AM)   [facility] For Women Only with Jia Echeverria PT   [facility]    [address] [facility] [address]   [phone]            Jul 05, 2018  8:10 AM CDT   [facility] For Women Only with Jia Echeverria PT   [facility]    [address] [place] [address]   [phone]            Jul 11, 2018  8:10 AM CDT   [facility] For Women Only with Jia Echeverria PT   [facility]    [address] [place] [address]   [phone]            Aug 14, 2018  8:45 AM CDT   (Arrive by 8:30 AM)   Return Visit with Pedro Mcneal MD   [facility] ([facility])    [address] [place] [id number] [phone]              Who to contact     Please call your clinic at [phone] to:    Ask questions about your health    Make or cancel appointments    Discuss your medicines    Learn about your test results    Speak to your doctor            Additional Information About Your Visit        [employer] Information     [employer] gives you secure access to your electronic health record. If you see a primary care provider, you can also send messages to your care team and make appointments. If you have questions, please call your primary care clinic.  If you do not have a primary care provider, please call [phone] and they will assist you.      [employer] is an electronic gateway that provides easy, online access to your medical records. With [employer], you can request a clinic appointment, read your test results, renew a prescription or communicate with your care team.     To access your  "existing account, please contact your [facility] or call [phone] for assistance.        Care EveryWhere ID     This is your Care EveryWhere ID. This could be used by other organizations to access your [place] medical records  [id number]        Your Vitals Were     Pulse Height Last Period BMI (Body Mass Index)          74 1.702 m (5' 7\") 05/16/2018 (Exact Date) 31.86 kg/m2         Blood Pressure from Last 3 Encounters:   05/31/18 119/58   05/18/18 100/57   05/07/18 105/66    Weight from Last 3 Encounters:   05/31/18 92.3 kg (203 lb 6.4 oz)   05/18/18 90.3 kg (199 lb)   05/07/18 92.2 kg (203 lb 4.8 oz)              We Performed the Following     PHYSICAL THERAPY REFERRAL        Primary Care Provider Office Phone # Fax #    Sydney Crista Ruiz [phone]        [address] [place] [address]        Equal Access to Services     [facility] HospitalLATONYA : Hadii sarah arevalo hadasho Soomaali, waaxda luqadaha, qaybta kaalmada adeegyahollie, cecy lopez . So [facility] [phone].    ATENCIÓN: Si habla español, tiene a urban disposición servicios gratuitos de asistencia lingüística. Llame al [phone].    We comply with applicable federal civil rights laws and [place] laws. We do not discriminate on the basis of race, color, national origin, age, disability, sex, sexual orientation, or gender identity.            Thank you!     Thank you for choosing [facility] NEUROLOGY  for your care. Our goal is always to provide you with excellent care. Hearing back from our patients is one way we can continue to improve our services. Please take a few minutes to complete the written survey that you may receive in the mail after your visit with us. Thank you!             Your Updated Medication List - Protect others around you: Learn how to safely use, store and throw away your medicines at www.disposemymeds.org.          This list is accurate as of 5/31/18 11:59 " PM.  Always use your most recent med list.                   Brand Name Dispense Instructions for use Diagnosis    Multi-vitamin Tabs tablet      Take 1 tablet by mouth daily

## 2018-06-13 ENCOUNTER — OFFICE VISIT (OUTPATIENT)
Dept: GASTROENTEROLOGY | Facility: CLINIC | Age: 38
End: 2018-06-13
Payer: COMMERCIAL

## 2018-06-13 VITALS
TEMPERATURE: 97.5 F | DIASTOLIC BLOOD PRESSURE: 61 MMHG | OXYGEN SATURATION: 97 % | BODY MASS INDEX: 31.01 KG/M2 | RESPIRATION RATE: 18 BRPM | HEART RATE: 64 BPM | SYSTOLIC BLOOD PRESSURE: 98 MMHG | HEIGHT: 67 IN | WEIGHT: 197.6 LBS

## 2018-06-13 DIAGNOSIS — K59.04 CHRONIC IDIOPATHIC CONSTIPATION: Primary | ICD-10-CM

## 2018-06-13 ASSESSMENT — ENCOUNTER SYMPTOMS
NECK MASS: 0
ARTHRALGIAS: 1
LEG PAIN: 1
HYPERTENSION: 0
WHEEZING: 0
BOWEL INCONTINENCE: 0
HEMOPTYSIS: 0
POOR WOUND HEALING: 1
BRUISES/BLEEDS EASILY: 0
INCREASED ENERGY: 1
ORTHOPNEA: 1
MUSCLE WEAKNESS: 1
SMELL DISTURBANCE: 0
MEMORY LOSS: 0
BLOOD IN STOOL: 1
BREAST PAIN: 1
NIGHT SWEATS: 0
BREAST MASS: 1
NAUSEA: 1
SHORTNESS OF BREATH: 0
VOMITING: 0
SPEECH CHANGE: 0
COUGH: 0
TROUBLE SWALLOWING: 0
SPUTUM PRODUCTION: 0
SEIZURES: 0
DYSPNEA ON EXERTION: 0
HYPOTENSION: 0
BACK PAIN: 1
WEAKNESS: 1
SWOLLEN GLANDS: 1
HALLUCINATIONS: 0
POLYDIPSIA: 0
ALTERED TEMPERATURE REGULATION: 0
LOSS OF CONSCIOUSNESS: 0
DIARRHEA: 0
EXERCISE INTOLERANCE: 1
DECREASED APPETITE: 0
NUMBNESS: 1
TREMORS: 0
TINGLING: 1
SLEEP DISTURBANCES DUE TO BREATHING: 0
CONSTIPATION: 1
POLYPHAGIA: 0
WEIGHT GAIN: 0
HEARTBURN: 1
MUSCLE CRAMPS: 0
LIGHT-HEADEDNESS: 1
SYNCOPE: 0
NAIL CHANGES: 1
MYALGIAS: 1
HOT FLASHES: 0
NECK PAIN: 1
WEIGHT LOSS: 0
SKIN CHANGES: 1
COUGH DISTURBING SLEEP: 0
ABDOMINAL PAIN: 1
DISTURBANCES IN COORDINATION: 0
JAUNDICE: 0
FEVER: 0
SINUS CONGESTION: 0
RECTAL PAIN: 0
DECREASED LIBIDO: 1
HEADACHES: 1
POSTURAL DYSPNEA: 1
PARALYSIS: 0
BLOATING: 1
STIFFNESS: 1
SNORES LOUDLY: 0
SORE THROAT: 0
DIZZINESS: 0
HOARSE VOICE: 0
SINUS PAIN: 1
PALPITATIONS: 1
FATIGUE: 1
CHILLS: 0
TASTE DISTURBANCE: 0

## 2018-06-13 ASSESSMENT — PAIN SCALES - GENERAL: PAINLEVEL: MODERATE PAIN (4)

## 2018-06-13 NOTE — MR AVS SNAPSHOT
After Visit Summary   6/13/2018    Priyanka Lundberg    MRN: [id number]           Patient Information     Date Of Birth          1980        Visit Information        Provider Department      6/13/2018 7:00 AM Nikita Ross PA-C [facility]        Care Instructions    It was a pleasure taking care of you today.  I've included a brief summary of our discussion and care plan from today's visit below.  Please review this information with your primary care provider.  ______________________________________________________________________    My recommendations are summarized as follows:    -- Recommend a trial of Miralax.  This is not a stimulant laxative and is safe to take on a daily basis.  Try 3 cap(s) every day.  You can titrate this dose (increase or decrease) based on stool frequency and consistency.    -- Recommend soluble fiber supplementation on a daily basis (Metamucil, citrucel or benefiber).  Start with 1 tablespoon per day and if tolerated, may increase up to 2-3 tablespoons per day.  You may experience some bloating with initiation of fiber supplementation that will improve over the first month.  A good fiber trial to evaluate the effect is 3-6 months.    Return to GI Clinic in 3 months to review your progress.    ______________________________________________________________________    Who do I call with any questions after my visit?  Please be in touch if there are any further questions that arise following today's visit.  There are multiple ways to contact your gastroenterology care team.        During business hours, you may reach a Gastroenterology nurse at [phone], option 3.       To schedule or reschedule an appointment, please call [phone].       You can always send a secure message through Bosideng.  Bosideng messages are answered by your nurse or doctor typically within 24 hours.  Please allow extra time on weekends and holidays.        For  urgent/emergent questions after business hours, you may reach the on-call GI Fellow by contacting the [facility]  at [phone].     How will I get the results of any tests ordered?    You will receive all of your results.  If you have signed up for EzLikehart, any tests ordered at your visit will be available to you after your physician reviews them.  Typically this takes 1-2 weeks.  If there are urgent results that require a change in your care plan, your physician or nurse will call you to discuss the next steps.      What is Safe Bulkerst?  FOCUS RESEARCH is a secure way for you to access all of your healthcare records from the [facility].  It is a web based computer program, so you can sign on to it from any location.  It also allows you to send secure messages to your care team.  I recommend signing up for FOCUS RESEARCH access if you have not already done so and are comfortable with using a computer.      How to I schedule a follow-up visit?  If you did not schedule a follow-up visit today, please call [phone] to schedule a follow-up office visit.        Sincerely,    Nikita Ross PA-C  [facility]  Division of Gastroenterology                Follow-ups after your visit        Follow-up notes from your care team     Return in about 3 months (around 9/13/2018).      Your next 10 appointments already scheduled     Jun 20, 2018  7:30 AM CDT   (Arrive by 7:15 AM)   [facility] For Women Only with Jia Echeverria PT   [facility] ([facility]  )    [address] [place] [address]   [phone]            Jul 05, 2018  8:10 AM CDT   [facility] For Women Only with Jia Echeverria PT   [facility] ([facility]  )    [address] [address]   [phone]            Jul 11, 2018  8:10 AM CDT   [facility] For Women Only with Jia Echeverria PT   [facility] [place] [facility] ([facility]  )    [address] [address]   [phone]            Aug 14, 2018  8:45 AM CDT   (Arrive by 8:30 AM)   Return Visit with Pedro Mcneal MD   [facility] ([facility])    [address]  [address] [facility] [address] [phone]              Who to contact     Please call your clinic at [phone] to:    Ask questions about your health    Make or cancel appointments    Discuss your medicines    Learn about your test results    Speak to your doctor            Additional Information About Your Visit        MiradaharPrimeStone Information     Charlie App gives you secure access to your electronic health record. If you see a primary care provider, you can also send messages to your care team and make appointments. If you have questions, please call your primary care clinic.  If you do not have a primary care provider, please call [phone] and they will assist you.      Charlie App is an electronic gateway that provides easy, online access to your medical records. With Charlie App, you can request a clinic appointment, read your test results, renew a prescription or communicate with your care team.     To access your existing account, please contact your [facility] or call [phone] for assistance.        Care EveryWhere ID     This is your Care EveryWhere ID. This could be used by other organizations to access your [place] medical records  [id number]        Your Vitals Were     Pulse Temperature Respirations Height Last Period Pulse Oximetry    64 97.5  F (36.4  C) (Oral) 18 1.702 m (5' 7\") 05/16/2018 (Exact Date) 97%    BMI (Body Mass Index)                   30.95 kg/m2            Blood Pressure from Last 3 Encounters:   06/13/18 98/61   05/31/18 119/58   05/18/18 100/57    Weight from Last 3 Encounters:   06/13/18 89.6 kg (197 lb 9.6 oz)   05/31/18 " 92.3 kg (203 lb 6.4 oz)   05/18/18 90.3 kg (199 lb)              Today, you had the following     No orders found for display       Primary Care Provider Office Phone # Fax #    Sydney Ruiz [phone]       [address] [place] [address]        Equal Access to Services     [facility]: Hadii aad ku hadasho Soomaali, waaxda luqadaha, qaybta kaalmada adeegyada, waxay idiin hayaan adeeg amadoaraisrael lajyotsnan . So [facility] [place] [facility] [phone].    ATENCIÓN: Si habla español, tiene a urban disposición servicios gratuitos de asistencia lingüística. Anyiame al [phone].    We comply with applicable federal civil rights laws and [place] laws. We do not discriminate on the basis of race, color, national origin, age, disability, sex, sexual orientation, or gender identity.            Thank you!     Thank you for choosing [facility]  for your care. Our goal is always to provide you with excellent care. Hearing back from our patients is one way we can continue to improve our services. Please take a few minutes to complete the written survey that you may receive in the mail after your visit with us. Thank you!             Your Updated Medication List - Protect others around you: Learn how to safely use, store and throw away your medicines at www.disposemymeds.org.          This list is accurate as of 6/13/18  7:47 AM.  Always use your most recent med list.                   Brand Name Dispense Instructions for use Diagnosis    Multi-vitamin Tabs tablet      Take 1 tablet by mouth daily

## 2018-06-13 NOTE — PROGRESS NOTES
GI CLINIC VISIT    CC/REFERRING MD:  Referred Self  REASON FOR FOLLOW UP: Constipation, GERD, abdominal pain    ASSESSMENT/PLAN:  38-year-old female with past medical history to include chronic back pain, ADHD, GERD who presents to the GI clinic for follow-up regarding constipation with history of hemorrhoid prolapse.    1. Abdominal pain in the setting of chronic constipation: This has been a chronic problem since patient was a child.  Normal colonoscopy, EGD (other than reactive gastropathy), HIDA scan, gastric emptying study.  Patient has yet to optimize bowel regimen in a consistent fashion.  We will initiate MiraLAX 3 caps full daily and may titrate to stool frequency and consistency in addition to soluble fiber supplementation that may also be titrated to effect starting with a tablespoon per day increasing up to 2-3 tablespoons per day.  Patient was referred to the pelvic floor center in February 2018 and I also would encourage a pelvic floor center evaluation given her long standing constipation.  This may require further interventions such as biofeedback in addition to optimizing her bowel regimen.  Additionally may consider a transit marker study if pelvic floor is unremarkable.  -- Recommend a trial of Miralax.  This is not a stimulant laxative and is safe to take on a daily basis.  Try 3 cap(s) every day.  You can titrate this dose (increase or decrease) based on stool frequency and consistency.  -- Recommend soluble fiber supplementation on a daily basis (Metamucil, citrucel or benefiber).  Start with 1 tablespoon per day and if tolerated, may increase up to 2-3 tablespoons per day.  You may experience some bloating with initiation of fiber supplementation that will improve over the first month.  A good fiber trial to evaluate the effect is 3-6 months.  -- Pelvic floor referral to be completed +/- biofeedback if intervention required.    2. GERD: Patient is currently managing symptoms with lifestyle  modifications which we reviewed today.  Patient has identified triggers to include greasy foods, which she rarely eats.  Additional lifestyle modifications reviewed include minimizing fatty foods, greasy foods, foods with high fructose corn syrup and foods with preservatives, allowing at least 2-3 hours after eating before laying down and weight loss.  --Continue with lifestyle modifications and avoid triggers    3. Colorectal cancer screening: Colonoscopy 2/2018 normal.  FH of colon cancer in grandfather.  Recommend repeat colonoscopy in 5 years.    RTC 3 months    Thank you for this consultation.  It was a pleasure to participate in the care of this patient; please contact us with any further questions.       Nikita Ross PA-C  Division of Gastroenterology, Hepatology and Nutrition  [facility]      HPI  38-year-old female with past medical history to include chronic back pain, ADHD, GERD who presents to the GI clinic for follow-up regarding constipation with history of hemorrhoid prolapse.  This is my first encounter with the patient.  Patient was previously followed by Brooke Ballard.    Patient had originally presented to the GI clinic due to right-sided central abdominal pain.   Pain is intermittent but can be severe.  She has previously attributed this to chronic constipation.  Workup included normal colonoscopy, upper endoscopy only remarkable for antral gastropathy, normal HIDA scan and normal gastric emptying study.  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  She notes that she has used MiraLAX in the past with good success but has not taken this on a regular basis.  She notes that she has previously been on adderall in the past for ADHD and this allowed for daily BMs.  She is no  longer on any stimulant medications for ADHD. Patient also has a history of hemorrhoids with prolapse.  She has undergone hemorrhoidectomy with colorectal here at the [facility].    Current bowel pattern is 1 bowel movement every 3 days and can be firm and hard to pass.  Occasionally she may have bright red blood per rectum from her hemorrhoids.    Additionally she has been evaluated for persistent nausea and GERD.  Her primary triggers for experiencing heartburn symptoms include greasy foods and tomatoes.  She is not on any chronic medications and primarily experiences nausea in the morning.  She continues to breast-feed and has a 2-year-old son.    She takes ibuprofen approximately once per morning for a week straight for joint pain with weather changes and this occurs every couple months.    ROS:    No fevers or chills  No weight loss  No blurry vision, double vision or change in vision  No sore throat  No lymphadenopathy  + headache  No paraesthesias, or weakness in a limb  No shortness of breath or wheezing  No chest pain or pressure  + arthralgias or myalgias (chronic back and joint pain)  + rashes or skin changes (sunburn to shoulders)  No odynophagia or dysphagia  + BRBPR (hemorrhoids)  No melena  No dysuria, frequency or urgency  No hot/cold intolerance or polyria  No anxiety or depression    PROBLEM LIST  Patient Active Problem List    Diagnosis Date Noted     Class 1 obesity due to excess calories without serious comorbidity with body mass index (BMI) of 30.0 to 30.9 in adult 04/24/2018     Priority: Medium     Cervical pain 12/28/2017     Priority: Medium     Bilateral thoracic back pain 12/28/2017     Priority: Medium     Other acne 07/08/2017     Priority: Medium     Benign nevus 07/08/2017     Priority: Medium     History of abnormal cervical Pap smear 09/08/2016     Priority: Medium     Vaginal discharge 03/18/2016     Priority: Medium     Gastrointestinal problem 12/07/2015     Priority:  Medium     Has multiple food allergies where she has trouble breathing after she eats, were testing her for celiac and IBS but never followed through with colonoscopy  NEED TO CONSIDER GI REFERRAL       Joint pain 12/07/2015     Priority: Medium     Dad with scleroderma.  Pt was to follow with rheumatologist to R/O RA  NEED TO CONSIDER RHEUM REFERRAL          L4-L5 disc bulge 12/07/2015     Priority: Medium     Has been using PT to manage symptoms       Attention deficit hyperactivity disorder (ADHD), combined type 12/07/2015     Priority: Medium     Stopped Adderal when she found out she was pregnant       History of Clostridium difficile colitis 10/01/2014     Priority: Medium       PERTINENT PAST MEDICAL HISTORY:  Past Medical History:   Diagnosis Date     Abdominal pain 2011    abnormal histamine problem, multiple food allergies     ADHD (attention deficit hyperactivity disorder)     on aderol     Anxiety and depression     on xanax     Arthritis      Breathing problem 2007    shortness of breath after eating, ?allergies     Chronic nausea      Gastroesophageal reflux disease      Heart murmur     closed by time she was 1     Hx of abnormal cervical Pap smear 2011     IBS (irritable bowel syndrome)     lots of mucus in bowel with occassional bleeding     Joint pain 2015    was to have rheumatologist     Kidney stone on right side 2010     Meniere's disease     hyperacusis     Migraines      Reduced vision      Tinnitus        PREVIOUS SURGERIES:  Past Surgical History:   Procedure Laterality Date     COLONOSCOPY N/A 2/14/2018    Procedure: COMBINED COLONOSCOPY, SINGLE OR MULTIPLE BIOPSY/POLYPECTOMY BY BIOPSY;  colon and egd;  Surgeon: Joan Willson MD;  Location: [facility] OR     ESOPHAGOSCOPY, GASTROSCOPY, DUODENOSCOPY (EGD), COMBINED N/A 2/14/2018    Procedure: COMBINED ESOPHAGOSCOPY, GASTROSCOPY, DUODENOSCOPY (EGD), BIOPSY SINGLE OR MULTIPLE;;  Surgeon: Joan Willson MD;  Location: [facility] OR     NO HISTORY OF SURGERY        ALLERGIES:     Allergies   Allergen Reactions     Food      Cantaloupe, cucumbers, green beans, and peppers.      Lemon Flavor      Mustard Seed      Onion Difficulty breathing and GI Disturbance     Josephine GI Disturbance     Penicillins Hives     Or another medication taken at that time     Vanilla GI Disturbance       PERTINENT MEDICATIONS:    Current Outpatient Prescriptions:      multivitamin, therapeutic with minerals (MULTI-VITAMIN) TABS tablet, Take 1 tablet by mouth daily, Disp: , Rfl:     SOCIAL HISTORY:  Social History     Social History     Marital status:      Spouse name: N/A     Number of children: 1     Years of education: N/A     Occupational History           stylist/hairdresser     Social History Main Topics     Smoking status: Never Smoker     Smokeless tobacco: Never Used     Alcohol use 0.0 oz/week      Comment: outside of pregnancy, social     Drug use: No     Sexual activity: Not Currently     Partners: Male     Birth control/ protection: Abstinence     Other Topics Concern      Service No     Blood Transfusions No     Caffeine Concern No     Occupational Exposure Yes          Hobby Hazards No     Sleep Concern Yes     harder to sleep now since pregnancy     Stress Concern Yes     lots of stress due to work and housing     Weight Concern Yes     has gained 10 lbs in the last month     Special Diet Yes     has many food allergies     Back Care Yes     disc problems, considering cortisone injections     Exercise No     Bike Helmet No     Seat Belt Yes     Self-Exams No     Parent/Sibling W/ Cabg, Mi Or Angioplasty Before 65f 55m? Yes     Social History Narrative       FAMILY HISTORY:  Family History   Problem Relation Age of Onset     Coronary Artery Disease Paternal Grandfather      Colon Cancer Paternal Grandfather      CANCER Paternal Grandfather      Coronary Artery Disease Maternal Grandmother      CEREBROVASCULAR DISEASE Maternal Grandmother      Breast  "Cancer Maternal Grandmother      Other Cancer Maternal Grandmother      stomach, skin     CANCER Maternal Grandmother      MENTAL ILLNESS Mother      bipolar, schizophrenia     Anxiety Disorder Mother      OSTEOPOROSIS Mother      Thyroid Disease Mother      DIABETES Mother      Neurofibromatosis Mother      more likely fibromyalgia     Arthritis Mother      Back Pain Mother      Osteoarthritis Mother      Obesity Mother      Cirrhosis Mother      from fatty liver. with esophageal varices, ..     Gallbladder Disease Mother      cholecystectomy     Hypertension Father      Hyperlipidemia Father      DIABETES Father      Other Cancer Father 67     Neuroendocrine tumor, ? from gall bladder, stage 4  1/2018     Migraines Father      Scleroderma Father      Rheumatoid Arthritis Father      Obesity Father      CANCER Father      Ankylosing Spondylitis Father      Prostate Cancer Maternal Grandfather      CANCER Maternal Grandfather      MENTAL ILLNESS Sister      bipolar     Anxiety Disorder Sister      Asthma Sister      eczema     Neurologic Disorder Sister      Cervical Dystonia, treated with Botox     DIABETES Paternal Grandmother      Kidney Cancer Paternal Grandmother      Scleroderma Paternal Aunt      Ankylosing Spondylitis Paternal Aunt      CANCER Other      CANCER Other      Asthma Sister      MENTAL ILLNESS Sister      Ovarian Cancer Maternal Aunt 68     Ankylosing Spondylitis Paternal Uncle        Past/family/social history reviewed and no changes    PHYSICAL EXAMINATION:  Constitutional: aaox3, cooperative, pleasant, not dyspneic/diaphoretic, no acute distress  Vitals reviewed: BP 98/61 (BP Location: Left arm, Patient Position: Sitting, Cuff Size: Adult Regular)  Pulse 64  Temp 97.5  F (36.4  C) (Oral)  Resp 18  Ht 1.702 m (5' 7\")  Wt 89.6 kg (197 lb 9.6 oz)  LMP 05/16/2018 (Exact Date)  SpO2 97%  BMI 30.95 kg/m2  Wt:   Wt Readings from Last 2 Encounters:   06/13/18 89.6 kg (197 lb 9.6 oz)   05/31/18 " 92.3 kg (203 lb 6.4 oz)      Eyes: Sclera anicteric/injected  Ears/nose/mouth/throat: Normal oropharynx without ulcers or exudate, mucus membranes moist, hearing intact  Neck: supple, thyroid normal size  CV: No edema  Respiratory: Unlabored breathing  Lymph: No axillary, submandibular, supraclavicular or inguinal lymphadenopathy  Abd: Nondistended, +bs, no hepatosplenomegaly, mildly tender to right periumbilical area, no peritoneal signs  Skin: warm, perfused, no jaundice  Psych: Normal affect  MSK: Normal gait      PERTINENT STUDIES:    Orders Only on 05/29/2018   Component Date Value Ref Range Status     Specimen Descrip 05/29/2018 Midstream Urine   Final     N Gonorrhea PCR 05/29/2018 Negative  NEG^Negative Final     Specimen Description 05/29/2018 Midstream Urine   Final     Chlamydia Trachomatis PCR 05/29/2018 Negative  NEG^Negative Final         Answers for HPI/ROS submitted by the patient on 6/13/2018   General Symptoms: Yes  Skin Symptoms: Yes  HENT Symptoms: Yes  EYE SYMPTOMS: No  HEART SYMPTOMS: Yes  LUNG SYMPTOMS: Yes  INTESTINAL SYMPTOMS: Yes  URINARY SYMPTOMS: No  GYNECOLOGIC SYMPTOMS: Yes  BREAST SYMPTOMS: Yes  SKELETAL SYMPTOMS: Yes  BLOOD SYMPTOMS: Yes  NERVOUS SYSTEM SYMPTOMS: Yes  MENTAL HEALTH SYMPTOMS: No  Fever: No  Loss of appetite: No  Weight loss: No  Weight gain: No  Fatigue: Yes  Night sweats: No  Chills: No  Increased stress: Yes  Excessive hunger: No  Excessive thirst: No  Feeling hot or cold when others believe the temperature is normal: No  Loss of height: No  Post-operative complications: No  Surgical site pain: No  Hallucinations: No  Change in or Loss of Energy: Yes  Hyperactivity: No  Confusion: No  Changes in hair: No  Changes in moles/birth marks: Yes  Itching: Yes  Rashes: No  Changes in nails: Yes  Acne: No  Hair in places you don't want it: No  Change in facial hair: No  Warts: No  Non-healing sores: Yes  Scarring: No  Flaking of skin: Yes  Color changes of hands/feet in cold  : No  Sun sensitivity: Yes  Skin thickening: No  Ear pain: Yes  Ear discharge: Yes  Hearing loss: Yes  Tinnitus: Yes  Nosebleeds: No  Congestion: No  Sinus pain: Yes  Trouble swallowing: No   Voice hoarseness: No  Mouth sores: No  Sore throat: No  Tooth pain: No  Gum tenderness: No  Bleeding gums: No  Change in taste: No  Change in sense of smell: No  Dry mouth: No  Hearing aid used: No  Neck lump: No  Cough: No  Sputum or phlegm: No  Coughing up blood: No  Difficulty breating or shortness of breath: No  Snoring: No  Wheezing: No  Difficulty breathing on exertion: No  Nighttime Cough: No  Difficulty breathing when lying flat: Yes  Chest pain or pressure: Yes  Fast or irregular heartbeat: Yes  Pain in legs with walking: Yes  Trouble breathing while lying down: Yes  Fingers or toes appear blue: No  High blood pressure: No  Low blood pressure: No  Fainting: No  Murmurs: No  Pacemaker: No  Varicose veins: No  Edema or swelling: No  Wake up at night with shortness of breath: No  Light-headedness: Yes  Exercise intolerance: Yes  Heart burn or indigestion: Yes  Nausea: Yes  Vomiting: No  Abdominal pain: Yes  Bloating: Yes  Constipation: Yes  Diarrhea: No  Blood in stool: Yes  Black stools: No  Rectal or Anal pain: No  Fecal incontinence: No  Yellowing of skin or eyes: No  Vomit with blood: No  Back pain: Yes  Muscle aches: Yes  Neck pain: Yes  Joint pain: Yes  Bone pain: Yes  Muscle cramps: No  Muscle weakness: Yes  Joint stiffness: Yes  Bone fracture: No  Anemia: No  Swollen glands: Yes  Easy bleeding or bruising: No  Trouble with coordination: No  Dizziness or trouble with balance: No  Fainting or black-out spells: No  Memory loss: No  Headache: Yes  Seizures: No  Speech problems: No  Tingling: Yes  Tremor: No  Weakness: Yes  Difficulty walking: No  Paralysis: No  Numbness: Yes  Bleeding or spotting between periods: No  Heavy or painful periods: No  Irregular periods: No  Vaginal discharge: No  Hot flashes: No  Vaginal  dryness: No  Genital ulcers: No  Reduced libido: Yes  Painful intercourse: Yes  Difficulty with sexual arousal: Yes  Post-menopausal bleeding: No  Discharge: No  Lumps: Yes  Pain: Yes  Nipple retraction: No

## 2018-06-13 NOTE — LETTER
6/13/2018     RE: Priyanka Lundberg  [address] [place] [address]     Dear Colleague,    Thank you for referring your patient, Priyanka Lundberg, to the [facility] GASTROENTEROLOGY AND IBD CLINIC at [facility]. Please see a copy of my visit note below.    GI CLINIC VISIT    CC/REFERRING MD:  Referred Self  REASON FOR FOLLOW UP: Constipation, GERD, abdominal pain    ASSESSMENT/PLAN:  38-year-old female with past medical history to include chronic back pain, ADHD, GERD who presents to the GI clinic for follow-up regarding constipation with history of hemorrhoid prolapse.    1. Abdominal pain in the setting of chronic constipation: This has been a chronic problem since patient was a child.  Normal colonoscopy, EGD (other than reactive gastropathy), HIDA scan, gastric emptying study.  Patient has yet to optimize bowel regimen in a consistent fashion.  We will initiate MiraLAX 3 caps full daily and may titrate to stool frequency and consistency in addition to soluble fiber supplementation that may also be titrated to effect starting with a tablespoon per day increasing up to 2-3 tablespoons per day.  Patient was referred to the pelvic floor center in February 2018 and I also would encourage a pelvic floor center evaluation given her long standing constipation.  This may require further interventions such as biofeedback in addition to optimizing her bowel regimen.  Additionally may consider a transit marker study if pelvic floor is unremarkable.  -- Recommend a trial of Miralax.  This is not a stimulant laxative and is safe to take on a daily basis.  Try 3 cap(s) every day.  You can titrate this dose (increase or decrease) based on stool frequency and consistency.  -- Recommend soluble fiber supplementation on a daily basis (Metamucil, citrucel or benefiber).  Start with 1 tablespoon per day and if tolerated, may increase up to 2-3 tablespoons per day.  You may experience  some bloating with initiation of fiber supplementation that will improve over the first month.  A good fiber trial to evaluate the effect is 3-6 months.  -- Pelvic floor referral to be completed +/- biofeedback if intervention required.    2. GERD: Patient is currently managing symptoms with lifestyle modifications which we reviewed today.  Patient has identified triggers to include greasy foods, which she rarely eats.  Additional lifestyle modifications reviewed include minimizing fatty foods, greasy foods, foods with high fructose corn syrup and foods with preservatives, allowing at least 2-3 hours after eating before laying down and weight loss.  --Continue with lifestyle modifications and avoid triggers    3. Colorectal cancer screening: Colonoscopy 2/2018 normal.  FH of colon cancer in grandfather.  Recommend repeat colonoscopy in 5 years.    RTC 3 months    Thank you for this consultation.  It was a pleasure to participate in the care of this patient; please contact us with any further questions.       Nikita Ross PA-C  Division of Gastroenterology, Hepatology and Nutrition  [facility]      HPI  38-year-old female with past medical history to include chronic back pain, ADHD, GERD who presents to the GI clinic for follow-up regarding constipation with history of hemorrhoid prolapse.  This is my first encounter with the patient.  Patient was previously followed by Brooke Ballard.    Patient had originally presented to the GI clinic due to right-sided central abdominal pain.   Pain is intermittent but can be severe.  She has previously attributed this to chronic constipation.  Workup included normal colonoscopy, upper endoscopy only remarkable for antral gastropathy, normal HIDA scan and normal gastric emptying study.  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  She notes that she has used MiraLAX in the past with good success but has not taken this on a regular basis.  She notes that she has previously been on adderall in the past for ADHD and this allowed for daily BMs.  She is no longer on any stimulant medications for ADHD. Patient also has a history of hemorrhoids with prolapse.  She has undergone hemorrhoidectomy with colorectal here at the [facility].    Current bowel pattern is 1 bowel movement every 3 days and can be firm and hard to pass.  Occasionally she may have bright red blood per rectum from her hemorrhoids.    Additionally she has been evaluated for persistent nausea and GERD.  Her primary triggers for experiencing heartburn symptoms include greasy foods and tomatoes.  She is not on any chronic medications and primarily experiences nausea in the morning.  She continues to breast-feed and has a 2-year-old son.    She takes ibuprofen approximately once per morning for a week straight for joint pain with weather changes and this occurs every couple months.    ROS:    No fevers or chills  No weight loss  No blurry vision, double vision or change in vision  No sore throat  No lymphadenopathy  + headache  No paraesthesias, or weakness in a limb  No shortness of breath or wheezing  No chest pain or pressure  + arthralgias or myalgias (chronic back and joint pain)  + rashes or skin changes (sunburn to shoulders)  No odynophagia or dysphagia  + BRBPR (hemorrhoids)  No melena  No dysuria, frequency or urgency  No hot/cold intolerance or polyria  No anxiety or depression    PROBLEM LIST  Patient Active Problem List    Diagnosis Date Noted     Class 1 obesity due to excess calories without serious comorbidity with body mass index (BMI) of 30.0 to 30.9 in adult 04/24/2018     Priority: Medium     Cervical pain 12/28/2017     Priority: Medium     Bilateral thoracic back pain  12/28/2017     Priority: Medium     Other acne 07/08/2017     Priority: Medium     Benign nevus 07/08/2017     Priority: Medium     History of abnormal cervical Pap smear 09/08/2016     Priority: Medium     Vaginal discharge 03/18/2016     Priority: Medium     Gastrointestinal problem 12/07/2015     Priority: Medium     Has multiple food allergies where she has trouble breathing after she eats, were testing her for celiac and IBS but never followed through with colonoscopy  NEED TO CONSIDER GI REFERRAL       Joint pain 12/07/2015     Priority: Medium     Dad with scleroderma.  Pt was to follow with rheumatologist to R/O RA  NEED TO CONSIDER RHEUM REFERRAL          L4-L5 disc bulge 12/07/2015     Priority: Medium     Has been using PT to manage symptoms       Attention deficit hyperactivity disorder (ADHD), combined type 12/07/2015     Priority: Medium     Stopped Adderal when she found out she was pregnant       History of Clostridium difficile colitis 10/01/2014     Priority: Medium       PERTINENT PAST MEDICAL HISTORY:  Past Medical History:   Diagnosis Date     Abdominal pain 2011    abnormal histamine problem, multiple food allergies     ADHD (attention deficit hyperactivity disorder)     on aderol     Anxiety and depression     on xanax     Arthritis      Breathing problem 2007    shortness of breath after eating, ?allergies     Chronic nausea      Gastroesophageal reflux disease      Heart murmur     closed by time she was 1     Hx of abnormal cervical Pap smear 2011     IBS (irritable bowel syndrome)     lots of mucus in bowel with occassional bleeding     Joint pain 2015    was to have rheumatologist     Kidney stone on right side 2010     Meniere's disease     hyperacusis     Migraines      Reduced vision      Tinnitus        PREVIOUS SURGERIES:  Past Surgical History:   Procedure Laterality Date     COLONOSCOPY N/A 2/14/2018    Procedure: COMBINED COLONOSCOPY, SINGLE OR MULTIPLE BIOPSY/POLYPECTOMY BY  BIOPSY;  colon and egd;  Surgeon: Joan Willson MD;  Location: [facility] OR     ESOPHAGOSCOPY, GASTROSCOPY, DUODENOSCOPY (EGD), COMBINED N/A 2/14/2018    Procedure: COMBINED ESOPHAGOSCOPY, GASTROSCOPY, DUODENOSCOPY (EGD), BIOPSY SINGLE OR MULTIPLE;;  Surgeon: Joan Willson MD;  Location: [facility] OR     NO HISTORY OF SURGERY       ALLERGIES:     Allergies   Allergen Reactions     Food      Cantaloupe, cucumbers, green beans, and peppers.      Lemon Flavor      Mustard Seed      Onion Difficulty breathing and GI Disturbance     [place] GI Disturbance     Penicillins Hives     Or another medication taken at that time     Vanilla GI Disturbance       PERTINENT MEDICATIONS:    Current Outpatient Prescriptions:      multivitamin, therapeutic with minerals (MULTI-VITAMIN) TABS tablet, Take 1 tablet by mouth daily, Disp: , Rfl:     SOCIAL HISTORY:  Social History     Social History     Marital status:      Spouse name: N/A     Number of children: 1     Years of education: N/A     Occupational History           stylist/hairdresser     Social History Main Topics     Smoking status: Never Smoker     Smokeless tobacco: Never Used     Alcohol use 0.0 oz/week      Comment: outside of pregnancy, social     Drug use: No     Sexual activity: Not Currently     Partners: Male     Birth control/ protection: Abstinence     Other Topics Concern      Service No     Blood Transfusions No     Caffeine Concern No     Occupational Exposure Yes          Hobby Hazards No     Sleep Concern Yes     harder to sleep now since pregnancy     Stress Concern Yes     lots of stress due to work and housing     Weight Concern Yes     has gained 10 lbs in the last month     Special Diet Yes     has many food allergies     Back Care Yes     disc problems, considering cortisone injections     Exercise No     Bike Helmet No     Seat Belt Yes     Self-Exams No     Parent/Sibling W/ Cabg, Mi Or Angioplasty Before 65f 55m? Yes     Social History  Narrative       FAMILY HISTORY:  Family History   Problem Relation Age of Onset     Coronary Artery Disease Paternal Grandfather      Colon Cancer Paternal Grandfather      CANCER Paternal Grandfather      Coronary Artery Disease Maternal Grandmother      CEREBROVASCULAR DISEASE Maternal Grandmother      Breast Cancer Maternal Grandmother      Other Cancer Maternal Grandmother      stomach, skin     CANCER Maternal Grandmother      MENTAL ILLNESS Mother      bipolar, schizophrenia     Anxiety Disorder Mother      OSTEOPOROSIS Mother      Thyroid Disease Mother      DIABETES Mother      Neurofibromatosis Mother      more likely fibromyalgia     Arthritis Mother      Back Pain Mother      Osteoarthritis Mother      Obesity Mother      Cirrhosis Mother      from fatty liver. with esophageal varices, ..     Gallbladder Disease Mother      cholecystectomy     Hypertension Father      Hyperlipidemia Father      DIABETES Father      Other Cancer Father 67     Neuroendocrine tumor, ? from gall bladder, stage 4  1/2018     Migraines Father      Scleroderma Father      Rheumatoid Arthritis Father      Obesity Father      CANCER Father      Ankylosing Spondylitis Father      Prostate Cancer Maternal Grandfather      CANCER Maternal Grandfather      MENTAL ILLNESS Sister      bipolar     Anxiety Disorder Sister      Asthma Sister      eczema     Neurologic Disorder Sister      Cervical Dystonia, treated with Botox     DIABETES Paternal Grandmother      Kidney Cancer Paternal Grandmother      Scleroderma Paternal Aunt      Ankylosing Spondylitis Paternal Aunt      CANCER Other      CANCER Other      Asthma Sister      MENTAL ILLNESS Sister      Ovarian Cancer Maternal Aunt 68     Ankylosing Spondylitis Paternal Uncle        Past/family/social history reviewed and no changes    PHYSICAL EXAMINATION:  Constitutional: aaox3, cooperative, pleasant, not dyspneic/diaphoretic, no acute distress  Vitals reviewed: BP 98/61 (BP Location:  "Left arm, Patient Position: Sitting, Cuff Size: Adult Regular)  Pulse 64  Temp 97.5  F (36.4  C) (Oral)  Resp 18  Ht 1.702 m (5' 7\")  Wt 89.6 kg (197 lb 9.6 oz)  LMP 05/16/2018 (Exact Date)  SpO2 97%  BMI 30.95 kg/m2  Wt:   Wt Readings from Last 2 Encounters:   06/13/18 89.6 kg (197 lb 9.6 oz)   05/31/18 92.3 kg (203 lb 6.4 oz)      Eyes: Sclera anicteric/injected  Ears/nose/mouth/throat: Normal oropharynx without ulcers or exudate, mucus membranes moist, hearing intact  Neck: supple, thyroid normal size  CV: No edema  Respiratory: Unlabored breathing  Lymph: No axillary, submandibular, supraclavicular or inguinal lymphadenopathy  Abd: Nondistended, +bs, no hepatosplenomegaly, mildly tender to right periumbilical area, no peritoneal signs  Skin: warm, perfused, no jaundice  Psych: Normal affect  MSK: Normal gait      PERTINENT STUDIES:    Orders Only on 05/29/2018   Component Date Value Ref Range Status     Specimen Descrip 05/29/2018 Midstream Urine   Final     N Gonorrhea PCR 05/29/2018 Negative  NEG^Negative Final     Specimen Description 05/29/2018 Midstream Urine   Final     Chlamydia Trachomatis PCR 05/29/2018 Negative  NEG^Negative Final         Answers for HPI/ROS submitted by the patient on 6/13/2018   General Symptoms: Yes  Skin Symptoms: Yes  HENT Symptoms: Yes  EYE SYMPTOMS: No  HEART SYMPTOMS: Yes  LUNG SYMPTOMS: Yes  INTESTINAL SYMPTOMS: Yes  URINARY SYMPTOMS: No  GYNECOLOGIC SYMPTOMS: Yes  BREAST SYMPTOMS: Yes  SKELETAL SYMPTOMS: Yes  BLOOD SYMPTOMS: Yes  NERVOUS SYSTEM SYMPTOMS: Yes  MENTAL HEALTH SYMPTOMS: No  Fever: No  Loss of appetite: No  Weight loss: No  Weight gain: No  Fatigue: Yes  Night sweats: No  Chills: No  Increased stress: Yes  Excessive hunger: No  Excessive thirst: No  Feeling hot or cold when others believe the temperature is normal: No  Loss of height: No  Post-operative complications: No  Surgical site pain: No  Hallucinations: No  Change in or Loss of Energy: " Yes  Hyperactivity: No  Confusion: No  Changes in hair: No  Changes in moles/birth marks: Yes  Itching: Yes  Rashes: No  Changes in nails: Yes  Acne: No  Hair in places you don't want it: No  Change in facial hair: No  Warts: No  Non-healing sores: Yes  Scarring: No  Flaking of skin: Yes  Color changes of hands/feet in cold : No  Sun sensitivity: Yes  Skin thickening: No  Ear pain: Yes  Ear discharge: Yes  Hearing loss: Yes  Tinnitus: Yes  Nosebleeds: No  Congestion: No  Sinus pain: Yes  Trouble swallowing: No   Voice hoarseness: No  Mouth sores: No  Sore throat: No  Tooth pain: No  Gum tenderness: No  Bleeding gums: No  Change in taste: No  Change in sense of smell: No  Dry mouth: No  Hearing aid used: No  Neck lump: No  Cough: No  Sputum or phlegm: No  Coughing up blood: No  Difficulty breating or shortness of breath: No  Snoring: No  Wheezing: No  Difficulty breathing on exertion: No  Nighttime Cough: No  Difficulty breathing when lying flat: Yes  Chest pain or pressure: Yes  Fast or irregular heartbeat: Yes  Pain in legs with walking: Yes  Trouble breathing while lying down: Yes  Fingers or toes appear blue: No  High blood pressure: No  Low blood pressure: No  Fainting: No  Murmurs: No  Pacemaker: No  Varicose veins: No  Edema or swelling: No  Wake up at night with shortness of breath: No  Light-headedness: Yes  Exercise intolerance: Yes  Heart burn or indigestion: Yes  Nausea: Yes  Vomiting: No  Abdominal pain: Yes  Bloating: Yes  Constipation: Yes  Diarrhea: No  Blood in stool: Yes  Black stools: No  Rectal or Anal pain: No  Fecal incontinence: No  Yellowing of skin or eyes: No  Vomit with blood: No  Back pain: Yes  Muscle aches: Yes  Neck pain: Yes  Joint pain: Yes  Bone pain: Yes  Muscle cramps: No  Muscle weakness: Yes  Joint stiffness: Yes  Bone fracture: No  Anemia: No  Swollen glands: Yes  Easy bleeding or bruising: No  Trouble with coordination: No  Dizziness or trouble with balance: No  Fainting or  black-out spells: No  Memory loss: No  Headache: Yes  Seizures: No  Speech problems: No  Tingling: Yes  Tremor: No  Weakness: Yes  Difficulty walking: No  Paralysis: No  Numbness: Yes  Bleeding or spotting between periods: No  Heavy or painful periods: No  Irregular periods: No  Vaginal discharge: No  Hot flashes: No  Vaginal dryness: No  Genital ulcers: No  Reduced libido: Yes  Painful intercourse: Yes  Difficulty with sexual arousal: Yes  Post-menopausal bleeding: No  Discharge: No  Lumps: Yes  Pain: Yes  Nipple retraction: No    Again, thank you for allowing me to participate in the care of your patient.      Sincerely,    Nikita Ross PA-C

## 2018-06-13 NOTE — PATIENT INSTRUCTIONS
It was a pleasure taking care of you today.  I've included a brief summary of our discussion and care plan from today's visit below.  Please review this information with your primary care provider.  ______________________________________________________________________    My recommendations are summarized as follows:    -- Recommend a trial of Miralax.  This is not a stimulant laxative and is safe to take on a daily basis.  Try 3 cap(s) every day.  You can titrate this dose (increase or decrease) based on stool frequency and consistency.    -- Recommend soluble fiber supplementation on a daily basis (Metamucil, citrucel or benefiber).  Start with 1 tablespoon per day and if tolerated, may increase up to 2-3 tablespoons per day.  You may experience some bloating with initiation of fiber supplementation that will improve over the first month.  A good fiber trial to evaluate the effect is 3-6 months.    [facility]                        Does studies to see whether the nerves and muscles of the rectum and pelvic floor function well for passing bowel movements.  It is located at [address] [place] [address] on [facility] in [place].  If you have not heard from them within two weeks, call GI as below.  ( If you have scheduled with them and need to change the appointment:                        Call  )    Return to GI Clinic in 3 months to review your progress.    ______________________________________________________________________    Who do I call with any questions after my visit?  Please be in touch if there are any further questions that arise following today's visit.  There are multiple ways to contact your gastroenterology care team.        During business hours, you may reach a Gastroenterology nurse at [phone], option [phone].       To schedule or reschedule an appointment, please call [phone].       You can always send a secure message through [employer].  [employer] messages are answered by  your nurse or doctor typically within 24 hours.  Please allow extra time on weekends and holidays.        For urgent/emergent questions after business hours, you may reach the on-call GI Fellow by contacting the [facility]  at [phone].     How will I get the results of any tests ordered?    You will receive all of your results.  If you have signed up for [employer], any tests ordered at your visit will be available to you after your physician reviews them.  Typically this takes 1-2 weeks.  If there are urgent results that require a change in your care plan, your physician or nurse will call you to discuss the next steps.      What is [employer]?  [employer] is a secure way for you to access all of your healthcare records from the [facility].  It is a web based computer program, so you can sign on to it from any location.  It also allows you to send secure messages to your care team.  I recommend signing up for [employer] One access if you have not already done so and are comfortable with using a computer.      How to I schedule a follow-up visit?  If you did not schedule a follow-up visit today, please call [phone] to schedule a follow-up office visit.        Sincerely,    Nikita Ross PA-C  [facility]  Division of Gastroenterology

## 2018-07-03 ENCOUNTER — HEALTH MAINTENANCE LETTER (OUTPATIENT)
Age: 38
End: 2018-07-03

## 2018-07-26 DIAGNOSIS — M25.552 HIP PAIN, LEFT: Primary | ICD-10-CM

## 2018-08-02 DIAGNOSIS — M25.551 HIP PAIN, RIGHT: Primary | ICD-10-CM

## 2018-08-08 ENCOUNTER — THERAPY VISIT (OUTPATIENT)
Dept: PHYSICAL THERAPY | Facility: CLINIC | Age: 38
End: 2018-08-08
Attending: OBSTETRICS & GYNECOLOGY
Payer: COMMERCIAL

## 2018-08-08 DIAGNOSIS — M99.05 SOMATIC DYSFUNCTION OF PELVIC REGION: Primary | ICD-10-CM

## 2018-08-08 DIAGNOSIS — N94.10 DYSPAREUNIA, FEMALE: ICD-10-CM

## 2018-08-08 DIAGNOSIS — N39.46 MIXED INCONTINENCE: ICD-10-CM

## 2018-08-08 PROCEDURE — 97161 PT EVAL LOW COMPLEX 20 MIN: CPT | Mod: GP | Performed by: PHYSICAL THERAPIST

## 2018-08-08 PROCEDURE — 97530 THERAPEUTIC ACTIVITIES: CPT | Mod: GP | Performed by: PHYSICAL THERAPIST

## 2018-08-08 NOTE — PROGRESS NOTES
\[facility] [place] [facility]\""  System  Physical Exam  General   [facility]        Physical Therapy Initial Examination/Evaluation August 8, 2018   Priyanka Lundberg is a 38 year old female referred to physical therapy by Dr. Hien Zhu for treatment of Female Stress Incontinence with Precautions/Restrictions/MD instructions E&T    Therapist Assessment:   Clinical Impression: Pt presents to [facility] with primary complaint of feeling of bulging in pelvic floor, urinary incontinence, and painful intercourse.  Per clinical examination, pt with slightly increased tone in pelvic floor, affecting ability to control pelvic floor muscles. Pt will benefit from skilled physical therapy for stretching and strengthening programas well as education on optimal bladder health as pt reporting that she often holds bladder for extended periods of time. Pt also reports that she often strains to have bowel movement.   Subjective: Patient reports pain with intercourse. She also feels bulging in pelvic area. Pt has had urge incontinence for many years. Back goes out every 2-3 months. Pt tends to hold bladder all day.   DOI/onset: MD order 5/7/2018   Mechanism of injury: unknown; possibly related to pregnancy and childbirth   DOS NA   Related PMH: incontinence, change in bowel/bladder, back pain Previous treatment: currently seeing PT for hip   Imaging: NA   Chief Complaint: pelvic pain, incontinence, painful intercourse   Pain: rest 4 /10, activity 10/10  Described as: burning in pelvic floor Alleviated by: unknown  Exacerbated by: standing in 1 spot, bending, sitting , intercourse Progression of symptoms since initial onset: Staying the same or worsening Time of day when pain is worse: Activity related   Sleeping: Doesn't get up to urinate but does often wake from pain   Social history: 2 year old son,   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 Yes    Do you leak with cough,sneeze, jumping, running?Yes   Any other activities that cause leaking? No   Do you have triggers that make you feel you can't wait to go to the bathroom? Yes   what are they: Getting home  Type of pad and number used per day? 0  When you leak what is the amount? Large    How long can you delay the need to urinate? hours.   How many times do you get up to urinate at night? 0    Can you stop the flow of urine when on the toilet? No  Is the volume of urine passed usually: large. (8sec rule=  250ml with average bladder storing  400-600ml)    Do you strain to pass urine? No  Do you have a slow or hesitant urinary stream? No  Do you have difficulty initiating the urine stream? No  Is urination painful?  No    How many bladder infections have you had in last 12 months? 1    Fluid intake(one glass is 8oz or one cup) 3glasses/day, Occasional coffee caffinated glasses/day  0 alcohol glasses/day.    Bowel habits:  Frequency of bowel movements? 4 times a week  Consistancy of stool? hard  Do you ignore the urge to defecate? No  Do you strain to pass stool? Yes    Pelvic Pain:  Do you have any pelvic pain with intercourse, exams, use of tampons? Yes  Is initial penetration during intercourse painful? Yes  Is deeper penetration painful? Yes  Do you use lubricant?   No       Given birth? Yes Any complications? Yes; 8 hours of pushing  # of vaginal delieveries? 1   # of C-sections? 0   # of episiotomies?  0  Are you sexually  active?Yes  Have you ever been worried for your physical safety? No  Any abdominal or pelvic surgeries? No  Are you having any regular exercise? Minimal  Have you practiced the PF(kegel) exercises for 4 or more weeks? Yes      MUSCLE PERFORMANCE    Baseline PF tone:hyper  PF Tone with cough: hyper  Valsalva: hyper  PF Response quality: moderate  PF Power: Center: 2   Endurance: Maximum contraction in seconds: 7  # of endurance contractions before fatigue: NT  Quick contraction repetitions prior to fatigue: 8 with decreased strenght   Specificity/accessory muscles: Adductors, glutes, abdominals  Prolapse: Possible grade I-II      PALPATION: Slight reproduction of pain with internal muscle examination     Therapeutic Activity (30 min): Today's session consisted of education regarding pelvic floor muscle anatomy, normal bladder function, urge suppression techniques and/or relaxation techniques as indicated, and instruction in how to complete a bladder diary for assessment next visit.  Pt was instructed in the pathoanatomy of the pelvic floor utilizing pelvic model.  We discussed what pelvic floor physical therapy is, components of exam, and typical patient progression.  Pt was told that she was in control of exam progression, and if at any time was uncomfortable and wished to discontinue we could. Education on normal voiding patterns including the importance of regular emptying throughout the day. Educated pt on use of squatty potty and body mechanics to eliminate strain. Also, initiated and practiced breathing techniques to eliminate increased pressure in pelvic floor.         Assessment/Plan:    Patient is a 38 year old female with pelvic complaints.    Patient has the following significant findings with corresponding treatment plan.                Diagnosis 1:  Urinary incontinence  Pain -  hot/cold therapy, manual therapy, self management, education and home program  Decreased ROM/flexibility - manual therapy,  therapeutic exercise and home program  Decreased strength - therapeutic exercise, therapeutic activities and home program  Impaired muscle performance - biofeedback, electric stimulation, neuro re-education and home program  Decreased function - therapeutic activities and home program    Therapy Evaluation Codes:   1) History comprised of:   Personal factors that impact the plan of care:      Age, Past/current experiences and Time since onset of symptoms.    Comorbidity factors that impact the plan of care are:      Bowel/bladder changes, Chest pain, Dizziness, Migraines/headaches, Numbness/tingling, Overweight, Pain at night/rest and Weakness.     Medications impacting care: None.  2) Examination of Body Systems comprised of:   Body structures and functions that impact the plan of care:      Lumbar spine and Pelvis.   Activity limitations that impact the plan of care are:      Sitting, Squatting/kneeling, Standing, Walking, Sleeping, Excel, Stress incontinence, Urgency, Urge incontinence and Urinary incontinence.  3) Clinical presentation characteristics are:   Stable/Uncomplicated.  4) Decision-Making    Low complexity using standardized patient assessment instrument and/or measureable assessment of functional outcome.  Cumulative Therapy Evaluation is: Low complexity.    Previous and current functional limitations:  (See Goal Flow Sheet for this information)    Short term and Long term goals: (See Goal Flow Sheet for this information)     Communication ability:  Patient appears to be able to clearly communicate and understand verbal and written communication and follow directions correctly.  Treatment Explanation - The following has been discussed with the patient:   RX ordered/plan of care  Anticipated outcomes  Possible risks and side effects  This patient would benefit from PT intervention to resume normal activities.   Rehab potential is good.    Frequency:  1 X week, once daily  Duration:  for 6  weeks  Discharge Plan:  Achieve all LTG.  Independent in home treatment program.  Reach maximal therapeutic benefit.    Please refer to the daily flowsheet for treatment today, total treatment time and time spent performing 1:1 timed codes.

## 2018-08-08 NOTE — MR AVS SNAPSHOT
After Visit Summary   8/8/2018    Priyanka Lundberg    MRN: [id number]           Patient Information     Date Of Birth          1980        Visit Information        Provider Department      8/8/2018 7:30 AM Jia Echeverria PT [facility] Physical Therapy        Today's Diagnoses     Somatic dysfunction of pelvic region    -  1    Mixed incontinence        Dyspareunia, female           Follow-ups after your visit        Your next 10 appointments already scheduled     Aug 09, 2018  8:40 AM CDT   (Arrive by 8:25 AM)   [facility] with Jodie Oliver OD   [facility]    [address] [place] [id number] [phone]            Aug 14, 2018  8:45 AM CDT   (Arrive by 8:30 AM)   Return Weight Management Visit with Pedro Mcneal MD   [facility] Medical [facility] Management ([facility])    [address] [place] [id number] [phone]            Aug 16, 2018  7:30 AM CDT   [facility] For Women Only with Jia Echeverria PT   [facility] ([facility]  )    [address] [place] [address] [phone]            Aug 22, 2018  7:30 AM CDT   [facility] For Women Only with Jia Echeverria PT   [facility] Physical Therapy ([facility]  )    [address] [place] [address] [phone]              Who to contact     If you have questions or need follow up information about today's clinic visit or your schedule please contact [facility] PHYSICAL THERAPY directly at [phone].  Normal or non-critical lab and imaging results will be communicated to you by Jose Mariahart, letter or phone within 4 business days after the clinic has received the results. If you do not hear from us within 7 days,  please contact the clinic through [employer] or phone. If you have a critical or abnormal lab result, we will notify you by phone as soon as possible.  Submit refill requests through [employer] or call your pharmacy and they will forward the refill request to us. Please allow 3 business days for your refill to be completed.          Additional Information About Your Visit        [employer] Information     [employer] gives you secure access to your electronic health record. If you see a primary care provider, you can also send messages to your care team and make appointments. If you have questions, please call your primary care clinic.  If you do not have a primary care provider, please call [phone] and they will assist you.        Care EveryWhere ID     This is your Care EveryWhere ID. This could be used by other organizations to access your [facility] medical records  [id number]         Blood Pressure from Last 3 Encounters:   06/13/18 98/61   05/31/18 119/58   05/18/18 100/57    Weight from Last 3 Encounters:   06/13/18 89.6 kg (197 lb 9.6 oz)   05/31/18 92.3 kg (203 lb 6.4 oz)   05/18/18 90.3 kg (199 lb)              We Performed the Following     HC PT EVAL, LOW COMPLEXITY     [facility] INITIAL EVAL REPORT     THERAPEUTIC ACTIVITIES        Primary Care Provider Office Phone # Fax #    Sydney Ruiz [phone] [address] [place] [id number]        Equal Access to Services     CHARISSE KERNS : Hadii aad ku hadasho Soangelali, waaxda luqadaha, qaybta kaalmada adecharlayada, cecy lopez . So [facility] [phone].    ATENCIÓN: Si habla español, tiene a urban disposición servicios gratuitos de asistencia lingüística. Trey al [phone].    We comply with applicable federal civil rights laws and [place] laws. We do not discriminate on the basis of race, color, national origin, age, disability, sex, sexual orientation, or gender identity.            Thank you!     Thank  you for choosing [facility]  for your care. Our goal is always to provide you with excellent care. Hearing back from our patients is one way we can continue to improve our services. Please take a few minutes to complete the written survey that you may receive in the mail after your visit with us. Thank you!             Your Updated Medication List - Protect others around you: Learn how to safely use, store and throw away your medicines at www.disposemymeds.org.          This list is accurate as of 8/8/18  4:17 PM.  Always use your most recent med list.                   Brand Name Dispense Instructions for use Diagnosis    Multi-vitamin Tabs tablet      Take 1 tablet by mouth daily

## 2018-08-09 ENCOUNTER — OFFICE VISIT (OUTPATIENT)
Dept: OPHTHALMOLOGY | Facility: CLINIC | Age: 38
End: 2018-08-09
Payer: COMMERCIAL

## 2018-08-09 DIAGNOSIS — H52.32 ANISOMETROPIA AND ANISEIKONIA: Primary | ICD-10-CM

## 2018-08-09 DIAGNOSIS — G43.109 OCULAR MIGRAINE: ICD-10-CM

## 2018-08-09 DIAGNOSIS — H52.31 ANISOMETROPIA AND ANISEIKONIA: Primary | ICD-10-CM

## 2018-08-09 DIAGNOSIS — H52.4 MYOPIA OF BOTH EYES WITH ASTIGMATISM AND PRESBYOPIA: ICD-10-CM

## 2018-08-09 DIAGNOSIS — H52.203 MYOPIA OF BOTH EYES WITH ASTIGMATISM AND PRESBYOPIA: ICD-10-CM

## 2018-08-09 DIAGNOSIS — H52.13 MYOPIA OF BOTH EYES WITH ASTIGMATISM AND PRESBYOPIA: ICD-10-CM

## 2018-08-09 ASSESSMENT — VISUAL ACUITY
OD_CC+: -1
CORRECTION_TYPE: GLASSES
METHOD: SNELLEN - LINEAR
OS_CC: 20/15
OD_CC: J1+
OS_CC: J1+
OD_CC: 20/15

## 2018-08-09 ASSESSMENT — CONF VISUAL FIELD
METHOD: COUNTING FINGERS
OS_NORMAL: 1
OD_NORMAL: 1

## 2018-08-09 ASSESSMENT — REFRACTION_CURRENTRX
OS_AXIS: 180
OD_AXIS: 180
OD_BASECURVE: 7.5
OS_CYLINDER: -3.75
OD_SPHERE: -4.50
OD_SPHERE: -5.25
OS_DIAMETER: 14.4
OS_BRAND: DUETTE TRIAL
OS_SPHERE: -4.50
OS_SPHERE: -5.25
OD_DIAMETER: 14.5
OS_DIAMETER: 14.5
OD_BRAND: BIOFINITY TORIC TRIAL
OD_CYLINDER: -2.25
OS_BASECURVE: 8.4
OS_BASECURVE: 7.6
OD_BRAND: DUETTE TRIAL
OD_BASECURVE: 8.7
OD_DIAMETER: 14.5

## 2018-08-09 ASSESSMENT — REFRACTION_MANIFEST
OS_CYLINDER: +4.50
OS_SPHERE: -9.25
OD_AXIS: 092
OD_CYLINDER: +2.50
OD_ADD: +1.00
OS_ADD: +1.00
OS_AXIS: 087
OD_SPHERE: -7.50

## 2018-08-09 ASSESSMENT — REFRACTION_WEARINGRX
OD_CYLINDER: +2.50
OS_SPHERE: -9.25
OD_SPHERE: -7.50
OS_CYLINDER: +4.50
OD_AXIS: 093
SPECS_TYPE: SVL
OS_AXIS: 087

## 2018-08-09 ASSESSMENT — TONOMETRY
OS_IOP_MMHG: 14
OD_IOP_MMHG: 14
IOP_METHOD: ICARE

## 2018-08-09 ASSESSMENT — SLIT LAMP EXAM - LIDS
COMMENTS: NORMAL
COMMENTS: NORMAL

## 2018-08-09 ASSESSMENT — KERATOMETRY
OD_AXISANGLE_DEGREES: 85
OD_AXISANGLE2_DEGREES: 175
OD_K1POWER_DIOPTERS: 44.25
OS_K1POWER_DIOPTERS: 43.75
METHOD_AUTO_MANUAL: AUTO-K
OS_AXISANGLE_DEGREES: 84
OS_AXISANGLE2_DEGREES: 174
OS_K2POWER_DIOPTERS: 47.50
OD_K2POWER_DIOPTERS: 46.25

## 2018-08-09 ASSESSMENT — EXTERNAL EXAM - RIGHT EYE: OD_EXAM: NORMAL

## 2018-08-09 ASSESSMENT — CUP TO DISC RATIO
OD_RATIO: 0.35
OS_RATIO: 0.35

## 2018-08-09 ASSESSMENT — EXTERNAL EXAM - LEFT EYE: OS_EXAM: NORMAL

## 2018-08-09 NOTE — MR AVS SNAPSHOT
After Visit Summary   8/9/2018    Priyanka Lundberg    MRN: [id number]           Patient Information     Date Of Birth          1980        Visit Information        Provider Department      8/9/2018 8:40 AM Jodie Oliver OD M [facility] Ophthalmology        Today's Diagnoses     Anisometropia and aniseikonia    -  1    Myopia of both eyes with astigmatism and presbyopia        Ocular migraine           Follow-ups after your visit        Your next 10 appointments already scheduled     Aug 14, 2018  8:45 AM CDT   (Arrive by 8:30 AM)   Return Weight Management Visit with MD REDDY Lara [facility] Medical Weight Management ([facility])    [address]  4th [address]  [facility] [address] [phone]            Aug 16, 2018  7:30 AM CDT   [facility] For Women Only with Jia Echeverria PT   [facility] Physical [facility] ([facility]  )    [address] [address] [phone]            Aug 22, 2018  7:30 AM CDT   [facility] For Women Only with Jia Echeverria PT   [facility] Physical Therapy ([facility]  )    [address] [address] [phone]              Who to contact     Please call your clinic at [phone] to:    Ask questions about your health    Make or cancel appointments    Discuss your medicines    Learn about your test results    Speak to your doctor            Additional Information About Your Visit        Magooshhart Information     [employer] gives you secure access to your electronic health record. If you see a primary care provider, you can also send messages to your care team and make appointments. If you have questions, please call your primary care clinic.  If you do not have a primary care provider, please call [phone] and they will assist you.      [employer] is an electronic gateway that provides easy, online access to your  medical records. With [employer], you can request a clinic appointment, read your test results, renew a prescription or communicate with your care team.     To access your existing account, please contact your [facility] or call [phone] for assistance.        Care EveryWhere ID     This is your Care EveryWhere ID. This could be used by other organizations to access your [facility] medical records  [id number]         Blood Pressure from Last 3 Encounters:   06/13/18 98/61   05/31/18 119/58   05/18/18 100/57    Weight from Last 3 Encounters:   06/13/18 89.6 kg (197 lb 9.6 oz)   05/31/18 92.3 kg (203 lb 6.4 oz)   05/18/18 90.3 kg (199 lb)              We Performed the Following     REFRACTION [[id number]]        Primary Care Provider Office Phone # Fax #    Sydney Crista Ruiz [phone]       [address] [place] [address]        Equal Access to Services     CHARISSE KERNS : Hadii aad ku hadasho Soomaali, waaxda luqadaha, qaybta kaalmada adeegyada, waxay idiin haywilbertn vladimir lopez . So [facility] [phone].    ATENCIÓN: Si habla español, tiene a urban disposición servicios gratuitos de asistencia lingüística. [facility] [phone].    We comply with applicable federal civil rights laws and [place] laws. We do not discriminate on the basis of race, color, national origin, age, disability, sex, sexual orientation, or gender identity.            Thank you!     Thank you for choosing [facility] OPHTHALMOLOGY  for your care. Our goal is always to provide you with excellent care. Hearing back from our patients is one way we can continue to improve our services. Please take a few minutes to complete the written survey that you may receive in the mail after your visit with us. Thank you!             Your Updated Medication List - Protect others around you: Learn how to safely use, store and throw away your medicines at www.disposemymeds.org.          This list is  accurate as of 8/9/18 11:20 AM.  Always use your most recent med list.                   Brand Name Dispense Instructions for use Diagnosis    Multi-vitamin Tabs tablet      Take 1 tablet by mouth daily

## 2018-08-09 NOTE — NURSING NOTE
"Chief Complaints and History of Present Illnesses   Patient presents with     COMPREHENSIVE EYE EXAM     HPI    Affected eye(s):  Both   Symptoms:     Blurred vision (Comment: notes she has a high astigmatism)   Difficulty with reading (Comment: patient notes she had difficulty reading phone)   No floaters   No flashes         Do you have eye pain now?:  Yes   Location:  OU   Pain Level:  No Pain (1)   Pain Frequency:  Intermittent   Pain Characteristics:  Aching      Comments:    Patient notes she had a few instances where VA went \"gray\" for about a few seconds, intermittent pain in the LE x 1 year, lasts a few seconds if it is sharp pain, sometimes longer if it is a dull ache, \"feeling residual pain from yesterday, there is pain like a strained muscle in my eyes\"     Violet Rowell August 9, 2018 8:42 AM               "

## 2018-08-09 NOTE — PROGRESS NOTES
A/P  1.) High Myopia/Astigmatism with Aniso OS>OD  -Corrects well in spec Rx, but with significant strain and peripheral distortion  -Tried CL's many years ago but poor vision/comfort  -Spec Rx updated to include bifocal  -Reviewed corrective options with pt - rec trial of soft toric vs hybrid lens. She is interested in trying    2.) Ocular migraine  -Few episodes of visual disturbance in the past year  -Not always associated with HA  -Monitor    RTC 2-3 weeks for CL eval     I have confirmed the patient's CC, HPI and reviewed Past Medical History, Past Surgical History, Social History, Family History, Problem List, Medication List and agree with Tech note.     Jodie Oliver, OD FAAO

## 2018-08-13 ENCOUNTER — OFFICE VISIT (OUTPATIENT)
Dept: FAMILY MEDICINE | Facility: CLINIC | Age: 38
End: 2018-08-13
Payer: COMMERCIAL

## 2018-08-13 VITALS
HEIGHT: 67 IN | WEIGHT: 201.5 LBS | HEART RATE: 74 BPM | OXYGEN SATURATION: 97 % | SYSTOLIC BLOOD PRESSURE: 126 MMHG | DIASTOLIC BLOOD PRESSURE: 77 MMHG | BODY MASS INDEX: 31.63 KG/M2 | TEMPERATURE: 98.2 F

## 2018-08-13 DIAGNOSIS — R07.0 THROAT PAIN: Primary | ICD-10-CM

## 2018-08-13 DIAGNOSIS — R53.83 OTHER FATIGUE: ICD-10-CM

## 2018-08-13 LAB
BUN SERPL-MCNC: 7 MG/DL (ref 5–24)
CALCIUM SERPL-MCNC: 9.2 MG/DL (ref 8.5–10.4)
CHLORIDE SERPLBLD-SCNC: 107 MMOL/L (ref 94–109)
CO2 SERPL-SCNC: 28 MMOL/L (ref 20–32)
CREAT SERPL-MCNC: 0.6 MG/DL (ref 0.6–1.3)
EGFR CALCULATED (BLACK REFERENCE): 142.4
EGFR CALCULATED (NON BLACK REFERENCE): 117.7
FERRITIN SERPL-MCNC: 33 NG/ML (ref 12–150)
GLUCOSE SERPL-MCNC: 101 MG/DL (ref 60–109)
HGB BLD-MCNC: 13.2 G/DL (ref 11.7–15.7)
POTASSIUM SERPL-SCNC: 3.7 MMOL/L (ref 3.4–5.3)
S PYO AG THROAT QL IA.RAPID: NEGATIVE
SODIUM SERPL-SCNC: 140 MMOL/L (ref 133–144)

## 2018-08-13 ASSESSMENT — PAIN SCALES - GENERAL: PAINLEVEL: NO PAIN (0)

## 2018-08-13 NOTE — NURSING NOTE
"38 year old  Chief Complaint   Patient presents with     RECHECK     Pt having issues with her tonsils. Pt says she has white spots on her tonsils and has had extreme fatigue for the last 2 weeks.        Blood pressure 126/77, pulse 74, temperature 98.2  F (36.8  C), temperature source Oral, height 5' 7.01\" (170.2 cm), weight 201 lb 8 oz (91.4 kg), SpO2 97 %, not currently breastfeeding. Body mass index is 31.55 kg/(m^2).  Patient Active Problem List   Diagnosis     Gastrointestinal problem     Joint pain     L4-L5 disc bulge     Attention deficit hyperactivity disorder (ADHD), combined type     Vaginal discharge     History of abnormal cervical Pap smear     Other acne     Benign nevus     History of Clostridium difficile colitis     Cervical pain     Bilateral thoracic back pain     Class 1 obesity due to excess calories without serious comorbidity with body mass index (BMI) of 30.0 to 30.9 in adult     Somatic dysfunction of pelvic region     Mixed incontinence     Dyspareunia, female       Wt Readings from Last 2 Encounters:   08/13/18 201 lb 8 oz (91.4 kg)   06/13/18 197 lb 9.6 oz (89.6 kg)     BP Readings from Last 3 Encounters:   08/13/18 126/77   06/13/18 98/61   05/31/18 119/58         Current Outpatient Prescriptions   Medication     multivitamin, therapeutic with minerals (MULTI-VITAMIN) TABS tablet     No current facility-administered medications for this visit.        Social History   Substance Use Topics     Smoking status: Never Smoker     Smokeless tobacco: Never Used     Alcohol use 0.0 oz/week      Comment: outside of pregnancy, social       Health Maintenance Due   Topic Date Due     LIPID MONITORING Q1 YEAR  08/28/2013     MAMMO Q1 YR  05/01/2018       Lab Results   Component Value Date    PAP NIL 09/08/2016       Chloé Lobo MA  August 13, 2018 2:42 PM    "

## 2018-08-13 NOTE — PATIENT INSTRUCTIONS
1. Pharyngitis - Likely viral with negative Rapid Strep screen  Await culture.  Increase fluids  Also, use Nasal Saline spray (cannister), at least twice daily    Anticipate improvement over the next week    2. Fatigue - Likely due to excessive stress  Check Hemoglobin, Ferritin and electrolytes  (Has had a normal study of thyroid function)    Start Vitamin D at 2000 international units daily and take throughout the winter months.

## 2018-08-13 NOTE — MR AVS SNAPSHOT
After Visit Summary   8/13/2018    Priyanka Lundberg    MRN: [id number]           Patient Information     Date Of Birth          1980        Visit Information        Provider Department      8/13/2018 2:40 PM Ty Diaz MD [facility]        Today's Diagnoses     Throat pain    -  1    Other fatigue          Care Instructions    1. Pharyngitis - Likely viral with negative Rapid Strep screen  Await culture.  Increase fluids  Also, use Nasal Saline spray (cannister), at least twice daily    Anticipate improvement over the next week    2. Fatigue - Likely due to excessive stress  Check Hemoglobin, Ferritin and electrolytes  (Has had a normal study of thyroid function)    Start Vitamin D at 2000 international units daily and take throughout the winter months.           Follow-ups after your visit        Your next 10 appointments already scheduled     Aug 14, 2018  8:45 AM CDT   (Arrive by 8:30 AM)   Return Weight Management Visit with Pedro Mcneal MD   [facility] Medical Weight Management ([facility])    [address] [id number] [phone]            Aug 16, 2018  7:30 AM CDT   LAWRENCE For Women Only with Jia Echeverria PT   [place] [facility] Physical Therapy ([facility]  )    [address] [place] [address] [phone]            Aug 22, 2018  7:30 AM CDT   LAWRENCE For Women Only with Jia Echeverria PT   [place] [facility] Physical Therapy ([facility]  )    [address] [place] [address] [phone]              Who to contact     Please call your clinic at [phone] to:    Ask questions about your health    Make or cancel appointments    Discuss your medicines    Learn about your test results    Speak to your doctor            Additional Information About Your Visit        MyChart Information     Sedicidodicit gives you secure  "access to your electronic health record. If you see a primary care provider, you can also send messages to your care team and make appointments. If you have questions, please call your primary care clinic.  If you do not have a primary care provider, please call [phone] and they will assist you.      R[employer] is an electronic gateway that provides easy, online access to your medical records. With RentJ[employer]y, you can request a clinic appointment, read your test results, renew a prescription or communicate with your care team.     To access your existing account, please contact your [facility] or call [phone] for assistance.        Care EveryWhere ID     This is your Care EveryWhere ID. This could be used by other organizations to access your [place] medical records  [id number]        Your Vitals Were     Pulse Temperature Height Pulse Oximetry BMI (Body Mass Index)       74 98.2  F (36.8  C) (Oral) 5' 7.01\" (170.2 cm) 97% 31.55 kg/m2        Blood Pressure from Last 3 Encounters:   08/13/18 126/77   06/13/18 98/61   05/31/18 119/58    Weight from Last 3 Encounters:   08/13/18 201 lb 8 oz (91.4 kg)   06/13/18 197 lb 9.6 oz (89.6 kg)   05/31/18 203 lb 6.4 oz (92.3 kg)              We Performed the Following     Basic Metabolic Panel ([place])     Ferritin     Hemoglobin     Rapid Strep Screen (Group) ([place])     Throat Culture Aerobic Bacterial        Primary Care Provider Office Phone # Fax #    Sydney Crista Ruiz [phone]       [address] [place] [address]        Equal Access to Services     [facility] SAUMYA : Hadii sarah kwok Somilo, waaxda luqadaha, qaybta josealcecy ramirez. So [facility] [phone].    ATENCIÓN: Si habla español, tiene a urban disposición servicios gratuitos de asistencia lingüística. Llame al [phone].    We comply with applicable federal civil rights laws and [place] " laws. We do not discriminate on the basis of race, color, national origin, age, disability, sex, sexual orientation, or gender identity.            Thank you!     Thank you for choosing [facility]  for your care. Our goal is always to provide you with excellent care. Hearing back from our patients is one way we can continue to improve our services. Please take a few minutes to complete the written survey that you may receive in the mail after your visit with us. Thank you!             Your Updated Medication List - Protect others around you: Learn how to safely use, store and throw away your medicines at www.disposemymeds.org.          This list is accurate as of 8/13/18  3:07 PM.  Always use your most recent med list.                   Brand Name Dispense Instructions for use Diagnosis    Multi-vitamin Tabs tablet      Take 1 tablet by mouth daily

## 2018-08-13 NOTE — PROGRESS NOTES
"REASON FOR VISIT:  Tonsil concern      HISTORY OF PRESENT ILLNESS: Priyanka Lundberg is a 38 year old female who presents for a concern about her tonsils. She has tonsil stones fairly frequently, and usually takes them out without problems. However, she developed a sore throat after removing some tonsil stones last night. This morning, she noticed some whiteness in the area that she had removed the stone. She also reports that she has been feeling very fatigued. She reports some sweats recently, no fever or chills. She saw a throat specialist 5 months ago for her tonsil stones, but did not get any treatment. She believes her voice seems higher than normal. She had an MRI 5 months ago on 3/18/18 and was told she had a sinus infection on the RIGHT side. She recalls having a cough with that. Nonproductive. She did not take antibiotics for this because she is breastfeeding. No netti pot.    She has a history of low potassium in the past, thought to be due to diet. In the low-normal range in 2017.        SOCIAL HISTORY: Her father passed away earlier this year, so she has been very busy taking care of her mother along with her 2 year old son, and working on her house.      MEDICATIONS:  Carefully reviewed.       REVIEW OF SYSTEMS:  POSITIVE for throat pain and fatigue. Otherwise, the ROS is negative except as noted in the HPI.      OBJECTIVE:      EXAM:  Priyanka is a typically healthy 37 yo female.  VITAL SIGNS: /77 (BP Location: Right arm, Patient Position: Chair, Cuff Size: Adult Regular)  Pulse 74  Temp 98.2  F (36.8  C) (Oral)  Ht 5' 7.01\" (170.2 cm)  Wt 201 lb 8 oz (91.4 kg)  SpO2 97%  BMI 31.55 kg/m2  Constitutional: healthy, alert and no distress   Head: Normocephalic. No masses, lesions, tenderness or abnormalities  ENT: TM normal without fluid or infection, and slight redness on back of throat, small white spot on left tonsil  Neck: Supple, no lymphadenopathy.  CV: Regular rate and rhythm, no " murmurs.  Resp: Clear to auscultation bilaterally        LABORATORY DATA: Rapid strep test negative, other labs pending.      ASSESSMENT AND PLAN:   1. Pharyngitis - Likely viral with negative Rapid Strep screen  Await culture.  Increase fluids  Also, use Nasal Saline spray (cannister), at least twice daily    Anticipate improvement over the next week    2. Fatigue - Likely due to excessive stress  Check Hemoglobin, Ferritin and electrolytes  (Has had a normal study of thyroid function)    Start Vitamin D at 2000 international units daily and take throughout the winter months.       Call with any problems or questions.          I, Priya Juares, am serving as a scribe to document services personally performed by Dr. Ty Diaz, based on data collection and the provider's statements to me.     --Ty Diaz MD

## 2018-08-15 LAB
BACTERIA SPEC CULT: NORMAL
SPECIMEN SOURCE: NORMAL

## 2018-08-16 ENCOUNTER — THERAPY VISIT (OUTPATIENT)
Dept: PHYSICAL THERAPY | Facility: CLINIC | Age: 38
End: 2018-08-16
Payer: COMMERCIAL

## 2018-08-16 DIAGNOSIS — N39.46 MIXED INCONTINENCE: ICD-10-CM

## 2018-08-16 DIAGNOSIS — N94.10 DYSPAREUNIA, FEMALE: ICD-10-CM

## 2018-08-16 DIAGNOSIS — M99.05 SOMATIC DYSFUNCTION OF PELVIC REGION: Primary | ICD-10-CM

## 2018-08-16 PROCEDURE — 97112 NEUROMUSCULAR REEDUCATION: CPT | Mod: GP | Performed by: PHYSICAL THERAPIST

## 2018-08-16 PROCEDURE — 97530 THERAPEUTIC ACTIVITIES: CPT | Mod: GP | Performed by: PHYSICAL THERAPIST

## 2018-08-16 NOTE — PROGRESS NOTES
Hip MMT 8/16/2018 Left Right   Hip Flexion 4+/5 4/5   Hip Abduction  4+/5 4/5   Hip Extension 4/5 4/5

## 2018-08-16 NOTE — MR AVS SNAPSHOT
After Visit Summary   8/16/2018    Priyanka Lundberg    MRN: [id number]           Patient Information     Date Of Birth          1980        Visit Information        Provider Department      8/16/2018 7:30 AM Jia Echeverria PT [facility] Physical Therapy        Today's Diagnoses     Somatic dysfunction of pelvic region    -  1    Dyspareunia, female        Mixed incontinence           Follow-ups after your visit        Your next 10 appointments already scheduled     Aug 22, 2018  7:30 AM CDT   LAWRENCE For Women Only with Jia Echeverria PT   [facility]  )    [address] [facility] [address] [phone]              Who to contact     If you have questions or need follow up information about today's clinic visit or your schedule please contact [place] [facility] THERAP[facility] directly at [phone].  Normal or non-critical lab and imaging results will be communicated to you by AutomateIthart, letter or phone within 4 business days after the clinic has received the results. If you do not hear from us within 7 days, please contact the clinic through AwayFindt or phone. If you have a critical or abnormal lab result, we will notify you by phone as soon as possible.  Submit refill requests through Drywave or call your pharmacy and they will forward the refill request to us. Please allow 3 business days for your refill to be completed.          Additional Information About Your Visit        MyChart Information     Drywave gives you secure access to your electronic health record. If you see a primary care provider, you can also send messages to your care team and make appointments. If you have questions, please call your primary care clinic.  If you do not have a primary care provider, please call [phone] and they will assist you.        Care EveryWhere ID      This is your Care EveryWhere ID. This could be used by other organizations to access your [place] medical records  [id number]         Blood Pressure from Last 3 Encounters:   08/13/18 126/77   06/13/18 98/61   05/31/18 119/58    Weight from Last 3 Encounters:   08/13/18 91.4 kg (201 lb 8 oz)   06/13/18 89.6 kg (197 lb 9.6 oz)   05/31/18 92.3 kg (203 lb 6.4 oz)              We Performed the Following     NEUROMUSCULAR RE-EDUCATION     THERAPEUTIC ACTIVITIES        Primary Care Provider Office Phone # Fax #    Sydney Ruiz [phone]       [address] [place] [address]        Equal Access to Services     CHARISSE KERNS : Hadii sarah gauthiero Somilo, waaxda luqadaha, qaybta kaalmada adeegyahollie, cecy lopez . So [facility] [phone].    ATENCIÓN: Si habla español, tiene a urban disposición servicios gratuitos de asistencia lingüística. [facility] [phone].    We comply with applicable federal civil rights laws and [place] laws. We do not discriminate on the basis of race, color, national origin, age, disability, sex, sexual orientation, or gender identity.            Thank you!     Thank you for choosing [facility] PHYSICAL THERAP[facility]  for your care. Our goal is always to provide you with excellent care. Hearing back from our patients is one way we can continue to improve our services. Please take a few minutes to complete the written survey that you may receive in the mail after your visit with us. Thank you!             Your Updated Medication List - Protect others around you: Learn how to safely use, store and throw away your medicines at www.disposemymeds.org.          This list is accurate as of 8/16/18 10:08 AM.  Always use your most recent med list.                   Brand Name Dispense Instructions for use Diagnosis    Multi-vitamin Tabs tablet      Take 1 tablet by mouth daily

## 2018-08-22 ENCOUNTER — THERAPY VISIT (OUTPATIENT)
Dept: PHYSICAL THERAPY | Facility: CLINIC | Age: 38
End: 2018-08-22
Payer: COMMERCIAL

## 2018-08-22 DIAGNOSIS — M99.05 SOMATIC DYSFUNCTION OF PELVIC REGION: ICD-10-CM

## 2018-08-22 DIAGNOSIS — N94.10 DYSPAREUNIA, FEMALE: ICD-10-CM

## 2018-08-22 DIAGNOSIS — N39.46 MIXED INCONTINENCE: ICD-10-CM

## 2018-08-22 PROCEDURE — 97530 THERAPEUTIC ACTIVITIES: CPT | Mod: GP | Performed by: PHYSICAL THERAPIST

## 2018-08-22 PROCEDURE — 97112 NEUROMUSCULAR REEDUCATION: CPT | Mod: GP | Performed by: PHYSICAL THERAPIST

## 2018-08-22 PROCEDURE — 97110 THERAPEUTIC EXERCISES: CPT | Mod: GP | Performed by: PHYSICAL THERAPIST

## 2018-08-23 ENCOUNTER — OFFICE VISIT (OUTPATIENT)
Dept: OPHTHALMOLOGY | Facility: CLINIC | Age: 38
End: 2018-08-23
Payer: COMMERCIAL

## 2018-08-23 DIAGNOSIS — H52.203 MYOPIA OF BOTH EYES WITH ASTIGMATISM AND PRESBYOPIA: ICD-10-CM

## 2018-08-23 DIAGNOSIS — H52.32 ANISOMETROPIA AND ANISEIKONIA: Primary | ICD-10-CM

## 2018-08-23 DIAGNOSIS — H52.31 ANISOMETROPIA AND ANISEIKONIA: Primary | ICD-10-CM

## 2018-08-23 DIAGNOSIS — H52.4 MYOPIA OF BOTH EYES WITH ASTIGMATISM AND PRESBYOPIA: ICD-10-CM

## 2018-08-23 DIAGNOSIS — H52.13 MYOPIA OF BOTH EYES WITH ASTIGMATISM AND PRESBYOPIA: ICD-10-CM

## 2018-08-23 ASSESSMENT — REFRACTION_CURRENTRX
OD_BRAND: DUETTE TRIAL
OD_BRAND: BIOFINITY TORIC TRIAL
OD_AXIS: 180
OS_DIAMETER: 14.4
OD_BASECURVE: 7.5
OD_CYLINDER: -2.25
OD_SPHERE: -4.50
OD_DIAMETER: 14.5
OD_DIAMETER: 14.5
OD_BASECURVE: 8.7
OS_SPHERE: -4.50
OS_SPHERE: -5.25
OD_SPHERE: -5.25
OS_BRAND: DUETTE TRIAL
OS_AXIS: 180
OS_DIAMETER: 14.5
OS_BASECURVE: 7.6
OS_BASECURVE: 8.4
OS_CYLINDER: -3.75

## 2018-08-23 ASSESSMENT — VISUAL ACUITY
OD_CC: 20/20
OS_CC: 20/25
CORRECTION_TYPE: GLASSES
METHOD: SNELLEN - LINEAR

## 2018-08-23 ASSESSMENT — SLIT LAMP EXAM - LIDS
COMMENTS: NORMAL
COMMENTS: NORMAL

## 2018-08-23 ASSESSMENT — EXTERNAL EXAM - LEFT EYE: OS_EXAM: NORMAL

## 2018-08-23 ASSESSMENT — EXTERNAL EXAM - RIGHT EYE: OD_EXAM: NORMAL

## 2018-08-23 NOTE — MR AVS SNAPSHOT
After Visit Summary   8/23/2018    Priyanka Lundberg    MRN: [id number]           Patient Information     Date Of Birth          1980        Visit Information        Provider Department      8/23/2018 8:20 AM Jodie Oliver OD M [facility] Ophthalmology        Today's Diagnoses     Anisometropia and aniseikonia    -  1    Myopia of both eyes with astigmatism and presbyopia           Follow-ups after your visit        Your next 10 appointments already scheduled     Sep 13, 2018  8:10 AM CDT   LAWRENCE For Women Only with Jia Echeverria PT   [place] [facility] Physical Therapy ([facility]  )    [address] [facility] [address]   [phone]            Sep 27, 2018  8:10 AM CDT   LAWRENCE For Women Only with Jia Echeverria PT   [facility] Physical Therapy ([facility]  )    [address] [facility] [address]   [phone]            Oct 10, 2018  8:10 AM CDT   [facility] For Women Only with Jia Echeverria PT   [facility] Physical Therapy ([facility]  )    [address] [facility] [address]   [phone]              Who to contact     Please call your clinic at [phone] to:    Ask questions about your health    Make or cancel appointments    Discuss your medicines    Learn about your test results    Speak to your doctor            Additional Information About Your Visit        FastBookingharExtendEvent Information     [employer] gives you secure access to your electronic health record. If you see a primary care provider, you can also send messages to your care team and make appointments. If you have questions, please call your primary care clinic.  If you do not have a primary care provider, please call [phone] and they will assist you.      [employer] is an electronic gateway that provides easy, online access to your medical records. With [employer], you can request a clinic  appointment, read your test results, renew a prescription or communicate with your care team.     To access your existing account, please contact your [facility] or call [phone] for assistance.        Care EveryWhere ID     This is your Care EveryWhere ID. This could be used by other organizations to access your [place] medical records  [id number]         Blood Pressure from Last 3 Encounters:   08/13/18 126/77   06/13/18 98/61   05/31/18 119/58    Weight from Last 3 Encounters:   08/13/18 91.4 kg (201 lb 8 oz)   06/13/18 89.6 kg (197 lb 9.6 oz)   05/31/18 92.3 kg (203 lb 6.4 oz)              Today, you had the following     No orders found for display       Primary Care Provider Office Phone # Fax #    Sydney Ruiz [phone]       [address] [place] [address]        Equal Access to Services     CHARISSE KERNS : Hadii sarah ku hadasho Soomaali, waaxda luqadaha, qaybta kaalmada adeegyada, cecy lopez . So [facility] [phone].    ATENCIÓN: Si keyona randhawa, tiene a urban disposición servicios gratuitos de asistencia lingüística. Llame al [phone].    We comply with applicable federal civil rights laws and [place] laws. We do not discriminate on the basis of race, color, national origin, age, disability, sex, sexual orientation, or gender identity.            Thank you!     Thank you for choosing [facility] OPHTHALMOLOGY  for your care. Our goal is always to provide you with excellent care. Hearing back from our patients is one way we can continue to improve our services. Please take a few minutes to complete the written survey that you may receive in the mail after your visit with us. Thank you!             Your Updated Medication List - Protect others around you: Learn how to safely use, store and throw away your medicines at www.disposemymeds.org.          This list is accurate as of 8/23/18 12:13 PM.  Always use  your most recent med list.                   Brand Name Dispense Instructions for use Diagnosis    Multi-vitamin Tabs tablet      Take 1 tablet by mouth daily

## 2018-08-23 NOTE — PROGRESS NOTES
A/P  1.) High Myopia/Astigmatism with Aniso OS>OD  -Corrects well in spec Rx, but with significant strain and peripheral distortion  -Tried CL's many years ago but poor vision/comfort  -Excellent vision/comfort/fit right eye with Biofinity Toric. Poor fit left eye with Proclear Toric XR, resulting in blurred vision  -No significant improvement with Duette trial today  -Will order left eye in Biofinity Toric XR, mail to pt (she is aware of delay on these lenses).   -CLRx given, okay to order if working well. Otherwise can continue in glasses.    Monitor 1 year routine eye exam

## 2018-09-27 ENCOUNTER — THERAPY VISIT (OUTPATIENT)
Dept: PHYSICAL THERAPY | Facility: CLINIC | Age: 38
End: 2018-09-27
Payer: COMMERCIAL

## 2018-09-27 DIAGNOSIS — N39.46 MIXED INCONTINENCE: ICD-10-CM

## 2018-09-27 DIAGNOSIS — N94.10 DYSPAREUNIA, FEMALE: ICD-10-CM

## 2018-09-27 DIAGNOSIS — M99.05 SOMATIC DYSFUNCTION OF PELVIC REGION: ICD-10-CM

## 2018-09-27 PROCEDURE — 97110 THERAPEUTIC EXERCISES: CPT | Mod: GP | Performed by: PHYSICAL THERAPIST

## 2018-09-27 PROCEDURE — 97112 NEUROMUSCULAR REEDUCATION: CPT | Mod: GP | Performed by: PHYSICAL THERAPIST

## 2018-10-10 ENCOUNTER — THERAPY VISIT (OUTPATIENT)
Dept: PHYSICAL THERAPY | Facility: CLINIC | Age: 38
End: 2018-10-10
Payer: COMMERCIAL

## 2018-10-10 DIAGNOSIS — M99.05 SOMATIC DYSFUNCTION OF PELVIC REGION: ICD-10-CM

## 2018-10-10 DIAGNOSIS — N39.46 MIXED INCONTINENCE: ICD-10-CM

## 2018-10-10 DIAGNOSIS — N94.10 DYSPAREUNIA, FEMALE: ICD-10-CM

## 2018-10-10 PROCEDURE — 97530 THERAPEUTIC ACTIVITIES: CPT | Mod: GP | Performed by: PHYSICAL THERAPIST

## 2018-11-06 ENCOUNTER — OFFICE VISIT (OUTPATIENT)
Dept: FAMILY MEDICINE | Facility: CLINIC | Age: 38
End: 2018-11-06
Payer: COMMERCIAL

## 2018-11-06 ENCOUNTER — NURSE TRIAGE (OUTPATIENT)
Dept: NURSING | Facility: CLINIC | Age: 38
End: 2018-11-06

## 2018-11-06 VITALS
TEMPERATURE: 97.7 F | HEART RATE: 73 BPM | SYSTOLIC BLOOD PRESSURE: 105 MMHG | DIASTOLIC BLOOD PRESSURE: 65 MMHG | OXYGEN SATURATION: 96 % | WEIGHT: 203.75 LBS | BODY MASS INDEX: 31.9 KG/M2

## 2018-11-06 DIAGNOSIS — E66.3 OVERWEIGHT: ICD-10-CM

## 2018-11-06 DIAGNOSIS — K21.00 GASTROESOPHAGEAL REFLUX DISEASE WITH ESOPHAGITIS: Primary | ICD-10-CM

## 2018-11-06 DIAGNOSIS — F41.9 ANXIETY: ICD-10-CM

## 2018-11-06 DIAGNOSIS — Z23 INFLUENZA VACCINE NEEDED: ICD-10-CM

## 2018-11-06 LAB
ALBUMIN SERPL-MCNC: 3.9 G/DL (ref 3.4–5)
ALP SERPL-CCNC: 50 U/L (ref 40–150)
ALT SERPL W P-5'-P-CCNC: 21 U/L (ref 0–50)
ANION GAP SERPL CALCULATED.3IONS-SCNC: 8 MMOL/L (ref 3–14)
AST SERPL W P-5'-P-CCNC: 19 U/L (ref 0–45)
BILIRUB SERPL-MCNC: 0.2 MG/DL (ref 0.2–1.3)
BUN SERPL-MCNC: 9 MG/DL (ref 7–30)
CALCIUM SERPL-MCNC: 8.5 MG/DL (ref 8.5–10.1)
CHLORIDE SERPL-SCNC: 104 MMOL/L (ref 94–109)
CO2 SERPL-SCNC: 28 MMOL/L (ref 20–32)
CREAT SERPL-MCNC: 0.67 MG/DL (ref 0.52–1.04)
ERYTHROCYTE [DISTWIDTH] IN BLOOD BY AUTOMATED COUNT: 12.9 %
GFR SERPL CREATININE-BSD FRML MDRD: >90 ML/MIN/1.7M2
GLUCOSE SERPL-MCNC: 92 MG/DL (ref 70–99)
HBA1C MFR BLD: 5 % (ref 4.1–5.7)
HCT VFR BLD AUTO: 40.8 % (ref 35–47)
HEMOGLOBIN: 13.6 G/DL (ref 11.7–15.7)
MCH RBC QN AUTO: 29.6 PG (ref 26.5–35)
MCHC RBC AUTO-ENTMCNC: 33.3 G/DL (ref 32–36)
MCV RBC AUTO: 88.7 FL (ref 78–100)
PLATELET # BLD AUTO: 251 K/UL (ref 150–450)
POTASSIUM SERPL-SCNC: 4.1 MMOL/L (ref 3.4–5.3)
PROT SERPL-MCNC: 7.3 G/DL (ref 6.8–8.8)
RBC # BLD AUTO: 4.6 M/UL (ref 3.8–5.2)
SODIUM SERPL-SCNC: 141 MMOL/L (ref 133–144)
TSH SERPL DL<=0.005 MIU/L-ACNC: 2.35 MU/L (ref 0.4–4)
WBC # BLD AUTO: 5.5 K/UL (ref 4–11)

## 2018-11-06 ASSESSMENT — PAIN SCALES - GENERAL: PAINLEVEL: NO PAIN (0)

## 2018-11-06 NOTE — PATIENT INSTRUCTIONS
Take omeprazole 2 of the 20mg tablets in the morning on an empty stomach.  Then drop down to 20mg  each morning on empty stomach    Continue taking for 2-3 months then stop

## 2018-11-06 NOTE — MR AVS SNAPSHOT
After Visit Summary   11/6/2018    Priyanka Lundberg    MRN: [id number]           Patient Information     Date Of Birth          1980        Visit Information        Provider Department      11/6/2018 11:20 AM Sydney Ruiz MD [facility]        Today's Diagnoses     Gastroesophageal reflux disease with esophagitis    -  1    Overweight          Care Instructions    Take omeprazole 2 of the 20mg tablets in the morning on an empty stomach.  Then drop down to 20mg  each morning on empty stomach    Continue taking for 2-3 months then stop          Follow-ups after your visit        Your next 10 appointments already scheduled     Nov 07, 2018  8:10 AM CST   LAWRENCE For Women Only with Jia Echeverria PT   [place] [facility] ([facility]  )    [address] [place] [address] [phone]              Who to contact     Please call your clinic at [phone] to:    Ask questions about your health    Make or cancel appointments    Discuss your medicines    Learn about your test results    Speak to your doctor            Additional Information About Your Visit        MyChart Information     [employer] gives you secure access to your electronic health record. If you see a primary care provider, you can also send messages to your care team and make appointments. If you have questions, please call your primary care clinic.  If you do not have a primary care provider, please call [phone] and they will assist you.      [employer] is an electronic gateway that provides easy, online access to your medical records. With [employer], you can request a clinic appointment, read your test results, renew a prescription or communicate with your care team.     To access your existing account, please contact your [facility] or call [phone] for assistance.        Care EveryWhere ID     This is your Care EveryWhere ID.  This could be used by other organizations to access your [place] medical records  [id number]        Your Vitals Were     Pulse Temperature Pulse Oximetry BMI (Body Mass Index)          73 97.7  F (36.5  C) (Oral) 96% 31.9 kg/m2         Blood Pressure from Last 3 Encounters:   11/06/18 105/65   08/13/18 126/77   06/13/18 98/61    Weight from Last 3 Encounters:   11/06/18 203 lb 12 oz (92.4 kg)   08/13/18 201 lb 8 oz (91.4 kg)   06/13/18 197 lb 9.6 oz (89.6 kg)              We Performed the Following     CBC with Plt ([facility])     Comprehensive metabolic panel     Hemoglobin A1c ([facility])     TSH with free T4 reflex          Today's Medication Changes          These changes are accurate as of 11/6/18 12:03 PM.  If you have any questions, ask your nurse or doctor.               Start taking these medicines.        Dose/Directions    omeprazole 20 MG CR capsule   Commonly known as:  priLOSEC   Used for:  Gastroesophageal reflux disease with esophagitis   Started by:  Sydney Ruiz MD        Dose:  20 mg   Take 1 capsule (20 mg) by mouth daily   Quantity:  90 capsule   Refills:  1            Where to get your medicines      [place] [facility] [place] [address], [place] [address]     Phone:  [phone]     omeprazole 20 MG CR capsule                Primary Care Provider Office Phone # Fax #    Sydney Ruiz [phone]       [address] [place] [address]        Equal Access to Services     CHARISSE KERNS : Hadii sarah gauthiero Somilo, waaxda luqadaha, qaybta kaalmada kimberlyn, waxay idiin hayaan adeeg kharash la'aan . So [facility] [phone].    ATENCIÓN: Si habla español, tiene a urban disposición servicios gratuitos de asistencia lingüística. Llame al [phone].    We comply with applicable federal civil rights laws and [place] laws. We do not discriminate on the basis of race, color,  national origin, age, disability, sex, sexual orientation, or gender identity.            Thank you!     Thank you for choosing [facility]  for your care. Our goal is always to provide you with excellent care. Hearing back from our patients is one way we can continue to improve our services. Please take a few minutes to complete the written survey that you may receive in the mail after your visit with us. Thank you!             Your Updated Medication List - Protect others around you: Learn how to safely use, store and throw away your medicines at www.disposemymeds.org.          This list is accurate as of 11/6/18 12:03 PM.  Always use your most recent med list.                   Brand Name Dispense Instructions for use Diagnosis    Multi-vitamin Tabs tablet      Take 1 tablet by mouth daily        omeprazole 20 MG CR capsule    priLOSEC    90 capsule    Take 1 capsule (20 mg) by mouth daily    Gastroesophageal reflux disease with esophagitis

## 2018-11-06 NOTE — TELEPHONE ENCOUNTER
Clinic Action Needed:  [facility]NA Triage Call  Presenting Problem:Pt called . Right  Constant dull pain  Chest pain for last 24 hours , occasional sharp  up to 4/10  On pain scale  For seconds .   Guideline Used:  Patient Recommendations/Teaching:  Routed to:

## 2018-11-06 NOTE — PROGRESS NOTES
"Priyanka Lundberg is a 38 year old female here for the following issues:    Chest pain  Priyanka reports occasional chest pain spanning a 5 yr period of time.  The first time it occurred, she went to the ER with chest pain and numbness in her arms. She had low potassium, likely because she was an alcoholic at that time. She is not drinking now. She also had chest pain while she was pregnant with her son.    The last time she experienced similar chest pain was on 5/19/17 and went to the ER. She described pressure on her neck \"like someone was choking her\", along with pressure in her head. She had a normal EKG, troponin, and chest x-ray, and was discharged. It was recommended she have an outpatient stress test, but she never did. She has been doing PT, but she cannot exercise without causing back pain.    She developed chest pain last night. She described chest tightness and heaviness on the right side of her chest. She also has \"pins and needles\" in her right hand. She is nauseous, which she attributes to her diet. She reports getting exhausted with any physical activity for about 10 years. She is very sedentary and feels she is deconditioned. She \"cannot talk and walk\" at the same time.     Acid reflux  She experienced some acid reflux last night. She has had a \"choking\" feeling occasionally since having her son, but food does not get stuck. She drinks 1 large cup of coffee/day and chocolate throughout the day. She reports that she has several food allergies, so she is regularly bloated and nauseous.    Situational stress  She reports that she is under a lot of stress currently, and has chronic fatigue. She has had panic attacks before. She has been seeing an art therapist for about 4 months. She was treated for situational depression in the past, as well as ADD. She took Cymbalta for her depression, and Xanax for panic attacks in the past. She was on Stratera for her ADD. She reports that the chest pain feels like it " "could be caused by a panic attack, but she does not have any mental panic symptoms.    She still occasionally experiences the pressure around her neck like being choked. The pressure starts in her upper-abdomen and continues up through her neck and to her head.    PHQ9 score = 13, no SI  BIN 7 score = 17      TMJ  She reports that she has TMJ and her jaw occasionally \"clicks\". She also clenches her teeth. Her PT reported that she has a lot of tightness in her neck. She has not seen a dentist for this.    Overweight Body mass index is 31.9 kg/(m^2).  Priyanka has struggled to lose weight since giving birth to her son. She met with a dietician at the [facility] for the first time today to work on her binge eating (no purging). She has also seen a counselor through the [facility]. She has not met with a counselor for binge eating in the past. She has never had anorexia. She was asked about her last A1c at her appointment today. Her last A1c was on 3/28/18, and was 4.8. She was unsure if she needs any other labs.    HCM  She is due for a flu vaccine, accepted.    Patient Active Problem List   Diagnosis     Gastrointestinal problem     Joint pain     L4-L5 disc bulge     Attention deficit hyperactivity disorder (ADHD), combined type     Vaginal discharge     History of abnormal cervical Pap smear     Other acne     Benign nevus     History of Clostridium difficile colitis     Cervical pain     Bilateral thoracic back pain     Class 1 obesity due to excess calories without serious comorbidity with body mass index (BMI) of 30.0 to 30.9 in adult     Somatic dysfunction of pelvic region     Mixed incontinence     Dyspareunia, female       Current Outpatient Prescriptions   Medication Sig Dispense Refill     multivitamin, therapeutic with minerals (MULTI-VITAMIN) TABS tablet Take 1 tablet by mouth daily       omeprazole (PRILOSEC) 20 MG CR capsule Take 1 capsule (20 mg) by mouth daily 90 capsule 1       Allergies "   Allergen Reactions     Food      Cantaloupe, cucumbers, green beans, and peppers.      Lemon Flavor      Mustard Seed      Onion Difficulty breathing and GI Disturbance     [place] GI Disturbance     Penicillins Hives     Or another medication taken at that time     Vanilla GI Disturbance        EXAM  /65 (BP Location: Right arm, Patient Position: Chair, Cuff Size: Adult Regular)  Pulse 73  Temp 97.7  F (36.5  C) (Oral)  Wt 203 lb 12 oz (92.4 kg)  SpO2 96%  BMI 31.9 kg/m2  Gen: Alert, pleasant, somewhat anxious  HEENT:  Conjunctiva nl, TM normal bilaterally, OP clear, no posterior erythema  COR: S1,S2, no murmur  Lungs: CTA bilaterally, no rhonchi, wheezes or rales  Abdomen: soft, midepigastric tenderness, normal bowel sounds, no HSM  Chest wall:  tenderness with palpation over the right anterior chest wall    Assessment:        (K21.0) Gastroesophageal reflux disease with esophagitis  (primary encounter diagnosis)  Comment: suspect chest is due to underlying GERD, gastritits  Plan: omeprazole (PRILOSEC) 20 MG CR capsule, CBC         with Plt ([facility])        Reassured pt, no cardiac workup is planned today. Recommend PPI  Patient Instructions   Take omeprazole 2 of the 20mg tablets in the morning on an empty stomach.  Then drop down to 20mg  each morning on empty stomach    Continue taking for 2-3 months then stop  Notify me for worsening of symptoms, consider EGD in this patient.      (E66.3) Overweight  Comment: she has an eating disorder, bingeing, working with [employer] program  Plan: Hemoglobin A1c ([facility]), Comprehensive         metabolic panel, TSH with free T4 reflex        Continue with therapy, check metabolic panel and A1c today    (Z23) Influenza vaccine needed  Comment: per pt request  Plan: C RIV4 (FLUBLOK) VACCINE RECOMBINANT DNA PRSRV         ANTIBIO FREE, IM        given    (F41.9) Anxiety  Comment: ongoing situational stress. Priyanka's father  and she is now looking after her  mother  Plan: continue with therapy. Consider medication management if symptoms not improving.     Total visit time today 40 minutes.  More than 50% of the time was spent in counseling and coordination of care      Sydney Ruiz MD  Internal Medicine/Pediatrics    I, Priya Juares, am serving as a scribe to document services personally performed by Dr. Sydney Ruiz, based on data collection and the provider's statements to me.

## 2018-11-06 NOTE — NURSING NOTE
38 year old  Chief Complaint   Patient presents with     RECHECK     Needs A1C lab for diabetes.      Referral     Wants referral for a stress test for chest pains.     Chest Pain       Blood pressure 105/65, pulse 73, temperature 97.7  F (36.5  C), temperature source Oral, weight 203 lb 12 oz (92.4 kg), SpO2 96 %, not currently breastfeeding. Body mass index is 31.9 kg/(m^2).  Patient Active Problem List   Diagnosis     Gastrointestinal problem     Joint pain     L4-L5 disc bulge     Attention deficit hyperactivity disorder (ADHD), combined type     Vaginal discharge     History of abnormal cervical Pap smear     Other acne     Benign nevus     History of Clostridium difficile colitis     Cervical pain     Bilateral thoracic back pain     Class 1 obesity due to excess calories without serious comorbidity with body mass index (BMI) of 30.0 to 30.9 in adult     Somatic dysfunction of pelvic region     Mixed incontinence     Dyspareunia, female       Wt Readings from Last 2 Encounters:   11/06/18 203 lb 12 oz (92.4 kg)   08/13/18 201 lb 8 oz (91.4 kg)     BP Readings from Last 3 Encounters:   11/06/18 105/65   08/13/18 126/77   06/13/18 98/61         Current Outpatient Prescriptions   Medication     multivitamin, therapeutic with minerals (MULTI-VITAMIN) TABS tablet     No current facility-administered medications for this visit.        Social History   Substance Use Topics     Smoking status: Never Smoker     Smokeless tobacco: Never Used     Alcohol use 0.0 oz/week      Comment: outside of pregnancy, social       Health Maintenance Due   Topic Date Due     LIPID MONITORING Q1 YEAR  08/28/2013     MAMMO Q1 YR  05/01/2018     INFLUENZA VACCINE (1) 09/01/2018       Lab Results   Component Value Date    PAP NIL 09/08/2016       hCloé Lobo MA  November 6, 2018 11:34 AM    Injectable Influenza Immunization Documentation    1.  Has the patient received the information for the injectable influenza vaccine? YES    "  2. Is the patient 6 months of age or older? YES     3. Does the patient have any of the following contraindications?         Severe allergy to eggs? No     Severe allergic reaction to previous influenza vaccines? No   Severe allergy to latex? No       History of Guillain-Conroe syndrome? No     Currently have a temperature greater than 100.4F? No        4.  Severely egg allergic patients should have flu vaccine eligibility assessed by an MD, RN, or pharmacist, and those who received flu vaccine should be observed for 15 min by an MD, RN, Pharmacist, Medical Technician, or member of clinic staff.\": YES    5. Latex-allergic patients should be given latex-free influenza vaccine Yes. Please reference the Vaccine latex table to determine if your clinic s product is latex-containing.       Vaccination given by Chloé Lobo CMA          "

## 2018-11-06 NOTE — TELEPHONE ENCOUNTER
[facility] Triage Call= unable to erase note below but copied information in the complete note.   Presenting Problem: Conferenced from [facility] to [facility] .  Pt called to report having constant dull chest pain for the last 24 hours , with occasional sharp pain up to 4/10 on pain scale for seconds of duration at a time. .=  Has appt pingle @ 1120am today  , is  Under a lot of stress .  Seen 5/2017 for this pain in ED  and advised to have a stress test but Pt deferred stress test.    Guideline Used:chest pain   Patient Recommendations/Teaching: call 911 and consider ASA but Pt declines and will keep appt only .    Pt Verbalizes understanding and denies further questions .  Jolly Babin RN  - [facility] Nurse Advisor        Reason for Disposition    [1] Chest pain lasts > 5 minutes AND [2] described as crushing, pressure-like, or heavy    Additional Information    Negative: Severe difficulty breathing (e.g., struggling for each breath, speaks in single words)    Negative: Difficult to awaken or acting confused (e.g., disoriented, slurred speech)    Negative: Shock suspected (e.g., cold/pale/clammy skin, too weak to stand, low BP, rapid pulse)    Negative: [1] Chest pain lasts > 5 minutes AND [2] history of heart disease  (i.e., heart attack, bypass surgery, angina, angioplasty, CHF; not just a heart murmur)    Protocols used: CHEST PAIN-ADULT-

## 2018-11-09 PROBLEM — K21.00 GASTROESOPHAGEAL REFLUX DISEASE WITH ESOPHAGITIS: Status: ACTIVE | Noted: 2018-11-09

## 2018-11-09 PROBLEM — F41.9 ANXIETY: Status: ACTIVE | Noted: 2018-11-09

## 2018-11-23 ASSESSMENT — ANXIETY QUESTIONNAIRES
3. WORRYING TOO MUCH ABOUT DIFFERENT THINGS: MORE THAN HALF THE DAYS
1. FEELING NERVOUS, ANXIOUS, OR ON EDGE: NEARLY EVERY DAY
GAD7 TOTAL SCORE: 17
2. NOT BEING ABLE TO STOP OR CONTROL WORRYING: MORE THAN HALF THE DAYS
6. BECOMING EASILY ANNOYED OR IRRITABLE: NEARLY EVERY DAY
5. BEING SO RESTLESS THAT IT IS HARD TO SIT STILL: SEVERAL DAYS
IF YOU CHECKED OFF ANY PROBLEMS ON THIS QUESTIONNAIRE, HOW DIFFICULT HAVE THESE PROBLEMS MADE IT FOR YOU TO DO YOUR WORK, TAKE CARE OF THINGS AT HOME, OR GET ALONG WITH OTHER PEOPLE: VERY DIFFICULT
7. FEELING AFRAID AS IF SOMETHING AWFUL MIGHT HAPPEN: NEARLY EVERY DAY

## 2018-11-23 ASSESSMENT — PATIENT HEALTH QUESTIONNAIRE - PHQ9
SUM OF ALL RESPONSES TO PHQ QUESTIONS 1-9: 13
5. POOR APPETITE OR OVEREATING: NEARLY EVERY DAY

## 2018-11-24 ASSESSMENT — ANXIETY QUESTIONNAIRES: GAD7 TOTAL SCORE: 17

## 2018-12-12 ENCOUNTER — THERAPY VISIT (OUTPATIENT)
Dept: PHYSICAL THERAPY | Facility: CLINIC | Age: 38
End: 2018-12-12
Payer: COMMERCIAL

## 2018-12-12 DIAGNOSIS — M99.05 SOMATIC DYSFUNCTION OF PELVIC REGION: ICD-10-CM

## 2018-12-12 DIAGNOSIS — N94.10 DYSPAREUNIA, FEMALE: ICD-10-CM

## 2018-12-12 DIAGNOSIS — N39.46 MIXED INCONTINENCE: ICD-10-CM

## 2018-12-12 PROCEDURE — 97112 NEUROMUSCULAR REEDUCATION: CPT | Mod: GP | Performed by: PHYSICAL THERAPIST

## 2018-12-12 PROCEDURE — 97530 THERAPEUTIC ACTIVITIES: CPT | Mod: GP | Performed by: PHYSICAL THERAPIST

## 2018-12-12 NOTE — PROGRESS NOTES
[facility]  [facility]  Physical Exam  General   ROS     PROGRESS  REPORT    Progress reporting period is from 8/8/2018 to 12/12/2018.       SUBJECTIVE  Subjective: Pt reports that she has had incontinence with coughing/sneezing, which she didn't know she had before. Pt is working on going through her parent's estate so life is busy and stressful right now and she is not able to put the time into her home program, despite wanting to improve symtpoms. She has tried to limit coffee, which has helped urgency. She is also trying to focus on relaxing and she also feels that this is beneficial for urinary urgency.  She is getting to core and back exercises very intermittently.     Current pain level is: not asked this session   Initial Pain level: 10/10.   Changes in function:  Minimal change in function   Adverse reaction to treatment or activity: None    OBJECTIVE  Changes noted in objective findings:  Minimal change in objective findings  Objective: Reviewed goals and POC. Discussed that pt would continue to benefit from PT but needs to be able to dedicate time to home program. Pt verbalized understanding. Reviewed relaxation techniques, bladder irritants, and HEP. Plan to touch base with PT In ~ 1 month.      ASSESSMENT/PLAN  Updated problem list and treatment plan: Diagnosis 1:  Stress Incontinence  Pain -  manual therapy and education  Decreased ROM/flexibility - manual therapy, therapeutic exercise and home program  Impaired muscle performance - biofeedback, neuro re-education and home program  Decreased function - therapeutic activities and home program  STG/LTGs have been met or progress has been made towards goals:  Yes (See Goal flow sheet completed today.)  Assessment of Progress: The patient's condition is unchanged.  Self Management Plans:  Patient has been instructed in a home treatment program.  Priyanka continues to require the following intervention to meet STG and LTG's:  Holding on PT until pt has time to commit  to program     Recommendations:  Pt would benefit from pelvic floor physical therapy; however, pt currently does not have time to commit to program. Pt would like to check back with PT in 1 month time period.     Please refer to the daily flowsheet for treatment today, total treatment time and time spent performing 1:1 timed codes.

## 2018-12-21 ENCOUNTER — ANCILLARY PROCEDURE (OUTPATIENT)
Dept: MAMMOGRAPHY | Facility: CLINIC | Age: 38
End: 2018-12-21
Attending: INTERNAL MEDICINE
Payer: COMMERCIAL

## 2018-12-21 DIAGNOSIS — R22.32 LUMP OF AXILLA, LEFT: ICD-10-CM

## 2018-12-21 DIAGNOSIS — R22.33 LUMP IN ARMPIT, BILATERAL: ICD-10-CM

## 2019-02-08 ENCOUNTER — OFFICE VISIT (OUTPATIENT)
Dept: FAMILY MEDICINE | Facility: CLINIC | Age: 39
End: 2019-02-08
Payer: COMMERCIAL

## 2019-02-08 VITALS
WEIGHT: 204 LBS | HEIGHT: 67 IN | SYSTOLIC BLOOD PRESSURE: 102 MMHG | OXYGEN SATURATION: 96 % | DIASTOLIC BLOOD PRESSURE: 59 MMHG | TEMPERATURE: 98.1 F | HEART RATE: 72 BPM | BODY MASS INDEX: 32.02 KG/M2

## 2019-02-08 DIAGNOSIS — R00.2 PALPITATIONS: ICD-10-CM

## 2019-02-08 DIAGNOSIS — J35.8 TONSIL STONE: ICD-10-CM

## 2019-02-08 DIAGNOSIS — R49.9 CHANGE IN VOICE: Primary | ICD-10-CM

## 2019-02-08 ASSESSMENT — MIFFLIN-ST. JEOR: SCORE: 1628.09

## 2019-02-08 NOTE — PROGRESS NOTES
Priyanka Lundberg is a 38 year old female here for the following issues:    Throat Concerns  Priyanka has a history of tonsillar stones and recurrent throat irritation from expressing stones with her finger. Today she reports a nodule on her right tonsil that is growing in size, it feels like she constantly has something stuck in the back of her throat.  She notes it has been growing over the past year. Mild postnasal drip. 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 No pain with swallowing.    She saw Dr. Diana Boothe, ENT 3/08/18 for hx of tonsillar stones who recommended she continue self-cleaning.   Tonsillectomy was offered but discouraged.     Snoring  She reports waking herself up because of snoring, she thinks she stops breathing. She does not wake up feeling rested.     Palpitations  She reports episodic palpitations at rest for the past 12 years.   She recently had palpitations twice while driving, she felt dizzy and near syncope. 1 week ago she was dancing and started feeling diaphoretic and near syncope. She needed to sit down and rest. She eats regularly, no skipped meals. She reports it feels much different than anxiety. She is not exercising. She drinks 1 cup coffee per day. 2 alcoholic drinks per week.     Patient Active Problem List   Diagnosis     Gastrointestinal problem     Joint pain     L4-L5 disc bulge     Attention deficit hyperactivity disorder (ADHD), combined type     Vaginal discharge     History of abnormal cervical Pap smear     Other acne     Benign nevus     History of Clostridium difficile colitis     Cervical pain     Bilateral thoracic back pain     Class 1 obesity due to excess calories without serious comorbidity with body mass index (BMI) of 30.0 to 30.9 in adult     Somatic dysfunction of pelvic region     Mixed incontinence     Dyspareunia, female     Gastroesophageal reflux disease with esophagitis  "    Anxiety       Current Outpatient Medications   Medication Sig Dispense Refill     multivitamin, therapeutic with minerals (MULTI-VITAMIN) TABS tablet Take 1 tablet by mouth daily       omeprazole (PRILOSEC) 20 MG CR capsule Take 1 capsule (20 mg) by mouth daily (Patient not taking: Reported on 2/8/2019) 90 capsule 1       Allergies   Allergen Reactions     Food      Cantaloupe, cucumbers, green beans, and peppers.      Lemon Flavor      Mustard Seed      Onion Difficulty breathing and GI Disturbance     [place] GI Disturbance     Penicillins Hives     Or another medication taken at that time     Vanilla GI Disturbance        EXAM  /59   Pulse 72   Temp 98.1  F (36.7  C) (Oral)   Ht 1.702 m (5' 7.01\")   Wt 92.5 kg (204 lb)   SpO2 96%   BMI 31.94 kg/m    Gen: Alert, pleasant, NAD  HEENT:  Conjunctiva nl, TM normal bilaterally, OP clear, no posterior erythema, very small tonsils in the lateral recesses bilaterally. Small right accessory tonsillar tissue on right. Mucosa looks normal. No evidence for stones  Neck: No LAD  COR: S1,S2, no murmur  Lungs: CTA bilaterally, no rhonchi, wheezes or rales    Assessment:  (R49.9) Change in voice  (primary encounter diagnosis)  Comment: noted difficulty with singing voice after straining speaking voice > 1 month ago  Plan: OTOLARYNGOLOGY REFERRAL        Refer to [facility] for evaluation and treatment    (J35.8) Tonsil stone  Comment: history of longstanding , recurrent stones, she is able to extract stones but finds it bothersome  Plan: OTOLARYNGOLOGY REFERRAL        Recommend ENT referral to discuss. Tonsils are not enlarged. Recommend warm salt water gargles after extracting stones as she reports tonsils become irritated after process.     (R00.2) Palpitations  Comment: acute on chronic, 2 episodes this past month at rest, near syncope  Plan: Cardiac Event Monitor Adult Pediatric        Discussed event monitor. Referral is given. Keep log of symptoms. "     Sydney Ruiz MD  Internal Medicine/Pediatrics      I, Era Stratton, am serving as a scribe to document services personally performed by Dr. Sydney Ruiz, based on data collection and the provider's statements to me. Dr. Ruiz has reviewed, edited, and approved the above note.

## 2019-02-08 NOTE — NURSING NOTE
"38 year old  Chief Complaint   Patient presents with     Throat Problem     pt reports noticed a lump in her throat about a year ago and she reports the lump is growing     Breast Problem     pt has some concerns about her mammogram       Blood pressure 102/59, pulse 72, temperature 98.1  F (36.7  C), temperature source Oral, height 1.702 m (5' 7.01\"), weight 92.5 kg (204 lb), SpO2 96 %, not currently breastfeeding. Body mass index is 31.94 kg/m .  Patient Active Problem List   Diagnosis     Gastrointestinal problem     Joint pain     L4-L5 disc bulge     Attention deficit hyperactivity disorder (ADHD), combined type     Vaginal discharge     History of abnormal cervical Pap smear     Other acne     Benign nevus     History of Clostridium difficile colitis     Cervical pain     Bilateral thoracic back pain     Class 1 obesity due to excess calories without serious comorbidity with body mass index (BMI) of 30.0 to 30.9 in adult     Somatic dysfunction of pelvic region     Mixed incontinence     Dyspareunia, female     Gastroesophageal reflux disease with esophagitis     Anxiety       Wt Readings from Last 2 Encounters:   02/08/19 92.5 kg (204 lb)   11/06/18 92.4 kg (203 lb 12 oz)     BP Readings from Last 3 Encounters:   02/08/19 102/59   11/06/18 105/65   08/13/18 126/77         Current Outpatient Medications   Medication     multivitamin, therapeutic with minerals (MULTI-VITAMIN) TABS tablet     omeprazole (PRILOSEC) 20 MG CR capsule     No current facility-administered medications for this visit.        Social History     Tobacco Use     Smoking status: Never Smoker     Smokeless tobacco: Never Used   Substance Use Topics     Alcohol use: Yes     Alcohol/week: 0.0 oz     Comment: outside of pregnancy, social     Drug use: No       Health Maintenance Due   Topic Date Due     LIPID MONITORING Q1 YEAR  08/28/2013       Lab Results   Component Value Date    PAP NIL 09/08/2016 February 8, 2019 8:54 AM  "

## 2019-02-27 NOTE — PROGRESS NOTES
"Priyanka Lundberg is a 38 year old female here for the following issues:    Discuss recent mammogram  Priyanka is a 39 yo woman who is here to discuss breast concerns. Hx of mastitis 2 yrs ago, secondary to breastfeeding. Treated with antibiotics and symptoms resolved. She had a routine mammogram done 12/2018, with US as she noted a left axillary mass. She reports the mass is deep within the axilla. No tenderness, has not enlarged. MGM with breast cancer. She has dense breast tissue, has not felt mass. She would like me to check the left axilla today, concern for a mass.    Mammogram Report 12/21/19  Results for orders placed or performed in visit on 12/21/18   US Axillary Left    Narrative    Exam: Bilateral diagnostic digital mammogram with computer aided  detection, tomosynthesis and left axillary ultrasound, 12/21/2018    Comparison: 5/1/2017    Technique: tomosynthesis.    History: Left axillary lump that has been present for over 1 year, but  patient feels that it is growing in size. 2 second degree relatives  with breast cancer, the youngest diagnosed at age 40. Aunt with  ovarian cancer age 60.     BREAST DENSITY: Scattered fibroglandular densities.    Findings:   Mammogram: Skin marker overlies the left axilla at site of palpable  lump. No suspicious mammographic finding in either breast.     Ultrasound: Targeted left breast ultrasound was performed by  radiologist and technologist. In the area of palpable concern, left  axilla, are normal appearing lymph nodes. No suspicious sonographic  findings.      Impression    IMPRESSION: BI-RADS CATEGORY: 1 -  NEGATIVE.    RECOMMENDED FOLLOW-UP: Annual Mammography and clinical follow up.    TUAN BECK MD     Lower spine concern  Priyanka noted a \"lump\" on her lower back. She had an MRI of her lumbar spine in 2015, noting a hemangioma. She can feel a dime sized thickening that moves easily. Nontender. Unsure if it is growing.    Low back pain  Priyanka reports low back " "problems since age 15. He had been lifting a sack of laundry over her shoulder and threw her back out. She \"collapsed\" and was hospitalized for about a week. She was told she had the back of 58 you patient. Since that time, pain waxes and wanes. She has done numerous rounds of PT. She reports doing home exercises but still tends to have something that triggers her back pain.     Her most recent episode of pain occurred in the past week, just after shampooing a carpet.     SI joint area feels tight, and pain wraps around to both anterior hips. She has some pain that shoots down her posterior thigh, to the level of her ankle. She also has left lateral thigh radiating down to her left knee. Rested using heat. Tried to do stretches but was too painful. No current numbness.     Describes numbness, stabbing over great toe and index finger. Also has intermittent numbness of right hand, 4-5th fingers. Comes and goes, no pain. Stabbing right index finger tip comes and goes.     Patient Active Problem List   Diagnosis     Gastrointestinal problem     Joint pain     L4-L5 disc bulge     Attention deficit hyperactivity disorder (ADHD), combined type     Vaginal discharge     History of abnormal cervical Pap smear     Other acne     Benign nevus     History of Clostridium difficile colitis     Cervical pain     Bilateral thoracic back pain     Class 1 obesity due to excess calories without serious comorbidity with body mass index (BMI) of 30.0 to 30.9 in adult     Somatic dysfunction of pelvic region     Mixed incontinence     Dyspareunia, female     Gastroesophageal reflux disease with esophagitis     Anxiety       Current Outpatient Medications   Medication Sig Dispense Refill     multivitamin, therapeutic with minerals (MULTI-VITAMIN) TABS tablet Take 1 tablet by mouth daily       omeprazole (PRILOSEC) 20 MG CR capsule Take 1 capsule (20 mg) by mouth daily (Patient not taking: Reported on 2/8/2019) 90 capsule 1 " "      Allergies   Allergen Reactions     Food      Cantaloupe, cucumbers, green beans, and peppers.      Lemon Flavor      Mustard Seed      Onion Difficulty breathing and GI Disturbance     Chaves GI Disturbance     Penicillins Hives     Or another medication taken at that time     Vanilla GI Disturbance        EXAM  /58 (BP Location: Right arm, Patient Position: Sitting, Cuff Size: Adult Large)   Pulse 74   Temp 98.1  F (36.7  C) (Oral)   Resp 16   Ht 1.721 m (5' 7.75\")   Wt 94.6 kg (208 lb 8 oz)   LMP 01/29/2019 (Within Days)   SpO2 96%   BMI 31.94 kg/m    Gen: Alert, pleasant, NAD, Overweight  COR: S1,S2, no murmur  Lungs: CTA bilaterally, no rhonchi, wheezes or rales  Breast exam: dense breast tissue without dominant mass, normal palpable left axillary LN  MS: No tenderness over the bony C, T, or LS spinous bodies  Point tenderness over the SI joints bilaterally. Tenderness with palpation over the upper gluteus m bilaterally.    Straight leg raise is negative.   DTR 1/4 at both patella.       Assessment:  (R22.32) Axillary mass, left  (primary encounter diagnosis)  Comment: normal clinical exam  Plan: reassurance, no worrisome findings.     (M54.42,  M54.41,  G89.29) Chronic bilateral low back pain with bilateral sciatica    Comment: acute on chronic pain  Plan: ORTHOPEDICS ADULT REFERRAL, cyclobenzaprine         (FLEXERIL) 10 MG tablet        Refer to orthopedic surgery for evaluation and treatment.     (M54.9,  G89.29) Upper back pain, chronic  Comment: upper back tension  Plan: ORTHOPEDICS ADULT REFERRAL, cyclobenzaprine         (FLEXERIL) 10 MG tablet        Refer to ortho    (M25.551,  M25.552) Hip pain, bilateral  Comment: bilateral pain vs radicular pain from SI joints  Plan: ORTHOPEDICS ADULT REFERRAL, cyclobenzaprine         (FLEXERIL) 10 MG tablet        Trial of flexeril, refer to orthopedics  "

## 2019-02-28 ENCOUNTER — OFFICE VISIT (OUTPATIENT)
Dept: OPHTHALMOLOGY | Facility: CLINIC | Age: 39
End: 2019-02-28
Payer: COMMERCIAL

## 2019-02-28 DIAGNOSIS — H52.31 ANISOMETROPIA AND ANISEIKONIA: Primary | ICD-10-CM

## 2019-02-28 DIAGNOSIS — H52.32 ANISOMETROPIA AND ANISEIKONIA: Primary | ICD-10-CM

## 2019-02-28 ASSESSMENT — REFRACTION_CURRENTRX
OD_AXIS: 180
OS_DIAMETER: 14.5
OS_BRAND: ULTRA ASTIGMATISM
OD_CYLINDER: -1.75
OD_SPHERE: -4.50
OD_SPHERE: -4.50
OS_CYLINDER: -2.75
OS_SPHERE: -4.50
OS_BASECURVE: 8.7
OD_BRAND: ACUVUE OASYS FOR ASTIGMATISM
OS_CYLINDER: -3.75
OD_AXIS: 180
OD_BRAND: BIOFINITY TORIC
OS_SPHERE: -4.50
OD_BASECURVE: 8.6
OS_AXIS: 180
OD_AXIS: 180
OD_BASECURVE: 8.7
OD_CYLINDER: -2.25
OS_BRAND: BIOFINITY TORIC XR
OD_DIAMETER: 14.5
OD_BRAND: ULTRA ASTIGMATISM
OS_BASECURVE: 8.6
OD_CYLINDER: -2.25
OD_SPHERE: -4.50
OS_AXIS: 180
OS_SPHERE: -4.50
OS_AXIS: 180
OS_BRAND: ACUVUE OASYS FOR ASTIGMATISM
OS_DIAMETER: 14.5
OD_DIAMETER: 14.5
OS_CYLINDER: -2.75

## 2019-02-28 ASSESSMENT — KERATOMETRY
OS_AXISANGLE_DEGREES: 083
OS_AXISANGLE2_DEGREES: 173
METHOD_AUTO_MANUAL: AUTO-K
OD_AXISANGLE_DEGREES: 084
OD_K1POWER_DIOPTERS: 44.25
OS_K2POWER_DIOPTERS: 47.00
OD_K2POWER_DIOPTERS: 46.25
OD_AXISANGLE2_DEGREES: 174
OS_K1POWER_DIOPTERS: 43.50

## 2019-02-28 ASSESSMENT — REFRACTION_WEARINGRX
OS_CYLINDER: +4.50
OS_SPHERE: -9.25
OS_ADD: +1.25
OD_CYLINDER: +2.50
OS_AXIS: 087
OD_AXIS: 092
OD_ADD: +1.25
OD_SPHERE: -7.50

## 2019-02-28 ASSESSMENT — VISUAL ACUITY
OS_CC: 20/40
METHOD: SNELLEN - LINEAR
OD_CC: 20/25
CORRECTION_TYPE: CONTACTS

## 2019-02-28 ASSESSMENT — SLIT LAMP EXAM - LIDS
COMMENTS: NORMAL
COMMENTS: NORMAL

## 2019-02-28 ASSESSMENT — EXTERNAL EXAM - LEFT EYE: OS_EXAM: NORMAL

## 2019-02-28 ASSESSMENT — EXTERNAL EXAM - RIGHT EYE: OD_EXAM: NORMAL

## 2019-02-28 NOTE — PROGRESS NOTES
A/P  1.) High Myopia/Astigmatism with Aniso OS>OD  -Corrects well in spec Rx, but with significant strain and peripheral distortion  -Tried CL's many years ago but poor vision/comfort  -Failed Biofinity Toric XR and Proclear Toric XR (fit issues left eye>right eye), AV Oasys Astig today  -Suspect larger than average HVID accounting for rotation of toric soft lenses  -Acceptable starting point with Ultra Astig today, but left eye rotated. Will try LARS correction and see if tolerable    If Ultra Astig does not give stable vision we should consider custom soft toric accounting for her large HVID. She will call with progress    I have confirmed where necessary the patient's CC, HPI and reviewed Past Medical History, Past Surgical History, Social History, Family History, Problem List, Medication List and agree with Tech note.     Jodie Oliver, OD FAAO FSLS

## 2019-02-28 NOTE — PATIENT INSTRUCTIONS
Trial the pair we are sending you home in today. I will mail a new LEFT lens with updated power to compare.    If these work well, let us know and we can send you a copy of the prescription.    If they continue to NOT work well, we should consider a custom soft lens for your eyes. Please contact the clinic so we can get them ordered.

## 2019-03-02 ENCOUNTER — OFFICE VISIT (OUTPATIENT)
Dept: FAMILY MEDICINE | Facility: CLINIC | Age: 39
End: 2019-03-02
Payer: COMMERCIAL

## 2019-03-02 VITALS
DIASTOLIC BLOOD PRESSURE: 58 MMHG | TEMPERATURE: 98.1 F | HEIGHT: 68 IN | RESPIRATION RATE: 16 BRPM | WEIGHT: 208.5 LBS | HEART RATE: 74 BPM | SYSTOLIC BLOOD PRESSURE: 114 MMHG | BODY MASS INDEX: 31.6 KG/M2 | OXYGEN SATURATION: 96 %

## 2019-03-02 DIAGNOSIS — M54.41 CHRONIC BILATERAL LOW BACK PAIN WITH BILATERAL SCIATICA: ICD-10-CM

## 2019-03-02 DIAGNOSIS — G89.29 CHRONIC BILATERAL LOW BACK PAIN WITH BILATERAL SCIATICA: ICD-10-CM

## 2019-03-02 DIAGNOSIS — M54.9 UPPER BACK PAIN, CHRONIC: ICD-10-CM

## 2019-03-02 DIAGNOSIS — G89.29 UPPER BACK PAIN, CHRONIC: ICD-10-CM

## 2019-03-02 DIAGNOSIS — M25.551 HIP PAIN, BILATERAL: ICD-10-CM

## 2019-03-02 DIAGNOSIS — M25.552 HIP PAIN, BILATERAL: ICD-10-CM

## 2019-03-02 DIAGNOSIS — R22.32 AXILLARY MASS, LEFT: Primary | ICD-10-CM

## 2019-03-02 DIAGNOSIS — M54.42 CHRONIC BILATERAL LOW BACK PAIN WITH BILATERAL SCIATICA: ICD-10-CM

## 2019-03-02 RX ORDER — CYCLOBENZAPRINE HCL 10 MG
10 TABLET ORAL 3 TIMES DAILY PRN
Qty: 30 TABLET | Refills: 0 | Status: SHIPPED | OUTPATIENT
Start: 2019-03-02 | End: 2019-03-15

## 2019-03-02 ASSESSMENT — MIFFLIN-ST. JEOR: SCORE: 1660.28

## 2019-03-02 ASSESSMENT — PAIN SCALES - GENERAL: PAINLEVEL: EXTREME PAIN (8)

## 2019-03-02 NOTE — NURSING NOTE
"38 year old  Chief Complaint   Patient presents with     Breast Mass     concern for left lateral breast lump more in the armpit.     Mass     concern for lumps on lower back near the spine.     Back Pain       Blood pressure 114/58, pulse 74, temperature 98.1  F (36.7  C), temperature source Oral, resp. rate 16, height 1.721 m (5' 7.75\"), weight 94.6 kg (208 lb 8 oz), last menstrual period 01/29/2019, SpO2 96 %, not currently breastfeeding. Body mass index is 31.94 kg/m .  Patient Active Problem List   Diagnosis     Gastrointestinal problem     Joint pain     L4-L5 disc bulge     Attention deficit hyperactivity disorder (ADHD), combined type     Vaginal discharge     History of abnormal cervical Pap smear     Other acne     Benign nevus     History of Clostridium difficile colitis     Cervical pain     Bilateral thoracic back pain     Class 1 obesity due to excess calories without serious comorbidity with body mass index (BMI) of 30.0 to 30.9 in adult     Somatic dysfunction of pelvic region     Mixed incontinence     Dyspareunia, female     Gastroesophageal reflux disease with esophagitis     Anxiety       Wt Readings from Last 3 Encounters:   03/02/19 94.6 kg (208 lb 8 oz)   02/08/19 92.5 kg (204 lb)   11/06/18 92.4 kg (203 lb 12 oz)     BP Readings from Last 6 Encounters:   03/02/19 114/58   02/08/19 102/59   11/06/18 105/65   08/13/18 126/77   06/13/18 98/61   05/31/18 119/58       Current Outpatient Medications   Medication     multivitamin, therapeutic with minerals (MULTI-VITAMIN) TABS tablet     omeprazole (PRILOSEC) 20 MG CR capsule     No current facility-administered medications for this visit.        Social History     Tobacco Use     Smoking status: Never Smoker     Smokeless tobacco: Never Used   Substance Use Topics     Alcohol use: Yes     Alcohol/week: 0.0 oz     Comment: outside of pregnancy, social     Drug use: No       Health Maintenance Due   Topic Date Due     PREVENTIVE CARE VISIT  " 1980     LIPID MONITORING Q1 YEAR  08/28/2013       Lab Results   Component Value Date    PAP NIL 09/08/2016       Anabella Price CMA  March 2, 2019 8:06 AM

## 2019-03-06 ENCOUNTER — MYC MEDICAL ADVICE (OUTPATIENT)
Dept: FAMILY MEDICINE | Facility: CLINIC | Age: 39
End: 2019-03-06

## 2019-03-12 NOTE — TELEPHONE ENCOUNTER
FUTURE VISIT INFORMATION      FUTURE VISIT INFORMATION:    Date: 3/15/19    Time: 8AM    [place] [facility] [place] INFORMATION:    Referring provider:  Sydney Ruiz MD    Referring providers clinic:  [facility]    Reason for visit/diagnosis  Tonsil stone     RECORDS REQUESTED FROM:       Clinic name Comments Records Status Imaging Status   [facility] 2/8/19 notes with Dr Sara DENNY    [facility] 3/8/18 notes with Dr Shyann DENNY    [facility] 3/18/18 MR Sinus [facility] PACS

## 2019-03-15 ENCOUNTER — MYC MEDICAL ADVICE (OUTPATIENT)
Dept: NEUROLOGY | Facility: CLINIC | Age: 39
End: 2019-03-15

## 2019-03-15 ENCOUNTER — PRE VISIT (OUTPATIENT)
Dept: OTOLARYNGOLOGY | Facility: CLINIC | Age: 39
End: 2019-03-15

## 2019-03-15 ENCOUNTER — OFFICE VISIT (OUTPATIENT)
Dept: OTOLARYNGOLOGY | Facility: CLINIC | Age: 39
End: 2019-03-15
Attending: INTERNAL MEDICINE
Payer: COMMERCIAL

## 2019-03-15 DIAGNOSIS — M26.609 TMJ (TEMPOROMANDIBULAR JOINT SYNDROME): ICD-10-CM

## 2019-03-15 DIAGNOSIS — J35.8 TONSILLAR MASS: ICD-10-CM

## 2019-03-15 DIAGNOSIS — G43.119 INTRACTABLE MIGRAINE WITH AURA WITHOUT STATUS MIGRAINOSUS: ICD-10-CM

## 2019-03-15 DIAGNOSIS — J32.9 CHRONIC SINUSITIS, UNSPECIFIED LOCATION: Primary | ICD-10-CM

## 2019-03-15 RX ORDER — EUCALYPTUS/PEPPERMINT OIL
SOLUTION, NON-ORAL NASAL
Qty: 10 ML | Refills: 3 | Status: SHIPPED | OUTPATIENT
Start: 2019-03-15 | End: 2020-10-27

## 2019-03-15 ASSESSMENT — PAIN SCALES - GENERAL: PAINLEVEL: MODERATE PAIN (5)

## 2019-03-15 NOTE — LETTER
3/15/2019       RE: Priyanka Lundberg  [address] [place] [address]     Dear Colleague,    Thank you for referring your patient, Priyanka Lundberg, to the [facility] EAR NOSE AND THROAT at [facility]. Please see a copy of my visit note below.    The patient presents with a history of a swelling in the area of the right tonsil at the superior pole. The patient reports that this mass has been slowly enlarging over the past year. The patient reports difficulty with sinusitis, rhinitis, facial pain, nasal obstruction and at times purulent nasal discharge. The patient denies chronic or recurrent tonsillitis, chronic or recurrent pharyngitis. The patient denies otalgia, otorrhea, eustachian tube dysfunction, ear infections, dizziness or tinnitus. She reports difficulty with temporomandibular joint disorder and chronic headaches.     This patient is seen in consultation at the request of Dr. Sydney Ruiz.     All other systems were reviewed and they are either negative or they are not directly pertinent to this Otolaryngology examination.     Past Medical History:    Past Medical History:   Diagnosis Date     Abdominal pain 2011    abnormal histamine problem, multiple food allergies     ADHD (attention deficit hyperactivity disorder)     on aderol     Anxiety and depression     on xanax     Arthritis      Breathing problem 2007    shortness of breath after eating, ?allergies     Chronic nausea      Gastroesophageal reflux disease      Heart murmur     closed by time she was 1     Hx of abnormal cervical Pap smear 2011     IBS (irritable bowel syndrome)     lots of mucus in bowel with occassional bleeding     Joint pain 2015    was to have rheumatologist     Kidney stone on right side 2010     Meniere's disease     hyperacusis     Migraines      Reduced vision      Tinnitus        Past Surgical History:    Past Surgical History:   Procedure Laterality Date     COLONOSCOPY  N/A 2/14/2018    Procedure: COMBINED COLONOSCOPY, SINGLE OR MULTIPLE BIOPSY/POLYPECTOMY BY BIOPSY;  colon and egd;  Surgeon: Joan Willson MD;  [facility]: [facility]     ESOPHAGOSCOPY, GASTROSCOPY, DUODENOSCOPY (EGD), COMBINED N/A 2/14/2018    Procedure: COMBINED ESOPHAGOSCOPY, GASTROSCOPY, DUODENOSCOPY (EGD), BIOPSY SINGLE OR MULTIPLE;;  Surgeon: Joan Willson MD;  Location: [facility] OR     NO HISTORY OF SURGERY         Medications:      Current Outpatient Medications:      multivitamin, therapeutic with minerals (MULTI-VITAMIN) TABS tablet, Take 1 tablet by mouth daily, Disp: , Rfl:     Allergies:    Food; Lemon flavor; Mustard seed; Onion; Peach; Penicillins; and Vanilla    Physical Examination:    The patient is a well developed, well nourished female in no apparent distress.  She is normocephalic, atraumatic with pupils equally round and reactive to light.    Oral Cavity Examination:  1x1 cm cyst in the superior pole of the right tonsil. 2+ tonsils  Nasal Examination: Deviated nasal septum and bilateral turbinates hypertrophy with engorged nasal mucosa with no masses or lesions  Ear Examination: Ear canals clear, tympanic membranes and middle ear spaces normal  Neurological Examination: Facial nerve function intact and symmetric  Integumentary Examination: No lesions on the skin of the head and neck  Neck Examination: No masses or lesions, no lymphadenopathy  Endocrine Examination: Normal thyroid examination  Temporomandibular Joint Examination: Malrotation of the temporomandibular joints bilaterally.     Assessment and Plan:    The patient presents with a history of swelling in the area of the right tonsil at the superior pole, migraine headaches, temporomandibular joint disorder and chronic nasal congestion and rhinitis. She will be referred for a consultation with Dr. Seng Burgos for possible surgical management of her tonsil cyst. She will be referred for a CT scan of the sinuses to assess for chronic sinusitis. She  will be referred for a temporomandibular joint disorder consultation with our [facility] and she will be referred for a neurology consultation for migraine headaches.     Rubens Anderson MD      CC: Dr. Sydney Ruiz

## 2019-03-15 NOTE — NURSING NOTE
Chief Complaint   Patient presents with     Consult     tonsil stones . Unable to take vial signs due to time constraints        Pravin Verduzco LPN

## 2019-03-15 NOTE — PROGRESS NOTES
The patient presents with a history of a swelling in the area of the right tonsil at the superior pole. The patient reports that this mass has been slowly enlarging over the past year. The patient reports difficulty with sinusitis, rhinitis, facial pain, nasal obstruction and at times purulent nasal discharge. The patient denies chronic or recurrent tonsillitis, chronic or recurrent pharyngitis. The patient denies otalgia, otorrhea, eustachian tube dysfunction, ear infections, dizziness or tinnitus. She reports difficulty with temporomandibular joint disorder and chronic headaches.     This patient is seen in consultation at the request of Dr. Sydney Ruiz.     All other systems were reviewed and they are either negative or they are not directly pertinent to this Otolaryngology examination.     Past Medical History:    Past Medical History:   Diagnosis Date     Abdominal pain 2011    abnormal histamine problem, multiple food allergies     ADHD (attention deficit hyperactivity disorder)     on aderol     Anxiety and depression     on xanax     Arthritis      Breathing problem 2007    shortness of breath after eating, ?allergies     Chronic nausea      Gastroesophageal reflux disease      Heart murmur     closed by time she was 1     Hx of abnormal cervical Pap smear 2011     IBS (irritable bowel syndrome)     lots of mucus in bowel with occassional bleeding     Joint pain 2015    was to have rheumatologist     Kidney stone on right side 2010     Meniere's disease     hyperacusis     Migraines      Reduced vision      Tinnitus        Past Surgical History:    Past Surgical History:   Procedure Laterality Date     COLONOSCOPY N/A 2/14/2018    Procedure: COMBINED COLONOSCOPY, SINGLE OR MULTIPLE BIOPSY/POLYPECTOMY BY BIOPSY;  colon and egd;  Surgeon: Joan Willson MD;  Location:  OR     ESOPHAGOSCOPY, GASTROSCOPY, DUODENOSCOPY (EGD), COMBINED N/A 2/14/2018    Procedure: COMBINED ESOPHAGOSCOPY, GASTROSCOPY,  DUODENOSCOPY (EGD), BIOPSY SINGLE OR MULTIPLE;;  Surgeon: Joan Willson MD;  [facility]     NO HISTORY OF SURGERY         Medications:      Current Outpatient Medications:      multivitamin, therapeutic with minerals (MULTI-VITAMIN) TABS tablet, Take 1 tablet by mouth daily, Disp: , Rfl:     Allergies:    Food; Lemon flavor; Mustard seed; Onion; Peach; Penicillins; and Vanilla    Physical Examination:    The patient is a well developed, well nourished female in no apparent distress.  She is normocephalic, atraumatic with pupils equally round and reactive to light.    Oral Cavity Examination:  1x1 cm cyst in the superior pole of the right tonsil. 2+ tonsils  Nasal Examination: Deviated nasal septum and bilateral turbinates hypertrophy with engorged nasal mucosa with no masses or lesions  Ear Examination: Ear canals clear, tympanic membranes and middle ear spaces normal  Neurological Examination: Facial nerve function intact and symmetric  Integumentary Examination: No lesions on the skin of the head and neck  Neck Examination: No masses or lesions, no lymphadenopathy  Endocrine Examination: Normal thyroid examination  Temporomandibular Joint Examination: Malrotation of the temporomandibular joints bilaterally.     Assessment and Plan:    The patient presents with a history of swelling in the area of the right tonsil at the superior pole, migraine headaches, temporomandibular joint disorder and chronic nasal congestion and rhinitis. She will be referred for a consultation with Dr. Seng Burgos for possible surgical management of her tonsil cyst. She will be referred for a CT scan of the sinuses to assess for chronic sinusitis. She will be referred for a temporomandibular joint disorder consultation with our Dental [facility] and she will be referred for a neurology consultation for migraine headaches.     CC: Dr. Sydney Ruiz

## 2019-03-15 NOTE — PATIENT INSTRUCTIONS
1.  You were seen in the ENT Clinic today by Dr. Anderson.  If you have any questions or concerns after your appointment, please call [phone]. Press option #1 for scheduling related needs. Press option #3 for Nurse advice.    2.  Please schedule an appointment for the following:   - CT Scan - Sinuses   - Neurology - Migraine Headaches Evaluation   - Dental - TMJ Evaluation    3.  Plan is to return to clinic to see Dr. Seng Burgos for further evaluation of tonsils.      Florence Hess LPN  [facility] Otolaryngology  [phone]    The patient presents with a history of swelling in the area of the right tonsil at the superior pole, migraine headaches, temporomandibular joint disorder and chronic nasal congestion and rhinitis. She will be referred for a consultation with Dr. Seng Burgos for possible surgical management of her tonsil cyst. She will be referred for a CT scan of the sinuses to assess for chronic sinusitis. She will be referred for a temporomandibular joint disorder consultation with our Dental [facility] and she will be referred for a neurology consultation for migraine headaches.

## 2019-03-16 ENCOUNTER — ANCILLARY PROCEDURE (OUTPATIENT)
Dept: CT IMAGING | Facility: CLINIC | Age: 39
End: 2019-03-16
Attending: OTOLARYNGOLOGY
Payer: COMMERCIAL

## 2019-03-16 DIAGNOSIS — J32.9 CHRONIC SINUSITIS, UNSPECIFIED LOCATION: ICD-10-CM

## 2019-04-01 NOTE — TELEPHONE ENCOUNTER
FUTURE VISIT INFORMATION      FUTURE VISIT INFORMATION:    Date: 4/9/19     Time: 8:30 am    Location: [facility]  REFERRAL INFORMATION:    Referring provider:  Dr. Anderson    Referring providers clinic:  [facility] ENT    Reason for visit/diagnosis  Change in Voice    RECORDS REQUESTED FROM:       Clinic name Comments Records Status Imaging Status   [facility] ENT Office Visit-3/15/19  Dr. Anderson [facility] Office Visit-2/8/19 Dr. Ruiz On license of [facility] ENT Office Vist-3/8/18  Dr. Boothe     [facility] CT Facial Bones-3/16/19  MR Sinus-3/18/18 [facility] PACS   Endoscopy Upper GI 2/14/18 On license of [facility] Gastroenterology Office Visit-2/15/18 SALLIE Yin Epic

## 2019-04-09 ENCOUNTER — OFFICE VISIT (OUTPATIENT)
Dept: OTOLARYNGOLOGY | Facility: CLINIC | Age: 39
End: 2019-04-09
Payer: COMMERCIAL

## 2019-04-09 ENCOUNTER — PRE VISIT (OUTPATIENT)
Dept: OTOLARYNGOLOGY | Facility: CLINIC | Age: 39
End: 2019-04-09

## 2019-04-09 VITALS
WEIGHT: 208 LBS | BODY MASS INDEX: 31.52 KG/M2 | RESPIRATION RATE: 12 BRPM | DIASTOLIC BLOOD PRESSURE: 65 MMHG | SYSTOLIC BLOOD PRESSURE: 120 MMHG | HEIGHT: 68 IN

## 2019-04-09 DIAGNOSIS — R49.0 DYSPHONIA: Primary | ICD-10-CM

## 2019-04-09 DIAGNOSIS — R07.0 THROAT PAIN: ICD-10-CM

## 2019-04-09 ASSESSMENT — MIFFLIN-ST. JEOR: SCORE: 1658.01

## 2019-04-09 NOTE — PROGRESS NOTES
Outpatient Speech Language Therapy Evaluation  PLAN OF TREATMENT FOR OUTPATIENT REHABILITATION  (COMPLETE FOR INITIAL CLAIMS ONLY)  Patient's Last Name, First Name, M.I.  YOB: 1980  Priyanka Lundberg                        Provider's Name  Mayte Reno Medical Record No.  [id number]                               Onset Date:  4/09/19   Start of Care Date: 4/09/19     Type: Speech Language Therapy Medical Diagnosis: No primary diagnosis found.                        Therapy Diagnosis:  No primary diagnosis found.   Visits from SOC:  1   _________________________________________________________________________________  Plan of Treatment:   Speech therapy    DURATION/FREQUENCY OF TREATMENT  Six weekly, one-hour sessions, with two monthly one-hour follow-up sessions    PROGNOSIS  Good prognosis for voice improvement with speech therapy and regular practice of therapeutic activities.    BARRIERS TO LEARNING/TEACHING AND LEARNING NEEDS  None/Unremarkable    GOALS:  Patient goal:   1. To improve and maintain a healthy voice quality  2. To understand the problem and fix it as much as possible  3. To have a normal and acceptable voice quality    Short-term goal(s): Within the first 4 sessions, Ms. Lundberg:  1. will demonstrate assigned laryngeal massage techniques with 80% accuracy or better with no clinician support  2. will demonstrate semi-occluded vocal tract (SOVT) exercises with at least 80% accuracy with no clinician support  3. will demonstrate the ability to alternate between target and habitual voice quality given clinician cue 75% of the time during therapy tasks    Long-term goal(s): In 6 months, Ms. Lundberg will:  1. Report a week of typical vocal activities, in which dysphonia and throat discomfort do not exceed a level of 3 out of 10, 80% of the time   2. In a singing task, demonstrate ability to vocalize to C5,  five times in 10 minutes, with breathiness, delayed onset, and strain that does not exceed a level of 3 out of 10, 80% of the time, by therapist judgment  Report resolution of symptoms, and use of optimal voice quality and comfort to meet personal, social, and professional needs, 90% of the time during a typical week of vocal activities  _________________________________________________________________________________    I CERTIFY THE NEED FOR THESE SERVICES FURNISHED UNDER        THIS PLAN OF TREATMENT AND WHILE UNDER MY CARE     (Physician attestation of this document indicates review and certification of the therapy plan).     Certification date from: 4/09/19  Certification date to: 7/8/19    Referring Provider: Sydney Ruiz

## 2019-04-09 NOTE — PROGRESS NOTES
"[facility] VOICE CLINIC  Magno Metcalf Jr., M.D., F.A.C.S.  Desiree Laureano M.D., M.P.H.  Rosio Barry, Ph.D., CCC/SLP  Mayte Reno M.M. (voice), M.A., CCC/SLP  Klaus Mayberry M.M. (voice), M.A., CCC/SLP    Evaluation report    Clinician: Mayte Reno M.M. (voice), M.A., CCC/SLP  Seen in conjunction with: Dr. Metcalf and Mayte Reno M.M. (voice), M.A., CCC/SLP  Referring physician:  Sara  Patient: Priyanka Lundberg  Date of Visit: 4/9/2019    HISTORY  Chief complaint: Priyanka Lundberg is a 38 year old presenting today for evaluation of voice and throat issues.  Salient history: She has a history significant for swelling of the right tonsil at the superior pole, recurrent tonsillitis,chronic sinusitis, rhinitis, facial pain, TMJ and nasal obstruction.  She presents today for further evaluation, and will also be evaluated by Dr. Burgos in the near future for surgical treatment of her tonsil cyst.    CURRENT SYMPTOMS INCLUDE  VOICE    She was loosing her upper range, and went hoarse for 2 weeks approximately 4 months ago.    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  Enjoys singing indie folk music (ie: [employer]), and recently learned the guitar.    Feels exhausting to talk.    Very stressed with the care of her family and her mother, who lives 3 blocks away.  She also experienced significant stress when her father passed away last February.    THROAT ISSUES    Always feels irritation in the back of the throat to her PCP (Dr. Ruiz).    Dr. Anderson observed a tonsil cyst.     Will occasionally wake choking in her sleep; not sure if it is reflux related or tonsils.    COUGH:  o Denies issues    THROAT CLEARING:  o Denies issues    GLOBUS:  o Feels like a popcorn kernel is stuck high in the back of the thraot    SWALLOWING    Denies issues    RESPIRATION    Hx of lung issues; difficult to walk and talk at the same time.  Inhalers were provided after she had difficulty with walking inclines, which " "were somewhat helpful.    \"burnt\" lungs with cat urine and bleach.     Had to quit hairstyling because the chemicals were bothering her; felt like she had the flu    Reports that occasionally she will catch herself holding her breath.    ADDITIONAL    Working with 3 counselors; diet, marriage and self    OTHER PERTINENT HISTORY    Father passed away 2018 of a rare cancer. They were very close and he passed away within 5 months.    Past Medical History:   Diagnosis Date     Abdominal pain     abnormal histamine problem, multiple food allergies     ADHD (attention deficit hyperactivity disorder)     on aderol     Anxiety and depression     on xanax     Arthritis      Breathing problem     shortness of breath after eating, ?allergies     Chronic nausea      Gastroesophageal reflux disease      Heart murmur     closed by time she was 1     Hx of abnormal cervical Pap smear      IBS (irritable bowel syndrome)     lots of mucus in bowel with occassional bleeding     Joint pain     was to have rheumatologist     Kidney stone on right side      Meniere's disease     hyperacusis     Migraines      Reduced vision      Tinnitus      Past Surgical History:   Procedure Laterality Date     COLONOSCOPY N/A 2018    Procedure: COMBINED COLONOSCOPY, SINGLE OR MULTIPLE BIOPSY/POLYPECTOMY BY BIOPSY;  colon and egd;  Surgeon: Joan Willson MD;  Location: [facility] OR     ESOPHAGOSCOPY, GASTROSCOPY, DUODENOSCOPY (EGD), COMBINED N/A 2018    Procedure: COMBINED ESOPHAGOSCOPY, GASTROSCOPY, DUODENOSCOPY (EGD), BIOPSY SINGLE OR MULTIPLE;;  Surgeon: Joan Willson MD;  Location: [facility] OR     NO HISTORY OF SURGERY         OBJECTIVE  PATIENT REPORTED MEASURES    Effort to talk: 4 / 10 (0-10 in which 10 represents maximal effort)    Effort to sin / 10 (0-10 in which 10 represents maximal effort)    Voice quality: 3 / 10 (0-10 in which 10 represents best possible voice)  Patient Supplied Answers To I " "Questionnaire  No flowsheet data found.    Patient Supplied Answers To CSI Questionnaire  No flowsheet data found.    Patient Supplied Answers To EAT Questionnaire  No flowsheet data found.    PERCEPTUAL EVALUATION (CPT 32111)  POSTURE / TENSION:     upper body    neck and shoulders    BREATHING:     clavicular elevation on inspiration    clavicular muscle use pattern     shoulder and neck involvement    shallow    phonation is not coordinated with respiration     LARYNGEAL PALPATION:     firm musculature    tenderness of the thyrohyoid area    reduced thyrohyoid space     Discomfort extends into her left ear    VOICE:    Roughness: Mild    Breathiness: Mild    Strain: WNL    Loudness    Conversational speech:  Mildly reduced    Projected speech:  Mildly reduced    Pitch:    Conversational speech:  Mildly lowered    Pitch glide: Upper range is mildly limited    Resonance:    Conversational speech:  backward focus of resonance and laryngeal pharyngeal resonance    Singing vs. Speech:  Approximately the same effort    CAPE-V Overall Severity:  30/100    COUGH/THROAT CLEARING:    Not observed    LARYNGEAL FUNCTION STUDIES (CPT 60631)  Laryngeal Function Studies (CPT 03959)     Acoustic Measures     Protocol / Parameter = result     Paste PRAAT data here     Fundamental frequency Metrics        /a/ mean F0 = 203 Hz (SD = 2.03 Hz)        /i/ mean F0 = 225 Hz (SD = 1.18 Hz)        [place] Mean f0 = 156 Hz (SD 17.30 Hz)       Glide:            Min F0 = 140 Hz           Max F0 = 665 Hz           Range = 525 Hz     0     Cepstral Measures        CPPS /a/ = 25.01 dB        CPPS /i/ = 24.73 dB        CPPS \"all voiced\" = 25.84 dB     0     Additional Measures        Harmonic to Noise Ratio /a/ = 28.57 dB        Harmonic to Noise Ratio: [place] = 16.62 dB       Jitter (local) /a/ = 0.204 %     Aerodynamic Measures     Protocol / Parameter Result Normative Mean (SD)   Vital Capacity     Expiratory Volume 0.716 " Lit/Sec  () Lit/Sec   Comfortable Sustained Phonation     Mean SPL 65.93 dB 65.46 (6.63) dB   Mean Pitch 75.23 dB  () dB   Peak Expiratory Airflow 0 Sec  () Sec   Mean Expiratory Airflow 0.337 Lit/Sec 0.2 (0.11) Lit/Sec   Voicing Efficiency     Peak Air Pressure 0 Sec  () Sec   Mean Peak Air Pressure 13.25 cm H2O 6.65 (1.96) cm H2O   Peak Expiratory Airflow 5.78   ()    Mean Airflow During Voicing 0.06 Liters 0.17 (0.12) Liters   Running Speech: All Voiced Stimulus     Mean SPL 40.48 dB    Mean Pitch 73.01 dB    Peak Expiratory Airflow 0.43 Sec    Mean Expiratory Airflow 0.915 Lit/Sec    Mean Airflow During Voicing 0.95 Liters    Running Speech: [place] Passage     Mean SPL 39.15 dB    SPL Range 63.88 dB    Mean Pitch 68.74 dB    Pitch Range 166.66 Hz    Peak Expiratory Airflow 1.53 Sec    Mean Expiratory Airflow 0.6 Lit/Sec    Expiratory Volume 0.137 Lit/Sec    Mean Airflow During Voicing 1.8 Liters    Peak Inspiratory Airflow 0.123 Lit/Sec    Inspiratory Volume -0.7 Lit/Sec            LARYNGEAL EXAMINATION  Procedure: Flexible endoscopy with chip-tip technology without stroboscopy, right nostril; topical anesthesia with 3% Lidocaine and 0.25% phenylephrine was applied.   Performed by: Dr. Metcalf   The laryngeal and pharyngeal structures were evaluated for gross appearance, mobility, function, and focal lesions / abnormalities of the associated mucosa.    All findings were within normal limits with the exception of the following salient features:     Moderate to severe supraglottic hyperfunction    THERAPY PROBES: Improvement was elicited with use of forward resonant stimuli, coordination of respiration and phonation and use of yawn sigh       Abducted view - healthy larynx and upper airway    The laryngeal exam was reviewed with Ms. Lundberg, and I provided pertinent explanations, as well as written and oral information.    ASSESSMENT / PLAN  IMPRESSIONS: Priyanka Lundberg is a 38 year old female/  caregiver/avocational indie folk morales, presenting today with R49.0 (Dysphonia) and R07.0 (Throat Pain),  as evidenced by evaluation, the results of the laryngeal function studies (demonstrating poor coordination between breath flow and speech), as well as the laryngeal exam.  Dysphonia/discomfort is compounded by the hyperfunction and imbalanced function of the intrinsic and extrinsic laryngeal musculature  .  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  Learning management of her laryngeal hypersensitivity will be helpful whether or not she decides to proceed with surgical management with Dr. Burgos.    STIMULABILITY: results of therapy probes during perceptual and laryngeal evaluation demonstrate improvement with use of forward resonant stimuli, coordination of respiration and phonation and use of yawn sigh    RECOMMENDATIONS:     A course of speech therapy is recommended to optimize vocal technique, improve voice quality, promote reduced discomfort, effort and fatigue and help reduce mucosal irritation.    She demonstrates a Good prognosis for improvement given adherence to therapeutic recommendations.     Positive indicators: positive response to therapy probes diagnosis is known to respond to treatment high level of comittment    Negative indicators: none    DURATION / FREQUENCY: 3 one-hour biweekly sessions.  A total of 6-8 sessions may be necessary.    GOALS:  Patient goal:   1. To improve and maintain a healthy voice quality  2. To understand the problem and fix it as much as possible  3. To have a normal and acceptable voice quality    Short-term goal(s): Within the first 4 sessions, Ms. Lundberg:  1. will demonstrate assigned laryngeal massage techniques with 80% accuracy or better with no clinician support  2. will demonstrate semi-occluded vocal tract (SOVT) exercises with at least 80% accuracy with no clinician  support  3. will demonstrate the ability to alternate between target and habitual voice quality given clinician cue 75% of the time during therapy tasks    Long-term goal(s): In 6 months, Ms. Lundberg will:  1. Report a week of typical vocal activities, in which dysphonia and throat discomfort do not exceed a level of 3 out of 10, 80% of the time   2. In a singing task, demonstrate ability to vocalize to C5, five times in 10 minutes, with breathiness, delayed onset, and strain that does not exceed a level of 3 out of 10, 80% of the time, by therapist judgment  3. Report resolution of symptoms, and use of optimal voice quality and comfort to meet personal, social, and professional needs, 90% of the time during a typical week of vocal activities    Certification period:   Certification date from: 4/09/19  Certification date to: 7/8/19    This treatment plan was developed with the patient who agreed with the recommendations.    TOTAL SERVICE TIME: 75 minutes  EVALUATION OF VOICE AND RESONANCE (70856)  LARYNGEAL FUNCTION STUDIES (63333)  NO CHARGE FACILITY FEE (95091)    Mayte Reno M.M. (voice), M.A., CCC/SLP  Speech-Language Pathologist  [facility] Trained Vocologist  [facility]  [phone]  [email or web address]  Prounouns: she/her

## 2019-04-09 NOTE — LETTER
"4/9/2019     RE: Priyanka Lundberg  [address] [place] [address]     Dear Colleague,    Thank you for referring your patient, Priyanka Lundberg, to the [facility] at [facility]. Please see a copy of my visit note below.    [facility] VOICE CLINIC  Magno Metcalf Jr., M.D., F.A.C.S.  Desiree Laureano M.D., M.P.H.  Rosio Barry, Ph.D., CCC/SLP  Mayte Reno M.M. (voice), M.A., CCC/SLP  Klaus Mayberry M.M. (voice), M.A., CCC/SLP    Evaluation report    Clinician: Mayte Reno M.M. (voice), M.A., CCC/SLP  Seen in conjunction with: Dr. Metcalf and Mayte Reno M.M. (voice), M.A., CCC/SLP  Referring physician:  Sara  Patient: Priyanka Lundberg  Date of Visit: 4/9/2019    HISTORY  Chief complaint: Priyanka Lundberg is a 38 year old presenting today for evaluation of voice and throat issues.  Salient history: She has a history significant for swelling of the right tonsil at the superior pole, recurrent tonsillitis,chronic sinusitis, rhinitis, facial pain, TMJ and nasal obstruction.  She presents today for further evaluation, and will also be evaluated by Dr. Burgos in the near future for surgical treatment of her tonsil cyst.    CURRENT SYMPTOMS INCLUDE  VOICE    She was loosing her upper range, and went hoarse for 2 weeks approximately 4 months ago.    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  Enjoys singing indie folk music (ie: [employer]), and recently learned the guitar.    Feels exhausting to talk.    Very stressed with the care of her family and her mother, who lives 3 blocks away.  She also experienced significant stress when her father passed away last February.    THROAT ISSUES    Always feels irritation in the back of the throat to her PCP (Dr. Ruiz).    Dr. Anderson observed a tonsil cyst.     Will occasionally wake choking in her sleep; not sure if it is reflux related or tonsils.    COUGH:  o Denies issues    THROAT CLEARING:  o Denies " "issues    GLOBUS:  o Feels like a popcorn kernel is stuck high in the back of the thraot    SWALLOWING    Denies issues    RESPIRATION    Hx of lung issues; difficult to walk and talk at the same time.  Inhalers were provided after she had difficulty with walking inclines, which were somewhat helpful.    \"burnt\" lungs with cat urine and bleach.     Had to quit hairstyling because the chemicals were bothering her; felt like she had the flu    Reports that occasionally she will catch herself holding her breath.    ADDITIONAL    Working with 3 counselors; diet, marriage and self    OTHER PERTINENT HISTORY    Father passed away Feb 2018 of a rare cancer. They were very close and he passed away within 5 months.    Past Medical History:   Diagnosis Date     Abdominal pain 2011    abnormal histamine problem, multiple food allergies     ADHD (attention deficit hyperactivity disorder)     on aderol     Anxiety and depression     on xanax     Arthritis      Breathing problem 2007    shortness of breath after eating, ?allergies     Chronic nausea      Gastroesophageal reflux disease      Heart murmur     closed by time she was 1     Hx of abnormal cervical Pap smear 2011     IBS (irritable bowel syndrome)     lots of mucus in bowel with occassional bleeding     Joint pain 2015    was to have rheumatologist     Kidney stone on right side 2010     Meniere's disease     hyperacusis     Migraines      Reduced vision      Tinnitus      Past Surgical History:   Procedure Laterality Date     COLONOSCOPY N/A 2/14/2018    Procedure: COMBINED COLONOSCOPY, SINGLE OR MULTIPLE BIOPSY/POLYPECTOMY BY BIOPSY;  colon and egd;  Surgeon: Joan Willson MD;  [facility]     ESOPHAGOSCOPY, GASTROSCOPY, DUODENOSCOPY (EGD), COMBINED N/A 2/14/2018    Procedure: COMBINED ESOPHAGOSCOPY, GASTROSCOPY, DUODENOSCOPY (EGD), BIOPSY SINGLE OR MULTIPLE;;  Surgeon: Joan Willson MD;  [facility]     NO HISTORY OF SURGERY         OBJECTIVE  PATIENT " "REPORTED MEASURES    Effort to talk: 4 / 10 (0-10 in which 10 represents maximal effort)    Effort to sin / 10 (0-10 in which 10 represents maximal effort)    Voice quality: 3 / 10 (0-10 in which 10 represents best possible voice)  Patient Supplied Answers To VHI Questionnaire  No flowsheet data found.    Patient Supplied Answers To CSI Questionnaire  No flowsheet data found.    Patient Supplied Answers To EAT Questionnaire  No flowsheet data found.    PERCEPTUAL EVALUATION (CPT 63058)  POSTURE / TENSION:     upper body    neck and shoulders    BREATHING:     clavicular elevation on inspiration    clavicular muscle use pattern     shoulder and neck involvement    shallow    phonation is not coordinated with respiration     LARYNGEAL PALPATION:     firm musculature    tenderness of the thyrohyoid area    reduced thyrohyoid space     Discomfort extends into her left ear    VOICE:    Roughness: Mild    Breathiness: Mild    Strain: WNL    Loudness    Conversational speech:  Mildly reduced    Projected speech:  Mildly reduced    Pitch:    Conversational speech:  Mildly lowered    Pitch glide: Upper range is mildly limited    Resonance:    Conversational speech:  backward focus of resonance and laryngeal pharyngeal resonance    Singing vs. Speech:  Approximately the same effort    CAPE- Overall Severity:  30/100    COUGH/THROAT CLEARING:    Not observed    LARYNGEAL FUNCTION STUDIES (CPT 55032)  Laryngeal Function Studies (CPT 32143)     Acoustic Measures     Protocol / Parameter = result     Paste PRAAT data here     Fundamental frequency Metrics        /a/ mean F0 = 203 Hz (SD = 2.03 Hz)        /i/ mean F0 = 225 Hz (SD = 1.18 Hz)        [place] Mean f0 = 156 Hz (SD 17.30 Hz)       Glide:            Min F0 = 140 Hz           Max F0 = 665 Hz           Range = 525 Hz     0     Cepstral Measures        CPPS /a/ = 25.01 dB        CPPS /i/ = 24.73 dB        CPPS \"all voiced\" = 25.84 dB     0     Additional " Measures        Harmonic to Noise Ratio /a/ = 28.57 dB        Harmonic to Noise Ratio: [place] = 16.62 dB       Jitter (local) /a/ = 0.204 %     Aerodynamic Measures     Protocol / Parameter Result Normative Mean (SD)   Vital Capacity     Expiratory Volume 0.716 Lit/Sec  () Lit/Sec   Comfortable Sustained Phonation     Mean SPL 65.93 dB 65.46 (6.63) dB   Mean Pitch 75.23 dB  () dB   Peak Expiratory Airflow 0 Sec  () Sec   Mean Expiratory Airflow 0.337 Lit/Sec 0.2 (0.11) Lit/Sec   Voicing Efficiency     Peak Air Pressure 0 Sec  () Sec   Mean Peak Air Pressure 13.25 cm H2O 6.65 (1.96) cm H2O   Peak Expiratory Airflow 5.78   ()    Mean Airflow During Voicing 0.06 Liters 0.17 (0.12) Liters   Running Speech: All Voiced Stimulus     Mean SPL 40.48 dB    Mean Pitch 73.01 dB    Peak Expiratory Airflow 0.43 Sec    Mean Expiratory Airflow 0.915 Lit/Sec    Mean Airflow During Voicing 0.95 Liters    Running Speech: [place]     Mean SPL 39.15 dB    SPL Range 63.88 dB    Mean Pitch 68.74 dB    Pitch Range 166.66 Hz    Peak Expiratory Airflow 1.53 Sec    Mean Expiratory Airflow 0.6 Lit/Sec    Expiratory Volume 0.137 Lit/Sec    Mean Airflow During Voicing 1.8 Liters    Peak Inspiratory Airflow 0.123 Lit/Sec    Inspiratory Volume -0.7 Lit/Sec            LARYNGEAL EXAMINATION  Procedure: Flexible endoscopy with chip-tip technology without stroboscopy, right nostril; topical anesthesia with 3% Lidocaine and 0.25% phenylephrine was applied.   Performed by: Dr. Metcalf   The laryngeal and pharyngeal structures were evaluated for gross appearance, mobility, function, and focal lesions / abnormalities of the associated mucosa.    All findings were within normal limits with the exception of the following salient features:     Moderate to severe supraglottic hyperfunction    THERAPY PROBES: Improvement was elicited with use of forward resonant stimuli, coordination of respiration and phonation and use of yawn  sigh       Abducted view - healthy larynx and upper airway    The laryngeal exam was reviewed with Ms. Lundberg, and I provided pertinent explanations, as well as written and oral information.    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  Dysphonia/discomfort is compounded by the hyperfunction and imbalanced function of the intrinsic and extrinsic laryngeal musculature  .  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  Learning management of her laryngeal hypersensitivity will be helpful whether or not she decides to proceed with surgical management with Dr. Burgos.    STIMULABILITY: results of therapy probes during perceptual and laryngeal evaluation demonstrate improvement with use of forward resonant stimuli, coordination of respiration and phonation and use of yawn sigh    RECOMMENDATIONS:     A course of speech therapy is recommended to optimize vocal technique, improve voice quality, promote reduced discomfort, effort and fatigue and help reduce mucosal irritation.    She demonstrates a Good prognosis for improvement given adherence to therapeutic recommendations.     Positive indicators: positive response to therapy probes diagnosis is known to respond to treatment high level of comittment    Negative indicators: none    DURATION / FREQUENCY: 3 one-hour biweekly sessions.  A total of 6-8 sessions may be necessary.    GOALS:  Patient goal:   1. To improve and maintain a healthy voice quality  2. To understand the problem and fix it as much as possible  3. To have a normal and acceptable voice quality    Short-term goal(s): Within the  first 4 sessions, Ms. Lundberg:  1. will demonstrate assigned laryngeal massage techniques with 80% accuracy or better with no clinician support  2. will demonstrate semi-occluded vocal tract (SOVT) exercises with at least 80% accuracy with no clinician support  3. will demonstrate the ability to alternate between target and habitual voice quality given clinician cue 75% of the time during therapy tasks    Long-term goal(s): In 6 months, Ms. Lundberg will:  1. Report a week of typical vocal activities, in which dysphonia and throat discomfort do not exceed a level of 3 out of 10, 80% of the time   2. In a singing task, demonstrate ability to vocalize to C5, five times in 10 minutes, with breathiness, delayed onset, and strain that does not exceed a level of 3 out of 10, 80% of the time, by therapist judgment  3. Report resolution of symptoms, and use of optimal voice quality and comfort to meet personal, social, and professional needs, 90% of the time during a typical week of vocal activities    Certification period:   Certification date from: 4/09/19  Certification date to: 7/8/19    This treatment plan was developed with the patient who agreed with the recommendations.    TOTAL SERVICE TIME: 75 minutes  EVALUATION OF VOICE AND RESONANCE (23677)  LARYNGEAL FUNCTION STUDIES (09046)  NO CHARGE FACILITY FEE (71014)    Mayte Reno M.M. (voice), M.A., CCC/SLP  Speech-Language Pathologist  Located within [facility] Trained Vocologist  [facility]  [phone]  [email or web address]  Prounouns: she/her                                                                                                       Outpatient Speech Language Therapy Evaluation  PLAN OF TREATMENT FOR OUTPATIENT REHABILITATION  (COMPLETE FOR INITIAL CLAIMS ONLY)  Patient's Last Name, First Name, M.I.  YOB: 1980  Priyanka Lundberg                        Provider's Name  Mayte Reno Medical Record No.  [id number]                                Onset Date:  4/09/19   Start of Care Date: 4/09/19     Type: Speech Language Therapy Medical Diagnosis: No primary diagnosis found.                        Therapy Diagnosis:  No primary diagnosis found.   Visits from SOC:  1   _________________________________________________________________________________  Plan of Treatment:   Speech therapy    DURATION/FREQUENCY OF TREATMENT  Six weekly, one-hour sessions, with two monthly one-hour follow-up sessions    PROGNOSIS  Good prognosis for voice improvement with speech therapy and regular practice of therapeutic activities.    BARRIERS TO LEARNING/TEACHING AND LEARNING NEEDS  None/Unremarkable    GOALS:  Patient goal:   4. To improve and maintain a healthy voice quality  5. To understand the problem and fix it as much as possible  6. To have a normal and acceptable voice quality    Short-term goal(s): Within the first 4 sessions, Ms. Lundberg:  4. will demonstrate assigned laryngeal massage techniques with 80% accuracy or better with no clinician support  5. will demonstrate semi-occluded vocal tract (SOVT) exercises with at least 80% accuracy with no clinician support  6. will demonstrate the ability to alternate between target and habitual voice quality given clinician cue 75% of the time during therapy tasks    Long-term goal(s): In 6 months, Ms. Lundberg will:  4. Report a week of typical vocal activities, in which dysphonia and throat discomfort do not exceed a level of 3 out of 10, 80% of the time   5. In a singing task, demonstrate ability to vocalize to C5, five times in 10 minutes, with breathiness, delayed onset, and strain that does not exceed a level of 3 out of 10, 80% of the time, by therapist judgment  Report resolution of symptoms, and use of optimal voice quality and comfort to meet personal, social, and professional needs, 90% of the time during a typical week of vocal  activities  _________________________________________________________________________________    I CERTIFY THE NEED FOR THESE SERVICES FURNISHED UNDER        THIS PLAN OF TREATMENT AND WHILE UNDER MY CARE     (Physician attestation of this document indicates review and certification of the therapy plan).   Certification date from: 4/09/19  Certification date to: 7/8/19    Referring Provider: Sydney Ruiz     Sincerely,  Mayte Reno, SLP

## 2019-04-09 NOTE — PROGRESS NOTES
HISTORY OF PRESENT ILLNESS: Priyanka Lundberg is a 38 year old female with a history of dysphonia.  She notes that she is been hoarse year but worse in the last 4 months.  She notes her father  that was very stressful for her.  She also had a very stressful.  Approximately 4 months ago.  She has some living issues and life issues that she is dealing with but she does note she is under high stress at this time.  She has no swallowing difficulties no airway difficulties.  Her voice is variable and rough.  She used to be a morales.  She notes she has vocal fatigue.  She also has some mild neck pain.  She also has a history of tonsillitis for which she is already scheduled to see Dr. Burgos.      PAST MEDICAL HISTORY:   Past Medical History:   Diagnosis Date     Abdominal pain     abnormal histamine problem, multiple food allergies     ADHD (attention deficit hyperactivity disorder)     on aderol     Anxiety and depression     on xanax     Arthritis      Breathing problem 2007    shortness of breath after eating, ?allergies     Chronic nausea      Gastroesophageal reflux disease      Heart murmur     closed by time she was 1     Hx of abnormal cervical Pap smear      IBS (irritable bowel syndrome)     lots of mucus in bowel with occassional bleeding     Joint pain     was to have rheumatologist     Kidney stone on right side      Meniere's disease     hyperacusis     Migraines      Reduced vision      Tinnitus        PAST SURGICAL HISTORY:   Past Surgical History:   Procedure Laterality Date     COLONOSCOPY N/A 2018    Procedure: COMBINED COLONOSCOPY, SINGLE OR MULTIPLE BIOPSY/POLYPECTOMY BY BIOPSY;  colon and egd;  Surgeon: Joan Willson MD;  [facility]     ESOPHAGOSCOPY, GASTROSCOPY, DUODENOSCOPY (EGD), COMBINED N/A 2018    Procedure: COMBINED ESOPHAGOSCOPY, GASTROSCOPY, DUODENOSCOPY (EGD), BIOPSY SINGLE OR MULTIPLE;;  Surgeon: Joan Willson MD;  [facility]     NO HISTORY OF  SURGERY         FAMILY HISTORY:   Family History   Problem Relation Age of Onset     Coronary Artery Disease Paternal Grandfather      Colon Cancer Paternal Grandfather      Cancer Paternal Grandfather      Coronary Artery Disease Maternal Grandmother      Cerebrovascular Disease Maternal Grandmother      Breast Cancer Maternal Grandmother      Other Cancer Maternal Grandmother         stomach, skin     Cancer Maternal Grandmother      Mental Illness Mother         bipolar, schizophrenia     Anxiety Disorder Mother      Osteoporosis Mother      Thyroid Disease Mother      Diabetes Mother      Neurofibromatosis Mother         more likely fibromyalgia     Arthritis Mother      Back Pain Mother      Osteoarthritis Mother      Obesity Mother      Cirrhosis Mother         from fatty liver. with esophageal varices, ..     Gallbladder Disease Mother         cholecystectomy     Hypertension Father      Hyperlipidemia Father      Diabetes Father      Other Cancer Father 67        Neuroendocrine tumor, ? from gall bladder, stage 4  1/2018     Migraines Father      Scleroderma Father      Rheumatoid Arthritis Father      Obesity Father      Cancer Father      Ankylosing Spondylitis Father      Macular Degeneration Father      Prostate Cancer Maternal Grandfather      Cancer Maternal Grandfather      Mental Illness Sister         bipolar     Anxiety Disorder Sister      Asthma Sister         eczema     Neurologic Disorder Sister         Cervical Dystonia, treated with Botox     Diabetes Paternal Grandmother      Kidney Cancer Paternal Grandmother      Scleroderma Paternal Aunt      Ankylosing Spondylitis Paternal Aunt      Cancer Other      Cancer Other      Asthma Sister      Mental Illness Sister      Ovarian Cancer Maternal Aunt 68     Ankylosing Spondylitis Paternal Uncle      Glaucoma No family hx of        SOCIAL HISTORY:   Social History     Tobacco Use     Smoking status: Never Smoker     Smokeless tobacco: Never Used    Substance Use Topics     Alcohol use: Yes     Alcohol/week: 0.0 oz     Comment: outside of pregnancy, social       REVIEW OF SYSTEMS: Ten point review of systems was performed and is negative except for:   [facility] ROS 4/8/2019   Constitutional Unexplained fatigue, Problems with sleep   Neurology Dizzy spells, Numbness, Headache   Eyes Visual loss   Ears, Nose, Throat Hearing loss, Ear pain, Ringing/noise in ears, Nasal congestion or drainage, Sore throat   Cardiopulmonary Chest pain   Gastrointestinal/Genitourinary Heartburn/indigestion, Unexplained nausea/vomiting   Musculoskeletal Sore or stiff joints, Back pain, Neck pain   Hematologic -   Endocrine -        ALLERGIES: Food; Lemon flavor; Mustard seed; Onion; Peach; Penicillins; and Vanilla    MEDICATIONS:   Current Outpatient Medications   Medication Sig Dispense Refill     Misc Natural Product Nasal (PONARIS) SOLN Apply two drops to each nostril BID X 2 month supply 10 mL 3     multivitamin, therapeutic with minerals (MULTI-VITAMIN) TABS tablet Take 1 tablet by mouth daily           PHYSICAL EXAMINATION:  She  is awake, alert and in no apparent distress.    Her tympanic membranes are clear and intact bilaterally. External auditory canals are clear.  Nasal exam shows a mild septal deviation without obstruction.  Examination of the oral cavity shows no suspicious lesions.  There is symmetric movement of the tongue and soft palate.    The oropharynx is clear.  The tonsils are 1+.  There are no tonsil is there at present but she notes that she clears them every night.  Her neck is supple without significant adenopathy.  Pulse is regular.  Upper airway is clear.  Cranial nerves II-XII are grossly intact.       PROCEDURE: A flexible laryngoscopy  was performed.  Informed consent was obtained and a time out was performed. 3% lidocaine and 0.25% phenylephrine was sprayed into the nasal cavity and allowed 3 to 5 minutes for effect. The scope was passed through the  right sided nostril. Examination showed the vocal folds to be mobile and meet in the midline.  No nodules, polyps or ulcerations are seen.  There is mild inflammation or erythema of the supraglottic or glottic larynx.  With phonation there is moderate to severe contraction of the supraglottic larynx.  The hypopharynx is otherwise clear as is the subglottis.       Respiration      Phonation      IMPRESSION/PLAN: A muscle tension dysphonia and otherwise normal vocal folds.  I recommended a trial of speech therapy.  She has appointment to see Dr. Burgos regarding tonsil is next week and I encouraged her to keep that appointment.

## 2019-04-09 NOTE — TELEPHONE ENCOUNTER
FUTURE VISIT INFORMATION      FUTURE VISIT INFORMATION:    Date: 4/16/19    Time: 7:45AM    [place] [facility] [place]  REFERRAL INFORMATION:    Referring provider:  Dr Rubens Anderson    Referring providers clinic:  [facility]    Reason for visit/diagnosis  tonsillar mass possible cyst removal    RECORDS REQUESTED FROM:       Clinic name Comments Records Status Imaging Status   [facility] 2/8/19 notes with Dr Sara DENNY     [facility] 3/8/18 notes with Dr Boothe  3/15/19 notes with Dr Beena DENNY     [facility] 3/18/18 MR Sinus  3/16/19 CT Facial Bone [facility] PACS

## 2019-04-09 NOTE — PATIENT INSTRUCTIONS
Please initiate speech therapy at the [facility].  Call Dianelys at [phone] if you require assistance in scheduling.

## 2019-04-09 NOTE — LETTER
2019       RE: Priyanka Lundberg  [address] [place] [address]     Dear Colleague,    Thank you for referring your patient, Priyanka Lundberg, to the [facility] EAR NOSE AND THROAT at [facility]. Please see a copy of my visit note below.      HISTORY OF PRESENT ILLNESS: Priyanka Lundberg is a 38 year old female with a history of dysphonia.  She notes that she is been hoarse year but worse in the last 4 months.  She notes her father  that was very stressful for her.  She also had a very stressful.  Approximately 4 months ago.  She has some living issues and life issues that she is dealing with but she does note she is under high stress at this time.  She has no swallowing difficulties no airway difficulties.  Her voice is variable and rough.  She used to be a morales.  She notes she has vocal fatigue.  She also has some mild neck pain.  She also has a history of tonsillitis for which she is already scheduled to see Dr. Burgos.      PAST MEDICAL HISTORY:   Past Medical History:   Diagnosis Date     Abdominal pain     abnormal histamine problem, multiple food allergies     ADHD (attention deficit hyperactivity disorder)     on aderol     Anxiety and depression     on xanax     Arthritis      Breathing problem 2007    shortness of breath after eating, ?allergies     Chronic nausea      Gastroesophageal reflux disease      Heart murmur     closed by time she was 1     Hx of abnormal cervical Pap smear      IBS (irritable bowel syndrome)     lots of mucus in bowel with occassional bleeding     Joint pain     was to have rheumatologist     Kidney stone on right side      Meniere's disease     hyperacusis     Migraines      Reduced vision      Tinnitus        PAST SURGICAL HISTORY:   Past Surgical History:   Procedure Laterality Date     COLONOSCOPY N/A 2018    Procedure: COMBINED COLONOSCOPY, SINGLE OR MULTIPLE BIOPSY/POLYPECTOMY BY BIOPSY;  colon  and egd;  Surgeon: Joan Willson MD;  [facility]     ESOPHAGOSCOPY, GASTROSCOPY, DUODENOSCOPY (EGD), COMBINED N/A 2/14/2018    Procedure: COMBINED ESOPHAGOSCOPY, GASTROSCOPY, DUODENOSCOPY (EGD), BIOPSY SINGLE OR MULTIPLE;;  Surgeon: Joan Willson MD;  [facility] OR     NO HISTORY OF SURGERY         FAMILY HISTORY:   Family History   Problem Relation Age of Onset     Coronary Artery Disease Paternal Grandfather      Colon Cancer Paternal Grandfather      Cancer Paternal Grandfather      Coronary Artery Disease Maternal Grandmother      Cerebrovascular Disease Maternal Grandmother      Breast Cancer Maternal Grandmother      Other Cancer Maternal Grandmother         stomach, skin     Cancer Maternal Grandmother      Mental Illness Mother         bipolar, schizophrenia     Anxiety Disorder Mother      Osteoporosis Mother      Thyroid Disease Mother      Diabetes Mother      Neurofibromatosis Mother         more likely fibromyalgia     Arthritis Mother      Back Pain Mother      Osteoarthritis Mother      Obesity Mother      Cirrhosis Mother         from fatty liver. with esophageal varices, ..     Gallbladder Disease Mother         cholecystectomy     Hypertension Father      Hyperlipidemia Father      Diabetes Father      Other Cancer Father 67        Neuroendocrine tumor, ? from gall bladder, stage 4  1/2018     Migraines Father      Scleroderma Father      Rheumatoid Arthritis Father      Obesity Father      Cancer Father      Ankylosing Spondylitis Father      Macular Degeneration Father      Prostate Cancer Maternal Grandfather      Cancer Maternal Grandfather      Mental Illness Sister         bipolar     Anxiety Disorder Sister      Asthma Sister         eczema     Neurologic Disorder Sister         Cervical Dystonia, treated with Botox     Diabetes Paternal Grandmother      Kidney Cancer Paternal Grandmother      Scleroderma Paternal Aunt      Ankylosing Spondylitis Paternal Aunt      Cancer Other       Cancer Other      Asthma Sister      Mental Illness Sister      Ovarian Cancer Maternal Aunt 68     Ankylosing Spondylitis Paternal Uncle      Glaucoma No family hx of        SOCIAL HISTORY:   Social History     Tobacco Use     Smoking status: Never Smoker     Smokeless tobacco: Never Used   Substance Use Topics     Alcohol use: Yes     Alcohol/week: 0.0 oz     Comment: outside of pregnancy, social       REVIEW OF SYSTEMS: Ten point review of systems was performed and is negative except for:   [facility] 4/8/2019   Constitutional Unexplained fatigue, Problems with sleep   Neurology Dizzy spells, Numbness, Headache   Eyes Visual loss   Ears, Nose, Throat Hearing loss, Ear pain, Ringing/noise in ears, Nasal congestion or drainage, Sore throat   Cardiopulmonary Chest pain   Gastrointestinal/Genitourinary Heartburn/indigestion, Unexplained nausea/vomiting   Musculoskeletal Sore or stiff joints, Back pain, Neck pain   Hematologic -   Endocrine -        ALLERGIES: Food; Lemon flavor; Mustard seed; Onion; Peach; Penicillins; and Vanilla    MEDICATIONS:   Current Outpatient Medications   Medication Sig Dispense Refill     Misc Natural Product Nasal (PONARIS) SOLN Apply two drops to each nostril BID X 2 month supply 10 mL 3     multivitamin, therapeutic with minerals (MULTI-VITAMIN) TABS tablet Take 1 tablet by mouth daily           PHYSICAL EXAMINATION:  She  is awake, alert and in no apparent distress.    Her tympanic membranes are clear and intact bilaterally. External auditory canals are clear.  Nasal exam shows a mild septal deviation without obstruction.  Examination of the oral cavity shows no suspicious lesions.  There is symmetric movement of the tongue and soft palate.    The oropharynx is clear.  The tonsils are 1+.  There are no tonsil is there at present but she notes that she clears them every night.  Her neck is supple without significant adenopathy.  Pulse is regular.  Upper airway is clear.  Cranial nerves  II-XII are grossly intact.       PROCEDURE: A flexible laryngoscopy  was performed.  Informed consent was obtained and a time out was performed. 3% lidocaine and 0.25% phenylephrine was sprayed into the nasal cavity and allowed 3 to 5 minutes for effect. The scope was passed through the right sided nostril. Examination showed the vocal folds to be mobile and meet in the midline.  No nodules, polyps or ulcerations are seen.  There is mild inflammation or erythema of the supraglottic or glottic larynx.  With phonation there is moderate to severe contraction of the supraglottic larynx.  The hypopharynx is otherwise clear as is the subglottis.       Respiration      Phonation      IMPRESSION/PLAN: A muscle tension dysphonia and otherwise normal vocal folds.  I recommended a trial of speech therapy.  She has appointment to see Dr. Burgos regarding tonsil is next week and I encouraged her to keep that appointment.      Again, thank you for allowing me to participate in the care of your patient.      Sincerely,    Magno Metcalf MD

## 2019-04-12 ENCOUNTER — OFFICE VISIT (OUTPATIENT)
Dept: NEUROLOGY | Facility: CLINIC | Age: 39
End: 2019-04-12
Payer: COMMERCIAL

## 2019-04-12 VITALS — SYSTOLIC BLOOD PRESSURE: 117 MMHG | HEART RATE: 87 BPM | OXYGEN SATURATION: 95 % | DIASTOLIC BLOOD PRESSURE: 68 MMHG

## 2019-04-12 DIAGNOSIS — G43.009 MIGRAINE WITHOUT AURA AND WITHOUT STATUS MIGRAINOSUS, NOT INTRACTABLE: ICD-10-CM

## 2019-04-12 DIAGNOSIS — M54.2 CERVICALGIA: Primary | ICD-10-CM

## 2019-04-12 RX ORDER — SUMATRIPTAN 50 MG/1
50 TABLET, FILM COATED ORAL
Qty: 9 TABLET | Refills: 1 | Status: SHIPPED | OUTPATIENT
Start: 2019-04-12 | End: 2019-10-30

## 2019-04-12 ASSESSMENT — PAIN SCALES - GENERAL: PAINLEVEL: MODERATE PAIN (5)

## 2019-04-12 NOTE — PROGRESS NOTES
Service Date: 04/12/2019      HISTORY OF PRESENT ILLNESS:  This patient is seen in followup with headache and neck pain.  I have seen this patient on several occasions in the last year or so.  Her most recent visit was in 05/2018.  She has a mixture of tension headache, occipital neuralgia.  She has never been inclined to take medication.  She reports that her headaches had been at their baseline.  However, then about 6 weeks or so ago she went into a 3-week siege of headache.  This was unusual.  It did not begin abruptly but escalated gradually.  She found that she was having to take naps.  If the headache got really bad, she would use ibuprofen with caffeine and that would be a fairly effective treatment.  At other times, acetaminophen might work.  The headache was bitemporal and also over her ears.  It was more on the right than on the left.  She described it as a vise-like pain.  Her hair would hurt as well.  She had 1 episode of blurring in the right eye.  She had awakened from a nap and noticed that it felt like there was Vaseline rubbed over her right eye.  The symptom lasted a couple of hours and then passed.  There were no symptoms with regard to speech or swallowing.  She does get an occasional pins and needles sensation in the right arm involving the pinky and ring fingers.  If she lies on her back, the symptom is worse.  Her neck bothers her.  She tried a muscle relaxer without benefit.      The patient is not pregnant.  She does not use oral contraceptives.  She does not drink or smoke.      PHYSICAL EXAMINATION:  On examination, the patient is cooperative and in no distress.  Her blood pressure is 117/68.  Her visual acuity 20/20 bilaterally.  Funduscopic exam shows sharp discs bilaterally.  Visual fields are full to confrontation.  Pupils react to light.  Eye movements are complete and conjugate.  Facies are symmetric.  There is some tenderness in the occipital region and occipital foramen  bilaterally, but that does not exactly reproduce her headache.  There is no pronator drift.  Finger tapping, finger-nose-finger and heel-knee-shin are unremarkable.  Reflexes are low amplitude and symmetric.  Toes are downgoing.  Gait is normal.      ASSESSMENT:   1.  Mixed headache disorder, including tension headache and migraine.   2.  Cervicalgia.      DISCUSSION:  The patient is seen for evaluation of worsening headache.  By description, it sounds like a mix of tension headache and migraine.  There has been a lot of stress in the patient's life.  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  It has finally gotten to be too much and the patient's mother is going to be moved into assisted living in the near future.  Hopefully, this will help with some of the stress.  I had a long talk with the patient today about treatment of her headache problem.  She is not inclined to take daily preventive medication.  She is interested in an interventional medicine.  I have given her a prescription for sumatriptan 50 mg.  I did advise her that it works best if taken at the onset of the migraine with a dose repeated if necessary.  If she waits too long, the medication may prove ineffective.  I am also going to refer her to physical therapy for the cervicalgia and tension part of the headache.  She is inclined to monitor her neurologic status and follow up on an as-needed basis.  I think that is reasonable but encouraged her to keep in touch if there are ongoing problems.      This was a 35-minute appointment, more than half of which was spent in counseling and coordination of care.         OTILIO GUERRA MD             D: 2019   T: 2019   MT: eufemia      Name:     CONSTANTIN NUR   MRN:      [id number]        Account:      [id number]   :      1980           Service Date: 2019      Document: [id number]

## 2019-04-12 NOTE — NURSING NOTE
Chief Complaint   Patient presents with     Headache     [facility] NEW - HEADACHES       Aristeo Whiteside, EMT

## 2019-04-12 NOTE — LETTER
4/12/2019       RE: Priyanka Lundberg  [address] [place] [address]     Dear Colleague,    Thank you for referring your patient, Priyanka Lundberg, to the [facility] NEUROLOGY at [facility]. Please see a copy of my visit note below.    Service Date: 04/12/2019      HISTORY OF PRESENT ILLNESS:  This patient is seen in followup with headache and neck pain.  I have seen this patient on several occasions in the last year or so.  Her most recent visit was in 05/2018.  She has a mixture of tension headache, occipital neuralgia.  She has never been inclined to take medication.  She reports that her headaches had been at their baseline.  However, then about 6 weeks or so ago she went into a 3-week siege of headache.  This was unusual.  It did not begin abruptly but escalated gradually.  She found that she was having to take naps.  If the headache got really bad, she would use ibuprofen with caffeine and that would be a fairly effective treatment.  At other times, acetaminophen might work.  The headache was bitemporal and also over her ears.  It was more on the right than on the left.  She described it as a vise-like pain.  Her hair would hurt as well.  She had 1 episode of blurring in the right eye.  She had awakened from a nap and noticed that it felt like there was Vaseline rubbed over her right eye.  The symptom lasted a couple of hours and then passed.  There were no symptoms with regard to speech or swallowing.  She does get an occasional pins and needles sensation in the right arm involving the pinky and ring fingers.  If she lies on her back, the symptom is worse.  Her neck bothers her.  She tried a muscle relaxer without benefit.      The patient is not pregnant.  She does not use oral contraceptives.  She does not drink or smoke.      PHYSICAL EXAMINATION:  On examination, the patient is cooperative and in no distress.  Her blood pressure is 117/68.  Her visual  acuity 20/20 bilaterally.  Funduscopic exam shows sharp discs bilaterally.  Visual fields are full to confrontation.  Pupils react to light.  Eye movements are complete and conjugate.  Facies are symmetric.  There is some tenderness in the occipital region and occipital foramen bilaterally, but that does not exactly reproduce her headache.  There is no pronator drift.  Finger tapping, finger-nose-finger and heel-knee-shin are unremarkable.  Reflexes are low amplitude and symmetric.  Toes are downgoing.  Gait is normal.      ASSESSMENT:   1.  Mixed headache disorder, including tension headache and migraine.   2.  Cervicalgia.      DISCUSSION:  The patient is seen for evaluation of worsening headache.  By description, it sounds like a mix of tension headache and migraine.  There has been a lot of stress in the patient's life.  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  It has finally gotten to be too much and the patient's mother is going to be moved into assisted living in the near future.  Hopefully, this will help with some of the stress.  I had a long talk with the patient today about treatment of her headache problem.  She is not inclined to take daily preventive medication.  She is interested in an interventional medicine.  I have given her a prescription for sumatriptan 50 mg.  I did advise her that it works best if taken at the onset of the migraine with a dose repeated if necessary.  If she waits too long, the medication may prove ineffective.  I am also going to refer her to physical therapy for the cervicalgia and tension part of the headache.  She is inclined to monitor her neurologic status and follow up on an as-needed basis.  I think that is reasonable but encouraged her to keep in touch if there are ongoing problems.      This was a 35-minute appointment, more than half of which was spent in counseling and coordination of care.      OTILIO BALES  MD MARI       D: 2019   T: 2019   MT: eufemia      Name:     CONSTANTIN NUR   MRN:      [id number]        Account:      [id number]   :      1980           Service Date: 2019      Document: [id number]

## 2019-04-16 ENCOUNTER — PRE VISIT (OUTPATIENT)
Dept: OTOLARYNGOLOGY | Facility: CLINIC | Age: 39
End: 2019-04-16

## 2019-05-14 ENCOUNTER — OFFICE VISIT (OUTPATIENT)
Dept: OTOLARYNGOLOGY | Facility: CLINIC | Age: 39
End: 2019-05-14
Payer: COMMERCIAL

## 2019-05-14 VITALS — HEART RATE: 68 BPM | WEIGHT: 205 LBS | BODY MASS INDEX: 31.07 KG/M2 | RESPIRATION RATE: 15 BRPM | HEIGHT: 68 IN

## 2019-05-14 DIAGNOSIS — J35.8 TONSIL STONE: ICD-10-CM

## 2019-05-14 DIAGNOSIS — J35.8 TONSILLAR MASS: Primary | ICD-10-CM

## 2019-05-14 PROCEDURE — 88304 TISSUE EXAM BY PATHOLOGIST: CPT | Performed by: OTOLARYNGOLOGY

## 2019-05-14 RX ORDER — LIDOCAINE HYDROCHLORIDE AND EPINEPHRINE 10; 10 MG/ML; UG/ML
20 INJECTION, SOLUTION INFILTRATION; PERINEURAL ONCE
Status: COMPLETED | OUTPATIENT
Start: 2019-05-14 | End: 2019-05-14

## 2019-05-14 RX ADMIN — LIDOCAINE HYDROCHLORIDE AND EPINEPHRINE 3 ML: 10; 10 INJECTION, SOLUTION INFILTRATION; PERINEURAL at 09:57

## 2019-05-14 ASSESSMENT — MIFFLIN-ST. JEOR: SCORE: 1639.4

## 2019-05-14 ASSESSMENT — PAIN SCALES - GENERAL: PAINLEVEL: NO PAIN (0)

## 2019-05-14 NOTE — NURSING NOTE
"Chief Complaint   Patient presents with     Consult     Tonsil tumor     Pulse 68, resp. rate 15, height 1.721 m (5' 7.75\"), weight 93 kg (205 lb), not currently breastfeeding.    Nury Hobbs CMA    "

## 2019-05-14 NOTE — PROGRESS NOTES
HISTORY OF PRESENT ILLNESS: Priyanka Lundberg is a 39 year old female with a history of lifelong tonsil stones.  She may also get a little bit of tonsil cyst in the superior aspect of the tonsil on the right side.  She is worried about this being tumors or something along those lines.  This has not grown and it does not really cause her much in the way of pain.   She gets some local irritation of her tonsil stones from time-to-time.  She has no other current complaints at the present time today.      PROCEDURE:  After I got consent from the patient today, there was indeed a little bit of a nodular area that almost might be a tonsil cyst or a mucous retention cyst or something in her top of her tonsil.  I went ahead and I anesthetized this with 1% lidocaine with epinephrine.  I unroofed this area and sent it off for routine pathology.  She has two little areas that look like nodules near her tonsil.  She said it seemed to be the most superior one that was causing the biggest problem that would pooch out and that is what I biopsied today.      ASSESSMENT:  Patient with a history of tonsil cysts, and maybe some tonsil stones.       PLAN:  We are going to see her back in about six weeks.  I talked to her about the fact that gargling is very good to take care of tonsil stones, but if it got to be a huge problem, she would need to have a tonsillectomy which is a pretty big deal for someone who is above juvenile age.       FO/ms         Last 2 Scores for Patient-Answered VHI Questionnaire  No flowsheet data found.    Last 2 Scores for Patient-Answered CSI Questionnaire  No flowsheet data found.      Last 2 Scores for Patient-Answered EAT Questionnaire  No flowsheet data found.        PAST MEDICAL HISTORY:   Past Medical History:   Diagnosis Date     Abdominal pain 2011    abnormal histamine problem, multiple food allergies     ADHD (attention deficit hyperactivity disorder)     on aderol     Anxiety and depression     on  xanax     Arthritis      Breathing problem 2007    shortness of breath after eating, ?allergies     Chronic nausea      Gastroesophageal reflux disease      Heart murmur     closed by time she was 1     Hx of abnormal cervical Pap smear 2011     IBS (irritable bowel syndrome)     lots of mucus in bowel with occassional bleeding     Joint pain 2015    was to have rheumatologist     Kidney stone on right side 2010     Meniere's disease     hyperacusis     Migraines      Reduced vision      Tinnitus        PAST SURGICAL HISTORY:   Past Surgical History:   Procedure Laterality Date     COLONOSCOPY N/A 2/14/2018    Procedure: COMBINED COLONOSCOPY, SINGLE OR MULTIPLE BIOPSY/POLYPECTOMY BY BIOPSY;  colon and egd;  Surgeon: Joan Willson MD;  [facility]     ESOPHAGOSCOPY, GASTROSCOPY, DUODENOSCOPY (EGD), COMBINED N/A 2/14/2018    Procedure: COMBINED ESOPHAGOSCOPY, GASTROSCOPY, DUODENOSCOPY (EGD), BIOPSY SINGLE OR MULTIPLE;;  Surgeon: Joan Willson MD;  [facility]     NO HISTORY OF SURGERY         FAMILY HISTORY:   Family History   Problem Relation Age of Onset     Coronary Artery Disease Paternal Grandfather      Colon Cancer Paternal Grandfather      Cancer Paternal Grandfather      Coronary Artery Disease Maternal Grandmother      Cerebrovascular Disease Maternal Grandmother      Breast Cancer Maternal Grandmother      Other Cancer Maternal Grandmother         stomach, skin     Cancer Maternal Grandmother      Mental Illness Mother         bipolar, schizophrenia     Anxiety Disorder Mother      Osteoporosis Mother      Thyroid Disease Mother      Diabetes Mother      Neurofibromatosis Mother         more likely fibromyalgia     Arthritis Mother      Back Pain Mother      Osteoarthritis Mother      Obesity Mother      Cirrhosis Mother         from fatty liver. with esophageal varices, ..     Gallbladder Disease Mother         cholecystectomy     Hypertension Father      Hyperlipidemia Father      Diabetes  Father      Other Cancer Father 67        Neuroendocrine tumor, ? from gall bladder, stage 4  1/2018     Migraines Father      Scleroderma Father      Rheumatoid Arthritis Father      Obesity Father      Cancer Father      Ankylosing Spondylitis Father      Macular Degeneration Father      Prostate Cancer Maternal Grandfather      Cancer Maternal Grandfather      Mental Illness Sister         bipolar     Anxiety Disorder Sister      Asthma Sister         eczema     Neurologic Disorder Sister         Cervical Dystonia, treated with Botox     Diabetes Paternal Grandmother      Kidney Cancer Paternal Grandmother      Scleroderma Paternal Aunt      Ankylosing Spondylitis Paternal Aunt      Cancer Other      Cancer Other      Asthma Sister      Mental Illness Sister      Ovarian Cancer Maternal Aunt 68     Ankylosing Spondylitis Paternal Uncle      Glaucoma No family hx of        SOCIAL HISTORY:   Social History     Tobacco Use     Smoking status: Never Smoker     Smokeless tobacco: Never Used   Substance Use Topics     Alcohol use: Not Currently     Alcohol/week: 0.0 oz     Comment: outside of pregnancy, social       REVIEW OF SYSTEMS: Ten point review of systems was performed and is negative except for:   [facility] ROS 5/14/2019   Constitutional Weight gain, Appetite change, Unexplained fatigue, Problems with sleep   Neurology Dizzy spells, Numbness, Headache   Eyes -   Ears, Nose, Throat Ringing/noise in ears, Nasal congestion or drainage   Cardiopulmonary -   Gastrointestinal/Genitourinary Heartburn/indigestion, Unexplained nausea/vomiting   Musculoskeletal Sore or stiff joints, Back pain, Neck pain   Allergy/Immunology Allergies or hay fever   Hematologic -   Endocrine Thirst        ALLERGIES: Food; Lemon flavor; Mustard seed; Onion; Peach; Penicillins; and Vanilla    MEDICATIONS:   Current Outpatient Medications   Medication Sig Dispense Refill     Misc Natural Product Nasal (PONARIS) SOLN Apply two drops to each  nostril BID X 2 month supply 10 mL 3     multivitamin, therapeutic with minerals (MULTI-VITAMIN) TABS tablet Take 1 tablet by mouth daily       SUMAtriptan (IMITREX) 50 MG tablet Take 1 tablet (50 mg) by mouth at onset of headache for migraine May repeat in two hours if headache persists. 9 tablet 1         PHYSICAL EXAMINATION:  She  is awake, alert and in no apparent distress.    Her tympanic membranes are clear and intact bilaterally. External auditory canals are clear.  Nasal exam shows a mild septal deviation without obstruction.  Examination of the oral cavity shows no suspicious lesions.  There is symmetric movement of the tongue and soft palate.    The oropharynx is clear.  Her neck is supple without significant adenopathy.  Pulse is regular.  Upper airway is clear.  Cranial nerves II-XII are grossly intact.       PROCEDURE: A ttis.     IMPRESSION/PLAN:

## 2019-05-14 NOTE — PATIENT INSTRUCTIONS
You were seen in the ENT clinic today with Dr. Burgos    Today we took a biopsy of your tonsil. We will call you with the results of this. These can take up to a week.     Please schedule an appointment with following at your convenience:   - Follow up appointment with Dr. Burgos in 6-8 weeks.        Please call our clinic for any questions, concerns, and/or worsening symptoms.      Clinic #[phone]       Option 1 for scheduling.    Thank you for allowing us to be apart of your care!    Claire CARPENTER RNCC    If you need to reach me my direct line is: [phone]

## 2019-05-14 NOTE — LETTER
5/14/2019     RE: Priyanka Lundberg  [address] [place] [address]     Dear Colleague,    Thank you for referring your patient, Priyanka Lundberg, to the [facility] EAR NOSE AND TH[facility]AT at [facility]. Please see a copy of my visit note below.    HISTORY OF PRESENT ILLNESS: Priyanka Lundberg is a 39 year old female with a history of lifelong tonsil stones.  She may also get a little bit of tonsil cyst in the superior aspect of the tonsil on the right side.  She is worried about this being tumors or something along those lines.  This has not grown and it does not really cause her much in the way of pain.   She gets some local irritation of her tonsil stones from time-to-time.  She has no other current complaints at the present time today.      PROCEDURE:  After I got consent from the patient today, there was indeed a little bit of a nodular area that almost might be a tonsil cyst or a mucous retention cyst or something in her top of her tonsil.  I went ahead and I anesthetized this with 1% lidocaine with epinephrine.  I unroofed this area and sent it off for routine pathology.  She has two little areas that look like nodules near her tonsil.  She said it seemed to be the most superior one that was causing the biggest problem that would pooch out and that is what I biopsied today.      ASSESSMENT:  Patient with a history of tonsil cysts, and maybe some tonsil stones.       PLAN:  We are going to see her back in about six weeks.  I talked to her about the fact that gargling is very good to take care of tonsil stones, but if it got to be a huge problem, she would need to have a tonsillectomy which is a pretty big deal for someone who is above juvenile age.       FO/ms         Last 2 Scores for Patient-Answered VHI Questionnaire  No flowsheet data found.    Last 2 Scores for Patient-Answered CSI Questionnaire  No flowsheet data found.      Last 2 Scores for Patient-Answered  EAT Questionnaire  No flowsheet data found.        PAST MEDICAL HISTORY:   Past Medical History:   Diagnosis Date     Abdominal pain 2011    abnormal histamine problem, multiple food allergies     ADHD (attention deficit hyperactivity disorder)     on aderol     Anxiety and depression     on xanax     Arthritis      Breathing problem 2007    shortness of breath after eating, ?allergies     Chronic nausea      Gastroesophageal reflux disease      Heart murmur     closed by time she was 1     Hx of abnormal cervical Pap smear 2011     IBS (irritable bowel syndrome)     lots of mucus in bowel with occassional bleeding     Joint pain 2015    was to have rheumatologist     Kidney stone on right side 2010     Meniere's disease     hyperacusis     Migraines      Reduced vision      Tinnitus        PAST SURGICAL HISTORY:   Past Surgical History:   Procedure Laterality Date     COLONOSCOPY N/A 2/14/2018    Procedure: COMBINED COLONOSCOPY, SINGLE OR MULTIPLE BIOPSY/POLYPECTOMY BY BIOPSY;  colon and egd;  Surgeon: Joan Willson MD;  Location: [facility]     ESOPHAGOSCOPY, GASTROSCOPY, DUODENOSCOPY (EGD), COMBINED N/A 2/14/2018    Procedure: COMBINED ESOPHAGOSCOPY, GASTROSCOPY, DUODENOSCOPY (EGD), BIOPSY SINGLE OR MULTIPLE;;  Surgeon: Joan Willson MD;  Location: [facility] OR     NO HISTORY OF SURGERY         FAMILY HISTORY:   Family History   Problem Relation Age of Onset     Coronary Artery Disease Paternal Grandfather      Colon Cancer Paternal Grandfather      Cancer Paternal Grandfather      Coronary Artery Disease Maternal Grandmother      Cerebrovascular Disease Maternal Grandmother      Breast Cancer Maternal Grandmother      Other Cancer Maternal Grandmother         stomach, skin     Cancer Maternal Grandmother      Mental Illness Mother         bipolar, schizophrenia     Anxiety Disorder Mother      Osteoporosis Mother      Thyroid Disease Mother      Diabetes Mother      Neurofibromatosis Mother         more likely  fibromyalgia     Arthritis Mother      Back Pain Mother      Osteoarthritis Mother      Obesity Mother      Cirrhosis Mother         from fatty liver. with esophageal varices, ..     Gallbladder Disease Mother         cholecystectomy     Hypertension Father      Hyperlipidemia Father      Diabetes Father      Other Cancer Father 67        Neuroendocrine tumor, ? from gall bladder, stage 4  1/2018     Migraines Father      Scleroderma Father      Rheumatoid Arthritis Father      Obesity Father      Cancer Father      Ankylosing Spondylitis Father      Macular Degeneration Father      Prostate Cancer Maternal Grandfather      Cancer Maternal Grandfather      Mental Illness Sister         bipolar     Anxiety Disorder Sister      Asthma Sister         eczema     Neurologic Disorder Sister         Cervical Dystonia, treated with Botox     Diabetes Paternal Grandmother      Kidney Cancer Paternal Grandmother      Scleroderma Paternal Aunt      Ankylosing Spondylitis Paternal Aunt      Cancer Other      Cancer Other      Asthma Sister      Mental Illness Sister      Ovarian Cancer Maternal Aunt 68     Ankylosing Spondylitis Paternal Uncle      Glaucoma No family hx of        SOCIAL HISTORY:   Social History     Tobacco Use     Smoking status: Never Smoker     Smokeless tobacco: Never Used   Substance Use Topics     Alcohol use: Not Currently     Alcohol/week: 0.0 oz     Comment: outside of pregnancy, social       REVIEW OF SYSTEMS: Ten point review of systems was performed and is negative except for:   [facility] ROS 5/14/2019   Constitutional Weight gain, Appetite change, Unexplained fatigue, Problems with sleep   Neurology Dizzy spells, Numbness, Headache   Eyes -   Ears, Nose, Throat Ringing/noise in ears, Nasal congestion or drainage   Cardiopulmonary -   Gastrointestinal/Genitourinary Heartburn/indigestion, Unexplained nausea/vomiting   Musculoskeletal Sore or stiff joints, Back pain, Neck pain   Allergy/Immunology  Allergies or hay fever   Hematologic -   Endocrine Thirst        ALLERGIES: Food; Lemon flavor; Mustard seed; Onion; Peach; Penicillins; and Vanilla    MEDICATIONS:   Current Outpatient Medications   Medication Sig Dispense Refill     Misc Natural Product Nasal (PONARIS) SOLN Apply two drops to each nostril BID X 2 month supply 10 mL 3     multivitamin, therapeutic with minerals (MULTI-VITAMIN) TABS tablet Take 1 tablet by mouth daily       SUMAtriptan (IMITREX) 50 MG tablet Take 1 tablet (50 mg) by mouth at onset of headache for migraine May repeat in two hours if headache persists. 9 tablet 1         PHYSICAL EXAMINATION:  She  is awake, alert and in no apparent distress.    Her tympanic membranes are clear and intact bilaterally. External auditory canals are clear.  Nasal exam shows a mild septal deviation without obstruction.  Examination of the oral cavity shows no suspicious lesions.  There is symmetric movement of the tongue and soft palate.    The oropharynx is clear.  Her neck is supple without significant adenopathy.  Pulse is regular.  Upper airway is clear.  Cranial nerves II-XII are grossly intact.       PROCEDURE: A ttis.     IMPRESSION/PLAN:    Again, thank you for allowing me to participate in the care of your patient.      Sincerely,    Seng Burgos MD

## 2019-05-14 NOTE — NURSING NOTE
Procedure: Biopsy of Right Tonsil  Reason: Tonsil Stones    PREPROCEDURE:   Patient ID verified with 2 identifiers (name and  or MRN): YES   Procedure and site verified with patient/designee (when able): YES  Accurate consent documentation in medical record: YES  Marking not required.   Reason: Procedure does not require site marking.  TIME OUT:   Time-Out performed immediately prior to starting procedure, including verbal and active participation of all team members addressin. Correct patient identity: YES    2. Confirmed that the correct side and site are marked if applicable: YES     3. An accurate procedure consent form: YES   4. Agreement on the procedure to be done: YES   5. Correct patient position: YES   6. Relevant images/results are properly labeled and displayed: YES    7. The need to administer antibiotics or fluids for irrigation purposes during  procedure as applicable: YES    8. Safety precations based on patient history or medication use: YES  DURING PROCEDURE:   Verification occurs any time the responsibility for care of the patient is transferred to another member of the care team.    1. Correct person: YES   2. Site: YES   3. Procedure: YES        Patient educated on after-care expectations. Patient denies any further questions or concerns.     Claire Torres RN

## 2019-05-15 LAB — COPATH REPORT: NORMAL

## 2019-05-21 ENCOUNTER — OFFICE VISIT (OUTPATIENT)
Dept: OTOLARYNGOLOGY | Facility: CLINIC | Age: 39
End: 2019-05-21
Payer: COMMERCIAL

## 2019-05-21 DIAGNOSIS — R49.0 DYSPHONIA: Primary | ICD-10-CM

## 2019-05-21 DIAGNOSIS — R07.0 THROAT PAIN: ICD-10-CM

## 2019-05-21 NOTE — LETTER
"5/21/2019       RE: Priyanka Lundberg  [address] [place] [address]     Dear Colleague,    Thank you for referring your patient, Priyanka Lundberg, to the [facility] at [facility]. Please see a copy of my visit note below.      [facility] VOICE CLINIC  THERAPY NOTE (CPT 30866)    Patient: Priyanka Lundberg  Date of Service: 5/21/2019  Referring physician: Dr. 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  Dysphonia/discomfort is compounded by the hyperfunction and imbalanced function of the intrinsic and extrinsic laryngeal musculature  .  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  Learning management of her laryngeal hypersensitivity will be helpful whether or not she decides to proceed with surgical management with Dr. Burgos.\"    SUBJECTIVE:  Since her last session, Ms. Lundberg reports the following:     Overall she reports that symptoms are stable; this is her first therapy session      OBJECTIVE:  Ms. Lundberg presents today with the following:  BREATHING:   ? clavicular elevation on inspiration  ? clavicular muscle use pattern   ? shoulder and neck involvement  ? shallow  ? phonation is not coordinated with respiration      LARYNGEAL PALPATION:   ? firm musculature  ? tenderness of the thyrohyoid area  ? reduced thyrohyoid space   ? Discomfort extends into her left ear     VOICE:  ? Roughness: Mild  ? Breathiness: Mild  ? Strain: WNL  ? Loudness    Conversational speech:  Mildly reduced    Projected speech:  Mildly reduced    PATIENT REPORTED " MEASURES:  Patient Supplied Answers To SLP QOL Questionnaire  Therapy Quality of Life 5/20/2019   Since my l ast session, I used the speech therapy exercises and strategies as recommended by my speech pathologist. Not applicable   I feel that using my therapy techniques has become a habit Not applicable   I feel confident in my ability to manage my current and future symptoms. Neither agree nor disagree   Since my last session I feel my symptoms have --------. Stayed the same   Overall, since starting therapy I feel my symptoms are --------. About the same     THERAPEUTIC ACTIVITIES    Asked many questions about the nature of her symptoms, and I answered all of these thoroughly.    Exercises and techniques for optimal vocal hygiene including:    Systemic hydration, including strategies for increasing daily water intake    Topical hydration - Gargling (saline and plain water), saline nasal irrigation, humidification, steam, guaifenesin to reduce the thickened secretions / laryngeal irritation.    Environmental barriers to healthy voicing - noise, inhaled irritants, room acoustics    Mucosal irritation/ throat clearing reduction therapy    Suppression and substitution strategies were instructed including    Swallowing substitution techniques    Breathing suppression techniques to reduce laryngeal tension    Low impact glottic coup and soft cough    Techniques to raise awareness of habitual throat clearing  Manual laryngeal massage was performed:    Significant tenderness of the thyrohyoid space with corresponding reduction of space     Thyrohyoid space, greater horns of the hyoid, and base of tongue were targeted with gentle circular massage    Gentle lateralization of the larynx, hyoid tilt, and sternocleidomastoid massage was also performed.    Patient was trained to focus on intentional relaxation of jaw and tongue in addition to area of massage during these maneuvers.    We discussed the possibility of working  with a physical therapist for additional therapeutic exercises, as well as myofascial release.  She will consider this recommendation.    Counseling and Education:    Asked many questions about the nature of her symptoms, and I answered all of these thoroughly.    I provided handouts of today's therapeutic activities to facilitate practice.    ASSESSMENT/PLAN  PROGRESS TOWARD LONG TERM GOALS:   Minimal at this point, as this is first session, but good learning today    IMPRESSIONS:  R49.0 (Dysphonia) and R07.0 (Throat Pain)    PLAN: I will see Ms. Lundberg in 3 weeks, at which point we will focus on dysphoonia.   For practice goals see AVS.     TOTAL SERVICE TIME: 60 minutes  TREATMENT (09148): 60 minutes  NO CHARGE FACILITY FEE (62348)    Mayte Reno M.M. (voice), M.A., CCC/SLP  Speech-Language Pathologist  Certificate of Vocology  [facility]  [phone]  [email or web address]  Prounouns: she/her    Again, thank you for allowing me to participate in the care of your patient.      Sincerely,    Mayte Reno, SLP

## 2019-05-21 NOTE — PROGRESS NOTES
"  [facility] VOICE CLINIC  THERAPY NOTE (CPT 64905)    Patient: Priyanka Lundberg  Date of Service: 5/21/2019  Referring physician: Dr. 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  Dysphonia/discomfort is compounded by the hyperfunction and imbalanced function of the intrinsic and extrinsic laryngeal musculature  .  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  Learning management of her laryngeal hypersensitivity will be helpful whether or not she decides to proceed with surgical management with Dr. Burgos.\"    SUBJECTIVE:  Since her last session, Ms. Lundberg reports the following:     Overall she reports that symptoms are stable; this is her first therapy session      OBJECTIVE:  Ms. Lundberg presents today with the following:  BREATHING:   ? clavicular elevation on inspiration  ? clavicular muscle use pattern   ? shoulder and neck involvement  ? shallow  ? phonation is not coordinated with respiration      LARYNGEAL PALPATION:   ? firm musculature  ? tenderness of the thyrohyoid area  ? reduced thyrohyoid space   ? Discomfort extends into her left ear     VOICE:  ? Roughness: Mild  ? Breathiness: Mild  ? Strain: WNL  ? Loudness    Conversational speech:  Mildly reduced    Projected speech:  Mildly reduced    PATIENT REPORTED MEASURES:  Patient Supplied Answers To SLP QOL Questionnaire  Therapy Quality of Life 5/20/2019   Since my l ast session, I used the speech therapy exercises and strategies as recommended by my speech pathologist. Not applicable   I feel that using my therapy techniques has become a " habit Not applicable   I feel confident in my ability to manage my current and future symptoms. Neither agree nor disagree   Since my last session I feel my symptoms have --------. Stayed the same   Overall, since starting therapy I feel my symptoms are --------. About the same     THERAPEUTIC ACTIVITIES    Asked many questions about the nature of her symptoms, and I answered all of these thoroughly.    Exercises and techniques for optimal vocal hygiene including:    Systemic hydration, including strategies for increasing daily water intake    Topical hydration - Gargling (saline and plain water), saline nasal irrigation, humidification, steam, guaifenesin to reduce the thickened secretions / laryngeal irritation.    Environmental barriers to healthy voicing - noise, inhaled irritants, room acoustics    Mucosal irritation/ throat clearing reduction therapy    Suppression and substitution strategies were instructed including    Swallowing substitution techniques    Breathing suppression techniques to reduce laryngeal tension    Low impact glottic coup and soft cough    Techniques to raise awareness of habitual throat clearing  Manual laryngeal massage was performed:    Significant tenderness of the thyrohyoid space with corresponding reduction of space     Thyrohyoid space, greater horns of the hyoid, and base of tongue were targeted with gentle circular massage    Gentle lateralization of the larynx, hyoid tilt, and sternocleidomastoid massage was also performed.    Patient was trained to focus on intentional relaxation of jaw and tongue in addition to area of massage during these maneuvers.    We discussed the possibility of working with a physical therapist for additional therapeutic exercises, as well as myofascial release.  She will consider this recommendation.    Counseling and Education:    Asked many questions about the nature of her symptoms, and I answered all of these thoroughly.    I provided handouts of  today's therapeutic activities to facilitate practice.    ASSESSMENT/PLAN  PROGRESS TOWARD LONG TERM GOALS:   Minimal at this point, as this is first session, but good learning today    IMPRESSIONS:  R49.0 (Dysphonia) and R07.0 (Throat Pain)    PLAN: I will see Ms. Lundberg in 3 weeks, at which point we will focus on dysphoonia.   For practice goals see AVS.     TOTAL SERVICE TIME: 60 minutes  TREATMENT (50886): 60 minutes  NO CHARGE FACILITY FEE (60271)    Mayte Reno M.M. (voice), M.A., CCC/SLP  Speech-Language Pathologist  Certificate of Vocology  [facility] Voice [facility]  [phone]  [email or web address]  Prounouns: she/her

## 2019-05-28 ENCOUNTER — PATIENT OUTREACH (OUTPATIENT)
Dept: OTOLARYNGOLOGY | Facility: CLINIC | Age: 39
End: 2019-05-28

## 2019-05-28 ENCOUNTER — MYC MEDICAL ADVICE (OUTPATIENT)
Dept: FAMILY MEDICINE | Facility: CLINIC | Age: 39
End: 2019-05-28

## 2019-05-28 DIAGNOSIS — M54.42 CHRONIC BILATERAL LOW BACK PAIN WITH BILATERAL SCIATICA: ICD-10-CM

## 2019-05-28 DIAGNOSIS — M54.9 UPPER BACK PAIN, CHRONIC: ICD-10-CM

## 2019-05-28 DIAGNOSIS — M25.551 HIP PAIN, BILATERAL: ICD-10-CM

## 2019-05-28 DIAGNOSIS — G89.29 UPPER BACK PAIN, CHRONIC: ICD-10-CM

## 2019-05-28 DIAGNOSIS — M54.41 CHRONIC BILATERAL LOW BACK PAIN WITH BILATERAL SCIATICA: ICD-10-CM

## 2019-05-28 DIAGNOSIS — M25.552 HIP PAIN, BILATERAL: ICD-10-CM

## 2019-05-28 DIAGNOSIS — G89.29 CHRONIC BILATERAL LOW BACK PAIN WITH BILATERAL SCIATICA: ICD-10-CM

## 2019-05-28 NOTE — PATIENT INSTRUCTIONS
Called patient with the following biopsy results:    FINAL DIAGNOSIS:   RIGHT TONSIL, BIOPSY:   - Small fragments of benign tonsillar tissue.     Dr. Burgos recommends patient follow up in 6 weeks as previously discussed. Patient verbalizes understanding of results. Patient denies any questions or concerns.     Claire Torres RN

## 2019-05-28 NOTE — TELEPHONE ENCOUNTER
[facility] Call Center    Phone Message    May a detailed message be left on voicemail: yes    Reason for Call: Medication Refill Request    Has the patient contacted the pharmacy for the refill? Yes   Name of medication being requested: cyclobenzaprine (FLEXERIL) 10 MG tablet   Provider who prescribed the medication: Sydney Ruiz  [place] [facility] [place] [address]    Date medication is needed: Patient called and is requesting a refill on this medication. Please call her to discuss with questions/         Action Taken: Message routed to:  [facility]:

## 2019-05-28 NOTE — TELEPHONE ENCOUNTER
Patient requests refill of Flexeril, last ordered 32/19 #30 and referred to Ortho for upper back pain. Patient has not seen Ortho yet.     Routing refill request to provider for review/approval because: Drug not on the [facility] refill protocol     Ninoska Campuzano RN  05/28/19  5:40 PM

## 2019-05-29 RX ORDER — CYCLOBENZAPRINE HCL 10 MG
10 TABLET ORAL 3 TIMES DAILY PRN
Qty: 30 TABLET | Refills: 0 | Status: SHIPPED | OUTPATIENT
Start: 2019-05-29 | End: 2019-10-30

## 2019-05-31 ENCOUNTER — TELEPHONE (OUTPATIENT)
Dept: FAMILY MEDICINE | Facility: CLINIC | Age: 39
End: 2019-05-31

## 2019-05-31 NOTE — TELEPHONE ENCOUNTER
Health Call Center    Phone Message    May a detailed message be left on voicemail: yes    Reason for Call: Other: Schedule for Priyanka Archer David for well child check (new) and physicals for the other two (return). She needs something that will allow her to be out of the clinic by 9am or on Saturdays. Can the clinic assist with scheduling?     DEE DEE FIELD  [phone]  Gregorio Ramos David K.  [phone]    [phone]  Action Taken: Message routed to:  [facility]:

## 2019-06-14 NOTE — PROGRESS NOTES
Priyanka Lundberg is here for a general check up. She is not fasting. She is overdue for eye exams (glasses) and dental visits.  Wears seat belt-yes. Bike helmet- na.   Concerns today:    HCM  Priyanka is here for preventive exam. She is up to date on paps. Up to date on Tdap. She had a colonoscopy in 2018. She had an external hemorrhoid and one very small polyp removed.    She reports that her diet is not very good. She is not consistent with eating 3 meals. Often does not eat fruits or vegetables. She would like to lose some weight. No current formal exercise program but she plans to start. She has a 3-year-old son at home.    Family history of cancer  Priyanka Has a significant family history of cancers. Her father  last year of a rare neuro endocrine tumor. Her paternal grandfather had the same. Stomach cancer in a maternal grandmother. Prostate cancer in a maternal grandfather. Kidney cancer in a paternal grandmother. Colon cancer in a paternal grandfather. Ovarian cancer in a maternal aunt and liver cancer in a cousin.  She is interested in a referral to the genetics clinic.       Irregular menses  Priyanka reports a history of very heavy menstrual cycles. She sometimes passes orange sized clots and has to wear a tampon and pad, changing protection every 3 hours on the first few days of her cycle. Patient's last menstrual period was 2019 (exact date).   Cycles are typically monthly but she is currently late for her menses. After her cycle in April, she had another one within 2 weeks. This was associated with breast tenderness and nausea until she started bleeding again. She took a pregnancy test and this was negative. She reports she is only occasionally active with her . Cannot recall if condoms were used with her last encounter.    Bacterial vaginitis  Priyanka reports a history of bacterial vaginosis. She has treated this in the past with MetroGel. She would like to have a refill as she is noticing  a thin watery discharge with fishy odor. No pelvic pain or fevers, no dysuria. She is not worried about STD screening.    Upper back myalgias  Priyanka has a history tension headaches.. She carries her tension in her upper back and shoulders. She also has a history of TMJ. She had gone to physical therapy for this but found herself overwhelmed with the number of appointments that were needed. She is trying to do home exercises. Discussed using heat. She has thera cane, uses it. She has been seen by a neurologist who recommended use of sumatriptan for migraine headaches but she does not take this.    Low back pain,  She also has a history of low back pain that radiates down the right thigh to the level of the knee. Denies weakness. Symptoms wax and wane. She has gone to physical therapy in the past but again is not doing the exercises consistently. She is not interested in a referral today, feeling a bit overwhelmed with the idea of going to appointments.    Health Maintenance   Topic Date Due     PREVENTIVE CARE VISIT  1978     DEPRESSION ACTION PLAN  1978     LIPID  08/28/2017     PHQ-9  05/06/2019     MAMMO SCREENING  12/21/2019     HPV  09/08/2021     PAP  09/08/2021     DTAP/TDAP/TD IMMUNIZATION (3 - Td) 06/16/2026     ZOSTER IMMUNIZATION (1 of 2) 04/18/2028     HIV SCREENING  Completed     PHQ-2  Completed     INFLUENZA VACCINE  Completed     IPV IMMUNIZATION  Aged Out     MENINGITIS IMMUNIZATION  Aged Out         Patient Active Problem List   Diagnosis     Gastrointestinal problem     Joint pain     L4-L5 disc bulge     Attention deficit hyperactivity disorder (ADHD), combined type     Vaginal discharge     History of abnormal cervical Pap smear     Other acne     Benign nevus     History of Clostridium difficile colitis     Cervical pain     Bilateral thoracic back pain     Class 1 obesity due to excess calories without serious comorbidity with body mass index (BMI) of 30.0 to 30.9 in adult      Somatic dysfunction of pelvic region     Mixed incontinence     Dyspareunia, female     Gastroesophageal reflux disease with esophagitis     Anxiety       Past Surgical History:   Procedure Laterality Date     COLONOSCOPY N/A 2/14/2018    Procedure: COMBINED COLONOSCOPY, SINGLE OR MULTIPLE BIOPSY/POLYPECTOMY BY BIOPSY;  colon and egd;  Surgeon: Joan Willson MD;  Location: [facility] OR     ESOPHAGOSCOPY, GASTROSCOPY, DUODENOSCOPY (EGD), COMBINED N/A 2/14/2018    Procedure: COMBINED ESOPHAGOSCOPY, GASTROSCOPY, DUODENOSCOPY (EGD), BIOPSY SINGLE OR MULTIPLE;;  Surgeon: Joan Willson MD;  Location: [facility] OR     NO HISTORY OF SURGERY         Family History   Problem Relation Age of Onset     Coronary Artery Disease Paternal Grandfather      Colon Cancer Paternal Grandfather      Cancer Paternal Grandfather      Coronary Artery Disease Maternal Grandmother      Cerebrovascular Disease Maternal Grandmother      Breast Cancer Maternal Grandmother      Other Cancer Maternal Grandmother         stomach, skin     Cancer Maternal Grandmother      Mental Illness Mother         bipolar, schizophrenia     Anxiety Disorder Mother      Osteoporosis Mother      Thyroid Disease Mother      Diabetes Mother      Neurofibromatosis Mother         more likely fibromyalgia     Arthritis Mother      Back Pain Mother      Osteoarthritis Mother      Obesity Mother      Cirrhosis Mother         from fatty liver. with esophageal varices, ..     Gallbladder Disease Mother         cholecystectomy     Hypertension Father      Hyperlipidemia Father      Diabetes Father      Other Cancer Father 67        Neuroendocrine tumor, ? from gall bladder, stage 4  1/2018     Migraines Father      Scleroderma Father      Rheumatoid Arthritis Father      Obesity Father      Cancer Father      Ankylosing Spondylitis Father      Macular Degeneration Father      Prostate Cancer Maternal Grandfather      Cancer Maternal Grandfather      Mental Illness Sister          bipolar     Anxiety Disorder Sister      Asthma Sister         eczema     Neurologic Disorder Sister         Cervical Dystonia, treated with Botox     Diabetes Paternal Grandmother      Kidney Cancer Paternal Grandmother      Scleroderma Paternal Aunt      Ankylosing Spondylitis Paternal Aunt      Cancer Other      Cancer Other      Asthma Sister      Mental Illness Sister      Ovarian Cancer Maternal Aunt 68     Ankylosing Spondylitis Paternal Uncle      Glaucoma No family hx of        Social    Iona son age 3    HABITS:  Tob: none  ETOH: 1/week  Calcium: rare  Caffeine: 1/day  Exercise: no formal program    OB/GYN HISTORY:  LMP: Patient's last menstrual period was 05/16/2019 (exact date).  Usually monthly, had one episode that was at 2 wks, had nausea, breast tenderness, that resolved with menstrual  Hx abnormal pap?  remote  STD hx?  none  cycle length:  Usually monthly  dysmenorrhea/PMS:  Minimal, some PMS, 4-5 days of flow, heavy, changing protection, pad and tampon x 3 days every 3 hrs  Vasomotor sx:  none  Contraception: abstinence, condoms  G 1   P 1   A 0  Self Breast exam:  yes    Current Outpatient Medications   Medication Sig Dispense Refill     cyclobenzaprine (FLEXERIL) 10 MG tablet Take 1 tablet (10 mg) by mouth 3 times daily as needed for muscle spasms 30 tablet 0     Misc Natural Product Nasal (PONARIS) SOLN Apply two drops to each nostril BID X 2 month supply 10 mL 3     multivitamin, therapeutic with minerals (MULTI-VITAMIN) TABS tablet Take 1 tablet by mouth daily       SUMAtriptan (IMITREX) 50 MG tablet Take 1 tablet (50 mg) by mouth at onset of headache for migraine May repeat in two hours if headache persists. 9 tablet 1     Allergies   Allergen Reactions     Food      Cantaloupe, cucumbers, green beans, and peppers.      Lemon Flavor      Mustard Seed      Onion Difficulty breathing and GI Disturbance     Mitchell GI Disturbance     Penicillins Hives     Or another medication taken at  "that time     Vanilla GI Disturbance         ROS  CONSTITUTIONAL:NEGATIVE for fever, chills, change in weight, overweight  INTEGUMENTARY/SKIN: NEGATIVE for worrisome rashes, moles or lesions  EYES: NEGATIVE for vision changes or irritation, glasses  ENT/MOUTH: NEGATIVE for ear, mouth and throat problems  RESP:NEGATIVE for significant cough or SOB  BREAST: NEGATIVE for masses, tenderness or discharge  CV: NEGATIVE for chest pain, palpitations, FULLER, orthopnea, PND  or peripheral edema  GI: NEGATIVE for nausea, abdominal pain, heartburn, or change in bowel habits  :NEGATIVE for frequency, dysuria, or hematuria  GYN: vaginal discharge, fishy, as above  MUSCULOSKELETAL:myalgias of neck and upper back, tension headaches , TMJ  NEURO: NEGATIVE for weakness, dizziness or paresthesias, headaches as above  ENDOCRINE: NEGATIVE for polyuria/dipsia,  temperature intolerance, skin/hair changes  HEME/ALLERGY/IMMUNE: NEGATIVE for bleeding problems  PSYCHIATRIC: hx of situational stress, no current counseling.    EXAM  /73 (BP Location: Right arm, Patient Position: Sitting, Cuff Size: Adult Large)   Pulse 72   Temp 97.8  F (36.6  C) (Oral)   Resp 16   Ht 1.721 m (5' 7.75\")   Wt 94.5 kg (208 lb 4 oz)   LMP 05/16/2019 (Exact Date)   SpO2 97%   BMI 31.90 kg/m    GENERAL APPEARANCE: Alert, pleasant, NAD, Overweight  EYES: PERRL, EOMI, conjunctiva clear  HENT: TM normal bilaterally. Nose and mouth without lesions  NECK: no adenopathy, thyroid normal to palpation   RESP: lungs clear to auscultation bilaterally,   BREAST: normal without masses, no tenderness or nipple discharge and no palpable  axillary masses or adenopathy   CV: regular rate and rhythm, normal S1 S2, no murmur, no carotid bruits  ABDOMEN: soft, nontender, without HSM or masses. Bowel sounds normal  : Not done  RECTAL EXAM: not done   MS: point tenderness with palpation over paracervical, upper trapezius, SCM, rhomboid m bilateraly, tenderness with " palpation over SI joints, R>L upper gluteal m right greater than left  SKIN: no suspicious lesions or rashes  NEURO: Normal strength and tone, sensory exam grossly normal, DTR normoreflexive in upper and lower extremities  PSYCH: mentation appears normal. and affect normal/bright.  EXT: no peripheral edema, pedal pulses palpable    Assessment:  (Z00.00) Routine general medical examination at a health care facility  (primary encounter diagnosis)  Comment: 38 yo woman in stable health  Plan: Lipid Panel ([facility])        Anticipatory guidance given today regarding diet, exercise and calcium intake, safety. Pap and colonoscopy are up to date, vaccines are up to date    (Z80.0) Family history of colon cancer  Comment: family history of colon and other types of cancers  Plan: GENETICS REFERRAL        She has had colonoscopy last year, will give her referral to see genetics team if she would like to assess her risk for other types of cancer    (N92.6) Irregular menses  Comment: late for her current period, HCG is NEGATIVE today, pt informed  Plan: HCG Qualitative Urine ([facility]), Ferritin,         CBC with Plt ([facility]), CANCELED: CBC with         platelets        Will check iron and CBC as she describes heavy menses    (N76.0,  B96.89) Bacterial vaginosis  Comment: current symptoms  Plan: metroNIDAZOLE (METROGEL) 0.75 % vaginal gel        Refilled medication    Sydney Ruiz MD  Internal Medicine/Pediatrics

## 2019-06-22 ENCOUNTER — OFFICE VISIT (OUTPATIENT)
Dept: FAMILY MEDICINE | Facility: CLINIC | Age: 39
End: 2019-06-22
Payer: COMMERCIAL

## 2019-06-22 VITALS
DIASTOLIC BLOOD PRESSURE: 73 MMHG | TEMPERATURE: 97.8 F | OXYGEN SATURATION: 97 % | HEART RATE: 72 BPM | SYSTOLIC BLOOD PRESSURE: 110 MMHG | WEIGHT: 208.25 LBS | HEIGHT: 68 IN | RESPIRATION RATE: 16 BRPM | BODY MASS INDEX: 31.56 KG/M2

## 2019-06-22 DIAGNOSIS — B96.89 BACTERIAL VAGINOSIS: ICD-10-CM

## 2019-06-22 DIAGNOSIS — N76.0 BACTERIAL VAGINOSIS: ICD-10-CM

## 2019-06-22 DIAGNOSIS — Z00.00 ROUTINE GENERAL MEDICAL EXAMINATION AT A HEALTH CARE FACILITY: Primary | ICD-10-CM

## 2019-06-22 DIAGNOSIS — N92.6 IRREGULAR MENSES: ICD-10-CM

## 2019-06-22 DIAGNOSIS — Z80.0 FAMILY HISTORY OF COLON CANCER: ICD-10-CM

## 2019-06-22 LAB
CHOLEST SERPL-MCNC: 192 MG/DL (ref 0–200)
CHOLEST/HDLC SERPL: 3.3 {RATIO} (ref 0–5)
ERYTHROCYTE [DISTWIDTH] IN BLOOD BY AUTOMATED COUNT: 13.2 %
FASTING SPECIMEN: NO
HCG UR QL: NEGATIVE
HCT VFR BLD AUTO: 42 % (ref 35–47)
HDLC SERPL-MCNC: 59 MG/DL
HEMOGLOBIN: 13.6 G/DL (ref 11.7–15.7)
LDLC SERPL CALC-MCNC: 122 MG/DL (ref 0–129)
MCH RBC QN AUTO: 29.2 PG (ref 26.5–35)
MCHC RBC AUTO-ENTMCNC: 32.4 G/DL (ref 32–36)
MCV RBC AUTO: 90.3 FL (ref 78–100)
PLATELET # BLD AUTO: 254 K/UL (ref 150–450)
RBC # BLD AUTO: 4.65 M/UL (ref 3.8–5.2)
TRIGL SERPL-MCNC: 57 MG/DL (ref 0–150)
VLDL-CHOLESTEROL: 11 (ref 7–32)
WBC # BLD AUTO: 5.5 K/UL (ref 4–11)

## 2019-06-22 RX ORDER — ALBUTEROL SULFATE 90 UG/1
2 AEROSOL, METERED RESPIRATORY (INHALATION) EVERY 4 HOURS PRN
Qty: 1 INHALER | Refills: 3 | COMMUNITY
Start: 2019-06-22 | End: 2020-04-09

## 2019-06-22 RX ORDER — METRONIDAZOLE 7.5 MG/G
1 GEL VAGINAL AT BEDTIME
Qty: 70 G | Refills: 3 | Status: SHIPPED | OUTPATIENT
Start: 2019-06-22 | End: 2019-06-29

## 2019-06-22 ASSESSMENT — MIFFLIN-ST. JEOR: SCORE: 1654.15

## 2019-06-22 NOTE — NURSING NOTE
"39 year old  Chief Complaint   Patient presents with     Physical     Vaginal Problem     pt reports odor and irregular bleeding       Blood pressure 110/73, pulse 72, temperature 97.8  F (36.6  C), temperature source Oral, resp. rate 16, height 1.721 m (5' 7.75\"), weight 94.5 kg (208 lb 4 oz), last menstrual period 05/16/2019, SpO2 97 %, not currently breastfeeding. Body mass index is 31.9 kg/m .  Patient Active Problem List   Diagnosis     Gastrointestinal problem     Joint pain     L4-L5 disc bulge     Attention deficit hyperactivity disorder (ADHD), combined type     Vaginal discharge     History of abnormal cervical Pap smear     Other acne     Benign nevus     History of Clostridium difficile colitis     Cervical pain     Bilateral thoracic back pain     Class 1 obesity due to excess calories without serious comorbidity with body mass index (BMI) of 30.0 to 30.9 in adult     Somatic dysfunction of pelvic region     Mixed incontinence     Dyspareunia, female     Gastroesophageal reflux disease with esophagitis     Anxiety       Wt Readings from Last 3 Encounters:   06/22/19 94.5 kg (208 lb 4 oz)   05/14/19 93 kg (205 lb)   04/09/19 94.3 kg (208 lb)     BP Readings from Last 6 Encounters:   06/22/19 110/73   04/12/19 117/68   04/09/19 120/65   03/02/19 114/58   02/08/19 102/59   11/06/18 105/65       Current Outpatient Medications   Medication     cyclobenzaprine (FLEXERIL) 10 MG tablet     Misc Natural Product Nasal (PONARIS) SOLN     multivitamin, therapeutic with minerals (MULTI-VITAMIN) TABS tablet     SUMAtriptan (IMITREX) 50 MG tablet     No current facility-administered medications for this visit.        Social History     Tobacco Use     Smoking status: Never Smoker     Smokeless tobacco: Never Used   Substance Use Topics     Alcohol use: Not Currently     Alcohol/week: 0.0 oz     Comment: outside of pregnancy, social     Drug use: No       Health Maintenance Due   Topic Date Due     PREVENTIVE CARE " VISIT  1978     DEPRESSION ACTION PLAN  1978     LIPID  08/28/2017     PHQ-9  05/06/2019       Lab Results   Component Value Date    PAP NIL 09/08/2016       Anabella Price CMA  June 22, 2019 10:36 AM

## 2019-06-24 LAB — FERRITIN SERPL-MCNC: 25 NG/ML (ref 12–150)

## 2019-07-29 ENCOUNTER — OFFICE VISIT (OUTPATIENT)
Dept: FAMILY MEDICINE | Facility: CLINIC | Age: 39
End: 2019-07-29
Payer: COMMERCIAL

## 2019-07-29 VITALS
DIASTOLIC BLOOD PRESSURE: 72 MMHG | HEART RATE: 71 BPM | TEMPERATURE: 97.8 F | HEIGHT: 67 IN | SYSTOLIC BLOOD PRESSURE: 107 MMHG | BODY MASS INDEX: 32.37 KG/M2 | OXYGEN SATURATION: 96 % | WEIGHT: 206.25 LBS

## 2019-07-29 DIAGNOSIS — N64.4 MASTODYNIA: ICD-10-CM

## 2019-07-29 DIAGNOSIS — S16.1XXD STRAIN OF NECK MUSCLE, SUBSEQUENT ENCOUNTER: Primary | ICD-10-CM

## 2019-07-29 ASSESSMENT — MIFFLIN-ST. JEOR: SCORE: 1638.29

## 2019-07-29 NOTE — PATIENT INSTRUCTIONS
Physical therapy, Dr. Denis neurologist gave referral in April    Note    If you have not heard from the scheduling office within 2 business days, please call [phone] for all locations, with the exception of [place], please call [phone] and [place], please call [phone].     Please be aware that coverage of these services is subject to the terms and limitations of your health insurance plan.  Call member services at your health plan with any benefit or coverage questions.

## 2019-07-29 NOTE — PROGRESS NOTES
Priyanka Lundberg is a 39 year old female here for the following issues:    Neck pain  Priyanka is a 39 year old female here for evaluation of neck pain (R>L). Symptoms began about a year ago. There is no radiation of pain to the upper back or arms. No associated arm tingling or weakness. She is also complaining of stiffness in the muscle groups of the upper back and shoulders. She has been going to massage therapy and noted significant pain with therapist palpated directly over the muscles at the right side of her neck. She cannot recall any recent injuries. She is most concerned, however, about some enlarged lymph nodes at the right side of her neck. No associated redness, warmth or fever.     Breast pain  She has been having left breast pain that radiates to her nipple. No pain at right breast. She had a normal mammogram on 12/2018. She was then given diagnostic mammogram of the left breast with associated ultrasound. There were not suspicious findings, but she is still very concerned about the breast pain.       Patient Active Problem List   Diagnosis     Gastrointestinal problem     Joint pain     L4-L5 disc bulge     Attention deficit hyperactivity disorder (ADHD), combined type     Vaginal discharge     History of abnormal cervical Pap smear     Other acne     Benign nevus     History of Clostridium difficile colitis     Cervical pain     Bilateral thoracic back pain     Class 1 obesity due to excess calories without serious comorbidity with body mass index (BMI) of 30.0 to 30.9 in adult     Somatic dysfunction of pelvic region     Mixed incontinence     Dyspareunia, female     Gastroesophageal reflux disease with esophagitis     Anxiety       Current Outpatient Medications   Medication Sig Dispense Refill     albuterol (PROAIR HFA/PROVENTIL HFA/VENTOLIN HFA) 108 (90 Base) MCG/ACT inhaler Inhale 2 puffs into the lungs every 4 hours as needed for shortness of breath / dyspnea or wheezing 1 Inhaler 3      "multivitamin, therapeutic with minerals (MULTI-VITAMIN) TABS tablet Take 1 tablet by mouth daily       cyclobenzaprine (FLEXERIL) 10 MG tablet Take 1 tablet (10 mg) by mouth 3 times daily as needed for muscle spasms (Patient not taking: Reported on 7/29/2019) 30 tablet 0     Misc Natural Product Nasal (PONARIS) SOLN Apply two drops to each nostril BID X 2 month supply (Patient not taking: Reported on 7/29/2019) 10 mL 3     SUMAtriptan (IMITREX) 50 MG tablet Take 1 tablet (50 mg) by mouth at onset of headache for migraine May repeat in two hours if headache persists. (Patient not taking: Reported on 7/29/2019) 9 tablet 1       Allergies   Allergen Reactions     Food      Cantaloupe, cucumbers, green beans, and peppers.      Lemon Flavor      Mustard Seed      Onion Difficulty breathing and GI Disturbance     [place]han GI Disturbance     Penicillins Hives     Or another medication taken at that time     Vanilla GI Disturbance        EXAM  /72 (BP Location: Left arm, Patient Position: Sitting, Cuff Size: Adult Regular)   Pulse 71   Temp 97.8  F (36.6  C) (Oral)   Ht 1.71 m (5' 7.32\")   Wt 93.6 kg (206 lb 4 oz)   SpO2 96%   BMI 31.99 kg/m    Gen: Alert, pleasant, appears worried  Neck: No palpable lymphadenopathy, no masses. Point tenderness with palpation over SCM (R>L) Mild limited ROM with lateral rotation of neck.   MS: Point tenderness over upper trapezius and rhomboid muscle groups.     Assessment:  (S16.1XXD) Strain of neck muscle, subsequent encounter  (primary encounter diagnosis)  Comment: point tenderness with palpation over left sternocleidomastoid m. No palpable neck masses, no suspicious lesions. She follows with neurologist who gave her referral for PT in April due to tension component of headaches, she has not pursued referral  Plan: copied information from neurology clinic visit with information regarding referral to PT from that provider. She is encouraged to call for appt. Warm packs, gentle " stretches, tylenol or ibuprofen if tolerated.     (N64.4) Mastodynia  Comment: left sided breast pain with normal imagingr  Plan: BREAST CENTER REFERRAL        Referral given to breast center to address breast pain, strategies for treatment.       Sydney Ruiz MD  Internal Medicine/Pediatrics    I, Rodrigue Walker, am serving as a scribe to document services personally performed by Dr. Sydney Ruiz, based on data collection and the provider's statements to me. Dr. Ruiz has reviewed, edited and approved the above note.

## 2019-07-29 NOTE — NURSING NOTE
"39 year old  Chief Complaint   Patient presents with     Musculoskeletal Problem     collar bone is painful / sore  and swollen lymphnode x 1 yrs      Breast Pain     left breast paniful        Blood pressure 107/72, pulse 71, temperature 97.8  F (36.6  C), temperature source Oral, height 1.71 m (5' 7.32\"), weight 93.6 kg (206 lb 4 oz), SpO2 96 %, not currently breastfeeding. Body mass index is 31.99 kg/m .  Patient Active Problem List   Diagnosis     Gastrointestinal problem     Joint pain     L4-L5 disc bulge     Attention deficit hyperactivity disorder (ADHD), combined type     Vaginal discharge     History of abnormal cervical Pap smear     Other acne     Benign nevus     History of Clostridium difficile colitis     Cervical pain     Bilateral thoracic back pain     Class 1 obesity due to excess calories without serious comorbidity with body mass index (BMI) of 30.0 to 30.9 in adult     Somatic dysfunction of pelvic region     Mixed incontinence     Dyspareunia, female     Gastroesophageal reflux disease with esophagitis     Anxiety       Wt Readings from Last 2 Encounters:   07/29/19 93.6 kg (206 lb 4 oz)   06/22/19 94.5 kg (208 lb 4 oz)     BP Readings from Last 3 Encounters:   07/29/19 107/72   06/22/19 110/73   04/12/19 117/68         Current Outpatient Medications   Medication     albuterol (PROAIR HFA/PROVENTIL HFA/VENTOLIN HFA) 108 (90 Base) MCG/ACT inhaler     multivitamin, therapeutic with minerals (MULTI-VITAMIN) TABS tablet     cyclobenzaprine (FLEXERIL) 10 MG tablet     Misc Natural Product Nasal (PONARIS) SOLN     SUMAtriptan (IMITREX) 50 MG tablet     No current facility-administered medications for this visit.        Social History     Tobacco Use     Smoking status: Never Smoker     Smokeless tobacco: Never Used   Substance Use Topics     Alcohol use: Not Currently     Alcohol/week: 0.0 oz     Comment: outside of pregnancy, social     Drug use: No       Health Maintenance Due   Topic Date Due     " PHQ-9  05/06/2019       Lab Results   Component Value Date    PAP NIL 09/08/2016 July 29, 2019 4:12 PM

## 2019-10-11 ENCOUNTER — TELEPHONE (OUTPATIENT)
Dept: FAMILY MEDICINE | Facility: CLINIC | Age: 39
End: 2019-10-11

## 2019-10-11 NOTE — TELEPHONE ENCOUNTER
[facility] Call Center    Phone Message    May a detailed message be left on voicemail: yes    Reason for Call: Medication Question or concern regarding medication   Prescription Request  Name of Medication: Muscle relaxer- Back spasms  Prescribing Provider: Dr. Ruiz   Pharmacy: [facility] [address] [place], [place]   Request? Please send a Prescription, patient has thrown out her back.          Action Taken: Message routed to:  [facility] ([facility] nurse

## 2019-10-11 NOTE — TELEPHONE ENCOUNTER
"Patient states she \"is on the floor and cannot move because my back went out\". Advised patient she needs to call 911 if she is on the floor and not able to move. She is requesting Flexeril and declines advice to call 911.  She asks to speak to Dr. Ruiz. The call was reviewed with Dr. Ruiz and her recommendation is for the patient to call 911. Called patient back and relayed the message. Patient verbalized understanding.     Ninoska Campuzano RN  10/11/19  3:12 PM    "

## 2019-10-22 PROBLEM — N39.46 MIXED INCONTINENCE: Status: RESOLVED | Noted: 2018-08-08 | Resolved: 2018-12-11

## 2019-10-22 PROBLEM — M99.05 SOMATIC DYSFUNCTION OF PELVIC REGION: Status: RESOLVED | Noted: 2018-08-08 | Resolved: 2018-12-11

## 2019-10-22 PROBLEM — N94.10 DYSPAREUNIA, FEMALE: Status: RESOLVED | Noted: 2018-08-08 | Resolved: 2018-12-11

## 2019-10-24 ENCOUNTER — OFFICE VISIT (OUTPATIENT)
Dept: OTOLARYNGOLOGY | Facility: CLINIC | Age: 39
End: 2019-10-24
Payer: COMMERCIAL

## 2019-10-24 VITALS
BODY MASS INDEX: 31.65 KG/M2 | SYSTOLIC BLOOD PRESSURE: 119 MMHG | HEART RATE: 80 BPM | WEIGHT: 204 LBS | DIASTOLIC BLOOD PRESSURE: 81 MMHG

## 2019-10-24 DIAGNOSIS — J35.8 TONSILLAR CYST: Primary | ICD-10-CM

## 2019-10-24 ASSESSMENT — PAIN SCALES - GENERAL: PAINLEVEL: NO PAIN (0)

## 2019-10-24 NOTE — NURSING NOTE
Chief Complaint   Patient presents with     RECHECK     Tonsil concerns     Blood pressure 119/81, pulse 80, weight 92.5 kg (204 lb), not currently breastfeeding.    Priya Juares EMT

## 2019-10-24 NOTE — LETTER
10/24/2019        RE: Priyanka Lundberg  [address] [place] [address]     Dear Colleague,    Thank you for referring your patient, Priyanka Lundberg, to the [facility] at [facility]. Please see a copy of my visit note below.    CC: right tonsillar cyst    HPI: Patient returns to see me for right tonsillar cyst and globus sensation.  Is been about a year since she last saw me.  In the meantime she has been seen by 3 other ENTs in our clinic.  She saw Dr. Metcalf who recommends a course of speech therapy for globus sensation.  She is also seen Dr. Anderson and Dr. Burgos.   noted a cyst in the right superior tonsillar area slightly onto the soft palate.  He did unroof it and send some tissue off to pathology which came back as normal tonsillar tissue.  But the cyst still remains.  She would like to see if there is any way to get rid of it short of going to the operating room.  She does not want have a tonsillectomy.    PE:  GEN: nad  O/C: Patient has an approximately 3 mm submucosal cyst that has a whitish undersurface appearance to it.  We do have to push the right tonsillar anterior pillar in order to come to pop the cyst out.  She has a minimal gag reflex    A/P:  Patient has a right submucosal cyst.  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  I discussed with her that we could try to decompress it again in clinic.  1 of the risk is what we may not really be able to get to it or fully be able to decompress it appropriately just based on its location.  In which case then she would have to decide when that she would want to go to the operating room to have the cyst removed under general anesthesia.  Patient is willing to try again in clinic.  We will help arrange a 30-minute procedure slot for her.    I spent a total of 15 minutes face-to-face with Priyanka Lundberg during  today's office visit.  Over 50% of this time was spent counseling the patient on and/or coordinating care as documented in my assessment and plan.      Again, thank you for allowing me to participate in the care of your patient.      Sincerely,    Diana Boothe MD

## 2019-10-24 NOTE — PROGRESS NOTES
CC: right tonsillar cyst    HPI: Patient returns to see me for right tonsillar cyst and globus sensation.  Is been about a year since she last saw me.  In the meantime she has been seen by 3 other ENTs in our clinic.  She saw Dr. Metcalf who recommends a course of speech therapy for globus sensation.  She is also seen Dr. Anderson and Dr. Burgos.   noted a cyst in the right superior tonsillar area slightly onto the soft palate.  He did unroof it and send some tissue off to pathology which came back as normal tonsillar tissue.  But the cyst still remains.  She would like to see if there is any way to get rid of it short of going to the operating room.  She does not want have a tonsillectomy.    PE:  GEN: nad  O/C: Patient has an approximately 3 mm submucosal cyst that has a whitish undersurface appearance to it.  We do have to push the right tonsillar anterior pillar in order to come to pop the cyst out.  She has a minimal gag reflex    A/P:  Patient has a right submucosal cyst.  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  I discussed with her that we could try to decompress it again in clinic.  1 of the risk is what we may not really be able to get to it or fully be able to decompress it appropriately just based on its location.  In which case then she would have to decide when that she would want to go to the operating room to have the cyst removed under general anesthesia.  Patient is willing to try again in clinic.  We will help arrange a 30-minute procedure slot for her.    I spent a total of 15 minutes face-to-face with Priyanka Lundberg during today's office visit.  Over 50% of this time was spent counseling the patient on and/or coordinating care as documented in my assessment and plan.

## 2019-10-29 ENCOUNTER — OFFICE VISIT (OUTPATIENT)
Dept: NEUROLOGY | Facility: CLINIC | Age: 39
End: 2019-10-29
Payer: COMMERCIAL

## 2019-10-29 VITALS
WEIGHT: 210.5 LBS | HEIGHT: 68 IN | DIASTOLIC BLOOD PRESSURE: 70 MMHG | BODY MASS INDEX: 31.9 KG/M2 | HEART RATE: 71 BPM | SYSTOLIC BLOOD PRESSURE: 107 MMHG | OXYGEN SATURATION: 98 %

## 2019-10-29 DIAGNOSIS — M54.2 CERVICALGIA: ICD-10-CM

## 2019-10-29 DIAGNOSIS — G44.209 TENSION HEADACHE: Primary | ICD-10-CM

## 2019-10-29 PROCEDURE — 99213 OFFICE O/P EST LOW 20 MIN: CPT | Performed by: PSYCHIATRY & NEUROLOGY

## 2019-10-29 RX ORDER — IBUPROFEN 200 MG
400 TABLET ORAL EVERY 4 HOURS PRN
COMMUNITY
End: 2024-04-03

## 2019-10-29 ASSESSMENT — MIFFLIN-ST. JEOR: SCORE: 1660.38

## 2019-10-29 ASSESSMENT — PAIN SCALES - GENERAL: PAINLEVEL: MILD PAIN (3)
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
## 2019-10-29 NOTE — NURSING NOTE
"Priyanka Lundberg's goals for this visit include: return  She requests these members of her care team be copied on today's visit information:     PCP: Sydney Ruiz    Referring Provider:  No referring provider defined for this encounter.    /70   Pulse 71   Ht 1.715 m (5' 7.5\")   Wt 95.5 kg (210 lb 8 oz)   SpO2 98%   BMI 32.48 kg/m      Do you need any medication refills at today's visit? ?  "
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
## 2019-10-30 ENCOUNTER — MYC REFILL (OUTPATIENT)
Dept: NEUROLOGY | Facility: CLINIC | Age: 39
End: 2019-10-30

## 2019-10-30 ENCOUNTER — MYC REFILL (OUTPATIENT)
Dept: FAMILY MEDICINE | Facility: CLINIC | Age: 39
End: 2019-10-30

## 2019-10-30 DIAGNOSIS — M54.42 CHRONIC BILATERAL LOW BACK PAIN WITH BILATERAL SCIATICA: ICD-10-CM

## 2019-10-30 DIAGNOSIS — G89.29 CHRONIC BILATERAL LOW BACK PAIN WITH BILATERAL SCIATICA: ICD-10-CM

## 2019-10-30 DIAGNOSIS — M25.551 HIP PAIN, BILATERAL: ICD-10-CM

## 2019-10-30 DIAGNOSIS — M54.41 CHRONIC BILATERAL LOW BACK PAIN WITH BILATERAL SCIATICA: ICD-10-CM

## 2019-10-30 DIAGNOSIS — M54.9 UPPER BACK PAIN, CHRONIC: ICD-10-CM

## 2019-10-30 DIAGNOSIS — M25.552 HIP PAIN, BILATERAL: ICD-10-CM

## 2019-10-30 DIAGNOSIS — G89.29 UPPER BACK PAIN, CHRONIC: ICD-10-CM

## 2019-10-30 DIAGNOSIS — G43.009 MIGRAINE WITHOUT AURA AND WITHOUT STATUS MIGRAINOSUS, NOT INTRACTABLE: ICD-10-CM

## 2019-10-30 RX ORDER — SUMATRIPTAN 50 MG/1
50 TABLET, FILM COATED ORAL
Qty: 9 TABLET | Refills: 1 | Status: SHIPPED | OUTPATIENT
Start: 2019-10-30 | End: 2020-10-27

## 2019-10-30 NOTE — TELEPHONE ENCOUNTER
Rx Authorization:    Requested Medication/ Dose  SUMAtriptan (IMITREX) 50 MG tablet     Date last refill ordered: 04/12/19    Quantity ordered: 9    # refills: 1    Date of last clinic visit with ordering provider: 10/29/19    Date of next clinic visit with ordering provider: 12/10/19    All pertinent protocol data (lab date/result):     Include pertinent information from patients message:

## 2019-10-31 RX ORDER — CYCLOBENZAPRINE HCL 10 MG
10 TABLET ORAL 3 TIMES DAILY PRN
Qty: 30 TABLET | Refills: 0 | Status: SHIPPED | OUTPATIENT
Start: 2019-10-31 | End: 2021-06-24

## 2019-10-31 NOTE — TELEPHONE ENCOUNTER
Last Office Visit: 7/29/19  Future [facility] Appointments: none    Patient was referred to PT by neurology last April, instructed again by Dr. Ruiz to follow referral to PT.  No PT notes found in chart review.    Routing refill request to provider for review/approval because: Drug not on the [facility] refill protocol - Dr. Ruiz, do you want to continue filling?     Ninoska Campuzano RN  10/31/19  8:34 AM

## 2019-11-03 ENCOUNTER — ANCILLARY PROCEDURE (OUTPATIENT)
Dept: MRI IMAGING | Facility: CLINIC | Age: 39
End: 2019-11-03
Attending: PSYCHIATRY & NEUROLOGY
Payer: COMMERCIAL

## 2019-11-03 DIAGNOSIS — G44.209 TENSION HEADACHE: ICD-10-CM

## 2019-11-03 RX ORDER — GADOBUTROL 604.72 MG/ML
10 INJECTION INTRAVENOUS ONCE
Status: COMPLETED | OUTPATIENT
Start: 2019-11-03 | End: 2019-11-03

## 2019-11-03 RX ADMIN — GADOBUTROL 10 ML: 604.72 INJECTION INTRAVENOUS at 15:33

## 2019-11-14 ENCOUNTER — OFFICE VISIT (OUTPATIENT)
Dept: OTOLARYNGOLOGY | Facility: CLINIC | Age: 39
End: 2019-11-14
Payer: COMMERCIAL

## 2019-11-14 ENCOUNTER — NURSE TRIAGE (OUTPATIENT)
Dept: NURSING | Facility: CLINIC | Age: 39
End: 2019-11-14

## 2019-11-14 VITALS — BODY MASS INDEX: 31.52 KG/M2 | WEIGHT: 208 LBS | HEIGHT: 68 IN

## 2019-11-14 DIAGNOSIS — J35.8 TONSILLAR CYST: ICD-10-CM

## 2019-11-14 DIAGNOSIS — J35.8 TONSIL STONE: Primary | ICD-10-CM

## 2019-11-14 DIAGNOSIS — J35.8 MUCOCELE OF TONSIL: ICD-10-CM

## 2019-11-14 PROCEDURE — 87102 FUNGUS ISOLATION CULTURE: CPT | Performed by: OTOLARYNGOLOGY

## 2019-11-14 PROCEDURE — 87070 CULTURE OTHR SPECIMN AEROBIC: CPT | Performed by: OTOLARYNGOLOGY

## 2019-11-14 PROCEDURE — 88305 TISSUE EXAM BY PATHOLOGIST: CPT | Performed by: OTOLARYNGOLOGY

## 2019-11-14 RX ORDER — LIDOCAINE HYDROCHLORIDE AND EPINEPHRINE 10; 10 MG/ML; UG/ML
20 INJECTION, SOLUTION INFILTRATION; PERINEURAL ONCE
Status: COMPLETED | OUTPATIENT
Start: 2019-11-14 | End: 2019-11-14

## 2019-11-14 RX ORDER — CHLORHEXIDINE GLUCONATE ORAL RINSE 1.2 MG/ML
15 SOLUTION DENTAL 3 TIMES DAILY
Qty: 473 ML | Refills: 0 | Status: SHIPPED | OUTPATIENT
Start: 2019-11-14 | End: 2020-10-27

## 2019-11-14 RX ADMIN — LIDOCAINE HYDROCHLORIDE AND EPINEPHRINE 1 ML: 10; 10 INJECTION, SOLUTION INFILTRATION; PERINEURAL at 08:58

## 2019-11-14 ASSESSMENT — PAIN SCALES - GENERAL: PAINLEVEL: NO PAIN (0)

## 2019-11-14 ASSESSMENT — MIFFLIN-ST. JEOR: SCORE: 1649.04

## 2019-11-14 NOTE — NURSING NOTE
"Chief Complaint   Patient presents with     Procedure     Drain cyst     Height 1.715 m (5' 7.5\"), weight 94.3 kg (208 lb), not currently breastfeeding.    Priya Juares, EMT    "

## 2019-11-14 NOTE — PATIENT INSTRUCTIONS
Thank you for choosing  [facility]  Please follow up with Dr. Boothe in approximately 1 week.    [phone] appointment scheduling option 1 and nurse advice option 3.

## 2019-11-14 NOTE — TELEPHONE ENCOUNTER
Pt was seen in clinic today 11/14/2019.    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       Pt was provided a script for mouth wash.   But no pain medication.   Pt calling back now in severe pain.  It hurts to talk, swallow, cry.  Pt can not stand the pain.   The pain is so bad it is radiating into her teeth and jaw area.   Pt asking for some pain medication to be sent to the pharmacy for Pt care.  [facility] pharmacy is the Pt's pharmacy.       Plan of Care  Called the back line to the clinic.     Per Dr. Boothe, would like the Pt to take 600 Mg's (3 Tabs) Ibuprofen/Advi, 4 times a day for pain.     Gave the information to the Pt over the phone.   Also mentioned to go back to the pharmacy when the chlorhexidine (PERIDEX) 0.12 % solution was ready to be picked up at the pharmacy for Pt care.       Try both of these med's for a couple of days to see how the pain is.    Call back if new or worsen symptoms arise.    Jade Mcintyre RN  Central Triage Red Flags/Med Refills

## 2019-11-14 NOTE — LETTER
11/14/2019       RE: Priyanka Lundberg  [address] [place] [address]     Dear Colleague,    Thank you for referring your patient, Priyanka Lundberg, to the [facility]. Please see a copy of my visit note below.    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    Findings: I had a small exploration of this area.  There is some normal-looking mayonnaise salivary glands.  They did appear to be one impacted and inflamed minor salivary gland I removed.  Additionally while doing this several tonsil stones did express out from around the superior tonsillar pole.    Description: Written and informed consent was obtained prior to the procedure.  I topically numb the patient with 4% lidocaine.  We then injected about 0.8 mL of 1% lidocaine with epinephrine in the area.  I then used a 4mm punch biopsy to make a mucosal incision.  I unroofed the mucosa.  We removed some minor salivary glands that were immediately underneath.  I was then able to see a whitish mass that looked to be an inflamed minor salivary gland and this was removed.  While doing this several tonsil stones were expressed.  I obtained hemostasis with silver nitrate.    Assessment & Plan:  We will send the tissue off for pathology.  I also took a swab of the area with and we will send this off to microbiology.  However my concern for a infection seems quite low at this time.  I will have the patient to rinse her mouth with some Peridex 3 times a day until she sees me next.  I will see  her back in 1 week for review of the pathology as well as wound healing.      Again, thank you for allowing me to participate in the care of your patient.      Sincerely,    Diana Boothe MD

## 2019-11-14 NOTE — PROGRESS NOTES
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    Findings: I had a small exploration of this area.  There is some normal-looking mayonnaise salivary glands.  They did appear to be one impacted and inflamed minor salivary gland I removed.  Additionally while doing this several tonsil stones did express out from around the superior tonsillar pole.    Description: Written and informed consent was obtained prior to the procedure.  I topically numb the patient with 4% lidocaine.  We then injected about 0.8 mL of 1% lidocaine with epinephrine in the area.  I then used a 4mm punch biopsy to make a mucosal incision.  I unroofed the mucosa.  We removed some minor salivary glands that were immediately underneath.  I was then able to see a whitish mass that looked to be an inflamed minor salivary gland and this was removed.  While doing this several tonsil stones were expressed.  I obtained hemostasis with silver nitrate.    Assessment & Plan:  We will send the tissue off for pathology.  I also took a swab of the area with and we will send this off to microbiology.  However my concern for a infection seems quite low at this time.  I will have the patient to rinse her mouth with some Peridex 3 times a day until she sees me next.  I will see her back in 1 week for review of the pathology as well as wound healing.

## 2019-11-14 NOTE — NURSING NOTE
Invasive Procedure Safety Checklist  Procedure:  Drainage of tonsil mucocele    Responsible person(s):  Complete sections as appropriate and electronically sign and date below.    Staff/Provider  Consent documentation on chart:  YES  H&P is not applicable (when straight local anesthesia is used).    Procedure Team  Completed by comparing informed consent documentation, information on the patient record and/or the marked surgical site, and discussion with the patient/guardian.     Verified:  (Select all that apply)  Patient identification (two indicators)  Procedure to be performed  Procedure site and /or laterality and/or level  Consent  Procedure site:  Site can not be marked due to location.  Provider Echeverria - Site/Laterality/Level:  No Level or Structure  Staff/Provider:  No images    Procedure Team:  *Pause for the Cause* verbal and active participation of team members- verify:  Patient name:  YES  Procedure to be performed:  YES  Site, laterality and level, noting patient position:  YES    Above steps completed as applicable (Electronic Signature, Title, Date):    DUARTE Easton    Note:  Any incidents of wrong patient, wrong procedure, or wrong site are reported using the Occurrence Process already in place.  The occurrence form is required to be completed immediately with this type of event.

## 2019-11-16 ENCOUNTER — NURSE TRIAGE (OUTPATIENT)
Dept: NURSING | Facility: CLINIC | Age: 39
End: 2019-11-16

## 2019-11-16 LAB
BACTERIA SPEC CULT: NORMAL
SPECIMEN SOURCE: NORMAL

## 2019-11-16 NOTE — TELEPHONE ENCOUNTER
"Patient is report right sided pain that radiates to her hip and also radiates to right rib and back.  Patient says it has been on-going for a week.  Patient reports burning sensation is not new but seems a bit worse.  Patient reports moderate level of pain.  Reviewed care advice with caller per RN triage protocol to be seen within 24 hours.  FNA answered her questions re: urgent care vs ED visits.  FNA advised to call back with any concerns.   Caller verbalized understanding.      Reason for Disposition    [1] MODERATE pain (e.g., interferes with normal activities) AND [2] pain comes and goes (cramps) AND [3] present > 24 hours  (Exception: pain with Vomiting or Diarrhea - see that Guideline)    Additional Information    Negative: Shock suspected (e.g., cold/pale/clammy skin, too weak to stand, low BP, rapid pulse)    Negative: Passed out (i.e., lost consciousness, collapsed and was not responding)    Negative: Difficult to awaken or acting confused (e.g., disoriented, slurred speech)    Negative: Sounds like a life-threatening emergency to the triager    Negative: Chest pain    Negative: Pain is mainly in upper abdomen  (if needed ask: \"is it mainly above the belly button?\")    Negative: Followed an abdomen (stomach) injury    Negative: [1] Abdominal pain AND [2] pregnant < 20 weeks    Negative: [1] Abdominal pain AND [2] pregnant > 20 weeks    Negative: [1] Abdominal pain AND [2] postpartum < 1 month since delivery    Negative: [1] SEVERE pain (e.g., excruciating) AND [2] present > 1 hour    Negative: [1] SEVERE pain AND [2] age > 60    Negative: [1] Vomiting AND [2] contains red blood or black (\"coffee ground\") material  (Exception: few red streaks in vomit that only happened once)    Negative: Blood in bowel movements   (Exception: blood on surface of BM with constipation)    Negative: Black or tarry bowel movements  (Exception: chronic-unchanged  black-grey bowel movements AND is taking iron pills or " Pepto-bismol)    Negative: Patient sounds very sick or weak to the triager    Negative: [1] MILD-MODERATE pain AND [2] constant AND [3] present > 2 hours    Negative: [1] Vomiting AND [2] abdomen looks much more swollen than usual    Negative: [1] Vomiting AND [2] contains bile (green color)    Negative: White of the eyes have turned yellow (i.e., jaundice)    Negative: Fever > 103 F (39.4 C)    Negative: [1] Fever > 101 F (38.3 C) AND [2] age > 60    Negative: [1] Fever > 100.0 F (37.8 C) AND [2] bedridden (e.g., nursing home patient, CVA, chronic illness, recovering from surgery)    Negative: [1] Fever > 100.0 F (37.8 C) AND [2] diabetes mellitus or weak immune system (e.g., HIV positive, cancer chemo, splenectomy, chronic steroids)    Negative: [1] SEVERE pain AND [2] present < 1 hour    Protocols used: ABDOMINAL PAIN - FEMALE-A-AH

## 2019-11-17 LAB — COPATH REPORT: NORMAL

## 2019-11-18 ENCOUNTER — OFFICE VISIT (OUTPATIENT)
Dept: OTOLARYNGOLOGY | Facility: CLINIC | Age: 39
End: 2019-11-18
Payer: COMMERCIAL

## 2019-11-18 DIAGNOSIS — R49.0 DYSPHONIA: Primary | ICD-10-CM

## 2019-11-18 DIAGNOSIS — R07.0 THROAT PAIN: ICD-10-CM

## 2019-11-18 NOTE — LETTER
"11/18/2019       RE: Priyanka Lundberg  [address] [place] [address]     Dear Colleague,    Thank you for referring your patient, Priyanka Lundberg, to the [facility]. Please see a copy of my visit note below.      [facility] VOICE CLINIC  THERAPY NOTE (CPT 01647)    Patient: Priyanka Lundberg  Date of Service: 11/18/2019  Referring physician: Dr. 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  Dysphonia/discomfort is compounded by the hyperfunction and imbalanced function of the intrinsic and extrinsic laryngeal musculature  .  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  Learning management of her laryngeal hypersensitivity will be helpful whether or not she decides to proceed with surgical management with Dr. Burgos.\"    She was last seen on 5/21/2019 for therapy.  At that time we focused on improving her vocal hygiene, reducing mucosal irritation/throat clearing, and laryngeal massage in order to improve her symptoms.  She reports that just recently on 11/14/2019 she met with Dr. Boothe who completed a procedure to remove tonsil stones present in 1 of her palate team tonsils.      SUBJECTIVE:  Since her last session, Ms. 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    Her neck discomfort is worse.  She is experiencing frequent " "headaches and overall fatigue.      Completed an MRI because of her headaches and she feels constant drainage.    She has a history of TMJ - waking with jaw locked.  Right side tension is worse.      Gargles have been helpful; feels like there are less stones.    Singing and talking - throat is exhausted by the end of the day and her singing is \"warbly\"     Always seems that she does not have a strong voice; people tend to tune out while listening to her speak.    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 Her mother has moved to an assisted living facility, which she visits once each week.  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    OBJECTIVE:  Ms. Lundberg presents today with the following:  BREATHING:   ? clavicular elevation on inspiration  ? clavicular muscle use pattern   ? shoulder and neck involvement  ? shallow  ? phonation is not coordinated with respiration      LARYNGEAL PALPATION:   ? firm musculature  ? tenderness of the thyrohyoid area  ? reduced thyrohyoid space   ? Discomfort extends into her left ear     VOICE:  ? Roughness: Mild  ? Breathiness: Mild  ? Strain: WNL  ? Loudness    Conversational speech:  Mildly reduced    Projected speech:  Mildly reduced      PATIENT REPORTED MEASURES:  Patient Supplied Answers To SLP QOL Questionnaire  Therapy Quality of Life 11/18/2019   Since my l ast session, I used the speech therapy exercises and strategies as recommended by my speech pathologist. Agree   I feel that using my therapy techniques has become a habit Disagree   I feel confident in my ability to manage my current and future symptoms. Disagree   Since my last session I feel my symptoms have --------. Stayed the same   Overall, since starting therapy I feel my symptoms are --------. About the same     Speech follow up as discussed with patient:  Dysponia SLP Goals " "11/18/2019   How would you rate your speaking voice quality, if 0 is worst voice quality, and 10 is best voice? 3   How would you rate your singing voice quality, if 0 is worst voice quality, and 10 is best voice? 5   How much effort is it to speak, if 0 is no extra effort and 10 is maximum effort? 7   How much effort is it to sing, if 0 is no extra effort and 10 is maximum effort? 9   How how severe is your [Throat Discomfort - or use patient specific description], if 0 is no [discomfort] at all and 10 is the worst [discomfort]? 5   How severe is your cough /throat clearing, if 0 is no cough at all and 10 is the worst cough? 2   How well are you able to be heard, if 0 is \"people can never hear me\" and 10 is \"people can always hear me\"? 5   How would you rate your breathing, if 0 is the worst and 10 is the best? 3   How would you rate your swallowing, if 0 is the worst and 10 is the best? 8   How much does your voice problem bother you? Quite a bit   How much does your swallowing problem bother you?      A little bit   How much does your cough/throat-clearing problem bother you?            A little bit   How much does your breathing problem bother you?         Somewhat   How much does your throat discomfort bother you?     Quite a bit       THERAPEUTIC ACTIVITIES  Exercises to promote optimal respiratory mechanics    I provided explanation of the anatomy and physiology of respiration for speech and singing; she found this to be helpful    she demonstrated clavicular/neck/shoulder involvement in inhalation    Demonstrated difficulty allowing abdominal relaxation for inhalation    Practiced in a prone and supine position on the massage table, with tactile cue of a hand on the low rib-cage to facilitate awareness of low respiratory engagement.  Progressed to seated positi8 today.    With clinician support, patient was able to demonstrate improved abdominal relaxation and engagement on inhalation    Optimal exhalation " "using inward engagement of the abdominal wall with no corresponding collapse of the upper chest cavity was trained using the pulsed \"sh\" task    [place] a respiratory pacing exercise; this was helpful    acceptable improvement in airflow and respiratory mechanics    Semi-Occluded Vocal Tract (SOVT) exercises instructed to reduce laryngeal tension, promote vocal fold pliability, and coordinate respiration and phonation    Straw phonation with water resistance was found to be most facilitating     Sustained phonation, and voice vs. voiceless productions used to promote easy voicing and raise awareness of laryngeal tension    Ascending and descending glides utilized to promote vocal fold pliability    \"Messa di voce\", gradual crescendo and decrescendo to vary medial compression was also utilized to promote vocal fold pliability.    Instructed on the importance of using these exercises as a warm-up / cool down,  and to re-calibrate the voice throughout the day.    Counseling and Education:    Asked many questions about the nature of her symptoms, and I answered all of these thoroughly.    I provided an after visit summary and handouts of today's therapeutic activities to facilitate practice.    ASSESSMENT/PLAN  PROGRESS TOWARD LONG TERM GOALS:   Adequate progress; too early for objective measures    IMPRESSIONS:  R49.0 (Dysphonia) and R07.0 (Throat Pain)    PLAN: I will see Ms. Lundberg when she is able to schedule in the new year.  For practice goals see AVS.     TOTAL SERVICE TIME: 60 minutes  TREATMENT (09379): 60 minutes  NO CHARGE FACILITY FEE (79752)    Mayte Reno M.M. (voice), M.A., CCC/SLP  Speech-Language Pathologist  Certificate of Vocology  [facility]  [phone]  [email or web address]  Prounouns: she/her    Again, thank you for allowing me to participate in the care of your patient.      Sincerely,    Mayte Reno, SLP      "

## 2019-11-18 NOTE — LETTER
"11/18/2019      RE: Priyanka Lundberg  [address] [place] [address]       [facility] VOICE CLINIC  THERAPY NOTE (CPT 45966)    Patient: Priyanka Lundberg  Date of Service: 11/18/2019  Referring physician: Dr. 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  Dysphonia/discomfort is compounded by the hyperfunction and imbalanced function of the intrinsic and extrinsic laryngeal musculature  .  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  Learning management of her laryngeal hypersensitivity will be helpful whether or not she decides to proceed with surgical management with Dr. Burgos.\"    She was last seen on 5/21/2019 for therapy.  At that time we focused on improving her vocal hygiene, reducing mucosal irritation/throat clearing, and laryngeal massage in order to improve her symptoms.  She reports that just recently on 11/14/2019 she met with Dr. Boothe who completed a procedure to remove tonsil stones present in 1 of her palate team tonsils.      SUBJECTIVE:  Since her last session, Ms. 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    Her neck discomfort is worse.  She is experiencing frequent headaches and overall fatigue.      Completed an MRI because of her headaches and she feels constant drainage.    She has a history of TMJ - waking with jaw locked.  Right side tension is worse.  " "    Gargles have been helpful; feels like there are less stones.    Singing and talking - throat is exhausted by the end of the day and her singing is \"warbly\"     Always seems that she does not have a strong voice; people tend to tune out while listening to her speak.    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 Her mother has moved to an assisted living facility, which she visits once each week.  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    OBJECTIVE:  Ms. Lundberg presents today with the following:  BREATHING:   ? clavicular elevation on inspiration  ? clavicular muscle use pattern   ? shoulder and neck involvement  ? shallow  ? phonation is not coordinated with respiration      LARYNGEAL PALPATION:   ? firm musculature  ? tenderness of the thyrohyoid area  ? reduced thyrohyoid space   ? Discomfort extends into her left ear     VOICE:  ? Roughness: Mild  ? Breathiness: Mild  ? Strain: WNL  ? Loudness    Conversational speech:  Mildly reduced    Projected speech:  Mildly reduced      PATIENT REPORTED MEASURES:  Patient Supplied Answers To SLP QOL Questionnaire  Therapy Quality of Life 11/18/2019   Since my l ast session, I used the speech therapy exercises and strategies as recommended by my speech pathologist. Agree   I feel that using my therapy techniques has become a habit Disagree   I feel confident in my ability to manage my current and future symptoms. Disagree   Since my last session I feel my symptoms have --------. Stayed the same   Overall, since starting therapy I feel my symptoms are --------. About the same     Speech follow up as discussed with patient:  Dysponia SLP Goals 11/18/2019   How would you rate your speaking voice quality, if 0 is worst voice quality, and 10 is best voice? 3   How would you rate your singing voice quality, if 0 is worst voice quality, and 10 is " "best voice? 5   How much effort is it to speak, if 0 is no extra effort and 10 is maximum effort? 7   How much effort is it to sing, if 0 is no extra effort and 10 is maximum effort? 9   How how severe is your [Throat Discomfort - or use patient specific description], if 0 is no [discomfort] at all and 10 is the worst [discomfort]? 5   How severe is your cough /throat clearing, if 0 is no cough at all and 10 is the worst cough? 2   How well are you able to be heard, if 0 is \"people can never hear me\" and 10 is \"people can always hear me\"? 5   How would you rate your breathing, if 0 is the worst and 10 is the best? 3   How would you rate your swallowing, if 0 is the worst and 10 is the best? 8   How much does your voice problem bother you? Quite a bit   How much does your swallowing problem bother you?      A little bit   How much does your cough/throat-clearing problem bother you?            A little bit   How much does your breathing problem bother you?         Somewhat   How much does your throat discomfort bother you?     Quite a bit       THERAPEUTIC ACTIVITIES  Exercises to promote optimal respiratory mechanics    I provided explanation of the anatomy and physiology of respiration for speech and singing; she found this to be helpful    she demonstrated clavicular/neck/shoulder involvement in inhalation    Demonstrated difficulty allowing abdominal relaxation for inhalation    Practiced in a prone and supine position on the massage table, with tactile cue of a hand on the low rib-cage to facilitate awareness of low respiratory engagement.  Progressed to seated positi8 today.    With clinician support, patient was able to demonstrate improved abdominal relaxation and engagement on inhalation    Optimal exhalation using inward engagement of the abdominal wall with no corresponding collapse of the upper chest cavity was trained using the pulsed \"sh\" task    [place] a respiratory pacing exercise; this was " "helpful    acceptable improvement in airflow and respiratory mechanics    Semi-Occluded Vocal Tract (SOVT) exercises instructed to reduce laryngeal tension, promote vocal fold pliability, and coordinate respiration and phonation    Straw phonation with water resistance was found to be most facilitating     Sustained phonation, and voice vs. voiceless productions used to promote easy voicing and raise awareness of laryngeal tension    Ascending and descending glides utilized to promote vocal fold pliability    \"Messa di voce\", gradual crescendo and decrescendo to vary medial compression was also utilized to promote vocal fold pliability.    Instructed on the importance of using these exercises as a warm-up / cool down,  and to re-calibrate the voice throughout the day.    Counseling and Education:    Asked many questions about the nature of her symptoms, and I answered all of these thoroughly.    I provided an after visit summary and handouts of today's therapeutic activities to facilitate practice.    ASSESSMENT/PLAN  PROGRESS TOWARD LONG TERM GOALS:   Adequate progress; too early for objective measures    IMPRESSIONS:  R49.0 (Dysphonia) and R07.0 (Throat Pain)    PLAN: I will see Ms. Lundberg when she is able to schedule in the new year.  For practice goals see AVS.     TOTAL SERVICE TIME: 60 minutes  TREATMENT (81879): 60 minutes  NO CHARGE FACILITY FEE (41918)    Mayte Reno M.M. (voice) M.A., CCC/SLP  Speech-Language Pathologist  Certificate of Vocology  [facility]  [phone]  [email or web address]  Prounouns: she/her        "

## 2019-11-18 NOTE — PROGRESS NOTES
"  [facility] VOICE CLINIC  THERAPY NOTE (CPT 74562)    Patient: Priyanka Lundberg  Date of Service: 11/18/2019  Referring physician: Dr. 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  Dysphonia/discomfort is compounded by the hyperfunction and imbalanced function of the intrinsic and extrinsic laryngeal musculature  .  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  Learning management of her laryngeal hypersensitivity will be helpful whether or not she decides to proceed with surgical management with Dr. Burgos.\"    She was last seen on 5/21/2019 for therapy.  At that time we focused on improving her vocal hygiene, reducing mucosal irritation/throat clearing, and laryngeal massage in order to improve her symptoms.  She reports that just recently on 11/14/2019 she met with Dr. Boothe who completed a procedure to remove tonsil stones present in 1 of her palate team tonsils.      SUBJECTIVE:  Since her last session, Ms. 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    Her neck discomfort is worse.  She is experiencing frequent headaches and overall fatigue.      Completed an MRI because of her headaches and she feels constant drainage.    She has a history of TMJ - waking with jaw locked.  Right side tension is worse.      Gargles have been helpful; feels like there are less stones.    Singing and talking - " "throat is exhausted by the end of the day and her singing is \"warbly\"     Always seems that she does not have a strong voice; people tend to tune out while listening to her speak.    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 Her mother has moved to an assisted living facility, which she visits once each week.  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    OBJECTIVE:  Ms. Lundberg presents today with the following:  BREATHING:   ? clavicular elevation on inspiration  ? clavicular muscle use pattern   ? shoulder and neck involvement  ? shallow  ? phonation is not coordinated with respiration      LARYNGEAL PALPATION:   ? firm musculature  ? tenderness of the thyrohyoid area  ? reduced thyrohyoid space   ? Discomfort extends into her left ear     VOICE:  ? Roughness: Mild  ? Breathiness: Mild  ? Strain: WNL  ? Loudness    Conversational speech:  Mildly reduced    Projected speech:  Mildly reduced      PATIENT REPORTED MEASURES:  Patient Supplied Answers To SLP QOL Questionnaire  Therapy Quality of Life 11/18/2019   Since my l ast session, I used the speech therapy exercises and strategies as recommended by my speech pathologist. Agree   I feel that using my therapy techniques has become a habit Disagree   I feel confident in my ability to manage my current and future symptoms. Disagree   Since my last session I feel my symptoms have --------. Stayed the same   Overall, since starting therapy I feel my symptoms are --------. About the same     Speech follow up as discussed with patient:  Dysponia SLP Goals 11/18/2019   How would you rate your speaking voice quality, if 0 is worst voice quality, and 10 is best voice? 3   How would you rate your singing voice quality, if 0 is worst voice quality, and 10 is best voice? 5   How much effort is it to speak, if 0 is no extra effort and 10 is maximum " "effort? 7   How much effort is it to sing, if 0 is no extra effort and 10 is maximum effort? 9   How how severe is your [Throat Discomfort - or use patient specific description], if 0 is no [discomfort] at all and 10 is the worst [discomfort]? 5   How severe is your cough /throat clearing, if 0 is no cough at all and 10 is the worst cough? 2   How well are you able to be heard, if 0 is \"people can never hear me\" and 10 is \"people can always hear me\"? 5   How would you rate your breathing, if 0 is the worst and 10 is the best? 3   How would you rate your swallowing, if 0 is the worst and 10 is the best? 8   How much does your voice problem bother you? Quite a bit   How much does your swallowing problem bother you?      A little bit   How much does your cough/throat-clearing problem bother you?            A little bit   How much does your breathing problem bother you?         Somewhat   How much does your throat discomfort bother you?     Quite a bit       THERAPEUTIC ACTIVITIES  Exercises to promote optimal respiratory mechanics    I provided explanation of the anatomy and physiology of respiration for speech and singing; she found this to be helpful    she demonstrated clavicular/neck/shoulder involvement in inhalation    Demonstrated difficulty allowing abdominal relaxation for inhalation    Practiced in a prone and supine position on the massage table, with tactile cue of a hand on the low rib-cage to facilitate awareness of low respiratory engagement.  Progressed to seated positi8 today.    With clinician support, patient was able to demonstrate improved abdominal relaxation and engagement on inhalation    Optimal exhalation using inward engagement of the abdominal wall with no corresponding collapse of the upper chest cavity was trained using the pulsed \"sh\" task    [place] a respiratory pacing exercise; this was helpful    acceptable improvement in airflow and respiratory mechanics    Semi-Occluded Vocal Tract " "(SOVT) exercises instructed to reduce laryngeal tension, promote vocal fold pliability, and coordinate respiration and phonation    Straw phonation with water resistance was found to be most facilitating     Sustained phonation, and voice vs. voiceless productions used to promote easy voicing and raise awareness of laryngeal tension    Ascending and descending glides utilized to promote vocal fold pliability    \"Messa di voce\", gradual crescendo and decrescendo to vary medial compression was also utilized to promote vocal fold pliability.    Instructed on the importance of using these exercises as a warm-up / cool down,  and to re-calibrate the voice throughout the day.    Counseling and Education:    Asked many questions about the nature of her symptoms, and I answered all of these thoroughly.    I provided an after visit summary and handouts of today's therapeutic activities to facilitate practice.    ASSESSMENT/PLAN  PROGRESS TOWARD LONG TERM GOALS:   Adequate progress; too early for objective measures    IMPRESSIONS:  R49.0 (Dysphonia) and R07.0 (Throat Pain)    PLAN: I will see Ms. Lundberg when she is able to schedule in the new year.  For practice goals see AVS.     TOTAL SERVICE TIME: 60 minutes  TREATMENT (61988): 60 minutes  NO CHARGE FACILITY FEE (22210)    Mayte Reno M.M. (voice), M.A., CCC/SLP  Speech-Language Pathologist  Certificate of Vocology  [facility]  [phone]  [email or web address]  Prounouns: she/her  "

## 2019-11-18 NOTE — PATIENT INSTRUCTIONS
After Visit Summary    Patient: Priyanka Lundberg  Date of Visit: 2019    Breathing:    In the morning and evening (twice daily) for 2-5 minutes:   o Breathe while lying on your back with your face and knees up. Hands on tummy and chest.  Take a breath in with rounded lips and exhale with a  shhhhh  (steady sh)   o Inhale  = Inflate; exhale = deflate  o 3x each: try breathin in/8 out, 3/4  o Throughout the day (2-3x/day for just a couple minutes) check breathing while keeping shoulders relaxed (riding to and from school, etc.)      Breathing Tips:  ? Breathe before all speech  ? Keep shoulders down and chest relaxed  ? Practice with arms behind the back, if helpful    Voice (2-3x/day unless otherwise noted):    Semi-occluded vocal tract exercise:   o Bubbles (straw in 1 to 1.5  of water) 4x/day for 30-60 seconds:  o 3x: blow bubbles and add a sustained  who  or an  oo  (comfortable pitch)  o 3x: blow bubbles and vary  who  gliding up and down             Up and down like a sine wave  o 3x: blow bubbles on a sustained/ varied pitch soft to loud to soft (messa di voce)    o 1-2x: Happy birthday bubbles (keep connected)  These exercises are great for:    *Instructed on the importance of using these exercises as a warm-up / cool down,  and to re-calibrate the voice throughout the day.    *tissue mobilization exercise - Improving the condition and pliability of the vocal folds.    *Abdominal breathing and applying optimal breath flow to speech/singing.       Counting exercise (2x/day)  o 1, 1-2, 1-2-3 up to 20, if possible    Mayte Reno M.M. (voice), MBurkeA., CCC/SLP  Speech-Language Pathologist  Certificate of Vocology  [facility]  [phone]  [email or web address]  Prounouns: she/her

## 2019-11-19 ENCOUNTER — THERAPY VISIT (OUTPATIENT)
Dept: PHYSICAL THERAPY | Facility: CLINIC | Age: 39
End: 2019-11-19
Payer: COMMERCIAL

## 2019-11-19 DIAGNOSIS — G44.209 TENSION TYPE HEADACHE: ICD-10-CM

## 2019-11-19 DIAGNOSIS — M54.2 CERVICALGIA: ICD-10-CM

## 2019-11-19 PROCEDURE — 97110 THERAPEUTIC EXERCISES: CPT | Mod: GP | Performed by: PHYSICAL THERAPIST

## 2019-11-19 PROCEDURE — 97140 MANUAL THERAPY 1/> REGIONS: CPT | Mod: GP | Performed by: PHYSICAL THERAPIST

## 2019-11-19 PROCEDURE — 97161 PT EVAL LOW COMPLEX 20 MIN: CPT | Mod: GP | Performed by: PHYSICAL THERAPIST
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
## 2019-11-21 ENCOUNTER — DOCUMENTATION ONLY (OUTPATIENT)
Dept: CARE COORDINATION | Facility: CLINIC | Age: 39
End: 2019-11-21

## 2019-11-22 ENCOUNTER — OFFICE VISIT (OUTPATIENT)
Dept: FAMILY MEDICINE | Facility: CLINIC | Age: 39
End: 2019-11-22
Payer: COMMERCIAL

## 2019-11-22 VITALS
DIASTOLIC BLOOD PRESSURE: 65 MMHG | OXYGEN SATURATION: 96 % | TEMPERATURE: 98.7 F | WEIGHT: 200 LBS | SYSTOLIC BLOOD PRESSURE: 99 MMHG | HEIGHT: 67 IN | BODY MASS INDEX: 31.39 KG/M2 | HEART RATE: 82 BPM

## 2019-11-22 DIAGNOSIS — R10.31 RIGHT LOWER QUADRANT PAIN: Primary | ICD-10-CM

## 2019-11-22 ASSESSMENT — ANXIETY QUESTIONNAIRES
7. FEELING AFRAID AS IF SOMETHING AWFUL MIGHT HAPPEN: SEVERAL DAYS
5. BEING SO RESTLESS THAT IT IS HARD TO SIT STILL: SEVERAL DAYS
3. WORRYING TOO MUCH ABOUT DIFFERENT THINGS: SEVERAL DAYS
6. BECOMING EASILY ANNOYED OR IRRITABLE: NEARLY EVERY DAY
1. FEELING NERVOUS, ANXIOUS, OR ON EDGE: SEVERAL DAYS
2. NOT BEING ABLE TO STOP OR CONTROL WORRYING: NOT AT ALL
IF YOU CHECKED OFF ANY PROBLEMS ON THIS QUESTIONNAIRE, HOW DIFFICULT HAVE THESE PROBLEMS MADE IT FOR YOU TO DO YOUR WORK, TAKE CARE OF THINGS AT HOME, OR GET ALONG WITH OTHER PEOPLE: SOMEWHAT DIFFICULT
GAD7 TOTAL SCORE: 10

## 2019-11-22 ASSESSMENT — PAIN SCALES - GENERAL: PAINLEVEL: MILD PAIN (2)

## 2019-11-22 ASSESSMENT — PATIENT HEALTH QUESTIONNAIRE - PHQ9
SUM OF ALL RESPONSES TO PHQ QUESTIONS 1-9: 12
5. POOR APPETITE OR OVEREATING: NEARLY EVERY DAY

## 2019-11-22 ASSESSMENT — MIFFLIN-ST. JEOR: SCORE: 1597.43

## 2019-11-22 NOTE — PROGRESS NOTES
Priyanka Lundberg is a 39 year old female here for the following issues:    Right lower quadrant pain   Priyanka is a 38 yo woman with longstanding history of RLQ pain that will radiate into her rib cage. symptoms are intermittent and have been present for years. She reports it started about 10 yr ago when she bent forward. She felt a snapping sensation then a shooting pain radiated to her right groin.      For some time she reported pain that alternated sides and seemed to correlate with ovulation. Now over the past 5 months she feels right sided pain starting the week before her menstrual cycle.     For the last 2 months, pain has been constant.  She has taken ibuprofen with mild relief.  She has a longstanding hx of constipation and nausea, with no changes in bowels recently.  She usually passes a hard stool every 3 days or so.  Denies vomiting or fevers.      She has been having hot flashes, and notes her periods have been becoming more irregular recently.  She scheduled an appointment with her OBGYN in January.  She had an abdominal ultrasound which was unremarkable 5/2018. She also had an abdominal US and HIDA scan last year, both normal.      Chronic Constipation  She has had chronic constipation for years with very hard stools, once every 3 days. Colonoscopy was unremarkable in 2018.  She reports persistent nausea.  No vomiting or blood in stool, no fever.    Priyanka has also had a separate abdominal pain, in her RUQ that will radiate around her rib cage.  She feels this pain will worsen after eating and shoot into her public bone.  Denies dysura or hematuria.    Situational Stress  Priyanka has had increased stress, with her father passing away in the past year.  She has been going through grief, along with needing to help clean out his home.  Her mother is living in an assisted living complex, which has also been stressful. No current counseling due to lack of time.     PHQ-9 SCORE 4/3/2018 11/23/2018 11/22/2019    PHQ-9 Total Score 13 13 12     BIN-7 SCORE 4/3/2018 11/23/2018 11/22/2019   Total Score 6 17 10       Patient Active Problem List   Diagnosis     Gastrointestinal problem     Joint pain     L4-L5 disc bulge     Attention deficit hyperactivity disorder (ADHD), combined type     Vaginal discharge     History of abnormal cervical Pap smear     Other acne     Benign nevus     History of Clostridium difficile colitis     Cervicalgia     Bilateral thoracic back pain     Class 1 obesity due to excess calories without serious comorbidity with body mass index (BMI) of 30.0 to 30.9 in adult     Gastroesophageal reflux disease with esophagitis     Anxiety     Tension type headache       Current Outpatient Medications   Medication Sig Dispense Refill     albuterol (PROAIR HFA/PROVENTIL HFA/VENTOLIN HFA) 108 (90 Base) MCG/ACT inhaler Inhale 2 puffs into the lungs every 4 hours as needed for shortness of breath / dyspnea or wheezing 1 Inhaler 3     chlorhexidine (PERIDEX) 0.12 % solution Swish and spit 15 mLs in mouth 3 times daily 473 mL 0     cyclobenzaprine (FLEXERIL) 10 MG tablet Take 1 tablet (10 mg) by mouth 3 times daily as needed for muscle spasms 30 tablet 0     ibuprofen (ADVIL/MOTRIN) 200 MG tablet Take 400 mg by mouth every 4 hours as needed for mild pain 2 tab every am       Misc Natural Product Nasal (PONARIS) SOLN Apply two drops to each nostril BID X 2 month supply (Patient not taking: Reported on 7/29/2019) 10 mL 3     multivitamin, therapeutic with minerals (MULTI-VITAMIN) TABS tablet Take 1 tablet by mouth daily       SUMAtriptan (IMITREX) 50 MG tablet Take 1 tablet (50 mg) by mouth at onset of headache for migraine May repeat in two hours if headache persists. 9 tablet 1       Allergies   Allergen Reactions     Food      Cantaloupe, cucumbers, green beans, and peppers.      Lemon Flavor      Mustard Seed      Onion Difficulty breathing and GI Disturbance     Labette GI Disturbance     Penicillins Hives     Or  "another medication taken at that time     Vanilla GI Disturbance        EXAM  BP 99/65 (BP Location: Left arm, Patient Position: Sitting, Cuff Size: Adult Large)   Pulse 82   Temp 98.7  F (37.1  C) (Oral)   Ht 1.69 m (5' 6.54\")   Wt 90.7 kg (200 lb)   LMP 11/15/2019 (Exact Date)   SpO2 96%   Breastfeeding No   BMI 31.76 kg/m    Gen: Alert, pleasant, NAD  HEENT:  Conjunctiva nl, TM normal bilaterally, OP clear, no posterior erythema  COR: S1,S2, no murmur  Lungs: CTA bilaterally, no rhonchi, wheezes or rales  Abdomen: soft, active BS, tenderness with palpation over right mid and lower quadrants.       EXAMINATION:  XR ABDOMEN 2 VW 11/22/2019 4:41 PM.     COMPARISON: None available.     HISTORY:  Right lower quadrant pain     FINDINGS: Upright and supine views of the abdomen. Gas is seen within  the stomach. Nonobstructive bowel gas pattern. Pelvic phleboliths. No  acute osseous lesion. No pneumatosis or portal venous gas.                                                                      IMPRESSION: Nonobstructive bowel gas pattern.     I have personally reviewed the examination and initial interpretation  and I agree with the findings.     ELSA MARTINEZ, DO      Assessment:  (R10.31) Right lower quadrant pain  (primary encounter diagnosis)  Comment: I reviewed xray with Priyanka, she has stool mainly on right side of abdomen,no bowel thickening, no obstruction  Plan: X-ray Abdomen 2 vw        Recommend trial of Miralax powder, modified bowel prep, then maintenance with Miralax. If not improving would refer for pelvic ultrasound to rule out ovarian cyst. Other DDX is endometriosis, constipation.    Sydney Ruiz MD  Internal Medicine/Pediatrics      I, Ozzie Gruber, am serving as a scribe to document services personally performed by Dr. Sydney Ruiz, based on data collection and the provider's statements to me. Dr. Ruiz has reviewed, edited, and approved the above note.       "

## 2019-11-22 NOTE — NURSING NOTE
"39 year old  Chief Complaint   Patient presents with     Abdominal Pain     RLQ pain that radiate up into rib cage .  ongoing x 1 yrs        Blood pressure 99/65, pulse 82, temperature 98.7  F (37.1  C), temperature source Oral, height 1.69 m (5' 6.54\"), weight 90.7 kg (200 lb), SpO2 96 %, not currently breastfeeding. Body mass index is 31.76 kg/m .  Patient Active Problem List   Diagnosis     Gastrointestinal problem     Joint pain     L4-L5 disc bulge     Attention deficit hyperactivity disorder (ADHD), combined type     Vaginal discharge     History of abnormal cervical Pap smear     Other acne     Benign nevus     History of Clostridium difficile colitis     Cervicalgia     Bilateral thoracic back pain     Class 1 obesity due to excess calories without serious comorbidity with body mass index (BMI) of 30.0 to 30.9 in adult     Gastroesophageal reflux disease with esophagitis     Anxiety     Tension type headache       Wt Readings from Last 2 Encounters:   11/22/19 90.7 kg (200 lb)   11/14/19 94.3 kg (208 lb)     BP Readings from Last 3 Encounters:   11/22/19 99/65   10/29/19 107/70   10/24/19 119/81         Current Outpatient Medications   Medication     cyclobenzaprine (FLEXERIL) 10 MG tablet     ibuprofen (ADVIL/MOTRIN) 200 MG tablet     multivitamin, therapeutic with minerals (MULTI-VITAMIN) TABS tablet     SUMAtriptan (IMITREX) 50 MG tablet     albuterol (PROAIR HFA/PROVENTIL HFA/VENTOLIN HFA) 108 (90 Base) MCG/ACT inhaler     chlorhexidine (PERIDEX) 0.12 % solution     Misc Natural Product Nasal (PONARIS) SOLN     No current facility-administered medications for this visit.        Social History     Tobacco Use     Smoking status: Never Smoker     Smokeless tobacco: Never Used   Substance Use Topics     Alcohol use: Not Currently     Alcohol/week: 0.0 standard drinks     Comment: outside of pregnancy, social     Drug use: No       Health Maintenance Due   Topic Date Due     PHQ-9  05/06/2019     INFLUENZA " VACCINE (1) 09/01/2019     MAMMO SCREENING  12/21/2019       Lab Results   Component Value Date    PAP NIL 09/08/2016 November 22, 2019 3:48 PM

## 2019-11-22 NOTE — PATIENT INSTRUCTIONS
Modified Miralax prep   Drink one bottle magnesium citrate OR  Dulcolax 10mg tablet  4 hr later    Mix 8.3 oz (238 gram) bottle (7 caps of Miralax)  powder with 32 oz gatorade            Drink 8 oz cup every 15 min until gone  (4 cups)    Next Day  Take Dulcolax 10mg tablets dose    Maintenance  Then take 1 cap of Miralax MWF until you have BM daily, then back off.    Check in with me via My Chart over the next couple of weeks.

## 2019-11-23 ASSESSMENT — ANXIETY QUESTIONNAIRES: GAD7 TOTAL SCORE: 10

## 2019-12-12 LAB
FUNGUS SPEC CULT: NORMAL
SPECIMEN SOURCE: NORMAL

## 2019-12-20 ENCOUNTER — THERAPY VISIT (OUTPATIENT)
Dept: PHYSICAL THERAPY | Facility: CLINIC | Age: 39
End: 2019-12-20
Payer: COMMERCIAL

## 2019-12-20 DIAGNOSIS — G44.209 TENSION TYPE HEADACHE: ICD-10-CM

## 2019-12-20 PROCEDURE — 97110 THERAPEUTIC EXERCISES: CPT | Mod: GP | Performed by: PHYSICAL THERAPIST

## 2019-12-20 PROCEDURE — 97140 MANUAL THERAPY 1/> REGIONS: CPT | Mod: GP | Performed by: PHYSICAL THERAPIST

## 2020-01-28 ENCOUNTER — TELEPHONE (OUTPATIENT)
Dept: OBGYN | Facility: CLINIC | Age: 40
End: 2020-01-28

## 2020-01-28 NOTE — TELEPHONE ENCOUNTER
Patient requesting appt after feb 1st with Dr. Mcmahan, she had gotten rescheduled due to clinic issue and her rescheduled date did not work for her. Left message for patient to call back and discuss options after feb 1st.

## 2020-02-11 ASSESSMENT — ANXIETY QUESTIONNAIRES
1. FEELING NERVOUS, ANXIOUS, OR ON EDGE: NOT AT ALL
GAD7 TOTAL SCORE: 0
4. TROUBLE RELAXING: NOT AT ALL
2. NOT BEING ABLE TO STOP OR CONTROL WORRYING: NOT AT ALL
7. FEELING AFRAID AS IF SOMETHING AWFUL MIGHT HAPPEN: NOT AT ALL
3. WORRYING TOO MUCH ABOUT DIFFERENT THINGS: NOT AT ALL
GAD7 TOTAL SCORE: 0
7. FEELING AFRAID AS IF SOMETHING AWFUL MIGHT HAPPEN: NOT AT ALL
6. BECOMING EASILY ANNOYED OR IRRITABLE: NOT AT ALL
5. BEING SO RESTLESS THAT IT IS HARD TO SIT STILL: NOT AT ALL

## 2020-02-12 ENCOUNTER — OFFICE VISIT (OUTPATIENT)
Dept: OBGYN | Facility: CLINIC | Age: 40
End: 2020-02-12
Attending: OBSTETRICS & GYNECOLOGY
Payer: COMMERCIAL

## 2020-02-12 VITALS
HEIGHT: 67 IN | BODY MASS INDEX: 31.96 KG/M2 | SYSTOLIC BLOOD PRESSURE: 112 MMHG | WEIGHT: 203.6 LBS | HEART RATE: 88 BPM | DIASTOLIC BLOOD PRESSURE: 70 MMHG

## 2020-02-12 DIAGNOSIS — N92.6 MENSTRUAL CHANGES: Primary | ICD-10-CM

## 2020-02-12 DIAGNOSIS — R10.2 PELVIC PAIN IN FEMALE: ICD-10-CM

## 2020-02-12 LAB
ESTRADIOL SERPL-MCNC: 40 PG/ML
FSH SERPL-ACNC: 5.9 IU/L
LH SERPL-ACNC: 6.8 IU/L
PROLACTIN SERPL-MCNC: 8 UG/L (ref 3–27)
TSH SERPL DL<=0.005 MIU/L-ACNC: 3.05 MU/L (ref 0.4–4)

## 2020-02-12 PROCEDURE — G0463 HOSPITAL OUTPT CLINIC VISIT: HCPCS | Mod: ZF

## 2020-02-12 PROCEDURE — 83001 ASSAY OF GONADOTROPIN (FSH): CPT | Performed by: OBSTETRICS & GYNECOLOGY

## 2020-02-12 PROCEDURE — 84146 ASSAY OF PROLACTIN: CPT | Performed by: OBSTETRICS & GYNECOLOGY

## 2020-02-12 PROCEDURE — 36415 COLL VENOUS BLD VENIPUNCTURE: CPT | Performed by: OBSTETRICS & GYNECOLOGY

## 2020-02-12 PROCEDURE — 83002 ASSAY OF GONADOTROPIN (LH): CPT | Performed by: OBSTETRICS & GYNECOLOGY

## 2020-02-12 PROCEDURE — 84443 ASSAY THYROID STIM HORMONE: CPT | Performed by: OBSTETRICS & GYNECOLOGY

## 2020-02-12 PROCEDURE — 83520 IMMUNOASSAY QUANT NOS NONAB: CPT | Performed by: OBSTETRICS & GYNECOLOGY

## 2020-02-12 PROCEDURE — 82670 ASSAY OF TOTAL ESTRADIOL: CPT | Performed by: OBSTETRICS & GYNECOLOGY

## 2020-02-12 ASSESSMENT — ANXIETY QUESTIONNAIRES
GAD7 TOTAL SCORE: 3
3. WORRYING TOO MUCH ABOUT DIFFERENT THINGS: NOT AT ALL
1. FEELING NERVOUS, ANXIOUS, OR ON EDGE: NOT AT ALL
2. NOT BEING ABLE TO STOP OR CONTROL WORRYING: NOT AT ALL
7. FEELING AFRAID AS IF SOMETHING AWFUL MIGHT HAPPEN: NOT AT ALL
6. BECOMING EASILY ANNOYED OR IRRITABLE: SEVERAL DAYS
5. BEING SO RESTLESS THAT IT IS HARD TO SIT STILL: NOT AT ALL

## 2020-02-12 ASSESSMENT — PATIENT HEALTH QUESTIONNAIRE - PHQ9
5. POOR APPETITE OR OVEREATING: MORE THAN HALF THE DAYS
SUM OF ALL RESPONSES TO PHQ QUESTIONS 1-9: 7

## 2020-02-12 ASSESSMENT — MIFFLIN-ST. JEOR: SCORE: 1613.84

## 2020-02-12 ASSESSMENT — PAIN SCALES - GENERAL: PAINLEVEL: NO PAIN (0)

## 2020-02-12 NOTE — PROGRESS NOTES
"Gynecology Visit Note  2/12/20    Reason for visit: Abdominal pain before menses    S: Patient is a 40 yo  who presents today with concerns of abdominal pain that occurs before menses.  Patient states since the summer of 2019 she has noticed increasing issues with right sided pelvic pain.  Patient notes she has a constant dull right sided lower pelvic pain that radiates to her right hip and up towards her right ribs.  This seems to worsen 1-2 weeks before her menses and at times when she bends over she feels like something is going to burst.  She has been seen by her PCP and had a Endoscopy and Colonoscopy to look for associated bowel issues and Gall bladder issues but there were no concerning findings form that standpoint.  Patient also states she has been having changes to her menstrual cycle in a little under a year.  She still seems to average around 30 days, but some months her cycles are closer together and others are later.  She also feels that her bleeding has changed, seems to be lighter than normal. Only has one day of heavy flow, used to have 3.  Has more brown sludge and associated watery discharge at times.  Her LMP is 2/10/20.  She states her last cycle had more associated cramping than usual and was accompanied by nausea.  She actually thought she may be pregnant, but had negative UPT and then got her period.  She is concerned there is something bad going on like cancer or endometriosis.  There is part of her that wishes to be pregnant, but also knows unlikely as rarely having intercourse and due to her age.    O:  /70   Pulse 88   Ht 1.69 m (5' 6.54\")   Wt 92.4 kg (203 lb 9.6 oz)   LMP 02/09/2020   BMI 32.33 kg/m       General: NAD, A&Ox3  Resp: Non-labored breathing  Pelvic: Deferred today secondary to menses    A/P: 40 yo  presents with concerns of abdominal pain that occurs before menses  1) Pelvic pain/Menstrual changes: Discussed getting Pelvic US to assess for structural " lesion causing her pain.  Plan in 3 weeks during the week when pain is typically worse.  Will also plan for FSH, LH, Estradiol, TSH, PRL, AMH as day 3 of cycle and menstrual changes.  Will call patient with all results and discuss appropriate plan for follow-up.  She understands and is agreeable with that plan.    Ana Paula Mcmahan MD

## 2020-02-12 NOTE — LETTER
"2/12/2020       RE: Priyanka Lundberg  [address] [place] [address]     Dear Colleague,    Thank you for referring your patient, Priyanka Lundberg, to the [facility] at [facility]. Please see a copy of my visit note below.    Gynecology Visit Note  2/12/20    Reason for visit: Abdominal pain before menses    S: Patient is a 40 yo  who presents today with concerns of abdominal pain that occurs before menses.  Patient states since the summer of 2019 she has noticed increasing issues with right sided pelvic pain.  Patient notes she has a constant dull right sided lower pelvic pain that radiates to her right hip and up towards her right ribs.  This seems to worsen 1-2 weeks before her menses and at times when she bends over she feels like something is going to burst.  She has been seen by her PCP and had a Endoscopy and Colonoscopy to look for associated bowel issues and Gall bladder issues but there were no concerning findings form that standpoint.  Patient also states she has been having changes to her menstrual cycle in a little under a year.  She still seems to average around 30 days, but some months her cycles are closer together and others are later.  She also feels that her bleeding has changed, seems to be lighter than normal. Only has one day of heavy flow, used to have 3.  Has more brown sludge and associated watery discharge at times.  Her LMP is 2/10/20.  She states her last cycle had more associated cramping than usual and was accompanied by nausea.  She actually thought she may be pregnant, but had negative UPT and then got her period.  She is concerned there is something bad going on like cancer or endometriosis.  There is part of her that wishes to be pregnant, but also knows unlikely as rarely having intercourse and due to her age.    O:  /70   Pulse 88   Ht 1.69 m (5' 6.54\")   Wt 92.4 kg (203 lb 9.6 oz)   LMP " 02/09/2020   BMI 32.33 kg/m        General: NAD, A&Ox3  Resp: Non-labored breathing  Pelvic: Deferred today secondary to menses    A/P: 40 yo  presents with concerns of abdominal pain that occurs before menses  1) Pelvic pain/Menstrual changes: Discussed getting Pelvic US to assess for structural lesion causing her pain.  Plan in 3 weeks during the week when pain is typically worse.  Will also plan for FSH, LH, Estradiol, TSH, PRL, AMH as day 3 of cycle and menstrual changes.  Will call patient with all results and discuss appropriate plan for follow-up.  She understands and is agreeable with that plan.    Ana Paula Mcmahan MD    Again, thank you for allowing me to participate in the care of your patient.      Sincerely,    Ana Paula Mcmahan MD

## 2020-02-12 NOTE — NURSING NOTE
Chief Complaint   Patient presents with     Abdominal Pain     PT c/o irregular menses and right ovarian pain that radiates to ribs.

## 2020-02-13 LAB — MIS SERPL-MCNC: 1.47 NG/ML

## 2020-03-05 ENCOUNTER — ANCILLARY PROCEDURE (OUTPATIENT)
Dept: ULTRASOUND IMAGING | Facility: CLINIC | Age: 40
End: 2020-03-05
Attending: OBSTETRICS & GYNECOLOGY
Payer: COMMERCIAL

## 2020-03-05 DIAGNOSIS — R10.2 PELVIC PAIN IN FEMALE: ICD-10-CM

## 2020-03-05 DIAGNOSIS — N92.6 MENSTRUAL CHANGES: ICD-10-CM

## 2020-03-05 PROCEDURE — 76830 TRANSVAGINAL US NON-OB: CPT

## 2020-03-10 ENCOUNTER — HEALTH MAINTENANCE LETTER (OUTPATIENT)
Age: 40
End: 2020-03-10

## 2020-04-09 ENCOUNTER — MYC MEDICAL ADVICE (OUTPATIENT)
Dept: FAMILY MEDICINE | Facility: CLINIC | Age: 40
End: 2020-04-09

## 2020-04-09 ENCOUNTER — VIRTUAL VISIT (OUTPATIENT)
Dept: FAMILY MEDICINE | Facility: CLINIC | Age: 40
End: 2020-04-09
Payer: COMMERCIAL

## 2020-04-09 DIAGNOSIS — J45.20 MILD INTERMITTENT ASTHMA WITHOUT COMPLICATION: Primary | ICD-10-CM

## 2020-04-09 DIAGNOSIS — F41.9 ANXIETY: ICD-10-CM

## 2020-04-09 DIAGNOSIS — R00.2 PALPITATIONS: ICD-10-CM

## 2020-04-09 DIAGNOSIS — R53.83 OTHER FATIGUE: ICD-10-CM

## 2020-04-09 RX ORDER — ALBUTEROL SULFATE 90 UG/1
2 AEROSOL, METERED RESPIRATORY (INHALATION) EVERY 4 HOURS PRN
Qty: 1 INHALER | Refills: 3 | Status: SHIPPED | OUTPATIENT
Start: 2020-04-09 | End: 2022-04-20

## 2020-04-09 ASSESSMENT — ANXIETY QUESTIONNAIRES
GAD7 TOTAL SCORE: 6
7. FEELING AFRAID AS IF SOMETHING AWFUL MIGHT HAPPEN: NOT AT ALL
GAD7 TOTAL SCORE: 6
3. WORRYING TOO MUCH ABOUT DIFFERENT THINGS: SEVERAL DAYS
4. TROUBLE RELAXING: NEARLY EVERY DAY
GAD7 TOTAL SCORE: 6
1. FEELING NERVOUS, ANXIOUS, OR ON EDGE: SEVERAL DAYS
2. NOT BEING ABLE TO STOP OR CONTROL WORRYING: SEVERAL DAYS
6. BECOMING EASILY ANNOYED OR IRRITABLE: NOT AT ALL
5. BEING SO RESTLESS THAT IT IS HARD TO SIT STILL: NOT AT ALL
7. FEELING AFRAID AS IF SOMETHING AWFUL MIGHT HAPPEN: NOT AT ALL

## 2020-04-09 ASSESSMENT — PATIENT HEALTH QUESTIONNAIRE - PHQ9
10. IF YOU CHECKED OFF ANY PROBLEMS, HOW DIFFICULT HAVE THESE PROBLEMS MADE IT FOR YOU TO DO YOUR WORK, TAKE CARE OF THINGS AT HOME, OR GET ALONG WITH OTHER PEOPLE: SOMEWHAT DIFFICULT
SUM OF ALL RESPONSES TO PHQ QUESTIONS 1-9: 6
SUM OF ALL RESPONSES TO PHQ QUESTIONS 1-9: 6

## 2020-04-09 NOTE — PROGRESS NOTES
"Subjective     Priyanka Lundberg is a 39 year old female who is being evaluated via a billable telephone visit.      The patient has been notified of following:     \"This telephone visit will be conducted via a call between you and your physician/provider. We have found that certain health care needs can be provided without the need for a physical exam.  This service lets us provide the care you need with a short phone conversation.  If a prescription is necessary we can send it directly to your pharmacy.  If lab work is needed we can place an order for that and you can then stop by our lab to have the test done at a later time.    Telephone visits are billed at different rates depending on your insurance coverage. During this emergency period, for some insurers they may be billed the same as an in-person visit.  Please reach out to your insurance provider with any questions.    If during the course of the call the physician/provider feels a telephone visit is not appropriate, you will not be charged for this service.\"    Patient has given verbal consent for Telephone visit?  Yes    How would you like to obtain your AVS? MyChart       Palpitations    Situational stress  Priyanka is a 38 yo woman with history of anxiety. She reports some flare in symptoms due to COVID19 pandemic. She , her spouse and son, just relocated to her parent's home in [facility], as there is more room to \"space out\". Priyanka's mother moved to a nursing home in 2019. Her father  prior to that. She is not on any medications for anxiety or depression but thinks she used alprazolam in the past. Was asking for rx for that if she were to have a panic attack.    PHQ 2019   PHQ-9 Total Score 12 7 6   Q9: Thoughts of better off dead/self-harm past 2 weeks Not at all Not at all Not at all     BIN-7 SCORE 2020   Total Score 0 (minimal anxiety) - 6 (mild anxiety)   Total Score 0 3 6     No use of " "Etoh or drugs to self treat. She currently sees a counselor every 2 wks.        Asthma  Priyanka reports a hx of mild asthma. She reports that about a month ago, she had a \"burning\" feeling in her lungs, different from acid reflux. Could not find her inhaler. She has history of seasonal allergies. Does not take antihistamines as \"Benadryl knocks me out\". Has not tried claritin or zyrtec.    URI symptoms  Priyanka reports that 2-3 days ago, she had dripping sweats lasting one day but did not measure fever. Felt out of breath with exertion such as climbing stairs. . Reports some chest tightness. Felt that the muscles of her chest hurt, tender to touch.   Sleeping is OK, denies coughing or wheezing at night.  Spouse and son are not ill. She denies headache, fatigue or sweats today. She would like to have her inhaler refilled.     Palpitations.   Priyanka reports intermittent history of palpitations, a \"Flippy flop heart beat\". The last episode occurred about a month ago, lasting 5 minutes. During that time, felt anxious, but no chest pain, no syncope. These episodes occur several times a month.     I had seen Priyanka in Feb 2019 for similar complaint and had ordered an event monitor but she did not follow through. She would like to do this now.   Caffeine: 1 day  Etoh: none    Fatigue  Priyanka reports that she typically feels \"sapped for energy\"  for 2-3 days at a time. This occurs every 2-3 months and this pattern has been ongoing for about 11 yr Previously related to low potassium, had to have an IV potassium in the ER. When this occurs, she tries to eat potassium rich foods, such as sweet potato, orange juice, coconut water. She admits she does not drink enough water.     Feels nervous , feels stressed.   Counseling session, last done this morning. Q 2 wk  Doing video games helps distract.     Has used clonazepam in the past, about 5 yr ago. But did not like it.     Eyelid twitch started a week ago, with more stress  Vision " tara Lundberg complains of   Chief Complaint   Patient presents with     RECHECK     stress and ongoing chest discomft. Short of breath for the last 7 days      Refill Request     albuterol inhaler       ALLERGIES  Food; Lemon flavor; Mustard seed; Onion; Peach; Penicillins; and Vanilla    Patient Active Problem List   Diagnosis     Gastrointestinal problem     Joint pain     L4-L5 disc bulge     Attention deficit hyperactivity disorder (ADHD), combined type     Vaginal discharge     History of abnormal cervical Pap smear     Other acne     Benign nevus     History of Clostridium difficile colitis     Cervicalgia     Bilateral thoracic back pain     Class 1 obesity due to excess calories without serious comorbidity with body mass index (BMI) of 30.0 to 30.9 in adult     Gastroesophageal reflux disease with esophagitis     Anxiety     Tension type headache     Past Surgical History:   Procedure Laterality Date     COLONOSCOPY N/A 2/14/2018    Procedure: COMBINED COLONOSCOPY, SINGLE OR MULTIPLE BIOPSY/POLYPECTOMY BY BIOPSY;  colon and egd;  Surgeon: Joan Willson MD;  Location: [facility] OR     ESOPHAGOSCOPY, GASTROSCOPY, DUODENOSCOPY (EGD), COMBINED N/A 2/14/2018    Procedure: COMBINED ESOPHAGOSCOPY, GASTROSCOPY, DUODENOSCOPY (EGD), BIOPSY SINGLE OR MULTIPLE;;  Surgeon: Joan Willson MD;  Location: [facility] OR     NO HISTORY OF SURGERY         Social History     Tobacco Use     Smoking status: Never Smoker     Smokeless tobacco: Never Used   Substance Use Topics     Alcohol use: Not Currently     Alcohol/week: 0.0 standard drinks     Comment: outside of pregnancy, social     Family History   Problem Relation Age of Onset     Coronary Artery Disease Paternal Grandfather      Colon Cancer Paternal Grandfather      Cancer Paternal Grandfather      Coronary Artery Disease Maternal Grandmother      Cerebrovascular Disease Maternal Grandmother      Breast Cancer Maternal Grandmother      Cancer Maternal Grandmother       Stomach Cancer Maternal Grandmother         stomach, skin     Mental Illness Mother         bipolar, schizophrenia     Anxiety Disorder Mother      Osteoporosis Mother      Thyroid Disease Mother      Diabetes Mother      Neurofibromatosis Mother         more likely fibromyalgia     Arthritis Mother      Back Pain Mother      Osteoarthritis Mother      Obesity Mother      Cirrhosis Mother         from fatty liver. with esophageal varices, ..     Gallbladder Disease Mother         cholecystectomy     Hypertension Father      Hyperlipidemia Father      Diabetes Father      Other Cancer Father 67        Neuroendocrine tumor, ? from gall bladder, stage 4  1/2018     Migraines Father      Scleroderma Father      Rheumatoid Arthritis Father      Obesity Father      Ankylosing Spondylitis Father      Macular Degeneration Father      Prostate Cancer Maternal Grandfather      Cancer Maternal Grandfather      Mental Illness Sister         bipolar     Anxiety Disorder Sister      Asthma Sister         eczema     Neurologic Disorder Sister         Cervical Dystonia, treated with Botox     Diabetes Paternal Grandmother      Kidney Cancer Paternal Grandmother      Scleroderma Paternal Aunt      Ankylosing Spondylitis Paternal Aunt      Liver Cancer Other      Ovarian Cancer Maternal Aunt 68     Skin Cancer Maternal Aunt      Ankylosing Spondylitis Paternal Uncle      Glaucoma No family hx of            Reviewed and updated as needed this visit by Provider         Review of Systems   ROS COMP: Constitutional, HEENT, cardiovascular, pulmonary, gi and gu systems are negative, except as otherwise noted.       Objective   Reported vitals:  There were no vitals taken for this visit.      Assessment/Plan:  (J45.20) Mild intermittent asthma without complication  (primary encounter diagnosis)  Comment: current cough, some shortness of breath with exertion, DDX is asthma, viral illness  Plan: albuterol (PROAIR HFA/PROVENTIL HFA/VENTOLIN          HFA) 108 (90 Base) MCG/ACT inhaler        Refill albuterol inhaler with instruction on use.     (R00.2) Palpitations  Comment: episodic palpitations, ongoing for more than one year, was referred for event monitor in 2/2019 but did not go  Plan: will reorder 30 day event monitor and she may call to set up. If she experiences palpitations with chest pain, SOB, dizziness, then ER evaluation.     (F41.9) Anxiety  Comment: situational stress, COVID 19 pandemic, mother in nursing home, she sees a counselor  Plan: continue counseling. Discussed self cares. Will fill alprazolam 0.25mg dose, 20 tablets. If using persistently, then consider SSRI    (R53.83) Other fatigue  Comment: ongoing for years, episodic, no acute changes  Plan: discussed rest and self cares when this occurs.       Call started a 3:30pmt PM  3:56pm  Phone call duration:  38 minutes    Sydney Ruiz MD  Internal Medicine/Pediatrics

## 2020-04-09 NOTE — PATIENT INSTRUCTIONS
Allergies  Claritin or Zyrtec 10mg daily    Albuterol   2 puffs every 1-4 hr for chest tightness, wheezing or cough    If not helping and you have sweats, body aches, shortness of breath  Consider are these COVID19 symptoms??   [email or web address]  [phone]    Cardiac event monitor  Phone number  [phone]  Call to see if they can send it.    Acute changes, escalating shortness of breath, dizziness, fainting  Then to ER.

## 2020-04-10 ASSESSMENT — PATIENT HEALTH QUESTIONNAIRE - PHQ9: SUM OF ALL RESPONSES TO PHQ QUESTIONS 1-9: 6

## 2020-04-10 ASSESSMENT — ANXIETY QUESTIONNAIRES: GAD7 TOTAL SCORE: 6

## 2020-04-11 RX ORDER — ALPRAZOLAM 0.25 MG
0.25 TABLET ORAL
Qty: 20 TABLET | Refills: 0 | Status: SHIPPED | OUTPATIENT
Start: 2020-04-11 | End: 2021-09-16

## 2020-04-16 PROBLEM — G44.209 TENSION TYPE HEADACHE: Status: RESOLVED | Noted: 2019-11-19 | Resolved: 2020-04-16

## 2020-04-16 NOTE — PROGRESS NOTES
Discharge Note    Progress reporting period is from Nov 19, 2019 to Dec 20, 2019.     Priyanka failed to return for next follow up visit and current status is unknown.  Please see information below for last relevant information on current status.  Patient seen for Rxs Used: 2 visits.  SUBJECTIVE  Subjective changes noted by patient:  Subjective: Pt reports that headaches are still present. She still feels the lump on neck on the R. Shoulder exercises irritated back. She feels upper back has been more sensitive than lower back lately.   .  Current pain level is  .     Previous pain level was  Initial Pain level: 9/10.   Changes in function:  Yes (See Goal flowsheet attached for changes in current functional level)  Adverse reaction to treatment or activity: None    OBJECTIVE  Changes noted in objective findings: Objective: Modified HEP and with focus on stretching. Did discuss importance of strengthening program. Trial of manual traction and manual work at cervical spine.      ASSESSMENT/PLAN  Diagnosis: Cervicalgia, headaches   Updated problem list and treatment plan:   Pain - HEP  Decreased ROM/flexibility - HEP  Decreased function - HEP  Decreased strength - HEP  STG/LTGs have been met or progress has been made towards goals:  Yes, please see goal flowsheet for most current information  Assessment of Progress: current status is unknown.  Last current status: Assessment of progress: Pt is progressing slower than anticipated   Self Management Plans:  HEP  I have re-evaluated this patient and find that the nature, scope, duration and intensity of the therapy is appropriate for the medical condition of the patient.  Priyanka continues to require the following intervention to meet STG and LTG's:  HEP.    Recommendations:  Discharge with current home program.  Patient to follow up with MD as needed.    Please refer to the daily flowsheet for treatment today, total treatment time and time spent performing 1:1 timed codes.

## 2020-04-16 NOTE — PROGRESS NOTES
"Gynecology Visit Note  4/17/20    SUBJECTIVE     Priyanka is a 39year old female who is being evaluated via a billable telephone visit.    Patient opted to conduct today's return visit via telephone secondary to the COVID-19 pandemic vs. an in person visit to the clinic.    I spoke with: Priyanka Lundberg    The patient has been notified of following:   \"This telephone visit will be conducted via a call between you and your physician/provider. We have found that certain health care needs can be provided without the need for a physical exam.  This service lets us provide the care you need with a short phone conversation.  If a prescription is necessary we can send it directly to your pharmacy.  If lab work is needed we can place an order for that and you can then stop by our lab to have the test done at a later time.  If during the course of the call the physician/provider feels a telephone visit is not appropriate, you will not be charged for this service.\"     The reason for the telephone visit: Discuss results, fertility    S: Patient is a 38 yo  who I saw in 2/12/20 with concerns of menstrual changes and pelvic pain around time of menses.  Patient also concerned about fertility and so evaluation completed at that time including Pelvic US.  This was completed on 3/5/20 and was normal without any concerning findings and no obvious structural causes for her pain.    Today, patient wants to talk about these results and see if there is anything she should worry about.  They have decided not to try for pregnancy right now due to pandemic.  Continues to have regular periods, but closer together in length, mostly every 23-25 days and lighter flow.  Wants to be sure no cancer concerns as runs in her family.    O:  No vitals as remote visit    General: NAD, A&Ox3  Resp: Non-labored breathing    A/P: 38 yo  having telephone visit for painful menses  1) Fertility concerns: Discussed normal labs and US, nothing overtly " abnormal and definitely nothing on US concerning for malignancy.  Reassurance given.  Discussed while hard to put off attempting pregnancy at her age, right choice given pandemic.  Did offer to refer to [facility] if wants to get information on IVF when ready to try again and she is not sure they would proceed with that, but will let me know if she changes her mind.  Discussed that her cycles although closer together, still in realm of normal, encouraged her to continue to monitor this.  She is agreeable with this plan.    Phone call start: 11:34 AM  Phone call end: 11:46 AM  Phone call duration:  12 minutes

## 2020-04-17 ENCOUNTER — VIRTUAL VISIT (OUTPATIENT)
Dept: OBGYN | Facility: CLINIC | Age: 40
End: 2020-04-17
Attending: OBSTETRICS & GYNECOLOGY
Payer: COMMERCIAL

## 2020-04-17 DIAGNOSIS — N92.6 MENSTRUAL CHANGES: Primary | ICD-10-CM

## 2020-05-20 NOTE — TELEPHONE ENCOUNTER
RECORDS STATUS - BREAST    RECORDS REQUESTED FROM: Epic   DATE REQUESTED: 5/21/2020    NOTES DETAILS STATUS   OFFICE NOTE from referring provider     OFFICE NOTE from medical oncologist N/A    OFFICE NOTE from surgeon N/A    OFFICE NOTE from radiation oncologist     DISCHARGE SUMMARY from hospital N/A    DISCHARGE REPORT from the ER     OPERATIVE REPORT N/A    MEDICATION LIST Complete [facility]   CLINICAL TRIAL TREATMENTS TO DATE     LABS     PATHOLOGY REPORTS  (Tissue diagnosis, Stage, ER/AL percentage positive and intensity of staining, HER2 IHC, FISH, and all biopsies from breast and any distant metastasis)                 N/A- no biopsy     GENONOMIC TESTING     TYPE:   (Next Generation Sequencing, including Foundation One testing, and Oncotype score)     IMAGING (NEED IMAGES & REPORT)     CT SCANS     Xray Chest Complete 5/29/2019    Complete [facility]  10/17/2014 10/15/2014     MRI Complete MRI Brain 11/3/2019, 1/21/2018 more in PACS   MAMMO Complete MA 12/21/2018     Complete- Siine (9/17/2012)    ULTRASOUND Complete US Breast 5/1/2017, 12/7/2016     Complete- [facility]  US Breast - In PACS   PET     BONE SCAN     BRAIN MRI       Action    Action Taken 5/20/2020 10:19am     I called pt Priyanka

## 2020-05-21 ENCOUNTER — PRE VISIT (OUTPATIENT)
Dept: SURGERY | Facility: CLINIC | Age: 40
End: 2020-05-21

## 2020-05-21 ENCOUNTER — ANCILLARY PROCEDURE (OUTPATIENT)
Dept: MAMMOGRAPHY | Facility: CLINIC | Age: 40
End: 2020-05-21
Payer: COMMERCIAL

## 2020-05-21 ENCOUNTER — OFFICE VISIT (OUTPATIENT)
Dept: SURGERY | Facility: CLINIC | Age: 40
End: 2020-05-21
Attending: INTERNAL MEDICINE
Payer: COMMERCIAL

## 2020-05-21 VITALS
HEART RATE: 74 BPM | TEMPERATURE: 98.2 F | HEIGHT: 66 IN | DIASTOLIC BLOOD PRESSURE: 81 MMHG | SYSTOLIC BLOOD PRESSURE: 125 MMHG | WEIGHT: 206 LBS | BODY MASS INDEX: 33.11 KG/M2 | OXYGEN SATURATION: 96 % | RESPIRATION RATE: 18 BRPM

## 2020-05-21 DIAGNOSIS — Z80.3 FAMILY HISTORY OF BREAST CANCER: ICD-10-CM

## 2020-05-21 DIAGNOSIS — Z80.41 FAMILY HISTORY OF OVARIAN CANCER: ICD-10-CM

## 2020-05-21 DIAGNOSIS — N64.4 BREAST PAIN, LEFT: Primary | ICD-10-CM

## 2020-05-21 DIAGNOSIS — N64.4 BREAST PAIN: ICD-10-CM

## 2020-05-21 PROCEDURE — G0463 HOSPITAL OUTPT CLINIC VISIT: HCPCS

## 2020-05-21 PROCEDURE — 99204 OFFICE O/P NEW MOD 45 MIN: CPT | Mod: ZP | Performed by: PHYSICIAN ASSISTANT

## 2020-05-21 ASSESSMENT — ENCOUNTER SYMPTOMS
EYE REDNESS: 0
DIARRHEA: 0
LOSS OF CONSCIOUSNESS: 0
DYSPNEA ON EXERTION: 1
COUGH DISTURBING SLEEP: 0
NUMBNESS: 0
BACK PAIN: 1
BREAST MASS: 1
SEIZURES: 0
FEVER: 0
LEG PAIN: 1
TASTE DISTURBANCE: 0
HYPERTENSION: 0
STIFFNESS: 1
DECREASED CONCENTRATION: 1
PALPITATIONS: 1
DOUBLE VISION: 0
ARTHRALGIAS: 1
HOT FLASHES: 0
DECREASED LIBIDO: 1
SLEEP DISTURBANCES DUE TO BREATHING: 0
WEAKNESS: 0
DEPRESSION: 1
CONSTIPATION: 1
SINUS CONGESTION: 1
PANIC: 0
DISTURBANCES IN COORDINATION: 0
NIGHT SWEATS: 1
INCREASED ENERGY: 1
ALTERED TEMPERATURE REGULATION: 0
TREMORS: 0
HALLUCINATIONS: 0
COUGH: 0
DIZZINESS: 1
TROUBLE SWALLOWING: 0
NECK PAIN: 1
DECREASED APPETITE: 0
SPEECH CHANGE: 0
LIGHT-HEADEDNESS: 1
WHEEZING: 0
EXERCISE INTOLERANCE: 1
SKIN CHANGES: 0
POLYDIPSIA: 1
NAIL CHANGES: 0
BLOOD IN STOOL: 0
SPUTUM PRODUCTION: 0
HEARTBURN: 1
ORTHOPNEA: 0
SNORES LOUDLY: 1
FATIGUE: 1
POOR WOUND HEALING: 1
EYE PAIN: 0
WEIGHT GAIN: 1
ABDOMINAL PAIN: 1
HOARSE VOICE: 0
VOMITING: 0
TINGLING: 1
HEMOPTYSIS: 0
NECK MASS: 0
HEADACHES: 1
MUSCLE WEAKNESS: 0
SYNCOPE: 0
HYPOTENSION: 0
JAUNDICE: 0
SHORTNESS OF BREATH: 0
EYE IRRITATION: 1
MYALGIAS: 0
BREAST PAIN: 1
INSOMNIA: 1
NERVOUS/ANXIOUS: 1
NAUSEA: 1
BLOATING: 1
EYE WATERING: 0
PARALYSIS: 0
RECTAL PAIN: 0
POLYPHAGIA: 0
JOINT SWELLING: 0
MEMORY LOSS: 0
WEIGHT LOSS: 0
SMELL DISTURBANCE: 1
CHILLS: 0
POSTURAL DYSPNEA: 0
SINUS PAIN: 0
BOWEL INCONTINENCE: 0
MUSCLE CRAMPS: 0
SORE THROAT: 0

## 2020-05-21 ASSESSMENT — MIFFLIN-ST. JEOR: SCORE: 1611.16

## 2020-05-21 ASSESSMENT — PAIN SCALES - GENERAL: PAINLEVEL: MILD PAIN (3)

## 2020-05-21 NOTE — NURSING NOTE
"Oncology Rooming Note    May 21, 2020 10:21 AM   Priyanka Lundberg is a 40 year old female who presents for:    Chief Complaint   Patient presents with     Consult     NEW PATIENT EVAL     Initial Vitals: /81   Pulse 74   Temp 98.2  F (36.8  C) (Oral)   Resp 18   Ht 1.676 m (5' 6\")   Wt 93.4 kg (206 lb)   SpO2 96%   BMI 33.25 kg/m   Estimated body mass index is 33.25 kg/m  as calculated from the following:    Height as of this encounter: 1.676 m (5' 6\").    Weight as of this encounter: 93.4 kg (206 lb). Body surface area is 2.09 meters squared.  Mild Pain (3) Comment: Data Unavailable   No LMP recorded.  Allergies reviewed: Yes  Medications reviewed: Yes    Medications: Medication refills not needed today.  Pharmacy name entered into EPIC:    [facility] [place] [address], [place] - [address] [place] [address]    Clinical concerns: N/A      Maria Ines Martinez CMA              "

## 2020-05-21 NOTE — LETTER
5/21/2020         RE: Priyanka Lundberg  [address] [place] [address]        Dear Colleague,    Thank you for referring your patient, Priyanka Lundberg, to the [facility]. Please see a copy of my visit note below.    NEW CONSULTATION  May 21, 2020     Priyanka Lundberg is a 40 year old woman who presents with family history of breast cancer, right supraclavicular mass, right axillary mass, bilateral nipple discharge, and left breast pain. She was referred by Dr. Ruiz.    HPI:    She developed focal deep left breast pain in August, 2019. The pain is daily and feels like an ache. The pain is worse when taking a deep breath. Yesterday she had another area of focal left breast pain that left like a bruise or clogged duct She began to do aggressive message on the area to try to help relieve it. This resulted in left nipple discharge from multiple ducts that was thick and white/yellow. She checked the right nipple and that resulted in right nipple discharge that was white/yellow from multiple ducts.     She has a stable left breast sebaceous cyst, currently asymptomatic.     She reports a right supraclavicular mass and a right axillary mass since since august.     She has a left axillary palpable lymph node that has been stable since her last imaging.     Left deep pain august. Worse with inhaling. Yest nap left like bruise. Felt like cloged duct. Daily aching. Not due to activity.     She reports recent irregular periods. She has seen her gynecologist for this.     She has a maternal grandmother who had breast cancer, gastric cancer and skin cancer. Her maternal aunt had ovarian cancer and melanoma. See full family history below.     BREAST-SPECIFIC HISTORY:    Previous breast imaging: Yes   - 9/17/12 b/l Dmammo for nipple cyst and right axillary lump: BI-RADS 0  - 12/7/16 left breast ultrasound: BI-RADS 2  - 1/18/17 right axillary ultrasound for lump: BI-RADS 2  - 5/1/17 b/l Dmammo  and right ultrasound: BI-RADS 2  - 18 b/l Dmammo and left axillary ultrasound: BI-RADS 1    Prior breast biopsies/surgeries: Yes   - left axillary    Prior history of breast cancer: No  Prior radiation history: No   Self breast exams: Yes  Breast density: scattered fibroglandular densities    GYN HISTORY:  . Age at 1st pregnancy: 36. Breastfeeding history: Yes.   Age at menarche: 9  Menopausal: premenopausal   Menopausal hormone replacement therapy: no    RISK ASSESSMENT: < 1.7% 5 year risk and < 20% lifetime risk   Roxy: 1% lifetime risk   JUDAH: 18.7% lifetime risk   Quinten:     FAMILY HISTORY:  Breast ca: Yes   - maternal grandmother with breast cancer diagnosed in her 40-60's, stomach cancer and skin cancer.  Ovarian ca: Yes   - maternal aunt, also with skin cancer  Pancreatic ca: No  Prostate: Yes   - maternal grandfather  Gastric ca: Yes   -maternal grandmother with stomach cancer, breast cancer and skin cancer  Melanoma: Yes   - maternal aunt   Kidney cancer: yes  - paternal grandmother  Colon ca: Yes   - paternal grandfather   Other cancer: Yes   - father with neuroendocrine tumor of the gallbladder  - sister with thyroid cancer  - maternal uncle with liver cancer  Other genetic, testing, syndromes, or clotting disorders: no  - no one has had genetic testing     PAST MEDICAL HISTORY  Past Medical History:   Diagnosis Date     Abdominal pain     abnormal histamine problem, multiple food allergies     ADHD (attention deficit hyperactivity disorder)     on aderol     Anxiety and depression     on xanax     Arthritis      Breathing problem 2007    shortness of breath after eating, ?allergies     Chronic nausea      Gastroesophageal reflux disease      Heart murmur     closed by time she was 1     Hx of abnormal cervical Pap smear      IBS (irritable bowel syndrome)     lots of mucus in bowel with occassional bleeding     Joint pain 2015    was to have rheumatologist     Kidney stone on right  side 2010     Meniere's disease     hyperacusis     Migraines      Reduced vision      Tinnitus      PAST SURGICAL HISTORY   Past Surgical History:   Procedure Laterality Date     COLONOSCOPY N/A 2/14/2018    Procedure: COMBINED COLONOSCOPY, SINGLE OR MULTIPLE BIOPSY/POLYPECTOMY BY BIOPSY;  colon and egd;  Surgeon: Joan Willson MD;  Location: [facility]     ESOPHAGOSCOPY, GASTROSCOPY, DUODENOSCOPY (EGD), COMBINED N/A 2/14/2018    Procedure: COMBINED ESOPHAGOSCOPY, GASTROSCOPY, DUODENOSCOPY (EGD), BIOPSY SINGLE OR MULTIPLE;;  Surgeon: Joan Willson MD;  Location: [facility]     NO HISTORY OF SURGERY       MEDICATIONS  Current Outpatient Medications   Medication Sig Dispense Refill     ibuprofen (ADVIL/MOTRIN) 200 MG tablet Take 400 mg by mouth every 4 hours as needed for mild pain 2 tab every am       multivitamin, therapeutic with minerals (MULTI-VITAMIN) TABS tablet Take 1 tablet by mouth daily       albuterol (PROAIR HFA/PROVENTIL HFA/VENTOLIN HFA) 108 (90 Base) MCG/ACT inhaler Inhale 2 puffs into the lungs every 4 hours as needed for shortness of breath / dyspnea or wheezing 1 Inhaler 3     ALPRAZolam (XANAX) 0.25 MG tablet Take 1 tablet (0.25 mg) by mouth nightly as needed for anxiety 20 tablet 0     chlorhexidine (PERIDEX) 0.12 % solution Swish and spit 15 mLs in mouth 3 times daily (Patient not taking: Reported on 11/22/2019) 473 mL 0     cyclobenzaprine (FLEXERIL) 10 MG tablet Take 1 tablet (10 mg) by mouth 3 times daily as needed for muscle spasms 30 tablet 0     Misc Natural Product Nasal (PONARIS) SOLN Apply two drops to each nostril BID X 2 month supply (Patient not taking: Reported on 11/22/2019) 10 mL 3     SUMAtriptan (IMITREX) 50 MG tablet Take 1 tablet (50 mg) by mouth at onset of headache for migraine May repeat in two hours if headache persists. (Patient not taking: Reported on 4/9/2020) 9 tablet 1     ALLERGIES  Allergies   Allergen Reactions     Food      Cantaloupe, cucumbers, green beans, and peppers.  "     Lemon Flavor      Mustard Seed      Onion Difficulty breathing and GI Disturbance     [place] GI Disturbance     Penicillins Hives     Or another medication taken at that time     Vanilla GI Disturbance      SOCIAL HISTORY:  Smokes: No  EtOH: 1 drink per week  Exercise: yes,  Walking 20 minutes daily     ROS:  Change in vision No  Headaches: yes  Respiratory: No shortness of breath, dyspnea on exertion, cough, or hemoptysis and Shortness of breath- seeing pcp   Cardiovascular: negative   Gastrointestinal: negative Abdominal pain: no  Breast: left breast pain, right axillary mass, right supraclavicular mass, bilateral nipple discharge  Musculoskeletal: negative Joint pain No Back pain: no  Psychiatric: negative  Hematologic/Lymphatic/Immunologic: negative  Endocrine: negative    EXAM  /81   Pulse 74   Temp 98.2  F (36.8  C) (Oral)   Resp 18   Ht 1.676 m (5' 6\")   Wt 93.4 kg (206 lb)   SpO2 96%   BMI 33.25 kg/m     PHYSICAL EXAM  Respiratory: breathing non labored.   Breasts: Examination was done in both the upright and supine positions.  Breasts are symmetrical . No dominant fixed or suspicious masses noted. No skin or nipple changes. No nipple discharge.   No clavicular, cervical, or right axillary lymphadenopathy. Left 1 cm axillary lymph node, previously imaged    INVESTIGATIONS:    5/21/20 bilateral diagnostic mammogram and bilateral ultrasound: no concerning findings per Radiology, final report pending.     ASSESSMENT/PLAN:    Priyanka Lundberg is a 40 year old woman who presents with family history of breast and ovarian cancer, left breast pain, right axillary mass, right supraclavicular mass and bilateral non-spontaneous nipple discharge. There were no concerning findings today on mammogram and ultrasound.     1) Nipple discharge  Discussed nipple discharge is common. Benign nipple discharge is usually bilateral, multiductal, and occurs with breast manipulation. Conversely, the risk of cancer is " higher when the discharge is spontaneous, bloody, clear, unilateral, uniductal, associated with a breast mass, and/or occurs in a woman over 40 years of age. Educated to stop compression of the breast and report any spontaneous discharge.     2) Left breast pain  Discussed unclear etiology for breast pain and that it can be difficult to treat. We discussed its association with breast cancer is low. The following was suggested.   - Pain diary   -  Supportive Bra that was professionally fit. Recommend Soma or Allure Intimate Apparel.    -  Wear a high-intensity sports bra when exercising. Wear while sleeping.   -  Consider eliminating/decrease caffeine (chocolate, tea, coffee, soda) for a two week period of time to see if pain improves/resolves  -  Eat a low fat diet and improve omega 3 fatty acid intake   -  Flax seed 25 grams (2 tablespoons) daily   - Acupuncture   -  Evening Primrose Oil starting with 500 mg/day. May increase to 1000 mg 3 times daily for 6 months.   - Maintain a healthy BMI <25.  - Tylenol, ibuprofen, or neproxen.   - Diclofenac cream was prescribed.       3) Right supraclavicular mass and right axillary mass: no concerning findings on breast imaging. Follow up with any changes or new concerns.     4) Family history of breast cancer  Continue yearly screening mammogram (consider tomosynthesis) and clinical encounter. Be familiar with your breast and how they normally feel and appear. Promptly report any changes to your healthcare provider.   - Next screening mammogram due: 5/22/2021  - Referral to Genetic Counseling.     5) Lifestyle Modifications were provided.   - Maintain a healthy weight. Your BMI is Body mass index is 33.25 kg/m . Recommended BMI is 20-25. Higher body mass index (BMI) and adult weight gain is associated with increased risk for breast cancer. This increase in risk has been attributed to increase in circulating endogenous estrogen levels from fat tissue.   - Alcohol consumption,  even at moderate levels (1-2 drinks per day), increases breast cancer risk. Limit alcohol consumption to less than 1 drink per day. (1 ounce of liquor, 6 ounces of wine, or 8 ounces of beer)  - Exercise a minimum of 3 hours per week of moderate-intensity aerobic activity.     Mary Damon PA-C    Total time spent face to face with the patient was 45 minutes. 35 minutes was spent counseling the patient as described above.     Again, thank you for allowing me to participate in the care of your patient.        Sincerely,        Mary Damon PA-C

## 2020-05-21 NOTE — PROGRESS NOTES
NEW CONSULTATION  May 21, 2020     Priyanka Lundberg is a 40 year old woman who presents with family history of breast cancer, right supraclavicular mass, right axillary mass, bilateral nipple discharge, and left breast pain. She was referred by Dr. Ruiz.    HPI:    She developed focal deep left breast pain in August, 2019. The pain is daily and feels like an ache. The pain is worse when taking a deep breath. Yesterday she had another area of focal left breast pain that left like a bruise or clogged duct She began to do aggressive message on the area to try to help relieve it. This resulted in left nipple discharge from multiple ducts that was thick and white/yellow. She checked the right nipple and that resulted in right nipple discharge that was white/yellow from multiple ducts.     She has a stable left breast sebaceous cyst, currently asymptomatic.     She reports a right supraclavicular mass and a right axillary mass since since august.     She has a left axillary palpable lymph node that has been stable since her last imaging.     Left deep pain august. Worse with inhaling. Yest nap left like bruise. Felt like cloged duct. Daily aching. Not due to activity.     She reports recent irregular periods. She has seen her gynecologist for this.     She has a maternal grandmother who had breast cancer, gastric cancer and skin cancer. Her maternal aunt had ovarian cancer and melanoma. See full family history below.     BREAST-SPECIFIC HISTORY:    Previous breast imaging: Yes   - 9/17/12 b/l Dmammo for nipple cyst and right axillary lump: BI-RADS 0  - 12/7/16 left breast ultrasound: BI-RADS 2  - 1/18/17 right axillary ultrasound for lump: BI-RADS 2  - 5/1/17 b/l Dmammo and right ultrasound: BI-RADS 2  - 12/21/18 b/l Dmammo and left axillary ultrasound: BI-RADS 1    Prior breast biopsies/surgeries: Yes   - left axillary    Prior history of breast cancer: No  Prior radiation history: No   Self breast exams: Yes  Breast  density: scattered fibroglandular densities    GYN HISTORY:  . Age at 1st pregnancy: 36. Breastfeeding history: Yes.   Age at menarche: 9  Menopausal: premenopausal   Menopausal hormone replacement therapy: no    RISK ASSESSMENT: < 1.7% 5 year risk and < 20% lifetime risk   Roxy: 1% lifetime risk   JUDAH: 18.7% lifetime risk   Quinten:     FAMILY HISTORY:  Breast ca: Yes   - maternal grandmother with breast cancer diagnosed in her 40-60's, stomach cancer and skin cancer.  Ovarian ca: Yes   - maternal aunt, also with skin cancer  Pancreatic ca: No  Prostate: Yes   - maternal grandfather  Gastric ca: Yes   -maternal grandmother with stomach cancer, breast cancer and skin cancer  Melanoma: Yes   - maternal aunt   Kidney cancer: yes  - paternal grandmother  Colon ca: Yes   - paternal grandfather   Other cancer: Yes   - father with neuroendocrine tumor of the gallbladder  - sister with thyroid cancer  - maternal uncle with liver cancer  Other genetic, testing, syndromes, or clotting disorders: no  - no one has had genetic testing     PAST MEDICAL HISTORY  Past Medical History:   Diagnosis Date     Abdominal pain     abnormal histamine problem, multiple food allergies     ADHD (attention deficit hyperactivity disorder)     on aderol     Anxiety and depression     on xanax     Arthritis      Breathing problem 2007    shortness of breath after eating, ?allergies     Chronic nausea      Gastroesophageal reflux disease      Heart murmur     closed by time she was 1     Hx of abnormal cervical Pap smear      IBS (irritable bowel syndrome)     lots of mucus in bowel with occassional bleeding     Joint pain     was to have rheumatologist     Kidney stone on right side      Meniere's disease     hyperacusis     Migraines      Reduced vision      Tinnitus      PAST SURGICAL HISTORY   Past Surgical History:   Procedure Laterality Date     COLONOSCOPY N/A 2018    Procedure: COMBINED COLONOSCOPY, SINGLE OR  MULTIPLE BIOPSY/POLYPECTOMY BY BIOPSY;  colon and egd;  Surgeon: Joan Willson MD;  Location: [facility]     ESOPHAGOSCOPY, GASTROSCOPY, DUODENOSCOPY (EGD), COMBINED N/A 2/14/2018    Procedure: COMBINED ESOPHAGOSCOPY, GASTROSCOPY, DUODENOSCOPY (EGD), BIOPSY SINGLE OR MULTIPLE;;  Surgeon: Joan Willson MD;  Location: [facility]     NO HISTORY OF SURGERY       MEDICATIONS  Current Outpatient Medications   Medication Sig Dispense Refill     ibuprofen (ADVIL/MOTRIN) 200 MG tablet Take 400 mg by mouth every 4 hours as needed for mild pain 2 tab every am       multivitamin, therapeutic with minerals (MULTI-VITAMIN) TABS tablet Take 1 tablet by mouth daily       albuterol (PROAIR HFA/PROVENTIL HFA/VENTOLIN HFA) 108 (90 Base) MCG/ACT inhaler Inhale 2 puffs into the lungs every 4 hours as needed for shortness of breath / dyspnea or wheezing 1 Inhaler 3     ALPRAZolam (XANAX) 0.25 MG tablet Take 1 tablet (0.25 mg) by mouth nightly as needed for anxiety 20 tablet 0     chlorhexidine (PERIDEX) 0.12 % solution Swish and spit 15 mLs in mouth 3 times daily (Patient not taking: Reported on 11/22/2019) 473 mL 0     cyclobenzaprine (FLEXERIL) 10 MG tablet Take 1 tablet (10 mg) by mouth 3 times daily as needed for muscle spasms 30 tablet 0     Misc Natural Product Nasal (PONARIS) SOLN Apply two drops to each nostril BID X 2 month supply (Patient not taking: Reported on 11/22/2019) 10 mL 3     SUMAtriptan (IMITREX) 50 MG tablet Take 1 tablet (50 mg) by mouth at onset of headache for migraine May repeat in two hours if headache persists. (Patient not taking: Reported on 4/9/2020) 9 tablet 1     ALLERGIES  Allergies   Allergen Reactions     Food      Cantaloupe, cucumbers, green beans, and peppers.      Lemon Flavor      Mustard Seed      Onion Difficulty breathing and GI Disturbance     [place] GI Disturbance     Penicillins Hives     Or another medication taken at that time     Vanilla GI Disturbance      SOCIAL HISTORY:  Smokes: No  EtOH: 1  "drink per week  Exercise: yes,  Walking 20 minutes daily     ROS:  Change in vision No  Headaches: yes  Respiratory: No shortness of breath, dyspnea on exertion, cough, or hemoptysis and Shortness of breath- seeing pcp   Cardiovascular: negative   Gastrointestinal: negative Abdominal pain: no  Breast: left breast pain, right axillary mass, right supraclavicular mass, bilateral nipple discharge  Musculoskeletal: negative Joint pain No Back pain: no  Psychiatric: negative  Hematologic/Lymphatic/Immunologic: negative  Endocrine: negative    EXAM  /81   Pulse 74   Temp 98.2  F (36.8  C) (Oral)   Resp 18   Ht 1.676 m (5' 6\")   Wt 93.4 kg (206 lb)   SpO2 96%   BMI 33.25 kg/m     PHYSICAL EXAM  Respiratory: breathing non labored.   Breasts: Examination was done in both the upright and supine positions.  Breasts are symmetrical . No dominant fixed or suspicious masses noted. No skin or nipple changes. No nipple discharge.   No clavicular, cervical, or right axillary lymphadenopathy. Left 1 cm axillary lymph node, previously imaged    INVESTIGATIONS:    5/21/20 bilateral diagnostic mammogram and bilateral ultrasound: no concerning findings per Radiology, final report pending.     ASSESSMENT/PLAN:    Priyanka Lundberg is a 40 year old woman who presents with family history of breast and ovarian cancer, left breast pain, right axillary mass, right supraclavicular mass and bilateral non-spontaneous nipple discharge. There were no concerning findings today on mammogram and ultrasound.     1) Nipple discharge  Discussed nipple discharge is common. Benign nipple discharge is usually bilateral, multiductal, and occurs with breast manipulation. Conversely, the risk of cancer is higher when the discharge is spontaneous, bloody, clear, unilateral, uniductal, associated with a breast mass, and/or occurs in a woman over 40 years of age. Educated to stop compression of the breast and report any spontaneous discharge.     2) " Left breast pain  Discussed unclear etiology for breast pain and that it can be difficult to treat. We discussed its association with breast cancer is low. The following was suggested.   - Pain diary   -  Supportive Bra that was professionally fit. Recommend Soma or Allure Intimate Apparel.    -  Wear a high-intensity sports bra when exercising. Wear while sleeping.   -  Consider eliminating/decrease caffeine (chocolate, tea, coffee, soda) for a two week period of time to see if pain improves/resolves  -  Eat a low fat diet and improve omega 3 fatty acid intake   -  Flax seed 25 grams (2 tablespoons) daily   - Acupuncture   -  Evening Primrose Oil starting with 500 mg/day. May increase to 1000 mg 3 times daily for 6 months.   - Maintain a healthy BMI <25.  - Tylenol, ibuprofen, or neproxen.   - Diclofenac cream was prescribed.       3) Right supraclavicular mass and right axillary mass: no concerning findings on breast imaging. Follow up with any changes or new concerns.     4) Family history of breast cancer  Continue yearly screening mammogram (consider tomosynthesis) and clinical encounter. Be familiar with your breast and how they normally feel and appear. Promptly report any changes to your healthcare provider.   - Next screening mammogram due: 5/22/2021  - Referral to Genetic Counseling.     5) Lifestyle Modifications were provided.   - Maintain a healthy weight. Your BMI is Body mass index is 33.25 kg/m . Recommended BMI is 20-25. Higher body mass index (BMI) and adult weight gain is associated with increased risk for breast cancer. This increase in risk has been attributed to increase in circulating endogenous estrogen levels from fat tissue.   - Alcohol consumption, even at moderate levels (1-2 drinks per day), increases breast cancer risk. Limit alcohol consumption to less than 1 drink per day. (1 ounce of liquor, 6 ounces of wine, or 8 ounces of beer)  - Exercise a minimum of 3 hours per week of  moderate-intensity aerobic activity.     Mary Damon PA-C    Total time spent face to face with the patient was 45 minutes. 35 minutes was spent counseling the patient as described above.

## 2020-05-21 NOTE — PATIENT INSTRUCTIONS
Priyanka Lundberg is a 40 year old woman who presents with family history of breast cancer, left breast pain, right axillary mass, right supraclavicular mass and bilateral non-spontaneous nipple discharge. There were no concerning findings today on mammogram and ultrasound.     1) Nipple discharge  Discussed nipple discharge is common. Benign nipple discharge is usually bilateral, multiductal, and occurs with breast manipulation. Conversely, the risk of cancer is higher when the discharge is spontaneous, bloody, clear, unilateral, uniductal, associated with a breast mass, and/or occurs in a woman over 40 years of age. Educated to stop compression of the breast and report any spontaneous discharge.     2) Left breast pain  Discussed unclear etiology for breast pain and that it can be difficult to treat. We discussed its association with breast cancer is low. The following was suggested.   - Pain diary   -  Supportive Bra that was professionally fit. Recommend Soma or Allure Intimate Apparel.    -  Wear a high-intensity sports bra when exercising. Wear while sleeping.   -  Consider eliminating/decrease caffeine (chocolate, tea, coffee, soda) for a two week period of time to see if pain improves/resolves  -  Eat a low fat diet and improve omega 3 fatty acid intake   -  Flax seed 25 grams (2 tablespoons) daily   - Acupuncture   -  Evening Primrose Oil starting with 500 mg/day. May increase to 1000 mg 3 times daily for 6 months.   - Maintain a healthy BMI <25.  - Tylenol, ibuprofen, or neproxen.   - Diclofenac cream was prescribed.       3) Right supraclavicular mass and right axillary mass: no concerning findings on breast imaging. Follow up with any changes or new concerns.     4) Screening recommendations  Continue yearly screening mammogram (consider tomosynthesis) and clinical encounter. Be familiar with your breast and how they normally feel and appear. Promptly report any changes to your healthcare provider.   -  Next screening mammogram due: 5/22/2021    5) Lifestyle Modifications were provided.   - Maintain a healthy weight. Your BMI is Body mass index is 33.25 kg/m . Recommended BMI is 20-25. Higher body mass index (BMI) and adult weight gain is associated with increased risk for breast cancer. This increase in risk has been attributed to increase in circulating endogenous estrogen levels from fat tissue.   - Alcohol consumption, even at moderate levels (1-2 drinks per day), increases breast cancer risk. Limit alcohol consumption to less than 1 drink per day. (1 ounce of liquor, 6 ounces of wine, or 8 ounces of beer)  - Exercise a minimum of 3 hours per week of moderate-intensity aerobic activity.

## 2020-10-27 ENCOUNTER — OFFICE VISIT (OUTPATIENT)
Dept: FAMILY MEDICINE | Facility: CLINIC | Age: 40
End: 2020-10-27
Payer: COMMERCIAL

## 2020-10-27 VITALS
SYSTOLIC BLOOD PRESSURE: 107 MMHG | TEMPERATURE: 97.4 F | HEIGHT: 67 IN | HEART RATE: 78 BPM | OXYGEN SATURATION: 97 % | DIASTOLIC BLOOD PRESSURE: 72 MMHG | BODY MASS INDEX: 32.92 KG/M2 | WEIGHT: 209.75 LBS

## 2020-10-27 DIAGNOSIS — L29.0 RECTAL ITCHING: Primary | ICD-10-CM

## 2020-10-27 DIAGNOSIS — K64.4 EXTERNAL HEMORRHOIDS: ICD-10-CM

## 2020-10-27 DIAGNOSIS — Z91.018 MULTIPLE FOOD ALLERGIES: ICD-10-CM

## 2020-10-27 RX ORDER — HYDROCORTISONE 25 MG/G
CREAM TOPICAL 2 TIMES DAILY PRN
Qty: 30 G | Refills: 1 | Status: SHIPPED | OUTPATIENT
Start: 2020-10-27 | End: 2024-04-03

## 2020-10-27 ASSESSMENT — ASTHMA QUESTIONNAIRES
QUESTION_1 LAST FOUR WEEKS HOW MUCH OF THE TIME DID YOUR ASTHMA KEEP YOU FROM GETTING AS MUCH DONE AT WORK, SCHOOL OR AT HOME: NONE OF THE TIME
QUESTION_2 LAST FOUR WEEKS HOW OFTEN HAVE YOU HAD SHORTNESS OF BREATH: ONCE OR TWICE A WEEK
QUESTION_4 LAST FOUR WEEKS HOW OFTEN HAVE YOU USED YOUR RESCUE INHALER OR NEBULIZER MEDICATION (SUCH AS ALBUTEROL): NOT AT ALL
QUESTION_3 LAST FOUR WEEKS HOW OFTEN DID YOUR ASTHMA SYMPTOMS (WHEEZING, COUGHING, SHORTNESS OF BREATH, CHEST TIGHTNESS OR PAIN) WAKE YOU UP AT NIGHT OR EARLIER THAN USUAL IN THE MORNING: ONCE OR TWICE
ACT_TOTALSCORE: 22
QUESTION_5 LAST FOUR WEEKS HOW WOULD YOU RATE YOUR ASTHMA CONTROL: WELL CONTROLLED

## 2020-10-27 ASSESSMENT — MIFFLIN-ST. JEOR: SCORE: 1636.66

## 2020-10-27 NOTE — PROGRESS NOTES
Priyanka Lundberg is a 40 year old female here for the following issues:    Concern about lymph nodes in her groin  Priyanka reports discomfort in her groin area for the past 4 weeks. Skin burns in the creases, but no redness.  She is concerned she has inflamed lymph nodes, L>R side.  No fevers. No vaginal discharge.     Rectal itching  Priyanka reports rectal itching for the past 4 months. Awakens at night with itching. Using OTC itching cream. No other family members with symptoms. She has tried athletes foot cream then vaseline as it was bleeding due to scratching.     Hemorrhoids  Priyanka has hx of hemorrhoids that bleed. She has had banding in the past. Has occasional fecal leakage.   Chronic constipation, not using anything right now. Last Colonoscopy 2018. Small polyp was removed. This was a hyperplastic polyp.     Food allergies  Priyanka reports multiple food allergies. She was seen by an allergist in [place] who identified the following:   Vanilla, peppers, onions- these trigger itchy ears, and top of lip becomes dry and red. Gums can become inflamed. And lips can become puffy. She would like referral to an allergist.    Declines flu vaccine    Patient Active Problem List   Diagnosis     Gastrointestinal problem     Joint pain     L4-L5 disc bulge     Attention deficit hyperactivity disorder (ADHD), combined type     Vaginal discharge     History of abnormal cervical Pap smear     Other acne     Benign nevus     History of Clostridium difficile colitis     Cervicalgia     Bilateral thoracic back pain     Class 1 obesity due to excess calories without serious comorbidity with body mass index (BMI) of 30.0 to 30.9 in adult     Gastroesophageal reflux disease with esophagitis     Anxiety       Current Outpatient Medications   Medication Sig Dispense Refill     albuterol (PROAIR HFA/PROVENTIL HFA/VENTOLIN HFA) 108 (90 Base) MCG/ACT inhaler Inhale 2 puffs into the lungs every 4 hours as needed for shortness of  "breath / dyspnea or wheezing 1 Inhaler 3     ALPRAZolam (XANAX) 0.25 MG tablet Take 1 tablet (0.25 mg) by mouth nightly as needed for anxiety 20 tablet 0     chlorhexidine (PERIDEX) 0.12 % solution Swish and spit 15 mLs in mouth 3 times daily (Patient not taking: Reported on 11/22/2019) 473 mL 0     cyclobenzaprine (FLEXERIL) 10 MG tablet Take 1 tablet (10 mg) by mouth 3 times daily as needed for muscle spasms 30 tablet 0     diclofenac (VOLTAREN) 1 % topical gel Place 2 g onto the skin 4 times daily as needed for moderate pain 100 g 0     ibuprofen (ADVIL/MOTRIN) 200 MG tablet Take 400 mg by mouth every 4 hours as needed for mild pain 2 tab every am       Misc Natural Product Nasal (PONARIS) SOLN Apply two drops to each nostril BID X 2 month supply (Patient not taking: Reported on 11/22/2019) 10 mL 3     multivitamin, therapeutic with minerals (MULTI-VITAMIN) TABS tablet Take 1 tablet by mouth daily       SUMAtriptan (IMITREX) 50 MG tablet Take 1 tablet (50 mg) by mouth at onset of headache for migraine May repeat in two hours if headache persists. (Patient not taking: Reported on 4/9/2020) 9 tablet 1       Allergies   Allergen Reactions     Food      Cantaloupe, cucumbers, green beans, and peppers.      Lemon Flavor      Mustard Seed      Onion Difficulty breathing and GI Disturbance     Crawford GI Disturbance     Penicillins Hives     Or another medication taken at that time     Vanilla GI Disturbance        EXAM  /72 (BP Location: Right arm, Patient Position: Sitting, Cuff Size: Adult Large)   Pulse 78   Temp 97.4  F (36.3  C) (Temporal)   Ht 1.69 m (5' 6.54\")   Wt 95.1 kg (209 lb 12 oz)   LMP 10/03/2020   SpO2 97%   BMI 33.31 kg/m    Gen: Alert, pleasant, NAD  COR: S1,S2, no murmur  Lungs: CTA bilaterally, no rhonchi, wheezes or rales  Abdomen: Soft, non tender, normal bowel sounds, no HSM or mass  Groin: no redness or skin change. Normal sized lymph nodes present .   Rectal: external hemorrhoids " noted, noninflamed.       Assessment:  (L29.0) Rectal itching  (primary encounter diagnosis)  Comment: hx of external hemorrhoids likely cause, local irritation vs infection with pinworms (less likely)  Plan: COLORECTAL SURGERY REFERRAL, hydrocortisone,         Perianal, (HYDROCORTISONE) 2.5 % cream        Recommend topical hydrocortisone cream 3-4 x per day, high fiber diet, stool softeners if needed. Referral is given to Colorectal surgery to discuss treatment for hemorrhoids. Send home Pinworm paddle that she can return at her convenience. Use in the middle of the night as that is most likely time to find pinworms    (Z91.018) Multiple food allergies  Comment: diagnosed with multiple food allergies in [place], she wishes to establish with an allergist locally  Plan: ALLERGY/ASTHMA ADULT REFERRAL        Referral is given    (K64.4) External hemorrhoids  Comment: bothersome hemorrhoids, hx of banding  Plan: COLORECTAL SURGERY REFERRAL, hydrocortisone,         Perianal, (HYDROCORTISONE) 2.5 % cream        rx for proctocream. Sitz baths, stool softeners.  Discuss treatment with colorectal surgery.     Sydney Ruiz MD  Internal Medicine/Pediatrics

## 2020-10-28 DIAGNOSIS — R19.8 GASTROINTESTINAL PROBLEM: Primary | ICD-10-CM

## 2020-10-28 ASSESSMENT — ASTHMA QUESTIONNAIRES: ACT_TOTALSCORE: 22

## 2020-10-29 LAB
E VERMICULARIS SPEC QL PINWORM EXAM: NORMAL
SPECIMEN SOURCE: NORMAL

## 2020-11-12 ENCOUNTER — VIRTUAL VISIT (OUTPATIENT)
Dept: FAMILY MEDICINE | Facility: OTHER | Age: 40
End: 2020-11-12

## 2020-11-12 NOTE — PROGRESS NOTES
"Date: 2020 15:10:58  Clinician: Lucho Daniels  Clinician NPI: [id number]  Patient: Priyanka Lundberg  Patient : 1980  Patient Address: [address], [place] [address]  Patient Phone: [phone]  Visit Protocol: URI  Patient Summary:  Priyanka is a 40 year old ( : 1980 ) female who initiated a [facility] Visit for COVID-19 (Coronavirus) evaluation and screening. When asked the question \"Please sign me up to receive news, health information and promotions. \", Priyanka responded \"No\".    Priyanka states her symptoms started gradually 2-3 weeks ago. After her symptoms started, they improved and then got worse again.   Her symptoms consist of myalgia, malaise, a headache, a cough, and nausea. She is experiencing mild difficulty breathing with activities but can speak normally in full sentences.   Symptom details     Cough: Priyanka coughs a few times an hour and her cough is more bothersome at night. Phlegm comes into her throat when she coughs. She does not believe her cough is caused by post-nasal drip. The color of the phlegm is yellow.     Headache: She states the headache is mild (1-3 on a 10 point pain scale).      Priyanka denies having vomiting, rhinitis, facial pain or pressure, chills, sore throat, teeth pain, ageusia, diarrhea, ear pain, wheezing, fever, nasal congestion, and anosmia. She also denies taking antibiotic medication in the past month, having recent facial or sinus surgery in the past 60 days, and having a sinus infection within the past year.   Precipitating events  She has not recently been exposed to someone with influenza. Priyanka has been in close contact with the following high risk individuals: adults 65 or older, children under the age of 5, and people with asthma, heart disease or diabetes.   Pertinent COVID-19 (Coronavirus) information  Priyanka does not work or volunteer as healthcare worker or a . In the past 14 days, Priyanka has worked or volunteered at an " assisted living. 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   Priyanka has had a close contact with a laboratory-confirmed COVID-19 patient within 14 days of symptom onset. She was exposed at her work. Date Priyanka was exposed to the laboratory-confirmed COVID-19 patient: 10/27/2020   Additional information about contact with COVID-19 (Coronavirus) patient as reported by the patient (free text): Mom expressed me In her apt    Since December 2019, Priyanka has been tested for COVID-19 and has not had upper respiratory infection or influenza-like illness.      Result of COVID-19 test: Negative     Date of her COVID-19 test: 04/15/2020      Pertinent medical history  Priyanka does not get yeast infections when she takes antibiotics.   Priyanka needs a return to work/school note.   Weight: 215 lbs   Priyanka does not smoke or use smokeless tobacco.   She denies pregnancy and denies breastfeeding. She has menstruated in the past month.   Additional information as reported by the patient (free text): I need a covid-19 test. 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 I need to be tested for the Coronavirus! Please advise me on how to do so. Sending me to my primary care that doesn't do testing does not make sense.  See the History of referred by protocol and completed visits section for details on previous visits (visits currently in queue to be diagnosed will not appear in this section).    MEDICATIONS: Complete Multivitamin-Multimineral oral, ALLERGIES: NKDA  Clinician Response:  Dear Priyanka,   Your symptoms show that you may have coronavirus (COVID-19). This illness can cause fever, cough and trouble breathing. Many people get a mild case and get better on their own. Some people can  "get very sick.  What should I do?  We would like to test you for this virus.   1. Please call [phone] to schedule your visit. Explain that you were referred by [facility] to have a COVID-19 test. Be ready [facility].  * If you need to schedule in [facility] please call [phone] or for [employer] employees please call [phone].  * If you need to schedule in the [place] area please call [phone]. [employer] employees call [phone].  The following will serve as your written order for this COVID Test, ordered by me, for the indication of suspected COVID [Z20.828]: The test will be ordered in [email or web address], our electronic health record, after you are scheduled. It will show as ordered and authorized by Abhishek Galindo MD.  Order: COVID-19 (Coronavirus) PCR for SYMPTOMATIC testing from [facility].   2. When it's time for your COVID test:  Stay at least 6 feet away from others. (If someone will drive you to your test, stay in the backseat, as far away from the  as you can.)   Cover your mouth and nose with a mask, tissue or washcloth.  Go straight to the testing site. Don't make any stops on the way there or back.      3.Starting now: Stay home and away from others (self-isolate) until:   You've had no fever---and no medicine that reduces fever---for one full day (24 hours). And...   Your other symptoms have gotten better. For example, your cough or breathing has improved. And...   At least 10 days have passed since your symptoms started.       During this time, don't leave the house except for testing or medical care.   Stay in your own room, even for meals. Use your own bathroom if you can.   Stay away from others in your home. No hugging, kissing or shaking hands. No visitors.  Don't go to work, school or anywhere else.    Clean \"high touch\" surfaces often (doorknobs, counters, handles, etc.). Use a household cleaning spray or wipes. You'll find a full list of  on the EPA website: " www.epa.gov/pesticide-registration/list-n-disinfectants-use-against-sars-cov-2.   Cover your mouth and nose with a mask, tissue or washcloth to avoid spreading germs.  Wash your hands and face often. Use soap and water.  Caregivers in these groups are at risk for severe illness due to COVID-19:  o People 65 years and older  o People who live in a nursing home or long-term care facility  o People with chronic disease (lung, heart, cancer, diabetes, kidney, liver, immunologic)  o People who have a weakened immune system, including those who:   Are in cancer treatment  Take medicine that weakens the immune system, such as corticosteroids  Had a bone marrow or organ transplant  Have an immune deficiency  Have poorly controlled HIV or AIDS  Are obese (body mass index of 40 or higher)  Smoke regularly   o Caregivers should wear gloves while washing dishes, handling laundry and cleaning bedrooms and bathrooms.  o Use caution when washing and drying laundry: Don't shake dirty laundry, and use the warmest water setting that you can.  o For more tips, go to www.cdc.gov/coronavirus/2019-ncov/downloads/10Things.pdf.    How can I take care of myself?    Get lots of rest. Drink extra fluids (unless a doctor has told you not to).   Take Tylenol (acetaminophen) for fever or pain. If you have liver or kidney problems, ask your family doctor if it's okay to take Tylenol.   Adults can take either:    650 mg (two 325 mg pills) every 4 to 6 hours, or...   1,000 mg (two 500 mg pills) every 8 hours as needed.    Note: Don't take more than 3,000 mg in one day. Acetaminophen is found in many medicines (both prescribed and over-the-counter medicines). Read all labels to be sure you don't take too much.   For children, check the Tylenol bottle for the right dose. The dose is based on the child's age or weight.    If you have other health problems (like cancer, heart failure, an organ transplant or severe kidney disease): Call your specialty  clinic if you don't feel better in the next 2 days.       Know when to call 911. Emergency warning signs include:    Trouble breathing or shortness of breath Pain or pressure in the chest that doesn't go away Feeling confused like you haven't felt before, or not being able to wake up Bluish-colored lips or face.  Where can I get more information?   [facility] -- About COVID-19: [email or web address]   CDC -- What to Do If You're Sick: www.cdc.gov/coronavirus/2019-ncov/about/steps-when-sick.html   [facility] -- Ending Home Isolation: www.cdc.gov/coronavirus/2019-ncov/hcp/disposition-in-home-patients.html   [facility] -- Caring for Someone: www.cdc.gov/coronavirus/2019-ncov/if-you-are-sick/care-for-someone.html   [facility] -- Interim Guidance for Hospital Discharge to Home: www.[facility].[place]./diseases/coronavirus/hcp/hospdischarge.pdf   [facility] clinical trials (COVID-19 research studies): [email or web address].[facility]-clinical-trials    Below are the COVID-19 hotlines at the [facility] ([facility]). Interpreters are available.    For health questions: Call 526-826-3126 or 1-488.274.4109 (7 a.m. to 7 p.m.) For questions about schools and childcare: Call 731-905-0449 or 1-230.936.7209 (7 a.m. to 7 p.m.)    Diagnosis: Contact with and (suspected) exposure to other viral communicable diseases  Diagnosis ICD: Z20.828

## 2020-11-12 NOTE — PROGRESS NOTES
"Date: 2020 14:41:15  Clinician: Jia Banks  Clinician NPI: [id number]  Patient: Priyanka Lundberg  Patient : 1980  Patient Address: [address], [place] [address]  Patient Phone: [phone]  Visit Protocol: URI  Patient Summary:  Priyanka is a 40 year old ( : 1980 ) female who initiated a [facility] Visit for COVID-19 (Coronavirus) evaluation and screening. When asked the question \"Please sign me up to receive news, health information and promotions. \", Priyanka responded \"No\".    Priyanka states her symptoms started suddenly 2-3 weeks ago. After her symptoms started, they improved and then got worse again.   Her symptoms consist of myalgia, chills, malaise, a headache, a cough, and nausea. She is experiencing mild difficulty breathing with activities but can speak normally in full sentences.   Symptom details     Cough: Priyanka coughs a few times an hour and her cough is more bothersome at night. Phlegm comes into her throat when she coughs. She does not believe her cough is caused by post-nasal drip. The color of the phlegm is yellow.     Headache: She states the headache is mild (1-3 on a 10 point pain scale).      Priyanka denies having vomiting, rhinitis, facial pain or pressure, sore throat, teeth pain, ageusia, diarrhea, ear pain, wheezing, fever, nasal congestion, and anosmia. She also denies taking antibiotic medication in the past month, having recent facial or sinus surgery in the past 60 days, and having a sinus infection within the past year.   Precipitating events  She has not recently been exposed to someone with influenza. Priyanka has been in close contact with the following high risk individuals: adults 65 or older, children under the age of 5, and people with asthma, heart disease or diabetes.   Pertinent COVID-19 (Coronavirus) information  Priyanka does not work or volunteer as healthcare worker or a . In the past 14 days, Priyanka has worked or volunteered at " an assisted living. 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   Priyanka has had a close contact with a laboratory-confirmed COVID-19 patient within 14 days of symptom onset. She was exposed at her work. Date Priyanka was exposed to the laboratory-confirmed COVID-19 patient: 10/27/2020   Additional information about contact with COVID-19 (Coronavirus) patient as reported by the patient (free text): I am not sure if I was exposed to my mom who is currently in the hospital with covid or if I was the carrier    Since December 2019, Priyanka has been tested for COVID-19 and has not had upper respiratory infection or influenza-like illness.      Result of COVID-19 test: Negative     Date of her COVID-19 test: 04/15/2020      Pertinent medical history  Priyanka does not get yeast infections when she takes antibiotics.   Priyanka does not need a return to work/school note.   Weight: 215 lbs   Priyanka does not smoke or use smokeless tobacco.   She denies pregnancy and denies breastfeeding. She has menstruated in the past month.   Weight: 215 lbs    MEDICATIONS: Complete Multivitamin-Multimineral oral, ALLERGIES: NKDA  Clinician Response:  Dear Priyanka,  I am sorry you are not feeling well. To determine the most appropriate care for you, I would like you to be seen in person to further discuss your health history and symptoms.  You will not be charged for this [facility] Visit. Thank you for trusting us with your care.  COVID-19 (Coronavirus) General Information  Because there is currently no vaccine to prevent infection, the best way to protect yourself is to avoid being exposed to this virus. Common symptoms of COVID-19 include but are not limited to fever, cough, and shortness of breath. These symptoms appear 2-14 days after you are exposed to the virus that causes COVID-19. Click here for more  information from the CDC on how to protect yourself.  If you are sick with COVID-19 or suspect you are infected with the virus that causes COVID-19, follow the steps here from the CDC to help prevent the disease from spreading to people in your home and community.  Click here for general information from the CDC on testing.  If you develop any of these emergency warning signs for COVID-19, get medical attention immediately:     Trouble breathing    Persistent pain or pressure in the chest    New confusion or inability to arouse    Bluish lips or face      Call your doctor or clinic before going in. Call 911 if you have a medical emergency and notify the  you have or think you may have COVID-19.  For more detailed and up to date information on COVID-19 (Coronavirus), please visit the CDC website.   Diagnosis: Refer for additional evaluation  Diagnosis ICD: R69  Additional Clinician Notes:  You need evaluation in clinic as symptoms have been ongoing and then worsening over a period of 2-3 weeks

## 2020-11-13 ENCOUNTER — NURSE TRIAGE (OUTPATIENT)
Dept: NURSING | Facility: CLINIC | Age: 40
End: 2020-11-13

## 2020-11-14 NOTE — TELEPHONE ENCOUNTER
Patient calling - says she did an [facility] visit yesterday for COVID19 symptoms.  Is scheduled for COVID19 test on Monday.  She says her symptoms started about 2 weeks ago.  Her mother tested positive for COVID19 last week and is currently hospitalized.      Patient is asking if she can have an antibody test and a PCR test at the same time.  Advised patient that if she currently has symptoms and was exposed to COVID19 a week ago she should have a PCR test to find out if she is positive for COVID19.  When her symptoms have all resolved she could then call to request an antibody test if her PCR test comes back negative.  Advised patient antibody tests are only done when it has been at least 14 days from exposure and/or start of symptoms and all symptoms are resolved.  If antibody test is done prior to this it may not be accurate as antibodies may not show yet.    Advised to call back with further questions or if symptoms worsen.    Karlene King RN  Triage Nurse Advisor      Additional Information    [1] Recent medical visit within 24 hours AND [2] condition/symptoms SAME (unchanged) AND [3] caller has additional questions triager can answer    Protocols used: RECENT MEDICAL VISIT FOR ILLNESS FOLLOW-UP CALL-A-

## 2020-11-15 ENCOUNTER — AMBULATORY - HEALTHEAST (OUTPATIENT)
Dept: FAMILY MEDICINE | Facility: CLINIC | Age: 40
End: 2020-11-15

## 2020-11-15 DIAGNOSIS — Z20.822 SUSPECTED COVID-19 VIRUS INFECTION: ICD-10-CM

## 2020-11-16 ENCOUNTER — AMBULATORY - HEALTHEAST (OUTPATIENT)
Dept: FAMILY MEDICINE | Facility: CLINIC | Age: 40
End: 2020-11-16

## 2020-11-16 DIAGNOSIS — Z20.822 SUSPECTED COVID-19 VIRUS INFECTION: ICD-10-CM

## 2020-11-18 ENCOUNTER — COMMUNICATION - HEALTHEAST (OUTPATIENT)
Dept: SCHEDULING | Facility: CLINIC | Age: 40
End: 2020-11-18

## 2020-12-27 ENCOUNTER — HEALTH MAINTENANCE LETTER (OUTPATIENT)
Age: 40
End: 2020-12-27

## 2021-02-11 ENCOUNTER — TELEPHONE (OUTPATIENT)
Dept: FAMILY MEDICINE | Facility: CLINIC | Age: 41
End: 2021-02-11

## 2021-02-11 NOTE — TELEPHONE ENCOUNTER
"Pt calling clinic - prefers appt only with Dr Ruiz. States she has history of upset stomach, nausea at times, constipation. States in past week, she has felt increased occ  nausea. She has not vomited - admits to \"odd eating habits, and I have many food allergy problems.\" States she thinks she is experiencing stomach acid, and likely suspects GERD. Is taking fluids adequately; denies fever. States bottom of right rib cage area is 'sore when touched\". States she experienced some diarrhea over the weekend. States she had tacos last night, and is eating a beef burrito when talking on this call. Advised pt that if she is having stomach upset and nausea, she should try to eat lighter foods - discussed BRAT diet; she is tending to eat spicier foods. Pt in no distress during this conversation.   Pt has been surfing the internet re: her symptoms - requests appt to discuss her intermittent symptoms - appt made with Dr Ruiz for Monday.  Pt will seek medical attention if her symptoms worsen before Mon.  Hoa Leal RN  [facility]  "

## 2021-02-15 ENCOUNTER — OFFICE VISIT (OUTPATIENT)
Dept: FAMILY MEDICINE | Facility: CLINIC | Age: 41
End: 2021-02-15
Payer: COMMERCIAL

## 2021-02-15 VITALS
HEIGHT: 67 IN | DIASTOLIC BLOOD PRESSURE: 77 MMHG | TEMPERATURE: 97.2 F | OXYGEN SATURATION: 95 % | RESPIRATION RATE: 15 BRPM | BODY MASS INDEX: 34.49 KG/M2 | SYSTOLIC BLOOD PRESSURE: 121 MMHG | WEIGHT: 219.75 LBS | HEART RATE: 77 BPM

## 2021-02-15 DIAGNOSIS — R10.9 RIGHT FLANK PAIN: ICD-10-CM

## 2021-02-15 DIAGNOSIS — R10.11 RIGHT UPPER QUADRANT PAIN: Primary | ICD-10-CM

## 2021-02-15 DIAGNOSIS — K59.09 CHRONIC CONSTIPATION: ICD-10-CM

## 2021-02-15 DIAGNOSIS — R42 DIZZINESS: ICD-10-CM

## 2021-02-15 LAB
ALBUMIN SERPL-MCNC: 3.6 G/DL (ref 3.3–4.6)
ALBUMIN UR-MCNC: NEGATIVE MG/DL
ALP SERPL-CCNC: 50 U/L (ref 40–150)
ALT SERPL-CCNC: 16 U/L (ref 0–50)
AMORPH CRY #/AREA URNS HPF: ABNORMAL /HPF
AMYLASE SERPL-CCNC: 37 U/L (ref 30–110)
APPEARANCE UR: ABNORMAL
AST SERPL-CCNC: 20 U/L (ref 0–45)
BILIRUB SERPL-MCNC: 0.6 MG/DL (ref 0.2–1.3)
BILIRUB UR QL STRIP: NEGATIVE
BUN SERPL-MCNC: 12 MG/DL (ref 5–24)
CALCIUM SERPL-MCNC: 9.1 MG/DL (ref 8.5–10.4)
CHLORIDE SERPLBLD-SCNC: 107 MMOL/L (ref 94–109)
CO2 SERPL-SCNC: 31 MMOL/L (ref 20–32)
COLOR UR AUTO: YELLOW
CREAT SERPL-MCNC: 0.8 MG/DL (ref 0.6–1.3)
EGFR CALCULATED (BLACK REFERENCE): 101.2
EGFR CALCULATED (NON BLACK REFERENCE): 83.6
ERYTHROCYTE [DISTWIDTH] IN BLOOD BY AUTOMATED COUNT: 13.1 %
GLUCOSE SERPL-MCNC: 104 MG/DL (ref 60–99)
GLUCOSE UR STRIP-MCNC: NEGATIVE MG/DL
HCT VFR BLD AUTO: 41.1 % (ref 35–47)
HEMOGLOBIN: 13.3 G/DL (ref 11.7–15.7)
HGB UR QL STRIP: NEGATIVE
KETONES UR STRIP-MCNC: NEGATIVE MG/DL
LEUKOCYTE ESTERASE UR QL STRIP: NEGATIVE
LIPASE SERPL-CCNC: 186 U/L (ref 73–393)
MCH RBC QN AUTO: 28.9 PG (ref 26.5–35)
MCHC RBC AUTO-ENTMCNC: 32.4 G/DL (ref 32–36)
MCV RBC AUTO: 89.3 FL (ref 78–100)
MUCOUS THREADS #/AREA URNS LPF: PRESENT /LPF
NITRATE UR QL: NEGATIVE
PH UR STRIP: 6 PH (ref 5–7)
PLATELET # BLD AUTO: 263 K/UL (ref 150–450)
POTASSIUM SERPL-SCNC: 3.9 MMOL/L (ref 3.4–5.3)
PROT SERPL-MCNC: 7 G/DL (ref 6.8–8.8)
RBC # BLD AUTO: 4.6 M/UL (ref 3.8–5.2)
RBC #/AREA URNS AUTO: 4 /HPF (ref 0–2)
SODIUM SERPL-SCNC: 142 MMOL/L (ref 137.3–146.3)
SOURCE: ABNORMAL
SP GR UR STRIP: 1.02 (ref 1–1.03)
SQUAMOUS #/AREA URNS AUTO: <1 /HPF (ref 0–1)
UROBILINOGEN UR STRIP-MCNC: NORMAL MG/DL (ref 0–2)
WBC # BLD AUTO: 6.4 K/UL (ref 4–11)
WBC #/AREA URNS AUTO: 0 /HPF (ref 0–5)

## 2021-02-15 ASSESSMENT — MIFFLIN-ST. JEOR: SCORE: 1688.28

## 2021-02-15 NOTE — PROGRESS NOTES
"Priyanka Lundberg is a 40 year old female with history of chronic constipation, lower back pain with disc bulging, ADHD, obesity, acid reflux and anxiety.  She is here to discuss the following issues.    Nausea, bloating, abdominal pain  Priyanka is a 40-year-old woman with longstanding history of abdominal pain and constipation.  She was previously followed at the GI clinic until 2018 when her provider left.  She reports a 4-day history of abdominal pain bloating cramping and nausea.  She also has constipation.  She does not like using MiraLAX, concerned about constant ingestion of \"chemicals\".  Despite this she use this over the weekend and had some small stools.  No blood, no mucus, no fevers.    She admits that her diet is poor, she eats a lot of cheese and a lot of carbohydrates.  Does not usually take in a lot of fiber and takes no supplements.  She had a colonoscopy in 2018 which was completely normal.  She is concerned that she is having a gallbladder attack.  She had previous work-up with a ultrasound and a HIDA scan in 2018, both normal.  Unclear if she is experiencing abdominal pain directly after eating.  Denies right upper quadrant cramping or diarrhea.    She has intolerance to many foods.  Had asked to see an allergist but did not follow through with an appointment.  Her last visit with an allergist was about 12 years ago in [place].    \"Allergic to many things\"  Green beans, mustard peppers, canteloupe    Dizziness  Priyanka also reports a history of feeling dizzy and having near syncope for the past 4 days.  Unclear if this is related to the abdominal pain or otherwise.  With further questioning, she denies palpitations, chest pain, shortness of breath prior to these episodes.    Upper abdominal  pain  Priyanka reports discomfort at her upper abdomen and a burning quality after eating.  She denies dysphagia, denies black or tarry stools.  She is not taking anything for her symptoms.  She uses ibuprofen " "once daily.  She has persistent nausea but no vomiting.    Right flank pain  She has a history of kidney stones.  Describes right-sided flank pain that radiates over to the right side of her abdomen and down to her right lower quadrant.  Denies dysuria or hematuria or fever.  Pain seems to be related more with eating.    Wt Readings from Last 2 Encounters:   02/15/21 99.7 kg (219 lb 12 oz)   10/27/20 95.1 kg (209 lb 12 oz)       Hx of migraines  Priyanka has a history of migraines.  Two days ago, she felt an \"ice pick stabbing pain\" to the left posterior scalp ice pick pain to left posterior scalp, parietal area.  It lasted about 5 seconds.  During this time, there were no vision changes numbness tingling or weakness.  About 15 minutes later, she had bony tenderness under the left eye but her vision was fine.  She went to sleep and has not experienced any further symptoms.    Burning in her groin, concerned about enlarged lymph nodes  We did not have time to address this during our visit today and I have encouraged her to make a follow-up appointment in 1 week..    Patient Active Problem List   Diagnosis     Gastrointestinal problem     Joint pain     L4-L5 disc bulge     Attention deficit hyperactivity disorder (ADHD), combined type     Vaginal discharge     History of abnormal cervical Pap smear     Other acne     Benign nevus     History of Clostridium difficile colitis     Cervicalgia     Bilateral thoracic back pain     Class 1 obesity due to excess calories without serious comorbidity with body mass index (BMI) of 30.0 to 30.9 in adult     Gastroesophageal reflux disease with esophagitis     Anxiety       Current Outpatient Medications   Medication Sig Dispense Refill     albuterol (PROAIR HFA/PROVENTIL HFA/VENTOLIN HFA) 108 (90 Base) MCG/ACT inhaler Inhale 2 puffs into the lungs every 4 hours as needed for shortness of breath / dyspnea or wheezing 1 Inhaler 3     ALPRAZolam (XANAX) 0.25 MG tablet Take 1 tablet " "(0.25 mg) by mouth nightly as needed for anxiety 20 tablet 0     cyclobenzaprine (FLEXERIL) 10 MG tablet Take 1 tablet (10 mg) by mouth 3 times daily as needed for muscle spasms 30 tablet 0     hydrocortisone, Perianal, (HYDROCORTISONE) 2.5 % cream Place rectally 2 times daily as needed for hemorrhoids 30 g 1     ibuprofen (ADVIL/MOTRIN) 200 MG tablet Take 400 mg by mouth every 4 hours as needed for mild pain 2 tab every am       multivitamin, therapeutic with minerals (MULTI-VITAMIN) TABS tablet Take 1 tablet by mouth daily         Allergies   Allergen Reactions     Food      Cantaloupe, cucumbers, green beans, and peppers.      Lemon Flavor      Mustard Seed      Onion Difficulty breathing and GI Disturbance     Keith GI Disturbance     Penicillins Hives     Or another medication taken at that time     Vanilla GI Disturbance        EXAM  /77 (BP Location: Right arm, Patient Position: Sitting, Cuff Size: Adult Large)   Pulse 77   Temp 97.2  F (36.2  C) (Skin)   Resp 15   Ht 1.7 m (5' 6.93\")   Wt 99.7 kg (219 lb 12 oz)   LMP 02/15/2021   SpO2 95%   BMI 34.49 kg/m    Gen: Alert, pleasant, NAD, obese  COR: S1,S2, no murmur, no ectopy  Lungs: CTA bilaterally, no rhonchi, wheezes or rales  Abdomen: soft, minimal tenderness at epigastrium , increased discomfort with palpation over navel  Tenderness with palpation over the RLQ, right mid area and RUQ. Discomfort with palpation over eft mid abdomen and LLQ.  normal bowel sounds, no HSM or masses  Skin: no jaundice    Assessment:  (R10.11) Right upper quadrant pain  (primary encounter diagnosis)  Comment: persistent RUQ pain over past 2 wks, worse in past 4 days. DDX is constipation, GB pain, liver disease, kidney stone, musculoskeletal strain  Plan: Comprehensive Metabolic Panel ([place]),         Amylase, Lipase, CBC with Plt ([facility])        Check liver enzymes today. Gave information on GB symptoms. If pain is escalating or accompanied by fever then go in " to ER.     (R10.9) Right flank pain  Comment: hx of kidney stones  Plan: Routine UA with micro reflex to culture        Check urine today for presence of blood, stay well hydrated.    (K59.09) Chronic constipation  Comment: longstanding issue, she reports her diet is poor and she is not consistent with fiber intake or miralax use.   Plan: suspect her constellation of symptoms are due to acute on chronic constipation.  Patient Instructions   Fiber intake 25-30 gram per day  Fiber one cereal 13-15 g per serving  Raisin Bran 7-8 grams per serving.    Metamucil powder once daily    Constipating foods  Meats  Cheese  Pasta, rice, potatoes    Dulcolax suppository for 1-2 days to remove stool from lower colon    Follow up in one week for recheck,  Contact MD for any acute changes    (R42) Dizziness  Comment: Priyanka describes near syncope for the past few days. DDX is arrhythmia, vasovagal response, anemia, vertigo, aneurysm  Plan: follow up next wee regarding symptoms, held on ordering heart monitor at this time. Suspect this may be vasovagal response to abdominal discomfort.     54 minutes spend on the date of this encounter doing chart review, history and exam, documentation and further activities as noted above.       Sydney Ruiz MD  Internal Medicine/Pediatrics

## 2021-02-15 NOTE — NURSING NOTE
"40 year old  Chief Complaint   Patient presents with     Nausea     and upset stomach (RUQ), just after eating, bloating,  dizziness/close to blacking out, x 4 days     Groin Pain     thinks lymph node pain in bilateral groin,        Blood pressure 121/77, pulse 77, temperature 97.2  F (36.2  C), temperature source Skin, resp. rate 15, height 1.7 m (5' 6.93\"), weight 99.7 kg (219 lb 12 oz), last menstrual period 02/15/2021, SpO2 95 %, not currently breastfeeding. Body mass index is 34.49 kg/m .  Patient Active Problem List   Diagnosis     Gastrointestinal problem     Joint pain     L4-L5 disc bulge     Attention deficit hyperactivity disorder (ADHD), combined type     Vaginal discharge     History of abnormal cervical Pap smear     Other acne     Benign nevus     History of Clostridium difficile colitis     Cervicalgia     Bilateral thoracic back pain     Class 1 obesity due to excess calories without serious comorbidity with body mass index (BMI) of 30.0 to 30.9 in adult     Gastroesophageal reflux disease with esophagitis     Anxiety       Wt Readings from Last 2 Encounters:   02/15/21 99.7 kg (219 lb 12 oz)   10/27/20 95.1 kg (209 lb 12 oz)     BP Readings from Last 3 Encounters:   02/15/21 121/77   10/27/20 107/72   05/21/20 125/81         Current Outpatient Medications   Medication     albuterol (PROAIR HFA/PROVENTIL HFA/VENTOLIN HFA) 108 (90 Base) MCG/ACT inhaler     ALPRAZolam (XANAX) 0.25 MG tablet     cyclobenzaprine (FLEXERIL) 10 MG tablet     hydrocortisone, Perianal, (HYDROCORTISONE) 2.5 % cream     ibuprofen (ADVIL/MOTRIN) 200 MG tablet     multivitamin, therapeutic with minerals (MULTI-VITAMIN) TABS tablet     No current facility-administered medications for this visit.        Social History     Tobacco Use     Smoking status: Never Smoker     Smokeless tobacco: Never Used   Substance Use Topics     Alcohol use: Not Currently     Alcohol/week: 0.0 standard drinks     Comment: outside of pregnancy, " social     Drug use: No       Health Maintenance Due   Topic Date Due     ASTHMA ACTION PLAN  1980     Pneumococcal Vaccine: Pediatrics (0 to 5 Years) and At-Risk Patients (6 to 64 Years) (1 of 1 - PPSV23) 04/18/1984     PREVENTIVE CARE VISIT  06/22/2020     LIPID  06/22/2020     INFLUENZA VACCINE (1) 09/01/2020       Lab Results   Component Value Date    PAP NIL 09/08/2016         February 15, 2021 3:11 PM

## 2021-02-15 NOTE — PATIENT INSTRUCTIONS
Fiber intake 25-30 gram per day  Fiber one cereal 13-15 g per serving  Raisin Bran 7-8 grams per serving.    Metamucil powder once daily    Constipating foods  Meats  Cheese  Pasta, rice, potatoes    Dulcolax suppository for 1-2 days to remove stool from lower colon          Patient Education     What Are Gallstones?    The gallbladder stores bile, a fluid made by the liver. Bile helps digest fats in the foods you eat. Gallstones form when certain substances in the bile crystallize and become solid. In some cases, the stones don t cause any symptoms. In others, they irritate the walls of the gallbladder. More serious problems can occur if stones move into nearby ducts (such as the common bile duct) and cause blockages. This can block the flow of bile and lead to pain, nausea, and infection.   Common symptoms  Gallbladder problems can cause painful attacks, often after a meal. Some people have only 1 attack. Others have many. Common symptoms include:     Severe, steady pain or aching in the upper right or middle belly (abdomen), back, or right shoulder blade. This can last from 30 minutes to several hours.    A dull ache under the ribs or breastbone    Upset stomach (nausea) or vomiting    A buildup of too much bile in the blood. This causes yellowing of the skin and eyes, dark urine, and itching (jaundice). Call your healthcare provider right away if this occurs.  Risk factors for gallstones  You are more at risk for gallstones if you:    Are a woman    Are obese    Have a family history    Are older (the risk goes up with age)    Have a history of very fast (rapid) weight loss    Have certain intestinal diseases, such as Crohn s disease    Have certain blood diseases, such as sickle cell anemia    Have a family background that includes Native Americans or German Americans  Diagnosis  Ultrasound is often used to look at the gallbladder and measure the size and exact location of gallstone.   Blood tests are used to  measure liver enzymes and bilirubin. Both of these may be high if the gallstones are blocking bile flow or irritating the liver.   Treating gallstones  If your stones are not causing symptoms, you may choose to delay treatment. But if you ve had 1 or more painful attacks, your healthcare provider will likely advise removing your gallbladder. This prevents more stones from forming and causing attacks. It also helps prevent problems, such as stones passing into the ducts and causing infection or pancreatitis. After the gallbladder is removed, your liver will still make bile to aid digestion.   If you're pregnant  Hormone changes during pregnancy can make bile more likely to form stones. If your gallbladder needs to be removed, your healthcare provider will talk with you about the timing for surgery. In some cases, it can be delayed until after childbirth. In others, you may have surgery during pregnancy. This helps protect you and your baby s health.   [employer] last reviewed this educational content on 6/1/2019 2000-2020 [employer]. [address] [place] [address], [place] [address]. All rights reserved. This information is not intended as a substitute for professional medical care. Always follow your healthcare professional's instructions.

## 2021-02-16 ENCOUNTER — TELEPHONE (OUTPATIENT)
Dept: FAMILY MEDICINE | Facility: CLINIC | Age: 41
End: 2021-02-16

## 2021-02-16 DIAGNOSIS — R10.9 RIGHT FLANK PAIN: Primary | ICD-10-CM

## 2021-02-16 NOTE — TELEPHONE ENCOUNTER
Priyanka is wondering if she can schedule her follow up next week, and not this week as you recommended.     Myrna

## 2021-02-17 ASSESSMENT — ASTHMA QUESTIONNAIRES: ACT_TOTALSCORE: 23

## 2021-02-22 ENCOUNTER — ANCILLARY PROCEDURE (OUTPATIENT)
Dept: CT IMAGING | Facility: CLINIC | Age: 41
End: 2021-02-22
Attending: INTERNAL MEDICINE
Payer: COMMERCIAL

## 2021-02-22 DIAGNOSIS — R10.9 RIGHT FLANK PAIN: ICD-10-CM

## 2021-02-22 PROCEDURE — 74176 CT ABD & PELVIS W/O CONTRAST: CPT | Mod: GC | Performed by: RADIOLOGY

## 2021-02-23 ENCOUNTER — TELEPHONE (OUTPATIENT)
Dept: FAMILY MEDICINE | Facility: CLINIC | Age: 41
End: 2021-02-23

## 2021-02-23 NOTE — TELEPHONE ENCOUNTER
"Who is calling? Patient  What is being requested? Patient would like to ask about her CT results  If patient calling, have they sent a Gu[employer] message? Yes    Additional Comments: Patient did schedule with  next week. However, she requested that a nurse call her. She stated, \"I am freaking out\" (regarding results).    Parul    "

## 2021-02-23 NOTE — TELEPHONE ENCOUNTER
Patient is scheduled with Dr. Ruiz for a follow up from imaging and referral - scheduled for 3/2/21.    Ninoska Campuzano RN  02/23/21  11:59 AM

## 2021-03-03 ENCOUNTER — VIRTUAL VISIT (OUTPATIENT)
Dept: FAMILY MEDICINE | Facility: CLINIC | Age: 41
End: 2021-03-03
Payer: COMMERCIAL

## 2021-03-03 DIAGNOSIS — R10.11 ABDOMINAL PAIN, RIGHT UPPER QUADRANT: ICD-10-CM

## 2021-03-03 DIAGNOSIS — K59.09 OTHER CONSTIPATION: ICD-10-CM

## 2021-03-03 DIAGNOSIS — N20.0 NEPHROLITHIASIS: Primary | ICD-10-CM

## 2021-03-03 NOTE — PATIENT INSTRUCTIONS
Allergist, call for appt  Your provider has referred you to: [facility] - [phone]    Urology appt for kidney stone  Phone: [phone]    GI clinic for chronic abdominal pain    Call for appt Phone: [phone]

## 2021-03-03 NOTE — PROGRESS NOTES
Priyanka is a 40 year old who is being evaluated via a billable video visit.      How would you like to obtain your AVS? MyChart  If the video visit is dropped, the invitation should be resent by: Send to e-mail at: [email or web address]  Will anyone else be joining your video visit? No      Video Start Time:   Start time: 3:12 PM  End time: 3:53 PM  Total : 41 minutes    ASSESSMENT:  (N20.0) Nephrolithiasis  (primary encounter diagnosis)  Comment: History of kidney stones, currently has a stone on the left side which is not obstructing.  She has never had a formal evaluation  Plan: UROLOGY ADULT REFERRAL        Refer to urology clinic, encouraged good hydration    (R10.11) Abdominal pain, right upper quadrant  Comment: Persistent right upper quadrant pain, recent CT scan showed normal liver, gallbladder, spleen, adrenal glands and pancreas.  No bowel obstruction.  Kidneys normal with the exception of a kidney stone on the left, nonobstructing.  She has underlying chronic constipation.  Had a normal HIDA scan several years ago  Plan: GASTROENTEROLOGY ADULT REF CONSULT ONLY        Recommended referral to gastroenterology clinic    (K59.09) Other constipation  Comment: Persistent issues with constipation.  Plan: GASTROENTEROLOGY ADULT REF CONSULT ONLY        Discussed modified MiraLAX prep.  Recommend 25 to 30 g of fiber daily.  Plenty of fluids.  Refer to GI clinic for further evaluation.  May benefit from Linzess or similar medication.    41 minutes spend on the date of this encounter doing chart review, history and exam, documentation and further activities as noted above.     Sydney Ruiz MD  Internal Medicine/Pediatrics      Subjective   Priyanka is a 40 year old woman with history of chronic constipation, acid reflux, joint pain, low back pain, ADHD, anxiety. She presents for the following health issues     HPI   Gallbladder  I recently evaluated Priyanka in clinic on 2/15/2021 for right upper quadrant pain.  At that  "time, she thought she was having a gallbladder attack.  She reports pain at the right upper quadrant after eating greasy foods.  No diarrhea or fever.  She has lost 7 to 10 pounds but has since gained it back.  She described abdominal bloating and that \"tearing sensation\".  She had nausea but no vomiting or diarrhea.  The episode lasted 2 hr. CT scan showed an unremarkable liver and biliary system.  She had a nonobstructing kidney stone, 5 mm on the left side.  This was not the cause of her pain.    Hx of chronic constipation  She has a history of chronic constipation.  After our last visit on 2/15/2021, she tried using a Dulcolax suppository and then tried a modified MiraLAX prep.  She reports that she got some results.  Is wondering what she needs to do now to maintain normal bowel movements.  We discussed recommendation for daily use of fiber, plenty of fluids and addition of MiraLAX once or twice a week if needed to keep stools soft.  Also recommended referral to the GI clinic for further evaluation.          Objective    Vitals - Patient Reported  Weight (Patient Reported): 96.6 kg (213 lb)      Vitals:  No vitals were obtained today due to virtual visit.    Physical Exam   GENERAL: Healthy, alert and no distress  NEURO: Cranial nerves grossly intact.  Mentation and speech appropriate for age.  PSYCH: Mentation appears normal, affect normal/bright, judgement and insight intact, normal speech and appearance well-groomed.        Video-Visit Details    Type of service:  Video Visit    Video End Time  Originating Location (pt. Location): Home    Distant Location (provider location):  [facility]     Platform used for Video Visit: Zohreh  "

## 2021-03-05 NOTE — TELEPHONE ENCOUNTER
MEDICAL RECORDS REQUEST   [facility]  [address] [place] [address]  PHONE: [phone]  Fax: [phone]        FUTURE VISIT INFORMATION                                                   Priyanka Lundberg, : 1980 scheduled for future visit at [facility]    APPOINTMENT INFORMATION:    Date: 3/16/2021    Provider:  Dorene RAMACHANDRAN    Reason for Visit/Diagnosis: Kidney stones     REFERRAL INFORMATION:    Referring provider:  N/A    Specialty: N/A    Referring providers clinic:      Clinic contact number:  N/A    RECORDS REQUESTED FOR VISIT                                                     NOTES  STATUS/DETAILS   OFFICE NOTE from referring provider  yes   OFFICE NOTE from other specialist  no   DISCHARGE SUMMARY from hospital  no   DISCHARGE REPORT from the ER  no   OPERATIVE REPORT  no   MEDICATION LIST  no   KIDNEY STONE     CT STONE  yes- 2021     PRE-VISIT CHECKLIST      Record collection complete Yes   Appointment appropriately scheduled           (right time/right provider) Yes   MyChart activation Yes   Questionnaire complete If no, please explain: in process     Completed by: Anabella Linares

## 2021-03-10 ENCOUNTER — PRE VISIT (OUTPATIENT)
Dept: UROLOGY | Facility: CLINIC | Age: 41
End: 2021-03-10

## 2021-03-10 NOTE — TELEPHONE ENCOUNTER
Reason for visit: kidney stone consult     Relevant information: 5 mm nonobstructing    Records/imaging/labs/orders: all records available    Pt called: no need for a call

## 2021-03-16 ENCOUNTER — VIRTUAL VISIT (OUTPATIENT)
Dept: UROLOGY | Facility: CLINIC | Age: 41
End: 2021-03-16
Payer: COMMERCIAL

## 2021-03-16 ENCOUNTER — PRE VISIT (OUTPATIENT)
Dept: UROLOGY | Facility: CLINIC | Age: 41
End: 2021-03-16

## 2021-03-16 DIAGNOSIS — N20.0 NEPHROLITHIASIS: ICD-10-CM

## 2021-03-16 PROCEDURE — 99203 OFFICE O/P NEW LOW 30 MIN: CPT | Mod: 95 | Performed by: NURSE PRACTITIONER

## 2021-03-16 NOTE — PROGRESS NOTES
Video Visit Technology for this patient: Zohreh Video Visit- Patient was left in waiting room    Priyanka is a 40 year old who is being evaluated via a billable video visit.      How would you like to obtain your AVS? MyChart  If the video visit is dropped, the invitation should be resent by:   Will anyone else be joining your video visit? No    Video Start Time: 10:03 AM  Video-Visit Details    Type of service:  Video Visit    Video End Time:10:31 AM    Originating Location (pt. Location): Home    Distant Location (provider location):  [facility] [place]     Platform used for Video Visit: Zohreh Lundberg complains of   Chief Complaint   Patient presents with     Consult     kidney stones         Assessment/Plan:  40 year old female with a history of kidney stones, with a 5 mm in her left kidney on CT imaging; not consistent with the right flank pain that she has experienced recently. I do not think that the stone she has is contributing to symptoms. It is entirely possible that she has formed and passed very tiny stones over the years, which would offer some explanation for her symptoms. We reviewed general recommendations to prevent kidney stones including the followin) Low oxalate diet. We discussed foods that are particularly high in oxalate including spinach, rhubarb, beets, nuts, nut butters, and black teas.     2) Low salt diet. Discussed common foods with high amounts of salt as well as dietary strategies to minimize sodium.     3) High fluid intake. Recommend 3 liters of fluid daily to produce goal of 2.5L of urine.     4) Modest animal protein. Recommend limiting animal protein to one serving or less daily.     5) Normal dietary calcium.    6) Recommend she also avoid large doses of vitamin C, which converts to oxalates, although the amount of vitamin C she would need to take in for it to be clinically significant would be very, very high.     Discussed the  potential benefit of 24-hour urine collection testing moving forward, but will defer this for now. If she develops worsening symptoms, including flank pain or gross hematuria, could then consider repeat imaging.   -She will plan to follow up with me as needed moving forward.       Jayde Catherine, CNP  Department of Urology      Subjective:   40 year old female who presents for the evaluation of kidney stones. A CT A/P was obtained 2/22/21 for evaluation of right flank pain, which demonstrated a 5 mm nonobstructing calyceal stone in the lower pole of the left kidney; no hydronephrosis. UA from 2/15 was positive for crystals and 4 RBC/HPF.     She has a history of kidney stones back in 2009. Has continued to have occasional, brief episodes of flank pain since then. Has worked in hydrating well, which helps with symptoms. Notices her symptoms tend to flare up after she's been taking vitamin C supplements.       Objective:     Exam:   Patient is a 40 year old female   General Appearance: Well groomed, hygenic  Respiratory: No cough, no respiratory distress or labored breathing  Musculoskeletal: Grossly normal with no gross deficits  Skin: No discoloration or apparent rashes  Neurologic: No tremors  Psychiatric: Alert and oriented  Further examination is deferred due to the nature of our visit.

## 2021-03-16 NOTE — PATIENT INSTRUCTIONS
UROLOGY CLINIC VISIT PATIENT INSTRUCTIONS    -Work on reducing your intake of nuts/nut products and other high-oxalate foods.   -Continue to hydrate well  -Continue a consistent intake of dairy   -Follow up with me as needed if symptoms were to worsen or interested in pursuing more in-depth stone prevention measures.     If you have any issues, questions or concerns in the meantime, do not hesitate to contact us at [phone] or via [employer].     It was a pleasure meeting with you today.  Thank you for allowing me and my team the privilege of caring for you today.  YOU are the reason we are here, and I truly hope we provided you with the excellent service you deserve.  Please let us know if there is anything else we can do for you so that we can be sure you are leaving completely satisfied with your care experience.    Jayde Catherine, CNP

## 2021-03-16 NOTE — LETTER
3/16/2021       RE: Priyanka Lundberg  [address] [place] [address]     Dear Colleague,    Thank you for referring your patient, Priyanka Lundberg, to the [facility] [place] [facility]. Please see a copy of my visit note below.    Video Visit Technology for this patient: Zohreh Video Visit- Patient was left in waiting room    Priyanka is a 40 year old who is being evaluated via a billable video visit.      How would you like to obtain your AVS? MyChart  If the video visit is dropped, the invitation should be resent by:   Will anyone else be joining your video visit? No    Video Start Time: 10:03 AM  Video-Visit Details    Type of service:  Video Visit    Video End Time:10:31 AM    Originating Location (pt. Location): Home    Distant Location (provider location):  [facility]     Platform used for Video Visit: Zohreh      Priyanka Lundberg complains of   Chief Complaint   Patient presents with     Consult     kidney stones         Assessment/Plan:  40 year old female with a history of kidney stones, with a 5 mm in her left kidney on CT imaging; not consistent with the right flank pain that she has experienced recently. I do not think that the stone she has is contributing to symptoms. It is entirely possible that she has formed and passed very tiny stones over the years, which would offer some explanation for her symptoms. We reviewed general recommendations to prevent kidney stones including the followin) Low oxalate diet. We discussed foods that are particularly high in oxalate including spinach, rhubarb, beets, nuts, nut butters, and black teas.     2) Low salt diet. Discussed common foods with high amounts of salt as well as dietary strategies to minimize sodium.     3) High fluid intake. Recommend 3 liters of fluid daily to produce goal of 2.5L of urine.     4) Modest animal protein.  Recommend limiting animal protein to one serving or less daily.     5) Normal dietary calcium.    6) Recommend she also avoid large doses of vitamin C, which converts to oxalates, although the amount of vitamin C she would need to take in for it to be clinically significant would be very, very high.     Discussed the potential benefit of 24-hour urine collection testing moving forward, but will defer this for now. If she develops worsening symptoms, including flank pain or gross hematuria, could then consider repeat imaging.   -She will plan to follow up with me as needed moving forward.       Jayde Catherine, CNP  Department of Urology      Subjective:   40 year old female who presents for the evaluation of kidney stones. A CT A/P was obtained 2/22/21 for evaluation of right flank pain, which demonstrated a 5 mm nonobstructing calyceal stone in the lower pole of the left kidney; no hydronephrosis. UA from 2/15 was positive for crystals and 4 RBC/HPF.     She has a history of kidney stones back in 2009. Has continued to have occasional, brief episodes of flank pain since then. Has worked in hydrating well, which helps with symptoms. Notices her symptoms tend to flare up after she's been taking vitamin C supplements.       Objective:     Exam:   Patient is a 40 year old female   General Appearance: Well groomed, hygenic  Respiratory: No cough, no respiratory distress or labored breathing  Musculoskeletal: Grossly normal with no gross deficits  Skin: No discoloration or apparent rashes  Neurologic: No tremors  Psychiatric: Alert and oriented  Further examination is deferred due to the nature of our visit.

## 2021-03-16 NOTE — NURSING NOTE
Chief Complaint   Patient presents with     Consult     kidney stones       Patient Active Problem List   Diagnosis     Joint pain     L4-L5 disc bulge     Attention deficit hyperactivity disorder (ADHD), combined type     Vaginal discharge     History of abnormal cervical Pap smear     Other acne     Benign nevus     History of Clostridium difficile colitis     Cervicalgia     Bilateral thoracic back pain     Class 1 obesity due to excess calories without serious comorbidity with body mass index (BMI) of 30.0 to 30.9 in adult     Gastroesophageal reflux disease with esophagitis     Anxiety     Chronic constipation       Allergies   Allergen Reactions     Food      Cantaloupe, cucumbers, green beans, and peppers.      Lemon Flavor      Mustard Seed      Onion Difficulty breathing and GI Disturbance     [place] GI Disturbance     Penicillins Hives     Or another medication taken at that time     Vanilla GI Disturbance       Current Outpatient Medications   Medication Sig Dispense Refill     albuterol (PROAIR HFA/PROVENTIL HFA/VENTOLIN HFA) 108 (90 Base) MCG/ACT inhaler Inhale 2 puffs into the lungs every 4 hours as needed for shortness of breath / dyspnea or wheezing 1 Inhaler 3     ALPRAZolam (XANAX) 0.25 MG tablet Take 1 tablet (0.25 mg) by mouth nightly as needed for anxiety 20 tablet 0     cyclobenzaprine (FLEXERIL) 10 MG tablet Take 1 tablet (10 mg) by mouth 3 times daily as needed for muscle spasms 30 tablet 0     hydrocortisone, Perianal, (HYDROCORTISONE) 2.5 % cream Place rectally 2 times daily as needed for hemorrhoids 30 g 1     ibuprofen (ADVIL/MOTRIN) 200 MG tablet Take 400 mg by mouth every 4 hours as needed for mild pain 2 tab every am       multivitamin, therapeutic with minerals (MULTI-VITAMIN) TABS tablet Take 1 tablet by mouth daily         Social History     Tobacco Use     Smoking status: Never Smoker     Smokeless tobacco: Never Used   Substance Use Topics     Alcohol use: Not Currently      Alcohol/week: 0.0 standard drinks     Comment: outside of pregnancy, social     Drug use: No       Svetlana Fried LPN  3/16/2021  10:01 AM

## 2021-06-14 ENCOUNTER — TELEPHONE (OUTPATIENT)
Dept: DERMATOLOGY | Facility: CLINIC | Age: 41
End: 2021-06-14

## 2021-06-14 NOTE — TELEPHONE ENCOUNTER
[facility] Call Center    Phone Message    May a detailed message be left on voicemail: yes     Reason for Call: Other: Needs review per protocol.     Action Taken: Message routed to:  [facility]    Travel Screening: Not Applicable

## 2021-06-23 DIAGNOSIS — G89.29 CHRONIC BILATERAL LOW BACK PAIN WITH BILATERAL SCIATICA: ICD-10-CM

## 2021-06-23 DIAGNOSIS — M54.42 CHRONIC BILATERAL LOW BACK PAIN WITH BILATERAL SCIATICA: ICD-10-CM

## 2021-06-23 DIAGNOSIS — M25.552 HIP PAIN, BILATERAL: ICD-10-CM

## 2021-06-23 DIAGNOSIS — M54.41 CHRONIC BILATERAL LOW BACK PAIN WITH BILATERAL SCIATICA: ICD-10-CM

## 2021-06-23 DIAGNOSIS — M25.551 HIP PAIN, BILATERAL: ICD-10-CM

## 2021-06-23 DIAGNOSIS — M54.9 UPPER BACK PAIN, CHRONIC: ICD-10-CM

## 2021-06-23 DIAGNOSIS — G89.29 UPPER BACK PAIN, CHRONIC: ICD-10-CM

## 2021-06-23 NOTE — TELEPHONE ENCOUNTER
"Pt requesting a refill on her cyclobenzaprine (FLEXERIL) 10 MG tablet for back pain.      Pt would like this sent to     [facility]  [address] [place] [id number]    Please call pt when this had been completed and/or with any questions.      Laura Calles RN/JAMAL    Spoke to pt - states she occ 'throw my back out\" - states Dr Ruiz is aware of this  Last refill of cyclobenzaprine Oct 2019. Last clinic visit 3/3/21.  Discussed with Dr Ruiz - OK for #14 tabs cyclobenzaprine. Script to pharmacy.  Hoa Leal RN  [facility]  "

## 2021-06-24 RX ORDER — CYCLOBENZAPRINE HCL 10 MG
10 TABLET ORAL 2 TIMES DAILY PRN
Qty: 14 TABLET | Refills: 0 | Status: SHIPPED | OUTPATIENT
Start: 2021-06-24 | End: 2021-09-16

## 2021-07-15 NOTE — TELEPHONE ENCOUNTER
RECORDS RECEIVED FROM:   Diagnoses: hemorrhoid banding   Appt date: 9.24.21   NOTES STATUS DETAILS   OFFICE NOTE from referring provider  Internal 3.3.21 [facility] (abdominal pain) (GI referral)  2.15.21   10.27.20  More in [facility] if needed   OFFICE NOTE from other specialist   N/A    DISCHARGE SUMMARY from hospital  N/A    DISCHARGE REPORT from the ER N/A    OPERATIVE REPORT  N/A    MEDICATION LIST Internal    LABS     PFC TESTING N/A    ANAL PAP N/A    BIOPSIES/PATHOLOGY RELATED TO DIAGNOSIS N/A    DIAGNOSTIC PROCEDURES     COLONOSCOPY Internal 2.14.18   UPPER ENDOSCOPY (EGD) Internal 2.14.18   FLEX SIGMOIDOSCOPY  N/A    ERCP N/A    IMAGING (DISC & REPORT)      CT  Internal 2.22.21 ab pelvis   MRI N/A    XRAY Internal 11.22.19 ab  8.30.17 ab   ULTRASOUND (ENDOANAL/ENDORECTAL) Internal 2.13.18 ab

## 2021-07-27 ENCOUNTER — OFFICE VISIT (OUTPATIENT)
Dept: FAMILY MEDICINE | Facility: CLINIC | Age: 41
End: 2021-07-27
Payer: COMMERCIAL

## 2021-07-27 VITALS
SYSTOLIC BLOOD PRESSURE: 94 MMHG | HEIGHT: 67 IN | OXYGEN SATURATION: 97 % | HEART RATE: 69 BPM | WEIGHT: 213.75 LBS | DIASTOLIC BLOOD PRESSURE: 64 MMHG | BODY MASS INDEX: 33.55 KG/M2 | TEMPERATURE: 97 F

## 2021-07-27 DIAGNOSIS — R10.11 ABDOMINAL PAIN, RIGHT UPPER QUADRANT: ICD-10-CM

## 2021-07-27 DIAGNOSIS — R53.83 OTHER FATIGUE: Primary | ICD-10-CM

## 2021-07-27 DIAGNOSIS — M25.512 ACUTE PAIN OF LEFT SHOULDER: ICD-10-CM

## 2021-07-27 DIAGNOSIS — Z13.220 LIPID SCREENING: ICD-10-CM

## 2021-07-27 DIAGNOSIS — Z91.018 HISTORY OF FOOD ALLERGY: ICD-10-CM

## 2021-07-27 LAB
ALBUMIN SERPL-MCNC: 3.9 G/DL (ref 3.4–5)
ALP SERPL-CCNC: 57 U/L (ref 40–150)
ALT SERPL W P-5'-P-CCNC: 17 U/L (ref 0–50)
ANION GAP SERPL CALCULATED.3IONS-SCNC: 1 MMOL/L (ref 3–14)
AST SERPL W P-5'-P-CCNC: 29 U/L (ref 0–45)
BILIRUB SERPL-MCNC: 0.6 MG/DL (ref 0.2–1.3)
BUN SERPL-MCNC: 7 MG/DL (ref 7–30)
CALCIUM SERPL-MCNC: 9.2 MG/DL (ref 8.5–10.1)
CHLORIDE BLD-SCNC: 109 MMOL/L (ref 94–109)
CHOLEST SERPL-MCNC: 206 MG/DL
CO2 SERPL-SCNC: 31 MMOL/L (ref 20–32)
CREAT SERPL-MCNC: 0.8 MG/DL (ref 0.52–1.04)
ERYTHROCYTE [DISTWIDTH] IN BLOOD BY AUTOMATED COUNT: 13.2 % (ref 10–15)
FASTING STATUS PATIENT QL REPORTED: YES
FERRITIN SERPL-MCNC: 30 NG/ML (ref 12–150)
GFR SERPL CREATININE-BSD FRML MDRD: >90 ML/MIN/1.73M2
GLUCOSE BLD-MCNC: 98 MG/DL (ref 70–99)
HBA1C MFR BLD: 5.2 % (ref 0–5.6)
HCT VFR BLD AUTO: 39.9 % (ref 35–47)
HDLC SERPL-MCNC: 50 MG/DL
HGB BLD-MCNC: 13.4 G/DL (ref 11.7–15.7)
LDLC SERPL CALC-MCNC: 140 MG/DL
MCH RBC QN AUTO: 29.7 PG (ref 26.5–33)
MCHC RBC AUTO-ENTMCNC: 33.6 G/DL (ref 31.5–36.5)
MCV RBC AUTO: 89 FL (ref 78–100)
NONHDLC SERPL-MCNC: 156 MG/DL
PLATELET # BLD AUTO: 255 10E3/UL (ref 150–450)
POTASSIUM BLD-SCNC: 4 MMOL/L (ref 3.4–5.3)
PROT SERPL-MCNC: 7.2 G/DL (ref 6.8–8.8)
RBC # BLD AUTO: 4.51 10E6/UL (ref 3.8–5.2)
SODIUM SERPL-SCNC: 141 MMOL/L (ref 133–144)
TRIGL SERPL-MCNC: 79 MG/DL
WBC # BLD AUTO: 6 10E3/UL (ref 4–11)

## 2021-07-27 PROCEDURE — 85027 COMPLETE CBC AUTOMATED: CPT | Performed by: INTERNAL MEDICINE

## 2021-07-27 PROCEDURE — 82728 ASSAY OF FERRITIN: CPT | Performed by: INTERNAL MEDICINE

## 2021-07-27 PROCEDURE — 82465 ASSAY BLD/SERUM CHOLESTEROL: CPT | Performed by: INTERNAL MEDICINE

## 2021-07-27 ASSESSMENT — ANXIETY QUESTIONNAIRES
2. NOT BEING ABLE TO STOP OR CONTROL WORRYING: SEVERAL DAYS
7. FEELING AFRAID AS IF SOMETHING AWFUL MIGHT HAPPEN: SEVERAL DAYS
1. FEELING NERVOUS, ANXIOUS, OR ON EDGE: SEVERAL DAYS
6. BECOMING EASILY ANNOYED OR IRRITABLE: MORE THAN HALF THE DAYS
IF YOU CHECKED OFF ANY PROBLEMS ON THIS QUESTIONNAIRE, HOW DIFFICULT HAVE THESE PROBLEMS MADE IT FOR YOU TO DO YOUR WORK, TAKE CARE OF THINGS AT HOME, OR GET ALONG WITH OTHER PEOPLE: SOMEWHAT DIFFICULT
3. WORRYING TOO MUCH ABOUT DIFFERENT THINGS: SEVERAL DAYS
GAD7 TOTAL SCORE: 9
5. BEING SO RESTLESS THAT IT IS HARD TO SIT STILL: SEVERAL DAYS

## 2021-07-27 ASSESSMENT — MIFFLIN-ST. JEOR: SCORE: 1649.8

## 2021-07-27 ASSESSMENT — PATIENT HEALTH QUESTIONNAIRE - PHQ9
5. POOR APPETITE OR OVEREATING: MORE THAN HALF THE DAYS
SUM OF ALL RESPONSES TO PHQ QUESTIONS 1-9: 9

## 2021-07-27 NOTE — PATIENT INSTRUCTIONS
Patient Instructions   Fiber intake 25-30 gram per day  Fiber one cereal 13-15 g per serving  Raisin Bran 7-8 grams per serving.     Metamucil powder once daily     Constipating foods  Meats  Cheese  Pasta, rice, potatoes     Dulcolax suppository for 1-2 days to remove stool from lower colon

## 2021-07-27 NOTE — PROGRESS NOTES
"Priyanka Lundberg is a 41 year old female with history of low back pain, obesity, constipation, food allergies, anxiety and ADHD. She is here for the following issues:    Other Fatigue  Priyanka is a 42 yo woman who reports exhaustion spanning the past 10 years. Symptoms have worsened. She believes her symptoms are secondary to food allergies and she would like a referral to see an allergist.     In 2012 she was told she had food allergies by an allergist in [place]. She lists cantaloupe, cucumbers, green beans, peppers, lemon, mustard, (unspecified reaction to this list). Onion triggers difficulty breathing and GI disturbance. Peach (GI symptoms)  and vanilla (GI symptoms). More recently she notes Corn pops trigger exhaustion. FODMAP diet has been recommended in the past but she did not follow through.    She reports remote history of difficulty breathing while walking up a hill. She thought she was going to have a heart attack. She laid down to catch her breath for 5-10 minutes. Her previous doctor gave her an albuterol inhaler, which, she states, did not provide relief.     She currently reports her muscles burn after walking a block and she gets short of breath after walking around the house.  She feels this is from allergies. She had been given a referral in October 2020 but did not schedule due to the COVID pandemic.     Anxiety/Depression  Priyanka has history of anxiety and depression. She states she had a \"rough\" week last week. Then she reports she \"was not bothered by this\", \"I don't even feel it\". She has a personal therapist and meets weekly. Will be starting some family therapy soon. She has rx for Xanax but rarely   uses it. She has been on medication for ADHD in the past, but is not currently taking anything. She is sleeping well, typically for 6-7 hours/night.    PHQ 2/12/2020 4/9/2020 7/27/2021   PHQ-9 Total Score 7 6 9   Q9: Thoughts of better off dead/self-harm past 2 weeks Not at all Not at all " "Not at all     BIN-7 SCORE 2/12/2020 4/9/2020 7/27/2021   Total Score - 6 (mild anxiety) -   Total Score 3 6 9       Acute Pain of the Left Shoulder  Priyanka notes she has been having significant left sided shoulder pain. Symptoms began 2 wks ago. No recall of injury.  She is unable to lift her arm over her head, and needs to use her right arm to complete tasks. She is right hand dominant. She has seen PT in the past, and this was helpful. She now reports left shoulder pain that limits range of motion and cannot do any home PT at this time. She has not had any previous imaging of her shoulder. She is interested in referral to sports medicine.     Right Sided Abdominal Pain  Priyanka has history of right sided abdominal pain and chronic constipation.  This was discussed in length at our last visit in March 2021. She was encouraged to try a modified MIraLAX bowel prep. She was referred to gastroenterology but has not scheduled a visit.     Abdomen/pelvis CT scan (2/22/2021) showed normal liver, gallbladder, spleen, adrenal glands and pancreas. A nonobstructing 5mm left sided kidney stone was seen on the scan, along with colonic diverticulosis.  She has since met with urology to discuss conservative management with dietary modification.    Today, she reports persistent RUQ discomfort. Pain occurs daily, and is \"nothing new\", as she's dealt with these symptoms her entire life. She does not feel constipated . Reports 1-2 BMs/week. She states she has been unable to schedule with the GI clinic. Has history of hemorrhoids that were bleeding. She has schedule an appt to discuss banding.     Nephrolithiasis  Priyanka was last seen in a virtual visit on 3/3/21. She had a CT scan in Feb that identified a 5mm nonobstructing stone on the left side. She was referred to urology. They provided recommendations/tips to prevent kidney stones. She was instructed to follow up with urology PRN. Today, she notes she has followed most of these " recommendations. Denies hematuria or recurrent left sided flank pain.      Patient Active Problem List   Diagnosis     Joint pain     L4-L5 disc bulge     Attention deficit hyperactivity disorder (ADHD), combined type     Vaginal discharge     History of abnormal cervical Pap smear     Other acne     Benign nevus     History of Clostridium difficile colitis     Cervicalgia     Bilateral thoracic back pain     Class 1 obesity due to excess calories without serious comorbidity with body mass index (BMI) of 30.0 to 30.9 in adult     Gastroesophageal reflux disease with esophagitis     Anxiety     Chronic constipation       Current Outpatient Medications   Medication Sig Dispense Refill     ALPRAZolam (XANAX) 0.25 MG tablet Take 1 tablet (0.25 mg) by mouth nightly as needed for anxiety 20 tablet 0     cyclobenzaprine (FLEXERIL) 10 MG tablet Take 1 tablet (10 mg) by mouth 2 times daily as needed for muscle spasms 14 tablet 0     hydrocortisone, Perianal, (HYDROCORTISONE) 2.5 % cream Place rectally 2 times daily as needed for hemorrhoids 30 g 1     ibuprofen (ADVIL/MOTRIN) 200 MG tablet Take 400 mg by mouth every 4 hours as needed for mild pain 2 tab every am       multivitamin, therapeutic with minerals (MULTI-VITAMIN) TABS tablet Take 1 tablet by mouth daily       albuterol (PROAIR HFA/PROVENTIL HFA/VENTOLIN HFA) 108 (90 Base) MCG/ACT inhaler Inhale 2 puffs into the lungs every 4 hours as needed for shortness of breath / dyspnea or wheezing (Patient not taking: Reported on 7/27/2021) 1 Inhaler 3       Allergies   Allergen Reactions     Food      Cantaloupe, cucumbers, green beans, and peppers.      Lemon Flavor      Mustard Seed      Onion Difficulty breathing and GI Disturbance     [place] GI Disturbance     Penicillins Hives     Or another medication taken at that time     [place] GI Disturbance        EXAM  BP 94/64 (BP Location: Left arm, Patient Position: Sitting, Cuff Size: Adult Large)   Pulse 69   Temp 97  F  "(36.1  C) (Temporal)   Ht 1.69 m (5' 6.54\")   Wt 97 kg (213 lb 12 oz)   LMP 07/24/2021   SpO2 97%   BMI 33.95 kg/m    Gen: Alert, pleasant, NAD  Abdomen: soft, active BS, point tenderness with palpation over the right mid and right upper quadrants. No HSM or masses  Left shoulder:point tenderness with palpation over the left glenohumeral joint. Abduction to 20 degrees due to discomfort. No overlying skin changes, no evidence for trauma.      Assessment:  (R53.83) Other fatigue  (primary encounter diagnosis)  Comment: chronic fatigue, spanning more than 10 years. Priyanka feels this is due to food allergies. Has wide constellation of symptoms, shortness of breath, remote chest pain, fatigue.  Plan: Hemoglobin A1c, Comprehensive metabolic panel,         CBC with Platelets, Ferritin        Will obtain labs today to look for reversible causes of fatigue.     (Z91.018) History of food allergy  Comment: Priyanka reports multiple food allergies, wishes to see an allergist  Plan: Adult Allergy/Asthma Referral        Referral to Allergist to discuss possible food allergies, though I think she would benefit from seeing a GI doctor for evaluation and treatment, as this has not been done.    (R10.11) Abdominal pain, right upper quadrant  Comment: chronic right sided abdominal discomfort, which she reports symptoms clears after bowel cleanse.  Plan: Adult Gastro Ref - Consult Only        Refer to GI for evaluation and treatment. Recommend high fiber diet, modified bowel prep.   Fiber intake 25-30 gram per day  Fiber one cereal 13-15 g per serving  Raisin Bran 7-8 grams per serving.     Metamucil powder once daily  May benefit from medication such as Linzess.      Reviewed Constipating foods--limit these  Meats  Cheese  Pasta, rice, potatoes     Dulcolax suppository for 1-2 days to remove stool from lower colon if needed     (Z13.220) Lipid screening  Comment: fasting  Recent Labs   Lab Test 07/27/21  1517 06/22/19  1140   CHOL " 206* 192.0   HDL 50 59.0   * 122.0   TRIG 79 57.0   CHOLHDLRATIO  --  3.3   Plan: Lipid Panel    (M25.512) Acute pain of left shoulder  Comment: acute pain in left shoulder 2 weeks ago, limited range of motion, can not recall injury, pain over glenohumeral joint  Plan: Orthopedic  Referral        Refer to sports clinic for evaluation and treatment    Sydney Ruiz MD  Internal Medicine/Pediatrics      I, Ozzie Gruber, am serving as a scribe to document services personally performed by Dr. Sydney Ruiz, based on data collection and the provider's statements to me. Dr. Ruiz has reviewed, edited, and approved the above note.

## 2021-07-27 NOTE — NURSING NOTE
"41 year old  Chief Complaint   Patient presents with     Fatigue     exhaustion, nausea on going for years        Blood pressure 94/64, pulse 69, temperature 97  F (36.1  C), temperature source Temporal, height 1.69 m (5' 6.54\"), weight 97 kg (213 lb 12 oz), SpO2 97 %, not currently breastfeeding. Body mass index is 33.95 kg/m .  Patient Active Problem List   Diagnosis     Joint pain     L4-L5 disc bulge     Attention deficit hyperactivity disorder (ADHD), combined type     Vaginal discharge     History of abnormal cervical Pap smear     Other acne     Benign nevus     History of Clostridium difficile colitis     Cervicalgia     Bilateral thoracic back pain     Class 1 obesity due to excess calories without serious comorbidity with body mass index (BMI) of 30.0 to 30.9 in adult     Gastroesophageal reflux disease with esophagitis     Anxiety     Chronic constipation       Wt Readings from Last 2 Encounters:   07/27/21 97 kg (213 lb 12 oz)   02/15/21 99.7 kg (219 lb 12 oz)     BP Readings from Last 3 Encounters:   07/27/21 94/64   02/15/21 121/77   10/27/20 107/72         Current Outpatient Medications   Medication     ALPRAZolam (XANAX) 0.25 MG tablet     cyclobenzaprine (FLEXERIL) 10 MG tablet     hydrocortisone, Perianal, (HYDROCORTISONE) 2.5 % cream     ibuprofen (ADVIL/MOTRIN) 200 MG tablet     multivitamin, therapeutic with minerals (MULTI-VITAMIN) TABS tablet     albuterol (PROAIR HFA/PROVENTIL HFA/VENTOLIN HFA) 108 (90 Base) MCG/ACT inhaler     No current facility-administered medications for this visit.       Social History     Tobacco Use     Smoking status: Never Smoker     Smokeless tobacco: Never Used   Substance Use Topics     Alcohol use: Not Currently     Alcohol/week: 0.0 standard drinks     Comment: outside of pregnancy, social     Drug use: No       Health Maintenance Due   Topic Date Due     ASTHMA ACTION PLAN  Never done     ADVANCE CARE PLANNING  Never done     Pneumococcal Vaccine: Pediatrics " (0 to 5 Years) and At-Risk Patients (6 to 64 Years) (1 of 2 - PPSV23) Never done     COVID-19 Vaccine (1) Never done     PREVENTIVE CARE VISIT  06/22/2020     LIPID  06/22/2020     MAMMO SCREENING  05/21/2021     ASTHMA CONTROL TEST  08/15/2021     HPV TEST  09/08/2021     PAP  09/08/2021       Lab Results   Component Value Date    PAP NIL 09/08/2016 July 27, 2021 2:32 PM

## 2021-07-27 NOTE — NURSING NOTE
"41 year old  Chief Complaint   Patient presents with     Fatigue     exhaustion, nausea on going for years      Musculoskeletal Problem     left shoulder pain flare up x 2-3 wks        Blood pressure 94/64, pulse 69, temperature 97  F (36.1  C), temperature source Temporal, height 1.69 m (5' 6.54\"), weight 97 kg (213 lb 12 oz), SpO2 97 %, not currently breastfeeding. Body mass index is 33.95 kg/m .  Patient Active Problem List   Diagnosis     Joint pain     L4-L5 disc bulge     Attention deficit hyperactivity disorder (ADHD), combined type     Vaginal discharge     History of abnormal cervical Pap smear     Other acne     Benign nevus     History of Clostridium difficile colitis     Cervicalgia     Bilateral thoracic back pain     Class 1 obesity due to excess calories without serious comorbidity with body mass index (BMI) of 30.0 to 30.9 in adult     Gastroesophageal reflux disease with esophagitis     Anxiety     Chronic constipation       Wt Readings from Last 2 Encounters:   07/27/21 97 kg (213 lb 12 oz)   02/15/21 99.7 kg (219 lb 12 oz)     BP Readings from Last 3 Encounters:   07/27/21 94/64   02/15/21 121/77   10/27/20 107/72         Current Outpatient Medications   Medication     ALPRAZolam (XANAX) 0.25 MG tablet     cyclobenzaprine (FLEXERIL) 10 MG tablet     hydrocortisone, Perianal, (HYDROCORTISONE) 2.5 % cream     ibuprofen (ADVIL/MOTRIN) 200 MG tablet     multivitamin, therapeutic with minerals (MULTI-VITAMIN) TABS tablet     albuterol (PROAIR HFA/PROVENTIL HFA/VENTOLIN HFA) 108 (90 Base) MCG/ACT inhaler     No current facility-administered medications for this visit.       Social History     Tobacco Use     Smoking status: Never Smoker     Smokeless tobacco: Never Used   Substance Use Topics     Alcohol use: Not Currently     Alcohol/week: 0.0 standard drinks     Comment: outside of pregnancy, social     Drug use: No       Health Maintenance Due   Topic Date Due     ASTHMA ACTION PLAN  Never done     " ADVANCE CARE PLANNING  Never done     Pneumococcal Vaccine: Pediatrics (0 to 5 Years) and At-Risk Patients (6 to 64 Years) (1 of 2 - PPSV23) Never done     COVID-19 Vaccine (1) Never done     PREVENTIVE CARE VISIT  06/22/2020     LIPID  06/22/2020     MAMMO SCREENING  05/21/2021     ASTHMA CONTROL TEST  08/15/2021     HPV TEST  09/08/2021     PAP  09/08/2021       Lab Results   Component Value Date    PAP NIL 09/08/2016 July 27, 2021 2:34 PM

## 2021-07-28 ENCOUNTER — TELEPHONE (OUTPATIENT)
Dept: ALLERGY | Facility: CLINIC | Age: 41
End: 2021-07-28
Payer: COMMERCIAL

## 2021-07-28 ASSESSMENT — ANXIETY QUESTIONNAIRES: GAD7 TOTAL SCORE: 9

## 2021-07-28 NOTE — TELEPHONE ENCOUNTER
DIAGNOSIS: Acute pain of left shoulder /Dr Ruiz/ no images   APPOINTMENT DATE: 8.3.21   NOTES STATUS DETAILS   OFFICE NOTE from referring provider Internal 7.28.21 Dr Sydney Ruiz, [facility]   OFFICE NOTE from other specialist N/A    DISCHARGE SUMMARY from hospital N/A    DISCHARGE REPORT from the ER N/A    OPERATIVE REPORT N/A    EMG report N/A    MEDICATION LIST Internal    MRI N/A    DEXA (osteoporosis/bone health) N/A    CT SCAN N/A    XRAYS (IMAGES & REPORTS) N/A

## 2021-07-28 NOTE — TELEPHONE ENCOUNTER
[facility] Call Center    Phone Message    May a detailed message be left on voicemail: yes     Reason for Call: Appointment Intake    Referring Provider Name: Dr Sydney Ruiz  Diagnosis and/or Symptoms: History of food allergy- new referral needs review, thanks!    Action Taken: Message routed to:  [facility]    Travel Screening: Not Applicable

## 2021-08-01 PROBLEM — R10.11 ABDOMINAL PAIN, RIGHT UPPER QUADRANT: Status: ACTIVE | Noted: 2021-08-01

## 2021-08-01 PROBLEM — M25.512 ACUTE PAIN OF LEFT SHOULDER: Status: ACTIVE | Noted: 2021-08-01

## 2021-08-01 PROBLEM — Z91.018 HISTORY OF FOOD ALLERGY: Status: ACTIVE | Noted: 2021-08-01

## 2021-08-01 PROBLEM — R53.83 OTHER FATIGUE: Status: ACTIVE | Noted: 2021-08-01

## 2021-08-02 ENCOUNTER — MYC MEDICAL ADVICE (OUTPATIENT)
Dept: FAMILY MEDICINE | Facility: CLINIC | Age: 41
End: 2021-08-02

## 2021-08-02 DIAGNOSIS — R06.09 DYSPNEA ON EXERTION: Primary | ICD-10-CM

## 2021-08-03 ENCOUNTER — PRE VISIT (OUTPATIENT)
Dept: ORTHOPEDICS | Facility: CLINIC | Age: 41
End: 2021-08-03

## 2021-08-08 ENCOUNTER — HEALTH MAINTENANCE LETTER (OUTPATIENT)
Age: 41
End: 2021-08-08

## 2021-08-09 DIAGNOSIS — M25.512 LEFT SHOULDER PAIN: Primary | ICD-10-CM

## 2021-08-10 ENCOUNTER — ANCILLARY PROCEDURE (OUTPATIENT)
Dept: GENERAL RADIOLOGY | Facility: CLINIC | Age: 41
End: 2021-08-10
Attending: FAMILY MEDICINE
Payer: COMMERCIAL

## 2021-08-10 ENCOUNTER — OFFICE VISIT (OUTPATIENT)
Dept: ORTHOPEDICS | Facility: CLINIC | Age: 41
End: 2021-08-10
Attending: INTERNAL MEDICINE
Payer: COMMERCIAL

## 2021-08-10 VITALS — BODY MASS INDEX: 33.43 KG/M2 | HEIGHT: 67 IN | WEIGHT: 213 LBS

## 2021-08-10 DIAGNOSIS — M25.512 LEFT SHOULDER PAIN: ICD-10-CM

## 2021-08-10 DIAGNOSIS — G89.29 CHRONIC PAIN IN LEFT SHOULDER: Primary | ICD-10-CM

## 2021-08-10 DIAGNOSIS — M25.512 CHRONIC PAIN IN LEFT SHOULDER: Primary | ICD-10-CM

## 2021-08-10 PROCEDURE — 99203 OFFICE O/P NEW LOW 30 MIN: CPT | Performed by: FAMILY MEDICINE

## 2021-08-10 PROCEDURE — 73030 X-RAY EXAM OF SHOULDER: CPT | Mod: LT | Performed by: RADIOLOGY

## 2021-08-10 ASSESSMENT — MIFFLIN-ST. JEOR: SCORE: 1646.48
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
## 2021-08-13 NOTE — TELEPHONE ENCOUNTER
FUTURE VISIT INFORMATION      FUTURE VISIT INFORMATION:    Date: 9.8.21    Time: 10:30 AM    Location: [facility]  REFERRAL INFORMATION:    Referring provider:  Sara    Referring providers clinic:  [facility]    Reason for visit/diagnosis  Food allergy    RECORDS REQUESTED FROM:       Clinic name Comments Records Status Imaging Status   [facility] 7.27.21 Sara  10.27.20 Sara EPIC

## 2021-08-26 ENCOUNTER — ANCILLARY PROCEDURE (OUTPATIENT)
Dept: MRI IMAGING | Facility: CLINIC | Age: 41
End: 2021-08-26
Attending: FAMILY MEDICINE
Payer: COMMERCIAL

## 2021-08-26 DIAGNOSIS — G89.29 CHRONIC PAIN IN LEFT SHOULDER: ICD-10-CM

## 2021-08-26 DIAGNOSIS — M25.512 CHRONIC PAIN IN LEFT SHOULDER: ICD-10-CM

## 2021-08-26 PROCEDURE — 73221 MRI JOINT UPR EXTREM W/O DYE: CPT | Mod: LT | Performed by: RADIOLOGY

## 2021-08-27 ENCOUNTER — OFFICE VISIT (OUTPATIENT)
Dept: FAMILY MEDICINE | Facility: CLINIC | Age: 41
End: 2021-08-27
Payer: COMMERCIAL

## 2021-08-27 VITALS
WEIGHT: 214 LBS | RESPIRATION RATE: 14 BRPM | BODY MASS INDEX: 33.98 KG/M2 | HEART RATE: 74 BPM | DIASTOLIC BLOOD PRESSURE: 73 MMHG | SYSTOLIC BLOOD PRESSURE: 122 MMHG | TEMPERATURE: 97.1 F | OXYGEN SATURATION: 96 %

## 2021-08-27 DIAGNOSIS — Z80.3 FAMILY HISTORY OF MALIGNANT NEOPLASM OF BREAST: ICD-10-CM

## 2021-08-27 DIAGNOSIS — E87.8 ELECTROLYTE ABNORMALITY: ICD-10-CM

## 2021-08-27 DIAGNOSIS — Z80.41 FAMILY HISTORY OF MALIGNANT NEOPLASM OF OVARY: ICD-10-CM

## 2021-08-27 DIAGNOSIS — R59.0 AXILLARY LYMPHADENOPATHY: Primary | ICD-10-CM

## 2021-08-27 DIAGNOSIS — Z80.0 FAMILY HISTORY OF COLON CANCER: ICD-10-CM

## 2021-08-27 LAB
ALBUMIN SERPL-MCNC: 3.9 G/DL (ref 3.4–5)
ALP SERPL-CCNC: 50 U/L (ref 40–150)
ALT SERPL W P-5'-P-CCNC: 18 U/L (ref 0–50)
ANION GAP SERPL CALCULATED.3IONS-SCNC: 5 MMOL/L (ref 3–14)
AST SERPL W P-5'-P-CCNC: 25 U/L (ref 0–45)
BASO+EOS+MONOS # BLD AUTO: 0.4 10E3/UL (ref 0–2.2)
BASO+EOS+MONOS NFR BLD AUTO: 7 %
BILIRUB SERPL-MCNC: 0.5 MG/DL (ref 0.2–1.3)
BUN SERPL-MCNC: 9 MG/DL (ref 7–30)
CALCIUM SERPL-MCNC: 9.2 MG/DL (ref 8.5–10.1)
CHLORIDE BLD-SCNC: 107 MMOL/L (ref 94–109)
CO2 SERPL-SCNC: 29 MMOL/L (ref 20–32)
CREAT SERPL-MCNC: 0.5 MG/DL (ref 0.52–1.04)
ERYTHROCYTE [DISTWIDTH] IN BLOOD BY AUTOMATED COUNT: 13.1 % (ref 10–15)
GFR SERPL CREATININE-BSD FRML MDRD: >90 ML/MIN/1.73M2
GLUCOSE BLD-MCNC: 97 MG/DL (ref 70–99)
HCT VFR BLD AUTO: 40.7 % (ref 35–47)
HGB BLD-MCNC: 13.5 G/DL (ref 11.7–15.7)
LYMPHOCYTES # BLD AUTO: 2 10E3/UL (ref 0.8–5.3)
LYMPHOCYTES NFR BLD AUTO: 34 %
MCH RBC QN AUTO: 29.1 PG (ref 26.5–33)
MCHC RBC AUTO-ENTMCNC: 33.2 G/DL (ref 31.5–36.5)
MCV RBC AUTO: 88 FL (ref 78–100)
NEUTROPHILS # BLD AUTO: 3.5 10E3/UL (ref 1.6–8.3)
NEUTROPHILS NFR BLD AUTO: 59 %
PLATELET # BLD AUTO: 293 10E3/UL (ref 150–450)
POTASSIUM BLD-SCNC: 4.2 MMOL/L (ref 3.4–5.3)
PROT SERPL-MCNC: 7.2 G/DL (ref 6.8–8.8)
RBC # BLD AUTO: 4.64 10E6/UL (ref 3.8–5.2)
SODIUM SERPL-SCNC: 141 MMOL/L (ref 133–144)
WBC # BLD AUTO: 5.9 10E3/UL (ref 4–11)

## 2021-08-27 PROCEDURE — 87389 HIV-1 AG W/HIV-1&-2 AB AG IA: CPT | Performed by: FAMILY MEDICINE

## 2021-08-27 ASSESSMENT — ASTHMA QUESTIONNAIRES
QUESTION_1 LAST FOUR WEEKS HOW MUCH OF THE TIME DID YOUR ASTHMA KEEP YOU FROM GETTING AS MUCH DONE AT WORK, SCHOOL OR AT HOME: NONE OF THE TIME
QUESTION_4 LAST FOUR WEEKS HOW OFTEN HAVE YOU USED YOUR RESCUE INHALER OR NEBULIZER MEDICATION (SUCH AS ALBUTEROL): NOT AT ALL
QUESTION_2 LAST FOUR WEEKS HOW OFTEN HAVE YOU HAD SHORTNESS OF BREATH: NOT AT ALL
QUESTION_3 LAST FOUR WEEKS HOW OFTEN DID YOUR ASTHMA SYMPTOMS (WHEEZING, COUGHING, SHORTNESS OF BREATH, CHEST TIGHTNESS OR PAIN) WAKE YOU UP AT NIGHT OR EARLIER THAN USUAL IN THE MORNING: NOT AT ALL
ACT_TOTALSCORE: 25
QUESTION_5 LAST FOUR WEEKS HOW WOULD YOU RATE YOUR ASTHMA CONTROL: COMPLETELY CONTROLLED

## 2021-08-27 NOTE — NURSING NOTE
41 year old  Chief Complaint   Patient presents with     Results     MRI     Blood Draw     CBC and albumin levels       Blood pressure 122/73, pulse 74, temperature 97.1  F (36.2  C), temperature source Skin, resp. rate 14, weight 97.1 kg (214 lb), SpO2 96 %, not currently breastfeeding. Body mass index is 33.98 kg/m .  Patient Active Problem List   Diagnosis     Joint pain     L4-L5 disc bulge     Attention deficit hyperactivity disorder (ADHD), combined type     Vaginal discharge     History of abnormal cervical Pap smear     Other acne     Benign nevus     History of Clostridium difficile colitis     Cervicalgia     Bilateral thoracic back pain     Class 1 obesity due to excess calories without serious comorbidity with body mass index (BMI) of 30.0 to 30.9 in adult     Gastroesophageal reflux disease with esophagitis     Anxiety     Chronic constipation     Acute pain of left shoulder     Abdominal pain, right upper quadrant     History of food allergy     Other fatigue       Wt Readings from Last 2 Encounters:   08/27/21 97.1 kg (214 lb)   08/10/21 96.6 kg (213 lb)     BP Readings from Last 3 Encounters:   08/27/21 122/73   07/27/21 94/64   02/15/21 121/77         Current Outpatient Medications   Medication     ALPRAZolam (XANAX) 0.25 MG tablet     cyclobenzaprine (FLEXERIL) 10 MG tablet     hydrocortisone, Perianal, (HYDROCORTISONE) 2.5 % cream     ibuprofen (ADVIL/MOTRIN) 200 MG tablet     albuterol (PROAIR HFA/PROVENTIL HFA/VENTOLIN HFA) 108 (90 Base) MCG/ACT inhaler     multivitamin, therapeutic with minerals (MULTI-VITAMIN) TABS tablet     No current facility-administered medications for this visit.       Social History     Tobacco Use     Smoking status: Never Smoker     Smokeless tobacco: Never Used   Substance Use Topics     Alcohol use: Not Currently     Alcohol/week: 0.0 standard drinks     Comment: outside of pregnancy, social     Drug use: No       Health Maintenance Due   Topic Date Due     ASTHMA  ACTION PLAN  Never done     ADVANCE CARE PLANNING  Never done     Pneumococcal Vaccine: Pediatrics (0 to 5 Years) and At-Risk Patients (6 to 64 Years) (1 of 2 - PPSV23) Never done     COVID-19 Vaccine (1) Never done     PREVENTIVE CARE VISIT  06/22/2020     MAMMO SCREENING  05/21/2021     ASTHMA CONTROL TEST  08/15/2021     INFLUENZA VACCINE (1) 09/01/2021     HPV TEST  09/08/2021     PAP  09/08/2021       Lab Results   Component Value Date    PAP NIL 09/08/2016 August 27, 2021 1:43 PM

## 2021-08-27 NOTE — PROGRESS NOTES
"ASSESSMENT AND PLAN:     (R59.0) Axillary lymphadenopathy  (primary encounter diagnosis)  Comment: New problem. Found incidentally on MRI shoulder.  Update breast cancer screening. Has nodular breasts and noticed new lumps so will get diagnostic mammogram and targeted ultrasound of new areas. Check CBC and HIV as well.   Plan: Mammo diagnostic  digital (bilateral)*, US         Breast Bilateral Limited 1-3 Quadrants, CBC         with platelets differential, HIV Antigen         Antibody Combo          (E87.8) Electrolyte abnormality  Comment: New problem. Likely measurement error - repeat CMP today.   Plan: Comprehensive metabolic panel          (Z80.0) Family history of colon cancer  (Z80.3) Family history of malignant neoplasm of breast  (Z80.41) Family history of malignant neoplasm of ovary  Comment: Increased risk for BRCA given 2 family members with breast and/or ovarian cancer. Also has multiple other cancers in family including potentially 2 with neuroendocrine tumors (father definitely, paternal GF possible). Will send to genetics to discuss genetic syndrome testing/cancer screening.   Plan: Adult Genetics & Metabolism Referral         Review of the result(s) of each unique test - MR Shoulder (from 21); CBC, TSH, CMP (from 2021)  Ordering of each unique test      Delfin Jackson MD   [facility]  2021, 2:52 PM      SUBJECTIVE:   Priyanka is a 41 year old female who presents to clinic today for a return visit.    - recently had MRI of her left shoulder (21), showed:  \"Multiple scattered axillary lymph node, nonspecific but may be seen in  a setting of recent vaccination.\"    - has herself noticed lumps in her armpits  - has discussed with breast center before, who was unconcerned    - father had a neuroendocrine cancer, sister had thyroid cancer, aunt recent  of cancer, so she is worried about it    - maternal grandmother diagnosed with breast cancer in 60s and had stomach " cancer  - maternal aunt diagnosed with ovarian cancer at age 68  - paternal grandfather had colon cancer vs neuroendocrine tumor         # Low Anion Gap  - 7/27/21 AG 1    ROS: Denies fevers, chills, chest pain, difficulty breathing, abdominal pain    Patient Active Problem List   Diagnosis     Joint pain     L4-L5 disc bulge     Attention deficit hyperactivity disorder (ADHD), combined type     Vaginal discharge     History of abnormal cervical Pap smear     Other acne     Benign nevus     History of Clostridium difficile colitis     Cervicalgia     Bilateral thoracic back pain     Class 1 obesity due to excess calories without serious comorbidity with body mass index (BMI) of 30.0 to 30.9 in adult     Gastroesophageal reflux disease with esophagitis     Anxiety     Chronic constipation     Acute pain of left shoulder     Abdominal pain, right upper quadrant     History of food allergy     Other fatigue     Current Outpatient Medications   Medication     ALPRAZolam (XANAX) 0.25 MG tablet     cyclobenzaprine (FLEXERIL) 10 MG tablet     hydrocortisone, Perianal, (HYDROCORTISONE) 2.5 % cream     ibuprofen (ADVIL/MOTRIN) 200 MG tablet     albuterol (PROAIR HFA/PROVENTIL HFA/VENTOLIN HFA) 108 (90 Base) MCG/ACT inhaler     multivitamin, therapeutic with minerals (MULTI-VITAMIN) TABS tablet     No current facility-administered medications for this visit.       I have reviewed the patient's relevant past medical history.     OBJECTIVE:   /73 (BP Location: Right arm, Patient Position: Sitting, Cuff Size: Adult Large)   Pulse 74   Temp 97.1  F (36.2  C) (Skin)   Resp 14   Wt 97.1 kg (214 lb)   SpO2 96%   BMI 33.98 kg/m      Constitutional: well-appearing, appears stated age  Eyes: conjunctivae without erythema, sclera anicteric.   Neck: no cervical or supraclavicular lymphadenopathy. Thyroid normal in size and contour.   Skin: no rashes, lesions, or wounds  Psych: affect is full and appropriate, speech is fluent  and non-pressured

## 2021-08-28 ENCOUNTER — OFFICE VISIT (OUTPATIENT)
Dept: ORTHOPEDICS | Facility: CLINIC | Age: 41
End: 2021-08-28
Payer: COMMERCIAL

## 2021-08-28 VITALS — HEIGHT: 67 IN | BODY MASS INDEX: 33.59 KG/M2 | WEIGHT: 214 LBS

## 2021-08-28 DIAGNOSIS — M75.02 ADHESIVE CAPSULITIS OF LEFT SHOULDER: ICD-10-CM

## 2021-08-28 DIAGNOSIS — M75.112 INCOMPLETE ROTATOR CUFF TEAR OR RUPTURE OF LEFT SHOULDER, NOT SPECIFIED AS TRAUMATIC: Primary | ICD-10-CM

## 2021-08-28 LAB — HIV 1+2 AB+HIV1 P24 AG SERPL QL IA: NONREACTIVE

## 2021-08-28 PROCEDURE — 99213 OFFICE O/P EST LOW 20 MIN: CPT | Performed by: FAMILY MEDICINE

## 2021-08-28 ASSESSMENT — ASTHMA QUESTIONNAIRES: ACT_TOTALSCORE: 25

## 2021-08-28 ASSESSMENT — MIFFLIN-ST. JEOR: SCORE: 1651.02

## 2021-08-28 NOTE — PATIENT INSTRUCTIONS
Rotator Cuff Injury     WHAT IS A ROTATOR CUFF INJURY?    A rotator cuff injury is irritation of or damage to the group of tendons and muscles that hold your shoulder joint together. Tendons are strong bands of tissue that attach muscle to bone. You use the muscles and tendons in your shoulder joint to move your shoulder and raise your arm over your head.        WHAT IS THE CAUSE?    A rotator cuff injury can be caused by:    Overuse of your shoulder in a sport or work activity that involves repetitive overhead movement of your shoulder, like swimming, baseball (mainly pitching), football, tennis, painting, plastering, or housework.  A sudden activity that twists your shoulder or tears your tendon, such as using your arm to break a fall, falling onto your arm, or lifting a heavy object  You may be at higher risk for a rotator cuff injury if you have poor head and shoulder posture, especially if you are older.    WHAT ARE THE SYMPTOMS?    Symptoms may include:    Pain and weakness in your arm and shoulder  Loss of shoulder movement, especially when you try to raise your arm overhead    HOW IS IT DIAGNOSED?    Your healthcare provider will ask about your symptoms, activities, and medical history and examine you. You may have X-rays or other scans or procedures, such as:    An ultrasound, which uses sound waves to show pictures of your shoulder joint  An MRI, which uses a strong magnetic field and radio waves to show detailed pictures of your shoulder joint  An arthrogram, which is an X-ray or MRI taken after a dye is injected into your joint to outline its shape  Arthroscopy, which is a type of surgery done with a small scope inserted into your joint so your provider can look directly at your joint    HOW IS IT TREATED?    You will need to change or stop doing the activities that cause pain until your injury has healed. For example, avoid strenuous activity or any overhead motion that causes pain. Also, try to make  sure that you are practicing good posture and are not slouching forward.    Your healthcare provider may recommend stretching and strengthening exercises to help you heal.    If you have a bad tear, you may need to have it repaired with surgery. After surgery, your treatment plan will include physical therapy to strengthen your shoulder as it heals.    The pain often gets better within a few weeks with self-care, but some injuries may take several months or longer to heal. It s important to follow all of your healthcare provider s instructions.    HOW CAN I TAKE CARE OF MYSELF?    To keep swelling down and help relieve pain:    Put an ice pack, gel pack, or package of frozen vegetables wrapped in a cloth on the area every 3 to 4 hours for up to 20 minutes at a time.  Take nonprescription pain medicine, such as acetaminophen, ibuprofen, or naproxen. Nonsteroidal anti-inflammatory medicines (NSAIDs), such as ibuprofen and naproxen, may cause stomach bleeding and other problems. These risks increase with age. Read the label and take as directed. Unless recommended by your healthcare provider, you should not take this medicine for more than 10 days.  Moist heat may help relieve pain, relax your muscles, and make it easier to move your arm and shoulder. Put moist heat on the injured area for 10 to 15 minutes before you do warm-up and stretching exercises. Moist heat includes heat patches or moist heating pads that you can buy at most drugstores, a warm wet washcloth, or a hot shower. To prevent burns to your skin, follow directions on the package and do not lie on any type of hot pad. Don t use heat if you have swelling.    Follow your healthcare provider's instructions, including any exercises recommended by your provider. Ask your provider:    How and when you will hear your test results  How long it will take to recover  What activities you should avoid, including how much you can lift, and when you can return to your  normal activities  How to take care of yourself at home  What symptoms or problems you should watch for and what to do if you have them  Make sure you know when you should come back for a checkup.    HOW CAN I HELP PREVENT A ROTATOR CUFF INJURY?    Warm-up exercises and stretching before activities can help prevent injuries. For example, do exercises that strengthen your shoulder muscles. If your arm or shoulder hurts after exercise, putting ice on it may help keep it from getting injured.    Follow safety rules and use any protective equipment recommended for your work or sport.    Avoid activities that cause pain. For example, avoid lifting heavy objects over your head.    Developed by [employer].  Published by [employer].  Copyright  2014 [employer] and/or one of its subsidiaries. All rights reserved.    Rotator Cuff Exercises    Isometric shoulder external rotation:  a doorway with your elbow bent 90 degrees and the back of the wrist on your injured side pressed against the door frame. Try to press your hand outward into the door frame. Hold for 5 seconds. Do 2 sets of 15.    Isometric shoulder internal rotation:  a doorway with your elbow bent 90 degrees and the front of the wrist on your injured side pressed against the door frame. Try to press your palm into the door frame. Hold for 5 seconds. Do 2 sets of 15.  Wand exercise, flexion: Stand upright and hold a stick in both hands, palms down. Stretch your arms by lifting them over your head, keeping your arms straight. Hold for 5 seconds and return to the starting position. Repeat 10 times.    Wand exercise, extension: Stand upright and hold a stick in both hands behind your back. Move the stick away from your back. Hold this position for 5 seconds. Relax and return to the starting position. Repeat 10 times.  Wand exercise, external rotation: Lie on your back and hold a stick in both hands, palms up. Your upper arms should be  resting on the floor with your elbows at your sides and bent 90 degrees. Use your uninjured arm to push your injured arm out away from your body. Keep the elbow of your injured arm at your side while it is being pushed. Hold the stretch for 5 seconds. Repeat 10 times.    Wand exercise, shoulder abduction and adduction: Stand and hold a stick with both hands, palms facing away from your body. Rest the stick against the front of your thighs. Use your uninjured arm to push your injured arm out to the side and up as high as possible. Keep your arms straight. Hold for 5 seconds. Repeat 10 times.    Resisted shoulder external rotation: Stand sideways next to a door with your injured arm farther from the door. Tie a knot in the end of the tubing and shut the knot in the door at waist level (or use cable weight machine at gym). Hold the other end of the tubing with the hand of your injured arm. Rest the hand of your injured arm across your stomach. Keeping your elbow in at your side, rotate your arm outward and away from your waist. Slowly return your arm to the starting position. Make sure you keep your elbow bent 90 degrees and your forearm parallel to the floor. Repeat 10 times. Build up to 2 sets of 15.    Resisted shoulder internal rotation: Stand sideways next to a door with your injured arm closest to the door. Tie a knot in the end of the tubing and shut the knot in the door at waist level (or use cable weight machine at gym). Hold the other end of the tubing with the hand of your injured arm. Bend the elbow of your injured arm 90 degrees. Keeping your elbow in at your side, rotate your forearm across your body and then slowly back to the starting position. Make sure you keep your forearm parallel to the floor. Do 2 sets of 8 to 12.    [place]: Stand with your arms at your sides and with your elbows straight. Slowly raise your arms to eye level. As you raise your arms, spread them apart so that they are only  "slightly in front of your body (at about a 30-degree angle to the front of your body). Point your thumbs toward the ceiling. Hold for 2 seconds and lower your arms slowly. Do 2 sets of 15. Progress to holding a soup can or light weight when you are doing the exercise and increase the weight as the exercise gets easier.    Side-lying external rotation: Lie on your uninjured side with your injured arm at your side and your elbow bent 90 degrees. Keeping your elbow against your side, raise your forearm toward the ceiling and hold for 2 seconds. Slowly lower your arm. Do 2 sets of 15. You can start doing this exercise holding a soup can or light weight and gradually increase the weight as long as there is no pain.    Horizontal abduction: Lie on your stomach on a table or the edge of a bed with the arm on your injured side hanging down over the edge. Raise your arm out to the side, with your thumb pointed toward the ceiling, until your arm is parallel to the floor. Hold for 2 seconds and then lower it slowly. Start this exercise with no weight. As you get stronger, add a light weight or hold a soup can. Do 2 sets of 15.    Push-up with a plus: Begin on the floor on your hands and knees. Keep your hands a shoulder width apart and lift your feet off the floor. Arch your back as high as possible and round your shoulders (this is the \"plus\" part or the exercise). Bend your elbows and lower your body to the floor. Return to the starting position and arch your back again. Do 2 sets of 15.          "

## 2021-08-28 NOTE — PROGRESS NOTES
ESTABLISHED PATIENT FOLLOW-UP:  RECHECK (left shoulder)       HISTORY OF PRESENT ILLNESS  Ms. Lundberg is a pleasant 41 year old year old female who presents to clinic today for follow-up of left shoulder pain after MRI on 8/26/21.    Date of injury/onset: June 2021  Date last seen: 8/10/21  Following Therapeutic Plan: Yes.  Pain: worse with current rainy weather.  Function: worse, she reports that her ROM is decreasing.   Interval History:  She is having difficulty with ADL's.     MEDICAL HISTORY  Patient Active Problem List   Diagnosis     Joint pain     L4-L5 disc bulge     Attention deficit hyperactivity disorder (ADHD), combined type     Vaginal discharge     History of abnormal cervical Pap smear     Other acne     Benign nevus     History of Clostridium difficile colitis     Cervicalgia     Bilateral thoracic back pain     Class 1 obesity due to excess calories without serious comorbidity with body mass index (BMI) of 30.0 to 30.9 in adult     Gastroesophageal reflux disease with esophagitis     Anxiety     Chronic constipation     Acute pain of left shoulder     Abdominal pain, right upper quadrant     History of food allergy     Other fatigue       Current Outpatient Medications   Medication Sig Dispense Refill     albuterol (PROAIR HFA/PROVENTIL HFA/VENTOLIN HFA) 108 (90 Base) MCG/ACT inhaler Inhale 2 puffs into the lungs every 4 hours as needed for shortness of breath / dyspnea or wheezing (Patient not taking: Reported on 8/27/2021) 1 Inhaler 3     ALPRAZolam (XANAX) 0.25 MG tablet Take 1 tablet (0.25 mg) by mouth nightly as needed for anxiety 20 tablet 0     cyclobenzaprine (FLEXERIL) 10 MG tablet Take 1 tablet (10 mg) by mouth 2 times daily as needed for muscle spasms 14 tablet 0     hydrocortisone, Perianal, (HYDROCORTISONE) 2.5 % cream Place rectally 2 times daily as needed for hemorrhoids 30 g 1     ibuprofen (ADVIL/MOTRIN) 200 MG tablet Take 400 mg by mouth every 4 hours as needed for mild pain 2 tab  every am       multivitamin, therapeutic with minerals (MULTI-VITAMIN) TABS tablet Take 1 tablet by mouth daily (Patient not taking: Reported on 8/27/2021)         Allergies   Allergen Reactions     Food      Cantaloupe, cucumbers, green beans, and peppers.      Lemon Flavor      Mustard Seed      Onion Difficulty breathing and GI Disturbance     [place] GI Disturbance     Penicillins Hives     Or another medication taken at that time     Vanilla GI Disturbance       Family History   Problem Relation Age of Onset     Mental Illness Mother         bipolar, schizophrenia     Anxiety Disorder Mother      Osteoporosis Mother      Thyroid Disease Mother      Diabetes Mother      Neurofibromatosis Mother         more likely fibromyalgia     Arthritis Mother      Back Pain Mother      Osteoarthritis Mother      Obesity Mother      Cirrhosis Mother         from fatty liver. with esophageal varices, ..     Gallbladder Disease Mother         cholecystectomy     Hypertension Father      Hyperlipidemia Father      Diabetes Father      Other Cancer Father 67        Neuroendocrine tumor, ? from gall bladder, stage 4  1/2018     Migraines Father      Scleroderma Father      Rheumatoid Arthritis Father      Obesity Father      Ankylosing Spondylitis Father      Macular Degeneration Father      Mental Illness Sister         bipolar     Anxiety Disorder Sister      Asthma Sister         eczema     Neurologic Disorder Sister         Cervical Dystonia, treated with Botox     Thyroid Cancer Sister      Coronary Artery Disease Maternal Grandmother      Cerebrovascular Disease Maternal Grandmother      Breast Cancer Maternal Grandmother 65     Skin Cancer Maternal Grandmother      Stomach Cancer Maternal Grandmother         stomach, skin     Prostate Cancer Maternal Grandfather      Cancer Maternal Grandfather      Diabetes Paternal Grandmother      Kidney Cancer Paternal Grandmother      Coronary Artery Disease Paternal Grandfather       "Colon Cancer Paternal Grandfather      Cancer Paternal Grandfather      Ovarian Cancer Maternal Aunt 68     Skin Cancer Maternal Aunt         melanoma     Scleroderma Paternal Aunt      Ankylosing Spondylitis Paternal Aunt      Ankylosing Spondylitis Paternal Uncle      Liver Cancer Other         uncle     Glaucoma No family hx of        Additional medical/Social/Surgical histories reviewed in [facility] and updated as appropriate.       PHYSICAL EXAM  Ht 1.69 m (5' 6.54\")   Wt 97.1 kg (214 lb)   BMI 33.98 kg/m      Deferred    IMAGING :     MR SHOULDER LEFT     Impression:  1. On a background of tendinosis, moderate to high-grade  intrasubstance tear of the supraspinatus posterior fibers near the  footprint involving approximately 9 mm in anterior-posterior  dimension.   2. Effacement of the rotator interval including coracohumeral ligament  without associated axillary pouch edema or thickening, nonspecific but  can be seen in the setting of clinical entity of adhesive capsulitis.     I have personally reviewed the examination and initial interpretation  and I agree with the findings.     FREYA WILEY      ASSESSMENT & PLAN  Ms. Lundberg is a 41 year old year old female who presents to clinic today with RECHECK (left shoulder)    Diagnosis:  Partial tear of left rotator cuff  Early adhesive capsulitis of left shoulder    Priyanka and I had a discussion about her current shoulder pathology.  Incomplete tear possibly related to injury in 2011 and reexacerbation vs. New insidious tear also possible. Constellation of findings concerning for concomitant adhesive capsulitis    Offered corticosteroid injection today, she wishes to hold off  PT referral and discussed this would be mainstay of rehab and returning shoulder to previous function.  Continue with OTC analgesics  Activity modifications for now.  Follow up after PT 4-6 weeks in and discuss need for additional treatments, CSI possible at  bursa vs RTC " interval.    It was a pleasure seeing Priyanka.    Gregorio Sy DO, [facility]  Primary Care Sports Medicine

## 2021-08-28 NOTE — LETTER
8/28/2021      RE: Priyanka Lundberg  [address] [place] [address]       ESTABLISHED PATIENT FOLLOW-UP:  RECHECK (left shoulder)       HISTORY OF PRESENT ILLNESS  Ms. Lundberg is a pleasant 41 year old year old female who presents to clinic today for follow-up of left shoulder pain after MRI on 8/26/21.    Date of injury/onset: June 2021  Date last seen: 8/10/21  Following Therapeutic Plan: Yes.  Pain: worse with current rainy weather.  Function: worse, she reports that her ROM is decreasing.   Interval History:  She is having difficulty with ADL's.     MEDICAL HISTORY  Patient Active Problem List   Diagnosis     Joint pain     L4-L5 disc bulge     Attention deficit hyperactivity disorder (ADHD), combined type     Vaginal discharge     History of abnormal cervical Pap smear     Other acne     Benign nevus     History of Clostridium difficile colitis     Cervicalgia     Bilateral thoracic back pain     Class 1 obesity due to excess calories without serious comorbidity with body mass index (BMI) of 30.0 to 30.9 in adult     Gastroesophageal reflux disease with esophagitis     Anxiety     Chronic constipation     Acute pain of left shoulder     Abdominal pain, right upper quadrant     History of food allergy     Other fatigue       Current Outpatient Medications   Medication Sig Dispense Refill     albuterol (PROAIR HFA/PROVENTIL HFA/VENTOLIN HFA) 108 (90 Base) MCG/ACT inhaler Inhale 2 puffs into the lungs every 4 hours as needed for shortness of breath / dyspnea or wheezing (Patient not taking: Reported on 8/27/2021) 1 Inhaler 3     ALPRAZolam (XANAX) 0.25 MG tablet Take 1 tablet (0.25 mg) by mouth nightly as needed for anxiety 20 tablet 0     cyclobenzaprine (FLEXERIL) 10 MG tablet Take 1 tablet (10 mg) by mouth 2 times daily as needed for muscle spasms 14 tablet 0     hydrocortisone, Perianal, (HYDROCORTISONE) 2.5 % cream Place rectally 2 times daily as needed for hemorrhoids 30 g 1     ibuprofen  (ADVIL/MOTRIN) 200 MG tablet Take 400 mg by mouth every 4 hours as needed for mild pain 2 tab every am       multivitamin, therapeutic with minerals (MULTI-VITAMIN) TABS tablet Take 1 tablet by mouth daily (Patient not taking: Reported on 8/27/2021)         Allergies   Allergen Reactions     Food      Cantaloupe, cucumbers, green beans, and peppers.      Lemon Flavor      Mustard Seed      Onion Difficulty breathing and GI Disturbance     [place] GI Disturbance     Penicillins Hives     Or another medication taken at that time     Vanilla GI Disturbance       Family History   Problem Relation Age of Onset     Mental Illness Mother         bipolar, schizophrenia     Anxiety Disorder Mother      Osteoporosis Mother      Thyroid Disease Mother      Diabetes Mother      Neurofibromatosis Mother         more likely fibromyalgia     Arthritis Mother      Back Pain Mother      Osteoarthritis Mother      Obesity Mother      Cirrhosis Mother         from fatty liver. with esophageal varices, ..     Gallbladder Disease Mother         cholecystectomy     Hypertension Father      Hyperlipidemia Father      Diabetes Father      Other Cancer Father 67        Neuroendocrine tumor, ? from gall bladder, stage 4  1/2018     Migraines Father      Scleroderma Father      Rheumatoid Arthritis Father      Obesity Father      Ankylosing Spondylitis Father      Macular Degeneration Father      Mental Illness Sister         bipolar     Anxiety Disorder Sister      Asthma Sister         eczema     Neurologic Disorder Sister         Cervical Dystonia, treated with Botox     Thyroid Cancer Sister      Coronary Artery Disease Maternal Grandmother      Cerebrovascular Disease Maternal Grandmother      Breast Cancer Maternal Grandmother 65     Skin Cancer Maternal Grandmother      Stomach Cancer Maternal Grandmother         stomach, skin     Prostate Cancer Maternal Grandfather      Cancer Maternal Grandfather      Diabetes Paternal Grandmother   "    Kidney Cancer Paternal Grandmother      Coronary Artery Disease Paternal Grandfather      Colon Cancer Paternal Grandfather      Cancer Paternal Grandfather      Ovarian Cancer Maternal Aunt 68     Skin Cancer Maternal Aunt         melanoma     Scleroderma Paternal Aunt      Ankylosing Spondylitis Paternal Aunt      Ankylosing Spondylitis Paternal Uncle      Liver Cancer Other         uncle     Glaucoma No family hx of        Additional medical/Social/Surgical histories reviewed in [facility] and updated as appropriate.       PHYSICAL EXAM  Ht 1.69 m (5' 6.54\")   Wt 97.1 kg (214 lb)   BMI 33.98 kg/m      Deferred    IMAGING :     MR SHOULDER LEFT     Impression:  1. On a background of tendinosis, moderate to high-grade  intrasubstance tear of the supraspinatus posterior fibers near the  footprint involving approximately 9 mm in anterior-posterior  dimension.   2. Effacement of the rotator interval including coracohumeral ligament  without associated axillary pouch edema or thickening, nonspecific but  can be seen in the setting of clinical entity of adhesive capsulitis.     I have personally reviewed the examination and initial interpretation  and I agree with the findings.     FREYA WILEY      ASSESSMENT & PLAN  Ms. Lundberg is a 41 year old year old female who presents to clinic today with RECHECK (left shoulder)    Diagnosis:  Partial tear of left rotator cuff  Early adhesive capsulitis of left shoulder    Priyanka and I had a discussion about her current shoulder pathology.  Incomplete tear possibly related to injury in 2011 and reexacerbation vs. New insidious tear also possible. Constellation of findings concerning for concomitant adhesive capsulitis    Offered corticosteroid injection today, she wishes to hold off  PT referral and discussed this would be mainstay of rehab and returning shoulder to previous function.  Continue with OTC analgesics  Activity modifications for now.  Follow up after PT 4-6 weeks " in and discuss need for additional treatments, CSI possible at  bursa vs RTC interval.    It was a pleasure seeing Priyanka.    Gregorio Sy DO, [facility]  Primary Care Sports Medicine        Gregorio Sy DO

## 2021-09-01 ENCOUNTER — ANCILLARY PROCEDURE (OUTPATIENT)
Dept: MAMMOGRAPHY | Facility: CLINIC | Age: 41
End: 2021-09-01
Attending: FAMILY MEDICINE
Payer: COMMERCIAL

## 2021-09-01 ENCOUNTER — THERAPY VISIT (OUTPATIENT)
Dept: PHYSICAL THERAPY | Facility: CLINIC | Age: 41
End: 2021-09-01
Attending: FAMILY MEDICINE
Payer: COMMERCIAL

## 2021-09-01 DIAGNOSIS — M75.02 ADHESIVE CAPSULITIS OF LEFT SHOULDER: ICD-10-CM

## 2021-09-01 DIAGNOSIS — M75.112 INCOMPLETE ROTATOR CUFF TEAR OR RUPTURE OF LEFT SHOULDER, NOT SPECIFIED AS TRAUMATIC: ICD-10-CM

## 2021-09-01 DIAGNOSIS — R59.0 AXILLARY LYMPHADENOPATHY: ICD-10-CM

## 2021-09-01 PROCEDURE — 77066 DX MAMMO INCL CAD BI: CPT | Performed by: RADIOLOGY

## 2021-09-01 PROCEDURE — 97110 THERAPEUTIC EXERCISES: CPT | Mod: GP | Performed by: PHYSICAL THERAPIST

## 2021-09-01 PROCEDURE — 76642 ULTRASOUND BREAST LIMITED: CPT | Mod: RT | Performed by: RADIOLOGY

## 2021-09-01 PROCEDURE — 97161 PT EVAL LOW COMPLEX 20 MIN: CPT | Mod: GP | Performed by: PHYSICAL THERAPIST

## 2021-09-01 PROCEDURE — 76882 US LMTD JT/FCL EVL NVASC XTR: CPT | Mod: LT | Performed by: RADIOLOGY

## 2021-09-01 PROCEDURE — G0279 TOMOSYNTHESIS, MAMMO: HCPCS | Performed by: RADIOLOGY

## 2021-09-01 NOTE — PROGRESS NOTES
Physical Therapy Initial Evaluation  Subjective:  The history is provided by the patient. No  was used.   Patient Health History  Priyanka Lundberg being seen for Frozen shoulder,  torn rotator cuff.     Date of Onset: 8/28/21 date of order.   Problem occurred: Several theories   Pain is reported as 5/10 on pain scale.  General health as reported by patient is poor.  Pertinent medical history includes: chest pain, migraines/headaches, overweight, pain at night/rest and weakness.   Red flags:  None as reported by patient.  Medical allergies: none.       Current medications:  Muscle relaxants and pain medication.    Current occupation is Caregiver.   Primary job tasks include:  Prolonged standing, pushing/pulling and repetitive tasks.                  Therapist Generated HPI Evaluation  Problem details: The patient presents today with left shoulder pain that has been going on since 2011 when she was moving some heavy furniture. The pt has been a  since 1998. In the past she has had physical therapy, which has helped reduce her pain, however, she is noting less range of motion of her left shoulder.    The pt is right handed. She has a hard time lifting/carrying her five year old. The patient will wake up about 3 times at night with left shoulder pain.    Goal: avoid surgery, have a nice set of exercises to hep manage pain/ROM, reduce sharp pain in L shoulder.         Type of problem:  Left shoulder.    This is a chronic condition.  Condition occurred with:  While carrying an object.  Where condition occurred: at home.  Patient reports pain:  Anterior and lateral.  Pain is described as aching (stiff) and is intermittent.  Pain radiates to:  Shoulder. Pain is worse in the A.M..  Since onset symptoms are gradually worsening.  Associated symptoms:  Loss of strength, painful arc and loss of motion/stiffness. Symptoms are exacerbated by using arm overhead, using arm behind back and lifting  (taking bra off, )  and relieved by NSAID's.    Previous treatment includes physical therapy. There was moderate improvement following previous treatment.  Restrictions due to condition include:  Working in normal job without restrictions.  Barriers include:  None as reported by patient.    Patient Health History  Priyanka Lundberg being seen for Frozen shoulder,  torn rotator cuff.     Date of Onset: 8/28/21 date of order.   Problem occurred: Several theories   Pain is reported as 5/10 on pain scale.  General health as reported by patient is poor.  Pertinent medical history includes: chest pain, migraines/headaches, overweight, pain at night/rest and weakness.   Red flags:  None as reported by patient.  Medical allergies: none.       Current medications:  Muscle relaxants and pain medication.    Current occupation is Caregiver.   Primary job tasks include:  Prolonged standing, pushing/pulling and repetitive tasks.                                    Objective:  Standing Alignment:    Cervical/Thoracic:  Forward head  Shoulder/UE:  Protracted scapula L and protracted scapula R (anterior glenohumeral joint L)                  Flexibility/Screens:     Upper Extremity:    Decreased left upper extremity flexibility at:  Pectoralis Major and Pectoralis Minor    Decreased right upper extremity flexibility present at:  Pectoralis Major and Pectoralis Minor                      Cervical/Thoracic Evaluation  Cervical AROM: normal                                  Shoulder Evaluation:  ROM:  AROM:    Flexion:  Left:  WNL, pain starting and 90 degrees    Right:  WNL    Abduction:  Left: WNL   Right:  WNL, pain starting at 20 degrees                                                                       General     ROS    Assessment/Plan:    Patient is a 41 year old female with left side shoulder complaints.    Patient has the following significant findings with corresponding treatment plan.                Diagnosis 1: non traumatic  R RTC  Pain -  hot/cold therapy, US, electric stimulation, mechanical traction, manual therapy, STS, splint/taping/bracing/orthotics, self management, education, directional preference exercise and home program  Impaired muscle performance - electric stimulation, neuro re-education and home program  Decreased function - therapeutic activities, home program and functional performance testing  Impaired posture - neuro re-education, therapeutic activities and home program    Therapy Evaluation Codes:   Cumulative Therapy Evaluation is: Low complexity.    Previous and current functional limitations:  (See Goal Flow Sheet for this information)    Short term and Long term goals: (See Goal Flow Sheet for this information)     Communication ability:  Patient appears to be able to clearly communicate and understand verbal and written communication and follow directions correctly.  Treatment Explanation - The following has been discussed with the patient:   RX ordered/plan of care  Anticipated outcomes  Possible risks and side effects  This patient would benefit from PT intervention to resume normal activities.   Rehab potential is good.    Frequency:  1 X week, once daily  Duration:  for 8 weeks  Discharge Plan:  Achieve all LTG.  Independent in home treatment program.  Reach maximal therapeutic benefit.    Please refer to the daily flowsheet for treatment today, total treatment time and time spent performing 1:1 timed codes.

## 2021-09-08 ENCOUNTER — PRE VISIT (OUTPATIENT)
Dept: ALLERGY | Facility: CLINIC | Age: 41
End: 2021-09-08

## 2021-09-08 ENCOUNTER — VIRTUAL VISIT (OUTPATIENT)
Dept: ALLERGY | Facility: CLINIC | Age: 41
End: 2021-09-08
Payer: COMMERCIAL

## 2021-09-08 DIAGNOSIS — J31.0 CHRONIC RHINITIS: ICD-10-CM

## 2021-09-08 DIAGNOSIS — Z88.9 DRUG ALLERGY: Primary | ICD-10-CM

## 2021-09-08 DIAGNOSIS — R53.83 FATIGUE, UNSPECIFIED TYPE: ICD-10-CM

## 2021-09-08 DIAGNOSIS — Z88.9 ATOPY: ICD-10-CM

## 2021-09-08 DIAGNOSIS — K90.49 FOOD INTOLERANCE IN ADULT: ICD-10-CM

## 2021-09-08 PROCEDURE — 99203 OFFICE O/P NEW LOW 30 MIN: CPT | Mod: 95 | Performed by: DERMATOLOGY

## 2021-09-08 ASSESSMENT — PAIN SCALES - GENERAL: PAINLEVEL: NO PAIN (0)

## 2021-09-08 NOTE — NURSING NOTE
Dermatology Rooming Note    Priyanka Lundberg's goals for this visit include:   Chief Complaint   Patient presents with     Allergy Consult     Priyanka is concerned about allergies to unknown     Juve Gifford, CMA

## 2021-09-08 NOTE — LETTER
9/8/2021         RE: Priyanka Lundberg  [address] [place] [address]        Dear Colleague,    Thank you for referring your patient, Priyanka Lundberg, to the [facility] [place]. Please see a copy of my visit note below.    [facility] Dermato-allergology Note  Virtual visit: store and forward video (Qardiohart connected)  Encounter Date: Sep 8, 2021  ____________________________________________    CC: Allergy Consult (Priyanka is concerned about allergies to unknown)      HPI:  Ms. Priyanka Lundberg is a(n) 41 year old female who presents today as a new patient for allergy consultation  - patient seems to have several food allergies diagnosed by prick tests.  - patient has chronic constipation since many years. In addition, recurrent rashes on the body (urticarial?) and patient has after eating tomato and spicy food with some swelling for few hours and after some days some scaling  - patient mostly bothered by fatigue about 7 years ago and has 5 year old child  - if patient eats sweat patient observes itching on ears (Vanilla)  - was with [facility]: in this prick test positives to various allergens  - otherwise feeling well in usual state of health    Physical exam:  General: In no acute distress, well-developed, well-nourished  Eyes: no conjunctivitis  ENT: no signs of rhinitis   Pulmonary: no wheezing or coughing  Skin: keratosis pilaris according to     Past Medical History:   Patient Active Problem List   Diagnosis     Joint pain     L4-L5 disc bulge     Attention deficit hyperactivity disorder (ADHD), combined type     Vaginal discharge     History of abnormal cervical Pap smear     Other acne     Benign nevus     History of Clostridium difficile colitis     Cervicalgia     Bilateral thoracic back pain     Class 1 obesity due to excess calories without serious comorbidity with body mass index (BMI) of 30.0 to 30.9 in adult      Gastroesophageal reflux disease with esophagitis     Anxiety     Chronic constipation     Acute pain of left shoulder     Abdominal pain, right upper quadrant     History of food allergy     Other fatigue     Past Medical History:   Diagnosis Date     Abdominal pain 2011    abnormal histamine problem, multiple food allergies     ADHD (attention deficit hyperactivity disorder)     on aderol     Anxiety and depression     on xanax     Arthritis      Breathing problem 2007    shortness of breath after eating, ?allergies     Chronic nausea      Gastroesophageal reflux disease      Heart murmur     closed by time she was 1     Hx of abnormal cervical Pap smear 2011     IBS (irritable bowel syndrome)     lots of mucus in bowel with occassional bleeding     Joint pain 2015    was to have rheumatologist     Kidney stone on right side 2010     Meniere's disease     hyperacusis     Migraines      Reduced vision      Tinnitus        Allergies:  Allergies   Allergen Reactions     [place]      Food      Cantaloupe, cucumbers, green beans, and peppers.      [place] (Weed)      Lemon Flavor      Mustard Seed      Onion Difficulty breathing and GI Disturbance     [place] GI Disturbance     Penicillins Hives     Or another medication taken at that time     [place] Grasses      Vanilla GI Disturbance       Medications:  Current Outpatient Medications   Medication     ALPRAZolam (XANAX) 0.25 MG tablet     cyclobenzaprine (FLEXERIL) 10 MG tablet     hydrocortisone, Perianal, (HYDROCORTISONE) 2.5 % cream     ibuprofen (ADVIL/MOTRIN) 200 MG tablet     albuterol (PROAIR HFA/PROVENTIL HFA/VENTOLIN HFA) 108 (90 Base) MCG/ACT inhaler     multivitamin, therapeutic with minerals (MULTI-VITAMIN) TABS tablet     No current facility-administered medications for this visit.       Social History:  The patient works as a  since 1998 (develops more and more irritant type of lung and nose irritatioin for 24h with headache). 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    Family History:  Family History   Problem Relation Age of Onset     Mental Illness Mother         bipolar, schizophrenia     Anxiety Disorder Mother      Osteoporosis Mother      Thyroid Disease Mother      Diabetes Mother      Neurofibromatosis Mother         more likely fibromyalgia     Arthritis Mother      Back Pain Mother      Osteoarthritis Mother      Obesity Mother      Cirrhosis Mother         from fatty liver. with esophageal varices, ..     Gallbladder Disease Mother         cholecystectomy     Hypertension Father      Hyperlipidemia Father      Diabetes Father      Other Cancer Father 67        Neuroendocrine tumor, ? from gall bladder, stage 4  1/2018     Migraines Father      Scleroderma Father      Rheumatoid Arthritis Father      Obesity Father      Ankylosing Spondylitis Father      Macular Degeneration Father      Mental Illness Sister         bipolar     Anxiety Disorder Sister      Asthma Sister         eczema     Neurologic Disorder Sister         Cervical Dystonia, treated with Botox     Thyroid Cancer Sister      Coronary Artery Disease Maternal Grandmother      Cerebrovascular Disease Maternal Grandmother      Breast Cancer Maternal Grandmother 65     Skin Cancer Maternal Grandmother      Stomach Cancer Maternal Grandmother         stomach, skin     Prostate Cancer Maternal Grandfather      Cancer Maternal Grandfather      Diabetes Paternal Grandmother      Kidney Cancer Paternal Grandmother      Coronary Artery Disease Paternal Grandfather      Colon Cancer Paternal Grandfather      Cancer Paternal Grandfather      Ovarian Cancer Maternal Aunt 68     Skin Cancer Maternal Aunt         melanoma     Scleroderma Paternal Aunt      Ankylosing Spondylitis Paternal Aunt      Ankylosing Spondylitis Paternal Uncle      Liver Cancer Other         uncle     Glaucoma No family hx of     sister: had severe atopic dermatitis    Previous Labs, Allergy Tests, Dermatopathology, Imaging:  Prick tests    Referred By: Sydney Ruiz MD  [address] [place] [id number]     Allergy Tests:    Past Allergy Test    Order for Future Allergy Testing:    [] Outpatient  [] Inpatient: [facility]..../ Bed ....       Skin Atopy (atopic dermatitis) [] Yes   [x] No .........  Contact allergies:   [] Yes   [x] No ..........  Hand eczema:   [] Yes   [x] No           Leading hand:   [] R   [] L       [] Ambidextrous         Drug allergies:        [x] Yes   [] No  Which?..Penicillins?    Urticaria/Angioedema  [] Yes   [] No .........  Food Allergy:  [x] Yes   [] No  Which?.diffuse symptoms  Pets :  [x] Yes   [] No  Which?..dog and cat (access to bedroom)        [x]  Rhinitis   [] Conjunctivitis   [] Sinusitis   [] Polyposis   [x] Otitis   [] Pharyngitis         []  none  Operations:   [] Tonsils   [] Septum   [] Sinus   [] Polyposis        [] Asthma bronchiale   [] Coughing      [x]  none  Symptoms (mostly Rhinoconjunctivitis and Asthma) aggravated by:  Season   [] I   [] II   [] III   [] IV   []V   []VI   []VII   []VIII   []IX   []X   []XI   []XI     [x] perennial   Day time      [x] morning   [] noon      [] evening        [] night    [] whole day........  []  none  Location/changes    [] inside        [] outside   [] mountains    [] sea     [] others.............   []  none  Triggers, specific     [] animals     [] plants     [] dust              [] others ...........................    []  none  Triggers, others       [] work          [] psyche    [] sport            [] others .............................  []  none  Irritant                [] phys efforts [] smoke    [] heat/cold     [] odors  []others............... []  none    Order for PATCH TESTS  Reason for tests (suspected allergy): none  Known previous allergies: none  Standardized panels  [] Standard panel (40 tests)  [] Preservatives &  Antimicrobials (31 tests)  [] Emulsifiers & Additives (25 tests)   [] Perfumes/Flavours & Plants (25 tests)  [] Hairdresser panel (12 tests)  [] Rubber Chemicals (22 tests)  [] Plastics (26 tests)  [] Colorants/Dyes/Food additives (20 tests)  [] Metals (implants/dental) (24 tests)  [] Local anaesthetics/NSAIDs (13 tests)  [] Antibiotics & Antimycotics (14 tests)   [] Corticosteroids (15 tests)   [] Photopatch test (62 tests)   [] others: ...      [] Patient's own products: ...    DO NOT test if chemical or biological identity is unknown!     always ask from patient the product information and safety sheets (MSDS)       Order for PRICK TESTS    Reason for tests (suspected allergy): perennial Rhinosinusitis HDM/molds/pets?  Known previous allergies: ??    Standardized prick panels  [x] Atopic panel (20 tests)  [] Pediatric Panel (12 tests)  [] Milk, Meat, Eggs, Grains (20 tests)   [] Dust, Epithelia, Feathers (10 tests)  [] Fish, Seafood, Shellfish (17 tests)  [] Nuts, Beans (14 tests)  [] Spice, Vegetable, Fruit (17 tests)  [] Pollen Panel = Tree, Grass, Weed (24 tests)  [] Others: ...      [x] Patient's own products: vanilla and    DO NOT test if chemical or biological identity is unknown!     always ask from patient the product information and safety sheets (MSDS)     Standardized intradermal tests  [x] Penicillium notatum [x] Aspergillus fumigatus [x] House dust mites D.far & D. pteron  [x] Cat    [x] dog  [] Others: ...  [] Bee venom   [] Wasp venom  !!Specific protocol with dilutions!!       Order for Drug allergy tests (prick & Intradermal)    [x] Penicillin G  [x] Ampicillin [] Cefazolin   [] Ceftriaxone   [] Ceftazidime  [] Bactrim    [] Others: ...  Order for ... as test date    [] Patient needs consultation with Allergy team (changes of tests may apply)  [] Tests discussed with Allergy team (can have direct appointment for allergy tests)     ________________________________    Assessment & Plan:    ==>  Final Diagnosis:     # atopic predisposition with    Positive family history sister    Positive prick tests to various pollens    Morning Rhinitis and headaches = HDM  * chronic illness with exacerbation, progression, side effects from treatment      # chronic obstipation and chronic fatigue symptom after certain foods  * stable chronic illness    # drug allergy to Penicillin?  Unsure about the reaction to Penicillins (got 2 shots in ER and afterwards got hives)  * stable chronic illness      These conclusions are made at the best of one's knowledge and belief based on the provided evidence such as patient's history and allergy test results and they can change over time or can be incomplete because of missing information's.    ==> Treatment Plan:  >> patient should come for prick tests and evaluation of skin  >> no antihistamines 1 week prior      Staff:  Provider    Follow-up in Derm-Allergy clinic for skin tests    Start time: 10:12 AM  End time: 10:50am    I spent a total of 38 minutes with Priyanka Lundberg. This time was spent counseling the patient and/or coordinating care, explaining the allergy tests       Again, thank you for allowing me to participate in the care of your patient.        Sincerely,        Alan Jain MD

## 2021-09-08 NOTE — PROGRESS NOTES
[facility]-allergology Note  Virtual visit: store and forward video ([employer] connected)  Encounter Date: Sep 8, 2021  ____________________________________________    CC: Allergy Consult (Priyanka is concerned about allergies to unknown)      HPI:  Ms. Priyanka Lundberg is a(n) 41 year old female who presents today as a new patient for allergy consultation  - patient seems to have several food allergies diagnosed by prick tests.  - patient has chronic constipation since many years. In addition, recurrent rashes on the body (urticarial?) and patient has after eating tomato and spicy food with some swelling for few hours and after some days some scaling  - patient mostly bothered by fatigue about 7 years ago and has 5 year old child  - if patient eats sweat patient observes itching on ears (Vanilla)  - was with [facility]: in this prick test positives to various allergens  - otherwise feeling well in usual state of health    Physical exam:  General: In no acute distress, well-developed, well-nourished  Eyes: no conjunctivitis  ENT: no signs of rhinitis   Pulmonary: no wheezing or coughing  Skin: keratosis pilaris according to     Past Medical History:   Patient Active Problem List   Diagnosis     Joint pain     L4-L5 disc bulge     Attention deficit hyperactivity disorder (ADHD), combined type     Vaginal discharge     History of abnormal cervical Pap smear     Other acne     Benign nevus     History of Clostridium difficile colitis     Cervicalgia     Bilateral thoracic back pain     Class 1 obesity due to excess calories without serious comorbidity with body mass index (BMI) of 30.0 to 30.9 in adult     Gastroesophageal reflux disease with esophagitis     Anxiety     Chronic constipation     Acute pain of left shoulder     Abdominal pain, right upper quadrant     History of food allergy     Other fatigue     Past Medical History:   Diagnosis Date     Abdominal pain 2011     abnormal histamine problem, multiple food allergies     ADHD (attention deficit hyperactivity disorder)     on aderol     Anxiety and depression     on xanax     Arthritis      Breathing problem 2007    shortness of breath after eating, ?allergies     Chronic nausea      Gastroesophageal reflux disease      Heart murmur     closed by time she was 1     Hx of abnormal cervical Pap smear 2011     IBS (irritable bowel syndrome)     lots of mucus in bowel with occassional bleeding     Joint pain 2015    was to have rheumatologist     Kidney stone on right side 2010     Meniere's disease     hyperacusis     Migraines      Reduced vision      Tinnitus        Allergies:  Allergies   Allergen Reactions     [place]      Food      Cantaloupe, cucumbers, green beans, and peppers.      [place] (Weed)      Lemon Flavor      Mustard Seed      Onion Difficulty breathing and GI Disturbance     [place] GI Disturbance     Penicillins Hives     Or another medication taken at that time     [place] Grasses      Vanilla GI Disturbance       Medications:  Current Outpatient Medications   Medication     ALPRAZolam (XANAX) 0.25 MG tablet     cyclobenzaprine (FLEXERIL) 10 MG tablet     hydrocortisone, Perianal, (HYDROCORTISONE) 2.5 % cream     ibuprofen (ADVIL/MOTRIN) 200 MG tablet     albuterol (PROAIR HFA/PROVENTIL HFA/VENTOLIN HFA) 108 (90 Base) MCG/ACT inhaler     multivitamin, therapeutic with minerals (MULTI-VITAMIN) TABS tablet     No current facility-administered medications for this visit.       Social History:  The patient works as a  since 1998 (develops more and more irritant type of lung and nose irritatioin for 24h with headache). But no signs for Eczemas  Mothers caregiver at home  Patient has the following hobbies or non-occupational exposure.    Family History:  Family History   Problem Relation Age of Onset     Mental Illness Mother         bipolar, schizophrenia     Anxiety Disorder Mother       Osteoporosis Mother      Thyroid Disease Mother      Diabetes Mother      Neurofibromatosis Mother         more likely fibromyalgia     Arthritis Mother      Back Pain Mother      Osteoarthritis Mother      Obesity Mother      Cirrhosis Mother         from fatty liver. with esophageal varices, ..     Gallbladder Disease Mother         cholecystectomy     Hypertension Father      Hyperlipidemia Father      Diabetes Father      Other Cancer Father 67        Neuroendocrine tumor, ? from gall bladder, stage 4  1/2018     Migraines Father      Scleroderma Father      Rheumatoid Arthritis Father      Obesity Father      Ankylosing Spondylitis Father      Macular Degeneration Father      Mental Illness Sister         bipolar     Anxiety Disorder Sister      Asthma Sister         eczema     Neurologic Disorder Sister         Cervical Dystonia, treated with Botox     Thyroid Cancer Sister      Coronary Artery Disease Maternal Grandmother      Cerebrovascular Disease Maternal Grandmother      Breast Cancer Maternal Grandmother 65     Skin Cancer Maternal Grandmother      Stomach Cancer Maternal Grandmother         stomach, skin     Prostate Cancer Maternal Grandfather      Cancer Maternal Grandfather      Diabetes Paternal Grandmother      Kidney Cancer Paternal Grandmother      Coronary Artery Disease Paternal Grandfather      Colon Cancer Paternal Grandfather      Cancer Paternal Grandfather      Ovarian Cancer Maternal Aunt 68     Skin Cancer Maternal Aunt         melanoma     Scleroderma Paternal Aunt      Ankylosing Spondylitis Paternal Aunt      Ankylosing Spondylitis Paternal Uncle      Liver Cancer Other         uncle     Glaucoma No family hx of    sister: had severe atopic dermatitis    Previous Labs, Allergy Tests, Dermatopathology, Imaging:  Prick tests    Referred By: Sydney Ruiz MD  [address] [facility] [place] [address]     Allergy Tests:    Past Allergy Test    Order for Future Allergy  Testing:    [] Outpatient  [] Inpatient: [facility]..../ Bed ....       Skin Atopy (atopic dermatitis) [] Yes   [x] No .........  Contact allergies:   [] Yes   [x] No ..........  Hand eczema:   [] Yes   [x] No           Leading hand:   [] R   [] L       [] Ambidextrous         Drug allergies:        [x] Yes   [] No  Which?..Penicillins?    Urticaria/Angioedema  [] Yes   [] No .........  Food Allergy:  [x] Yes   [] No  Which?.diffuse symptoms  Pets :  [x] Yes   [] No  Which?..dog and cat (access to bedroom)        [x]  Rhinitis   [] Conjunctivitis   [] Sinusitis   [] Polyposis   [x] Otitis   [] Pharyngitis         []  none  Operations:   [] Tonsils   [] Septum   [] Sinus   [] Polyposis        [] Asthma bronchiale   [] Coughing      [x]  none  Symptoms (mostly Rhinoconjunctivitis and Asthma) aggravated by:  Season   [] I   [] II   [] III   [] IV   []V   []VI   []VII   []VIII   []IX   []X   []XI   []XI     [x] perennial   Day time      [x] morning   [] noon      [] evening        [] night    [] whole day........  []  none  Location/changes    [] inside        [] outside   [] mountains    [] sea     [] others.............   []  none  Triggers, specific     [] animals     [] plants     [] dust              [] others ...........................    []  none  Triggers, others       [] work          [] psyche    [] sport            [] others .............................  []  none  Irritant                [] phys efforts [] smoke    [] heat/cold     [] odors  []others............... []  none    Order for PATCH TESTS  Reason for tests (suspected allergy): none  Known previous allergies: none  Standardized panels  [] Standard panel (40 tests)  [] Preservatives & Antimicrobials (31 tests)  [] Emulsifiers & Additives (25 tests)   [] Perfumes/Flavours & Plants (25 tests)  [] Hairdresser panel (12 tests)  [] Rubber Chemicals (22 tests)  [] Plastics (26 tests)  [] Colorants/Dyes/Food additives (20 tests)  [] Metals (implants/dental) (24  tests)  [] Local anaesthetics/NSAIDs (13 tests)  [] Antibiotics & Antimycotics (14 tests)   [] Corticosteroids (15 tests)   [] Photopatch test (62 tests)   [] others: ...      [] Patient's own products: ...    DO NOT test if chemical or biological identity is unknown!     always ask from patient the product information and safety sheets (MSDS)       Order for PRICK TESTS    Reason for tests (suspected allergy): perennial Rhinosinusitis HDM/molds/pets?  Known previous allergies: ??    Standardized prick panels  [x] Atopic panel (20 tests)  [] Pediatric Panel (12 tests)  [] Milk, Meat, Eggs, Grains (20 tests)   [] Dust, Epithelia, Feathers (10 tests)  [] Fish, Seafood, Shellfish (17 tests)  [] Nuts, Beans (14 tests)  [] Spice, Vegetable, Fruit (17 tests)  [] Pollen Panel = Tree, Grass, Weed (24 tests)  [] Others: ...      [x] Patient's own products: vanilla and    DO NOT test if chemical or biological identity is unknown!     always ask from patient the product information and safety sheets (MSDS)     Standardized intradermal tests  [x] Penicillium notatum [x] Aspergillus fumigatus [x] House dust mites D.far & D. pteron  [x] Cat    [x] dog  [] Others: ...  [] Bee venom   [] Wasp venom  !!Specific protocol with dilutions!!       Order for Drug allergy tests (prick & Intradermal)    [x] Penicillin G  [x] Ampicillin [] Cefazolin   [] Ceftriaxone   [] Ceftazidime  [] Bactrim    [] Others: ...  Order for ... as test date    [] Patient needs consultation with Allergy team (changes of tests may apply)  [] Tests discussed with Allergy team (can have direct appointment for allergy tests)     ________________________________    Assessment & Plan:    ==> Final Diagnosis:     # atopic predisposition with    Positive family history sister    Positive prick tests to various pollens    Morning Rhinitis and headaches = HDM  * chronic illness with exacerbation, progression, side effects from treatment      # chronic obstipation and  chronic fatigue symptom after certain foods  * stable chronic illness    # drug allergy to Penicillin?  Unsure about the reaction to Penicillins (got 2 shots in ER and afterwards got hives)  * stable chronic illness      These conclusions are made at the best of one's knowledge and belief based on the provided evidence such as patient's history and allergy test results and they can change over time or can be incomplete because of missing information's.    ==> Treatment Plan:  >> patient should come for prick tests and evaluation of skin  >> no antihistamines 1 week prior      Staff:  Provider    Follow-up in Derm-Allergy clinic for skin tests    Start time: 10:12 AM  End time: 10:50am    I spent a total of 38 minutes with Priyanka Lundberg. This time was spent counseling the patient and/or coordinating care, explaining the allergy tests

## 2021-09-08 NOTE — PATIENT INSTRUCTIONS
WHAT IS A SKIN PRICK TEST?   A skin prick test is a simple and reliable diagnostic test used to identify substances ( allergens ) responsible for triggering the symptoms of allergy. The basic skin prick testing panel includes common allergens, such as house dust mites, molds, dog and cat allergens and pollen allergens. We have other more specialized series for various food allergens and sometimes we test your own foods directly on your skin as well. Tests will usually be performed on the skin of the forearm. The skin may develop a red and itchy reaction which can be an indication of an allergy to the tested substance, but will be confirmed by discussion with the allergy specialist     HOW IS A SKIN PRICK TEST PERFORMED?   The skin will be disinfected with alcohol, then marked and numbered with a pen to identify the areas where the specific allergens will be tested.   Afterwards a drop of the allergen solution will be placed on the skin and then the skin gently pricked using the tip of a specially designed prick needle.   With this procedure the most superficial part of the skin will be pierced to allow the test solution to diffuse into the skin and make contact with the reactive immune cells.   After 20 min the area will be examined and evaluated for possible allergic reactions.     WHAT ARE THE POSSIBLE RISKS OF A SKIN PRICK TEST?   If the skin reacts it will develop red, itchy wheals of 5-30mm diameter.   The wheal and itch will usually disappear spontaneously after 1-2 hours.   Sometimes there might be a delayed reactions after days and this has to be reported to the treating allergy specialist (could be another kind of reaction pattern and important piece of information).   Rarely there are more serious generalized allergic reactions observed and therefore you will be under observation of the allergy team during the entire procedure.   There is a small risk for some bleeding and skin infection by the  SPT.    WHAT DO I NEED TO DO BEFORE SKIN PRICK TEST?  Stop all antihistamines for at lease 1 week.  Stop all oral steroids at lease 1 month prior to testing.

## 2021-09-09 ENCOUNTER — VIRTUAL VISIT (OUTPATIENT)
Dept: GASTROENTEROLOGY | Facility: CLINIC | Age: 41
End: 2021-09-09
Payer: COMMERCIAL

## 2021-09-09 VITALS — BODY MASS INDEX: 33.74 KG/M2 | HEIGHT: 67 IN | WEIGHT: 215 LBS

## 2021-09-09 DIAGNOSIS — K58.1 IRRITABLE BOWEL SYNDROME WITH CONSTIPATION: Primary | ICD-10-CM

## 2021-09-09 PROCEDURE — 99205 OFFICE O/P NEW HI 60 MIN: CPT | Mod: 95 | Performed by: PHYSICIAN ASSISTANT

## 2021-09-09 ASSESSMENT — MIFFLIN-ST. JEOR: SCORE: 1655.55

## 2021-09-09 NOTE — PATIENT INSTRUCTIONS
It was a pleasure taking care of you today.  I've included a brief summary of our discussion and care plan from today's visit below.  Please review this information with your primary care provider.  ______________________________________________________________________    My recommendations are summarized as follows:    -- Recommend a trial of Miralax.  This is not a stimulant laxative and is safe to take on a daily basis.  Try 3 cap(s) every day.  You can titrate this dose (increase or decrease) based on stool frequency and consistency.    --Ensure you drink enough water    -- Hold off on the fiber for now until bowel frequency is better then can restart. Then when try 3 tbs per day    [facility]                        Does studies to see whether the nerves and muscles of the rectum and pelvic floor function well for passing bowel movements.  It is located at [address] [place] [address] on [facility] in [place].  If you have not heard from them within two weeks, call GI as below.  ( If you have scheduled with them and need to change the appointment:                        Call  )    Return to GI Clinic in 3 months to review your progress.    ______________________________________________________________________    Who do I call with any questions after my visit?  Please be in touch if there are any further questions that arise following today's visit.  There are multiple ways to contact your gastroenterology care team.        During business hours, you may reach a Gastroenterology nurse at [phone], option 3.       To schedule or reschedule an appointment, please call [phone].       You can always send a secure message through [employer].  [employer] messages are answered by your nurse or doctor typically within 24 hours.  Please allow extra time on weekends and holidays.        For urgent/emergent questions after business hours, you may reach the on-call GI Fellow by contacting the  [facility]  at [phone].     How will I get the results of any tests ordered?    You will receive all of your results.  If you have signed up for [employer] [facility], any tests ordered at your visit will be available to you after your physician reviews them.  Typically this takes 1-2 weeks.  If there are urgent results that require a change in your care plan, your physician or nurse will call you to discuss the next steps.      What is [employer]?  [employer] is a secure way for you to access all of your healthcare records from the [facility].  It is a web based computer program, so you can sign on to it from any location.  It also allows you to send secure messages to your care team.  I recommend signing up for [employer] access if you have not already done so and are comfortable with using a computer.      How to I schedule a follow-up visit?  If you did not schedule a follow-up visit today, please call [phone] to schedule a follow-up office visit.        Sincerely,    Nikita Ross PA-C  [facility]  Division of Gastroenterology

## 2021-09-09 NOTE — NURSING NOTE
"Chief Complaint   Patient presents with     RECHECK     Follow up constipation.        Vitals:    09/09/21 0851   Weight: 97.5 kg (215 lb)   Height: 1.69 m (5' 6.54\")       Body mass index is 34.14 kg/m .                          Richelle Vitale, EMT    "

## 2021-09-09 NOTE — PROGRESS NOTES
Priyanka is a 41 year old who is being evaluated via a billable video visit.      How would you like to obtain your AVS? MyChart  If the video visit is dropped, the invitation should be resent by: Text to cell phone: [phone]  Will anyone else be joining your video visit? No      Video Start Time: 9:23 AM    Video-Visit Details    Type of service:  Video Visit    Video End Time:[id number]    Originating Location (pt. Location): Home    Distant Location (provider location):  [facility] [place]     Platform used for Video Visit: BuyerMLS     GI CLINIC VISIT    CC/REFERRING MD:  Referred Self  REASON FOR FOLLOW UP: Constipation, GERD, abdominal pain    ASSESSMENT/PLAN:  41-year-old female with past medical history to include chronic back pain, ADHD, GERD who presents to the GI clinic for follow-up regarding constipation with history of hemorrhoid prolapse.    1. Abdominal pain in the setting of chronic constipation: This has been a chronic problem since patient was a child.  Normal colonoscopy, EGD (other than reactive gastropathy) in 2018, HIDA scan, gastric emptying study.  Patient has yet to optimize bowel regimen in a consistent fashion.  We will initiate MiraLAX 3 caps full daily and may titrate to stool frequency and consistency in addition to soluble fiber supplementation that may also be titrated to effect starting with a tablespoon per day increasing up to 2-3 tablespoons per day.  Patient was referred to the pelvic floor center in February 2018 and I also would encourage a pelvic floor center evaluation given her long standing constipation.  This may require further interventions such as biofeedback in addition to optimizing her bowel regimen.  Additionally may consider a transit marker study if pelvic floor is unremarkable.  -- Recommend a trial of Miralax.  This is not a stimulant laxative and is safe to take on a daily basis.  Try 3 cap(s) every day.  You can titrate this dose  (increase or decrease) based on stool frequency and consistency.  -- Hold off on the fiber for now until bowel frequency is better then can restart. Then try 3 tbs per day  -- Pelvic floor referral to be completed +/- biofeedback if intervention required.    2. GERD: Patient is currently managing symptoms with lifestyle modifications which we reviewed today.  Constipation can certainly make this worse.  Patient has identified triggers to include greasy foods, which she rarely eats.  Additional lifestyle modifications reviewed include minimizing fatty foods, greasy foods, foods with high fructose corn syrup and foods with preservatives, allowing at least 2-3 hours after eating before laying down and weight loss.  --Continue with lifestyle modifications and avoid triggers    3. Colorectal cancer screening: Colonoscopy 2/2018 normal.  FH of colon cancer in grandfather.  Recommend repeat colonoscopy in 5 years.    RTC 3 months    Thank you for this consultation.  65 minutes spent on the date of the encounter doing chart review, history and exam, documentation and further activities as noted above.  It was a pleasure to participate in the care of this patient; please contact us with any further questions.      Nikita Ross PA-C  Division of Gastroenterology, Hepatology and Nutrition  [facility]          HPI  41-year-old female with past medical history to include chronic back pain, ADHD, GERD who presents to the GI clinic for follow-up regarding constipation with history of hemorrhoid prolapse.  This is my first encounter with the patient.  Patient was previously followed by Brooke Ballard.    Patient had originally presented to the GI clinic due to right-sided central abdominal pain.   Pain is intermittent but can be severe.  She has previously attributed this to chronic constipation.  Workup included normal colonoscopy 2018), upper endoscopy (2018) only remarkable for antral gastropathy, normal HIDA scan  and normal gastric emptying study.  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  She notes that she has used MiraLAX in the past with good success but has not taken this on a regular basis.  She notes that she has previously been on adderall in the past for ADHD and this allowed for daily BMs.  She is no longer on any stimulant medications for ADHD. Patient also has a history of hemorrhoids with prolapse.  She has undergone hemorrhoidectomy with colorectal here at the [facility].    Current bowel pattern is 1 bowel movement every 3 days and can be firm and hard to pass.  Occasionally she may have bright red blood per rectum from her hemorrhoids.    Additionally she has been evaluated for persistent nausea and GERD.  Her primary triggers for experiencing heartburn symptoms include greasy foods and tomatoes.  She is not on any chronic medications and primarily experiences nausea in the morning.  She continues to breast-feed and has a 2-year-old son.    She takes ibuprofen approximately once per morning for a week straight for joint pain with weather changes and this occurs every couple months.    Interval hx 9/9/21:  Feels like symptoms a little worse, will have days of BRBPR when this occurs. She is having a BM 1-2 times per week. She started to have fecal leakage over the last year and having extreme itching in the perineum area. When she wipes at the end of the day, she will have some fecal smearing.     She started psyllium but is not consistent about it. She also has a stool softener that she takes as needed then if no BM for a couple days then will try a suppository.  She is afraid of using Miralax because she does not understand what is in it.  She has gone in with fecal compaction.  Always has a low dull cramp across her abdomen that is cramping during a period. This can change to severe cramping.  She has extreme bloating. Nausea is worse.  She just had an allergy appt and she would like to focus on dietary changes to allow for improvement.     ROS:    No fevers or chills  No weight loss  No blurry vision, double vision or change in vision  No sore throat  No lymphadenopathy  + headache  No paraesthesias, or weakness in a limb  No shortness of breath or wheezing  No chest pain or pressure  + arthralgias or myalgias (chronic back and joint pain)  + rashes or skin changes (sunburn to shoulders)  No odynophagia or dysphagia  + BRBPR (hemorrhoids)  No melena  No dysuria, frequency or urgency  No hot/cold intolerance or polyria  No anxiety or depression    PROBLEM LIST  Patient Active Problem List    Diagnosis Date Noted     Acute pain of left shoulder 08/01/2021     Priority: Medium     Abdominal pain, right upper quadrant 08/01/2021     Priority: Medium     History of food allergy 08/01/2021     Priority: Medium     Other fatigue 08/01/2021     Priority: Medium     Chronic constipation 02/15/2021     Priority: Medium     Gastroesophageal reflux disease with esophagitis 11/09/2018     Priority: Medium     Anxiety 11/09/2018     Priority: Medium     Class 1 obesity due to excess calories without serious comorbidity with body mass index (BMI) of 30.0 to 30.9 in adult 04/24/2018     Priority: Medium     Cervicalgia 12/28/2017     Priority: Medium     Bilateral thoracic back pain 12/28/2017     Priority: Medium     Other acne 07/08/2017     Priority: Medium     Benign nevus 07/08/2017     Priority: Medium     History of abnormal cervical Pap smear 09/08/2016     Priority: Medium     Vaginal discharge 03/18/2016     Priority: Medium     Joint pain 12/07/2015     Priority: Medium     Dad with scleroderma.  Pt was to follow with rheumatologist to R/O RA  NEED TO CONSIDER RHEUM REFERRAL          L4-L5  disc bulge 12/07/2015     Priority: Medium     Has been using PT to manage symptoms       Attention deficit hyperactivity disorder (ADHD), combined type 12/07/2015     Priority: Medium     Stopped Adderal when she found out she was pregnant       History of Clostridium difficile colitis 10/01/2014     Priority: Medium       PERTINENT PAST MEDICAL HISTORY:  Past Medical History:   Diagnosis Date     Abdominal pain 2011    abnormal histamine problem, multiple food allergies     ADHD (attention deficit hyperactivity disorder)     on aderol     Anxiety and depression     on xanax     Arthritis      Breathing problem 2007    shortness of breath after eating, ?allergies     Chronic nausea      Gastroesophageal reflux disease      Heart murmur     closed by time she was 1     Hx of abnormal cervical Pap smear 2011     IBS (irritable bowel syndrome)     lots of mucus in bowel with occassional bleeding     Joint pain 2015    was to have rheumatologist     Kidney stone on right side 2010     Meniere's disease     hyperacusis     Migraines      Reduced vision      Tinnitus        PREVIOUS SURGERIES:  Past Surgical History:   Procedure Laterality Date     COLONOSCOPY N/A 2/14/2018    Procedure: COMBINED COLONOSCOPY, SINGLE OR MULTIPLE BIOPSY/POLYPECTOMY BY BIOPSY;  colon and egd;  Surgeon: Joan Willson MD;  Location: [facility] OR     ESOPHAGOSCOPY, GASTROSCOPY, DUODENOSCOPY (EGD), COMBINED N/A 2/14/2018    Procedure: COMBINED ESOPHAGOSCOPY, GASTROSCOPY, DUODENOSCOPY (EGD), BIOPSY SINGLE OR MULTIPLE;;  Surgeon: Joan Willson MD;  Location: [facility] OR     NO HISTORY OF SURGERY       ALLERGIES:     Allergies   Allergen Reactions     Garber      Food      Cantaloupe, cucumbers, green beans, and peppers.      [place] (Weed)      Lemon Flavor      Mustard Seed      Onion Difficulty breathing and GI Disturbance     Emmons GI Disturbance     Penicillins Hives     Or another medication taken at that time     [place] Grasses      Vanilla  GI Disturbance       PERTINENT MEDICATIONS:    Current Outpatient Medications:      albuterol (PROAIR HFA/PROVENTIL HFA/VENTOLIN HFA) 108 (90 Base) MCG/ACT inhaler, Inhale 2 puffs into the lungs every 4 hours as needed for shortness of breath / dyspnea or wheezing (Patient not taking: Reported on 8/27/2021), Disp: 1 Inhaler, Rfl: 3     ALPRAZolam (XANAX) 0.25 MG tablet, Take 1 tablet (0.25 mg) by mouth nightly as needed for anxiety, Disp: 20 tablet, Rfl: 0     cyclobenzaprine (FLEXERIL) 10 MG tablet, Take 1 tablet (10 mg) by mouth 2 times daily as needed for muscle spasms, Disp: 14 tablet, Rfl: 0     hydrocortisone, Perianal, (HYDROCORTISONE) 2.5 % cream, Place rectally 2 times daily as needed for hemorrhoids, Disp: 30 g, Rfl: 1     ibuprofen (ADVIL/MOTRIN) 200 MG tablet, Take 400 mg by mouth every 4 hours as needed for mild pain 2 tab every am, Disp: , Rfl:      multivitamin, therapeutic with minerals (MULTI-VITAMIN) TABS tablet, Take 1 tablet by mouth daily (Patient not taking: Reported on 8/27/2021), Disp: , Rfl:     SOCIAL HISTORY:  Social History     Socioeconomic History     Marital status:      Spouse name: Not on file     Number of children: 1     Years of education: Not on file     Highest education level: Not on file   Occupational History     Comment: stylist/hairdresser   Tobacco Use     Smoking status: Never Smoker     Smokeless tobacco: Never Used   Substance and Sexual Activity     Alcohol use: Not Currently     Alcohol/week: 0.0 standard drinks     Comment: outside of pregnancy, social     Drug use: No     Sexual activity: Not Currently     Partners: Male     Birth control/protection: Abstinence   Other Topics Concern      Service No     Blood Transfusions No     Caffeine Concern No     Occupational Exposure Yes     Comment:      Hobby Hazards No     Sleep Concern Yes     Comment: harder to sleep now since pregnancy     Stress Concern Yes     Comment: lots of stress due to  work and housing     Weight Concern Yes     Comment: has gained 10 lbs in the last month     Special Diet Yes     Comment: has many food allergies     Back Care Yes     Comment: disc problems, considering cortisone injections     Exercise No     Bike Helmet No     Seat Belt Yes     Self-Exams No     Parent/sibling w/ CABG, MI or angioplasty before 65F 55M? Yes   Social History Narrative     Not on file     Social Determinants of Health     Financial Resource Strain:      Difficulty of Paying Living Expenses:    Food Insecurity:      Worried About Running Out of Food in the Last Year:      Ran Out of Food in the Last Year:    Transportation Needs:      Lack of Transportation (Medical):      Lack of Transportation (Non-Medical):    Physical Activity:      Days of Exercise per Week:      Minutes of Exercise per Session:    Stress:      Feeling of Stress :    Social Connections:      Frequency of Communication with Friends and Family:      Frequency of Social Gatherings with Friends and Family:      Attends Alevism Services:      Active Member of Clubs or Organizations:      Attends Club or Organization Meetings:      Marital Status:    Intimate Partner Violence:      Fear of Current or Ex-Partner:      Emotionally Abused:      Physically Abused:      Sexually Abused:        FAMILY HISTORY:  Family History   Problem Relation Age of Onset     Mental Illness Mother         bipolar, schizophrenia     Anxiety Disorder Mother      Osteoporosis Mother      Thyroid Disease Mother      Diabetes Mother      Neurofibromatosis Mother         more likely fibromyalgia     Arthritis Mother      Back Pain Mother      Osteoarthritis Mother      Obesity Mother      Cirrhosis Mother         from fatty liver. with esophageal varices, ..     Gallbladder Disease Mother         cholecystectomy     Hypertension Father      Hyperlipidemia Father      Diabetes Father      Other Cancer Father 67        Neuroendocrine tumor, ? from gall  "bladder, stage 4  1/2018     Migraines Father      Scleroderma Father      Rheumatoid Arthritis Father      Obesity Father      Ankylosing Spondylitis Father      Macular Degeneration Father      Mental Illness Sister         bipolar     Anxiety Disorder Sister      Asthma Sister         eczema     Neurologic Disorder Sister         Cervical Dystonia, treated with Botox     Thyroid Cancer Sister      Coronary Artery Disease Maternal Grandmother      Cerebrovascular Disease Maternal Grandmother      Breast Cancer Maternal Grandmother 65     Skin Cancer Maternal Grandmother      Stomach Cancer Maternal Grandmother         stomach, skin     Prostate Cancer Maternal Grandfather      Cancer Maternal Grandfather      Diabetes Paternal Grandmother      Kidney Cancer Paternal Grandmother      Coronary Artery Disease Paternal Grandfather      Colon Cancer Paternal Grandfather      Cancer Paternal Grandfather      Ovarian Cancer Maternal Aunt 68     Skin Cancer Maternal Aunt         melanoma     Scleroderma Paternal Aunt      Ankylosing Spondylitis Paternal Aunt      Ankylosing Spondylitis Paternal Uncle      Liver Cancer Other         uncle     Glaucoma No family hx of        Past/family/social history reviewed and no changes    PHYSICAL EXAMINATION:  Constitutional: aaox3, cooperative, pleasant, not dyspneic/diaphoretic, no acute distress  Vitals reviewed: Ht 1.69 m (5' 6.54\")   Wt 97.5 kg (215 lb)   BMI 34.14 kg/m    Wt:   Wt Readings from Last 2 Encounters:   09/09/21 97.5 kg (215 lb)   08/28/21 97.1 kg (214 lb)      Eyes: Sclera anicteric/injected  Ears/nose/mouth/throat: Normal oropharynx without ulcers or exudate, mucus membranes moist, hearing intact  Neck: supple, thyroid normal size  CV: No edema  Respiratory: Unlabored breathing  Lymph: No axillary, submandibular, supraclavicular or inguinal lymphadenopathy  Abd: Nondistended, +bs, no hepatosplenomegaly, mildly tender to right periumbilical area, no peritoneal " signs  Skin: warm, perfused, no jaundice  Psych: Normal affect  MSK: Normal gait      PERTINENT STUDIES:    Orders Only on 05/29/2018   Component Date Value Ref Range Status     Specimen Descrip 05/29/2018 Midstream Urine   Final     N Gonorrhea PCR 05/29/2018 Negative  NEG^Negative Final     Specimen Description 05/29/2018 Midstream Urine   Final     Chlamydia Trachomatis PCR 05/29/2018 Negative  NEG^Negative Final

## 2021-09-09 NOTE — LETTER
9/9/2021         RE: Priyanka Lundberg  [address] [place] [address]        Dear Colleague,    Thank you for referring your patient, Priyanka Lundberg, to the [facility] [place]. Please see a copy of my visit note below.    GI CLINIC VISIT    CC/REFERRING MD:  Referred Self  REASON FOR FOLLOW UP: Constipation, GERD, abdominal pain    ASSESSMENT/PLAN:  41-year-old female with past medical history to include chronic back pain, ADHD, GERD who presents to the GI clinic for follow-up regarding constipation with history of hemorrhoid prolapse.    1. Abdominal pain in the setting of chronic constipation: This has been a chronic problem since patient was a child.  Normal colonoscopy, EGD (other than reactive gastropathy) in 2018, HIDA scan, gastric emptying study.  Patient has yet to optimize bowel regimen in a consistent fashion.  We will initiate MiraLAX 3 caps full daily and may titrate to stool frequency and consistency in addition to soluble fiber supplementation that may also be titrated to effect starting with a tablespoon per day increasing up to 2-3 tablespoons per day.  Patient was referred to the pelvic floor center in February 2018 and I also would encourage a pelvic floor center evaluation given her long standing constipation.  This may require further interventions such as biofeedback in addition to optimizing her bowel regimen.  Additionally may consider a transit marker study if pelvic floor is unremarkable.  -- Recommend a trial of Miralax.  This is not a stimulant laxative and is safe to take on a daily basis.  Try 3 cap(s) every day.  You can titrate this dose (increase or decrease) based on stool frequency and consistency.  -- Hold off on the fiber for now until bowel frequency is better then can restart. Then try 3 tbs per day  -- Pelvic floor referral to be completed +/- biofeedback if intervention required.    2. GERD: Patient is currently managing  symptoms with lifestyle modifications which we reviewed today.  Constipation can certainly make this worse.  Patient has identified triggers to include greasy foods, which she rarely eats.  Additional lifestyle modifications reviewed include minimizing fatty foods, greasy foods, foods with high fructose corn syrup and foods with preservatives, allowing at least 2-3 hours after eating before laying down and weight loss.  --Continue with lifestyle modifications and avoid triggers    3. Colorectal cancer screening: Colonoscopy 2/2018 normal.  FH of colon cancer in grandfather.  Recommend repeat colonoscopy in 5 years.    RTC 3 months    Thank you for this consultation.  65 minutes spent on the date of the encounter doing chart review, history and exam, documentation and further activities as noted above.  It was a pleasure to participate in the care of this patient; please contact us with any further questions.      Nikita Ross PA-C  Division of Gastroenterology, Hepatology and Nutrition  [facility]          HPI  41-year-old female with past medical history to include chronic back pain, ADHD, GERD who presents to the GI clinic for follow-up regarding constipation with history of hemorrhoid prolapse.  This is my first encounter with the patient.  Patient was previously followed by Brooke Ballard.    Patient had originally presented to the GI clinic due to right-sided central abdominal pain.   Pain is intermittent but can be severe.  She has previously attributed this to chronic constipation.  Workup included normal colonoscopy 2018), upper endoscopy (2018) only remarkable for antral gastropathy, normal HIDA scan and normal gastric emptying study.  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   She notes that she has used MiraLAX in the past with good success but has not taken this on a regular basis.  She notes that she has previously been on adderall in the past for ADHD and this allowed for daily BMs.  She is no longer on any stimulant medications for ADHD. Patient also has a history of hemorrhoids with prolapse.  She has undergone hemorrhoidectomy with colorectal [facility] at the [facility].    Current bowel pattern is 1 bowel movement every 3 days and can be firm and hard to pass.  Occasionally she may have bright red blood per rectum from her hemorrhoids.    Additionally she has been evaluated for persistent nausea and GERD.  Her primary triggers for experiencing heartburn symptoms include greasy foods and tomatoes.  She is not on any chronic medications and primarily experiences nausea in the morning.  She continues to breast-feed and has a 2-year-old son.    She takes ibuprofen approximately once per morning for a week straight for joint pain with weather changes and this occurs every couple months.    Interval hx 9/9/21:  Feels like symptoms a little worse, will have days of BRBPR when this occurs. She is having a BM 1-2 times per week. She started to have fecal leakage over the last year and having extreme itching in the perineum area. When she wipes at the end of the day, she will have some fecal smearing.     She started psyllium but is not consistent about it. She also has a stool softener that she takes as needed then if no BM for a couple days then will try a suppository.  She is afraid of using Miralax because she does not understand what is in it.  She has gone in with fecal compaction. Always has a low dull cramp across her abdomen that is cramping during a period. This can change to severe cramping.  She has extreme bloating. Nausea is worse.  She just had an allergy appt and she would like to focus on dietary changes to allow for improvement.     ROS:    No fevers or chills  No  weight loss  No blurry vision, double vision or change in vision  No sore throat  No lymphadenopathy  + headache  No paraesthesias, or weakness in a limb  No shortness of breath or wheezing  No chest pain or pressure  + arthralgias or myalgias (chronic back and joint pain)  + rashes or skin changes (sunburn to shoulders)  No odynophagia or dysphagia  + BRBPR (hemorrhoids)  No melena  No dysuria, frequency or urgency  No hot/cold intolerance or polyria  No anxiety or depression    PROBLEM LIST  Patient Active Problem List    Diagnosis Date Noted     Acute pain of left shoulder 08/01/2021     Priority: Medium     Abdominal pain, right upper quadrant 08/01/2021     Priority: Medium     History of food allergy 08/01/2021     Priority: Medium     Other fatigue 08/01/2021     Priority: Medium     Chronic constipation 02/15/2021     Priority: Medium     Gastroesophageal reflux disease with esophagitis 11/09/2018     Priority: Medium     Anxiety 11/09/2018     Priority: Medium     Class 1 obesity due to excess calories without serious comorbidity with body mass index (BMI) of 30.0 to 30.9 in adult 04/24/2018     Priority: Medium     Cervicalgia 12/28/2017     Priority: Medium     Bilateral thoracic back pain 12/28/2017     Priority: Medium     Other acne 07/08/2017     Priority: Medium     Benign nevus 07/08/2017     Priority: Medium     History of abnormal cervical Pap smear 09/08/2016     Priority: Medium     Vaginal discharge 03/18/2016     Priority: Medium     Joint pain 12/07/2015     Priority: Medium     Dad with scleroderma.  Pt was to follow with rheumatologist to R/O RA  NEED TO CONSIDER RHEUM REFERRAL          L4-L5 disc bulge 12/07/2015     Priority: Medium     Has been using PT to manage symptoms       Attention deficit hyperactivity disorder (ADHD), combined type 12/07/2015     Priority: Medium     Stopped Adderal when she found out she was pregnant       History of Clostridium difficile colitis 10/01/2014      Priority: Medium       PERTINENT PAST MEDICAL HISTORY:  Past Medical History:   Diagnosis Date     Abdominal pain 2011    abnormal histamine problem, multiple food allergies     ADHD (attention deficit hyperactivity disorder)     on aderol     Anxiety and depression     on xanax     Arthritis      Breathing problem 2007    shortness of breath after eating, ?allergies     Chronic nausea      Gastroesophageal reflux disease      Heart murmur     closed by time she was 1     Hx of abnormal cervical Pap smear 2011     IBS (irritable bowel syndrome)     lots of mucus in bowel with occassional bleeding     Joint pain 2015    was to have rheumatologist     Kidney stone on right side 2010     Meniere's disease     hyperacusis     Migraines      Reduced vision      Tinnitus        PREVIOUS SURGERIES:  Past Surgical History:   Procedure Laterality Date     COLONOSCOPY N/A 2/14/2018    Procedure: COMBINED COLONOSCOPY, SINGLE OR MULTIPLE BIOPSY/POLYPECTOMY BY BIOPSY;  colon and egd;  Surgeon: Joan Willson MD;  Location: [facility] OR     ESOPHAGOSCOPY, GASTROSCOPY, DUODENOSCOPY (EGD), COMBINED N/A 2/14/2018    Procedure: COMBINED ESOPHAGOSCOPY, GASTROSCOPY, DUODENOSCOPY (EGD), BIOPSY SINGLE OR MULTIPLE;;  Surgeon: Joan Willson MD;  Location: [facility] OR     NO HISTORY OF SURGERY       ALLERGIES:     Allergies   Allergen Reactions     Loup      Food      Cantaloupe, cucumbers, green beans, and peppers.      [place] (Weed)      Lemon Flavor      Mustard Seed      Onion Difficulty breathing and GI Disturbance     [place] GI Disturbance     Penicillins Hives     Or another medication taken at that time     [place] Grasses      Vanilla GI Disturbance       PERTINENT MEDICATIONS:    Current Outpatient Medications:      albuterol (PROAIR HFA/PROVENTIL HFA/VENTOLIN HFA) 108 (90 Base) MCG/ACT inhaler, Inhale 2 puffs into the lungs every 4 hours as needed for shortness of breath / dyspnea or wheezing (Patient not taking: Reported on  8/27/2021), Disp: 1 Inhaler, Rfl: 3     ALPRAZolam (XANAX) 0.25 MG tablet, Take 1 tablet (0.25 mg) by mouth nightly as needed for anxiety, Disp: 20 tablet, Rfl: 0     cyclobenzaprine (FLEXERIL) 10 MG tablet, Take 1 tablet (10 mg) by mouth 2 times daily as needed for muscle spasms, Disp: 14 tablet, Rfl: 0     hydrocortisone, Perianal, (HYDROCORTISONE) 2.5 % cream, Place rectally 2 times daily as needed for hemorrhoids, Disp: 30 g, Rfl: 1     ibuprofen (ADVIL/MOTRIN) 200 MG tablet, Take 400 mg by mouth every 4 hours as needed for mild pain 2 tab every am, Disp: , Rfl:      multivitamin, therapeutic with minerals (MULTI-VITAMIN) TABS tablet, Take 1 tablet by mouth daily (Patient not taking: Reported on 8/27/2021), Disp: , Rfl:     SOCIAL HISTORY:  Social History     Socioeconomic History     Marital status:      Spouse name: Not on file     Number of children: 1     Years of education: Not on file     Highest education level: Not on file   Occupational History     Comment: stylist/hairdresser   Tobacco Use     Smoking status: Never Smoker     Smokeless tobacco: Never Used   Substance and Sexual Activity     Alcohol use: Not Currently     Alcohol/week: 0.0 standard drinks     Comment: outside of pregnancy, social     Drug use: No     Sexual activity: Not Currently     Partners: Male     Birth control/protection: Abstinence   Other Topics Concern      Service No     Blood Transfusions No     Caffeine Concern No     Occupational Exposure Yes     Comment:      Hobby Hazards No     Sleep Concern Yes     Comment: harder to sleep now since pregnancy     Stress Concern Yes     Comment: lots of stress due to work and housing     Weight Concern Yes     Comment: has gained 10 lbs in the last month     Special Diet Yes     Comment: has many food allergies     Back Care Yes     Comment: disc problems, considering cortisone injections     Exercise No     Bike Helmet No     Seat Belt Yes     Self-Exams No      Parent/sibling w/ CABG, MI or angioplasty before 65F 55M? Yes   Social History Narrative     Not on file     Social Determinants of Health     Financial Resource Strain:      Difficulty of Paying Living Expenses:    Food Insecurity:      Worried About Running Out of Food in the Last Year:      Ran Out of Food in the Last Year:    Transportation Needs:      Lack of Transportation (Medical):      Lack of Transportation (Non-Medical):    Physical Activity:      Days of Exercise per Week:      Minutes of Exercise per Session:    Stress:      Feeling of Stress :    Social Connections:      Frequency of Communication with Friends and Family:      Frequency of Social Gatherings with Friends and Family:      Attends Nondenominational Services:      Active Member of Clubs or Organizations:      Attends Club or Organization Meetings:      Marital Status:    Intimate Partner Violence:      Fear of Current or Ex-Partner:      Emotionally Abused:      Physically Abused:      Sexually Abused:        FAMILY HISTORY:  Family History   Problem Relation Age of Onset     Mental Illness Mother         bipolar, schizophrenia     Anxiety Disorder Mother      Osteoporosis Mother      Thyroid Disease Mother      Diabetes Mother      Neurofibromatosis Mother         more likely fibromyalgia     Arthritis Mother      Back Pain Mother      Osteoarthritis Mother      Obesity Mother      Cirrhosis Mother         from fatty liver. with esophageal varices, ..     Gallbladder Disease Mother         cholecystectomy     Hypertension Father      Hyperlipidemia Father      Diabetes Father      Other Cancer Father 67        Neuroendocrine tumor, ? from gall bladder, stage 4  1/2018     Migraines Father      Scleroderma Father      Rheumatoid Arthritis Father      Obesity Father      Ankylosing Spondylitis Father      Macular Degeneration Father      Mental Illness Sister         bipolar     Anxiety Disorder Sister      Asthma Sister         eczema      "Neurologic Disorder Sister         Cervical Dystonia, treated with Botox     Thyroid Cancer Sister      Coronary Artery Disease Maternal Grandmother      Cerebrovascular Disease Maternal Grandmother      Breast Cancer Maternal Grandmother 65     Skin Cancer Maternal Grandmother      Stomach Cancer Maternal Grandmother         stomach, skin     Prostate Cancer Maternal Grandfather      Cancer Maternal Grandfather      Diabetes Paternal Grandmother      Kidney Cancer Paternal Grandmother      Coronary Artery Disease Paternal Grandfather      Colon Cancer Paternal Grandfather      Cancer Paternal Grandfather      Ovarian Cancer Maternal Aunt 68     Skin Cancer Maternal Aunt         melanoma     Scleroderma Paternal Aunt      Ankylosing Spondylitis Paternal Aunt      Ankylosing Spondylitis Paternal Uncle      Liver Cancer Other         uncle     Glaucoma No family hx of        Past/family/social history reviewed and no changes    PHYSICAL EXAMINATION:  Constitutional: aaox3, cooperative, pleasant, not dyspneic/diaphoretic, no acute distress  Vitals reviewed: Ht 1.69 m (5' 6.54\")   Wt 97.5 kg (215 lb)   BMI 34.14 kg/m    Wt:   Wt Readings from Last 2 Encounters:   09/09/21 97.5 kg (215 lb)   08/28/21 97.1 kg (214 lb)      Eyes: Sclera anicteric/injected  Ears/nose/mouth/throat: Normal oropharynx without ulcers or exudate, mucus membranes moist, hearing intact  Neck: supple, thyroid normal size  CV: No edema  Respiratory: Unlabored breathing  Lymph: No axillary, submandibular, supraclavicular or inguinal lymphadenopathy  Abd: Nondistended, +bs, no hepatosplenomegaly, mildly tender to right periumbilical area, no peritoneal signs  Skin: warm, perfused, no jaundice  Psych: Normal affect  MSK: Normal gait      PERTINENT STUDIES:    Orders Only on 05/29/2018   Component Date Value Ref Range Status     Specimen Descrip 05/29/2018 Midstream Urine   Final     N Gonorrhea PCR 05/29/2018 Negative  NEG^Negative Final     Specimen " Description 05/29/2018 Midstream Urine   Final     Chlamydia Trachomatis PCR 05/29/2018 Negative  NEG^Negative Final       Again, thank you for allowing me to participate in the care of your patient.      Sincerely,    Nikita Ross PA-C

## 2021-09-10 ENCOUNTER — TELEPHONE (OUTPATIENT)
Dept: GASTROENTEROLOGY | Facility: CLINIC | Age: 41
End: 2021-09-10

## 2021-09-10 ENCOUNTER — PATIENT OUTREACH (OUTPATIENT)
Dept: GASTROENTEROLOGY | Facility: CLINIC | Age: 41
End: 2021-09-10

## 2021-09-10 ENCOUNTER — MEDICAL CORRESPONDENCE (OUTPATIENT)
Dept: HEALTH INFORMATION MANAGEMENT | Facility: CLINIC | Age: 41
End: 2021-09-10

## 2021-09-10 NOTE — PROGRESS NOTES
Received a request to send a referral to the [facility].   Referral faxed.   My Chart message sent with the number to schedule.

## 2021-09-14 ENCOUNTER — THERAPY VISIT (OUTPATIENT)
Dept: PHYSICAL THERAPY | Facility: CLINIC | Age: 41
End: 2021-09-14
Payer: COMMERCIAL

## 2021-09-14 DIAGNOSIS — M25.512 ACUTE PAIN OF LEFT SHOULDER: Primary | ICD-10-CM

## 2021-09-14 PROCEDURE — 97110 THERAPEUTIC EXERCISES: CPT | Mod: GP | Performed by: PHYSICAL THERAPIST

## 2021-09-14 PROCEDURE — 97530 THERAPEUTIC ACTIVITIES: CPT | Mod: GP | Performed by: PHYSICAL THERAPIST

## 2021-09-16 ENCOUNTER — OFFICE VISIT (OUTPATIENT)
Dept: OPHTHALMOLOGY | Facility: CLINIC | Age: 41
End: 2021-09-16
Payer: COMMERCIAL

## 2021-09-16 DIAGNOSIS — H52.4 MYOPIA OF BOTH EYES WITH ASTIGMATISM AND PRESBYOPIA: Primary | ICD-10-CM

## 2021-09-16 DIAGNOSIS — H52.13 MYOPIA OF BOTH EYES WITH ASTIGMATISM AND PRESBYOPIA: Primary | ICD-10-CM

## 2021-09-16 DIAGNOSIS — H52.203 MYOPIA OF BOTH EYES WITH ASTIGMATISM AND PRESBYOPIA: Primary | ICD-10-CM

## 2021-09-16 PROCEDURE — 92015 DETERMINE REFRACTIVE STATE: CPT | Performed by: OPTOMETRIST

## 2021-09-16 PROCEDURE — 92014 COMPRE OPH EXAM EST PT 1/>: CPT | Performed by: OPTOMETRIST

## 2021-09-16 ASSESSMENT — REFRACTION_CURRENTRX
OS_CYLINDER: -1.75
OS_BRAND: ULTRA ASTIGMATISM
OS_AXIS: 170
OS_CYLINDER: -2.75
OS_SPHERE: -4.50
OD_SPHERE: -4.50
OS_DIAMETER: 14.5
OS_SPHERE: -4.50
OD_BRAND: ULTRA ASTIGMATISM
OD_SPHERE: -4.50
OS_BRAND: ULTRA FOR ASTIGMATISM
OD_BASECURVE: 8.6
OS_DIAMETER: 14.5
OD_AXIS: 180
OD_DIAMETER: 14.5
OS_BASECURVE: 8.6
OD_AXIS: 180
OD_BASECURVE: 8.6
OD_CYLINDER: -1.75
OD_DIAMETER: 14.5
OD_CYLINDER: -1.75
OS_BASECURVE: 8.6
OS_AXIS: 160
OD_BRAND: ULTRA FOR ASTIGMATISM

## 2021-09-16 ASSESSMENT — REFRACTION_MANIFEST
OS_ADD: +1.25
OD_AXIS: 079
OS_AXIS: 087
OD_AXIS: 090
OD_SPHERE: -7.50
OS_CYLINDER: +4.50
OS_AXIS: 078
OS_CYLINDER: +4.50
OD_SPHERE: -6.75
OD_ADD: +1.25
OS_SPHERE: -9.50
METHOD_AUTOREFRACTION: 1
OD_CYLINDER: +2.00
OS_SPHERE: -9.25
OD_CYLINDER: +2.00

## 2021-09-16 ASSESSMENT — VISUAL ACUITY
OD_CC: 20/20
METHOD: SNELLEN - LINEAR
OS_CC: 20/20
CORRECTION_TYPE: CONTACTS

## 2021-09-16 ASSESSMENT — REFRACTION_WEARINGRX
OD_AXIS: 092
OD_ADD: +1.25
OD_CYLINDER: +2.50
OD_SPHERE: -7.50
OS_SPHERE: -9.25
OS_ADD: +1.25
OS_CYLINDER: +4.50
OS_AXIS: 087

## 2021-09-16 ASSESSMENT — SLIT LAMP EXAM - LIDS
COMMENTS: NORMAL
COMMENTS: NORMAL

## 2021-09-16 ASSESSMENT — KERATOMETRY
OD_AXISANGLE_DEGREES: 170
METHOD_AUTO_MANUAL: AUTO-K
OS_AXISANGLE_DEGREES: 173
OD_K2POWER_DIOPTERS: 7.32
OS_K1POWER_DIOPTERS: 7.68
OD_K1POWER_DIOPTERS: 7.66
OS_K2POWER_DIOPTERS: 7.07

## 2021-09-16 ASSESSMENT — CUP TO DISC RATIO
OD_RATIO: 0.35
OS_RATIO: 0.35

## 2021-09-16 ASSESSMENT — EXTERNAL EXAM - RIGHT EYE: OD_EXAM: NORMAL

## 2021-09-16 ASSESSMENT — TONOMETRY
IOP_METHOD: ICARE
OD_IOP_MMHG: 13
OS_IOP_MMHG: 12

## 2021-09-16 ASSESSMENT — CONF VISUAL FIELD
OD_NORMAL: 1
OS_NORMAL: 1

## 2021-09-16 ASSESSMENT — EXTERNAL EXAM - LEFT EYE: OS_EXAM: NORMAL

## 2021-09-16 NOTE — PROGRESS NOTES
A/P  1.) High Myopia/Astigmatism with Aniso OS>OD  -Corrects well in spec Rx, but with significant strain and peripheral distortion. Rec re-trial with bifocals, optical recommendations given  -Failed standard soft torics (Biofinity, Proclear, AV Oasys, Ultra) with rotation left eye>>right eye  -Large HVID (12mm right, 13mm left). Rec trial of custom soft toric  -Dilated ocular health unremarkable each eye. Reviewed flashes/floaters and need for stat eye eval should they occur    Order custom soft trials and mail to pt. F/u for lens recheck prn    I have confirmed where necessary the patient's CC, HPI and reviewed Past Medical History, Past Surgical History, Social History, Family History, Problem List, Medication List and agree with Tech note.     Jodie Oliver, OD FAAO FSLS

## 2021-09-16 NOTE — NURSING NOTE
Chief Complaints and History of Present Illnesses   Patient presents with     Annual Eye Exam     Chief Complaint(s) and History of Present Illness(es)     Annual Eye Exam     Laterality: both eyes    Onset: unknown    Quality: blurred vision    Severity: moderate    Context: distance vision and near vision    Associated symptoms: headache (happening midday consistently), dryness, itching and burning (when using saline solution).  Negative for eye pain, flashes, floaters, tearing, redness and swelling    Treatments tried: glasses    Pain scale: 0/10              Comments     Pt here today for annual routine eye exam.  YOAV was here 2/28/2019. Having some trouble with HA's midday.   Pt states she is still wearing CL trials from 2/28/2019.    Ocular meds = none    Ruthy FREEDMAN, MARIAELENA 11:48 AM 09/16/2021

## 2021-09-16 NOTE — CONFIDENTIAL NOTE
Sent upcoming appointment reminder via [employer].     Appt date/time: Sep 24, 2021 at 11:15 AM  Provider: LENORA Rubio  Appt type: dione Hess CMA

## 2021-09-22 ENCOUNTER — DOCUMENTATION ONLY (OUTPATIENT)
Dept: OPTOMETRY | Facility: CLINIC | Age: 41
End: 2021-09-22

## 2021-09-22 ENCOUNTER — THERAPY VISIT (OUTPATIENT)
Dept: PHYSICAL THERAPY | Facility: CLINIC | Age: 41
End: 2021-09-22
Payer: COMMERCIAL

## 2021-09-22 DIAGNOSIS — M25.512 ACUTE PAIN OF LEFT SHOULDER: ICD-10-CM

## 2021-09-22 PROCEDURE — 97140 MANUAL THERAPY 1/> REGIONS: CPT | Mod: GP | Performed by: PHYSICAL THERAPIST

## 2021-09-22 PROCEDURE — 97110 THERAPEUTIC EXERCISES: CPT | Mod: GP | Performed by: PHYSICAL THERAPIST

## 2021-09-24 ENCOUNTER — PRE VISIT (OUTPATIENT)
Dept: SURGERY | Facility: CLINIC | Age: 41
End: 2021-09-24

## 2021-09-24 ENCOUNTER — MYC MEDICAL ADVICE (OUTPATIENT)
Dept: SURGERY | Facility: CLINIC | Age: 41
End: 2021-09-24

## 2021-10-04 ENCOUNTER — HEALTH MAINTENANCE LETTER (OUTPATIENT)
Age: 41
End: 2021-10-04

## 2021-10-06 ENCOUNTER — THERAPY VISIT (OUTPATIENT)
Dept: PHYSICAL THERAPY | Facility: CLINIC | Age: 41
End: 2021-10-06
Payer: COMMERCIAL

## 2021-10-06 DIAGNOSIS — M25.512 ACUTE PAIN OF LEFT SHOULDER: ICD-10-CM

## 2021-10-06 PROCEDURE — 97112 NEUROMUSCULAR REEDUCATION: CPT | Mod: GP | Performed by: PHYSICAL THERAPIST

## 2021-10-06 PROCEDURE — 97140 MANUAL THERAPY 1/> REGIONS: CPT | Mod: GP | Performed by: PHYSICAL THERAPIST

## 2021-10-06 PROCEDURE — 97530 THERAPEUTIC ACTIVITIES: CPT | Mod: GP | Performed by: PHYSICAL THERAPIST

## 2021-10-19 DIAGNOSIS — Z88.9 DRUG ALLERGY: Primary | ICD-10-CM

## 2021-10-19 RX ORDER — PENICILLIN G POTASSIUM 5000000 [IU]/1
5000000 INJECTION, POWDER, FOR SOLUTION INTRAMUSCULAR; INTRAVENOUS ONCE
Status: DISCONTINUED | OUTPATIENT
Start: 2021-10-20 | End: 2022-05-19

## 2021-10-19 RX ORDER — AMPICILLIN 1 G/1
1 INJECTION, POWDER, FOR SOLUTION INTRAMUSCULAR; INTRAVENOUS ONCE
Status: DISCONTINUED | OUTPATIENT
Start: 2021-10-20 | End: 2022-05-19

## 2021-10-20 ENCOUNTER — OFFICE VISIT (OUTPATIENT)
Dept: ALLERGY | Facility: CLINIC | Age: 41
End: 2021-10-20
Payer: COMMERCIAL

## 2021-10-20 DIAGNOSIS — Z91.09 HOUSE DUST MITE ALLERGY: Primary | ICD-10-CM

## 2021-10-20 DIAGNOSIS — J31.0 CHRONIC RHINITIS: ICD-10-CM

## 2021-10-20 DIAGNOSIS — L30.1 DYSHIDROTIC HAND DERMATITIS: ICD-10-CM

## 2021-10-20 DIAGNOSIS — Z88.9 DRUG ALLERGY: ICD-10-CM

## 2021-10-20 DIAGNOSIS — R53.83 FATIGUE, UNSPECIFIED TYPE: ICD-10-CM

## 2021-10-20 PROCEDURE — 95024 IQ TESTS W/ALLERGENIC XTRCS: CPT | Performed by: DERMATOLOGY

## 2021-10-20 PROCEDURE — 99214 OFFICE O/P EST MOD 30 MIN: CPT | Mod: 25 | Performed by: DERMATOLOGY

## 2021-10-20 PROCEDURE — 95018 ALL TSTG PERQ&IQ DRUGS/BIOL: CPT | Performed by: DERMATOLOGY

## 2021-10-20 PROCEDURE — 95044 PATCH/APPLICATION TESTS: CPT | Performed by: DERMATOLOGY

## 2021-10-20 RX ORDER — MOMETASONE FUROATE 1 MG/G
OINTMENT TOPICAL
Qty: 45 G | Refills: 1 | Status: SHIPPED | OUTPATIENT
Start: 2021-10-21 | End: 2022-10-19

## 2021-10-20 RX ORDER — FEXOFENADINE HCL 180 MG/1
180 TABLET ORAL EVERY EVENING
Qty: 30 TABLET | Refills: 3 | Status: SHIPPED | OUTPATIENT
Start: 2021-10-20 | End: 2022-04-20

## 2021-10-20 RX ORDER — MOMETASONE FUROATE MONOHYDRATE 50 UG/1
2 SPRAY, METERED NASAL DAILY
Qty: 17 G | Refills: 1 | Status: SHIPPED | OUTPATIENT
Start: 2021-10-20 | End: 2022-04-20

## 2021-10-20 ASSESSMENT — PAIN SCALES - GENERAL: PAINLEVEL: NO PAIN (0)

## 2021-10-20 NOTE — PROGRESS NOTES
[facility] Dermato-allergology Note  Office visit  Encounter Date: Oct 20, 2021  ____________________________________________    CC: No chief complaint on file.      HPI:  (10/19/21)  Ms. Priyanka Lundberg is a(n) 41 year old female who presents today as a return patient for allergy consultation  - >> patient should come for prick tests and evaluation of skin  >> no antihistamines 1 week prior  - otherwise feeling well in usual state of health    Physical exam:  General: In no acute distress, well-developed, well-nourished  Eyes: no conjunctivitis  ENT: no signs of rhinitis   Pulmonary: no wheezing or coughing  Skin:Focused examination of the skin on test sites was performed = see test results below    Earlier History and Allergy exams:  - patient seems to have several food allergies diagnosed by prick tests.  - patient has chronic constipation since many years. In addition, recurrent rashes on the body (urticarial?) and patient has after eating tomato and spicy food with some swelling for few hours and after some days some scaling  - patient mostly bothered by fatigue about 7 years ago and has 5 year old child  - if patient eats sweat patient observes itching on ears (Vanilla)  - was with [facility]: in this prick test positives to various allergens    Past Medical History:   Patient Active Problem List   Diagnosis     Joint pain     L4-L5 disc bulge     Attention deficit hyperactivity disorder (ADHD), combined type     Vaginal discharge     History of abnormal cervical Pap smear     Other acne     Benign nevus     History of Clostridium difficile colitis     Cervicalgia     Bilateral thoracic back pain     Class 1 obesity due to excess calories without serious comorbidity with body mass index (BMI) of 30.0 to 30.9 in adult     Gastroesophageal reflux disease with esophagitis     Anxiety     Chronic constipation     Acute pain of left shoulder     Abdominal pain, right upper quadrant      History of food allergy     Other fatigue     Past Medical History:   Diagnosis Date     Abdominal pain 2011    abnormal histamine problem, multiple food allergies     ADHD (attention deficit hyperactivity disorder)     on aderol     Anxiety and depression     on xanax     Arthritis      Breathing problem 2007    shortness of breath after eating, ?allergies     Chronic nausea      Gastroesophageal reflux disease      Heart murmur     closed by time she was 1     Hx of abnormal cervical Pap smear 2011     IBS (irritable bowel syndrome)     lots of mucus in bowel with occassional bleeding     Joint pain 2015    was to have rheumatologist     Kidney stone on right side 2010     Meniere's disease     hyperacusis     Migraines      Reduced vision      Tinnitus        Allergies:  Allergies   Allergen Reactions     [place]      Food      Cantaloupe, cucumbers, green beans, and peppers.      [place] (Weed)      Lemon Flavor      Mustard Seed      Onion Difficulty breathing and GI Disturbance     [place] GI Disturbance     Penicillins Hives     Or another medication taken at that time     Redtop Grasses      Vanilla GI Disturbance       Medications:  Current Outpatient Medications   Medication     albuterol (PROAIR HFA/PROVENTIL HFA/VENTOLIN HFA) 108 (90 Base) MCG/ACT inhaler     hydrocortisone, Perianal, (HYDROCORTISONE) 2.5 % cream     ibuprofen (ADVIL/MOTRIN) 200 MG tablet     No current facility-administered medications for this visit.       Social History:  The patient works as a  since 1998 (develops more and more irritant type of lung and nose irritatioin for 24h with headache). But no signs for Eczemas  Mothers caregiver at home  Patient has the following hobbies or non-occupational exposure.    Family History:  Family History   Problem Relation Age of Onset     Mental Illness Mother         bipolar, schizophrenia     Anxiety Disorder Mother      Osteoporosis Mother      Thyroid Disease Mother       Diabetes Mother      Neurofibromatosis Mother         more likely fibromyalgia     Arthritis Mother      Back Pain Mother      Osteoarthritis Mother      Obesity Mother      Cirrhosis Mother         from fatty liver. with esophageal varices, ..     Gallbladder Disease Mother         cholecystectomy     Hypertension Father      Hyperlipidemia Father      Diabetes Father      Other Cancer Father 67        Neuroendocrine tumor, ? from gall bladder, stage 4  1/2018     Migraines Father      Scleroderma Father      Rheumatoid Arthritis Father      Obesity Father      Ankylosing Spondylitis Father      Macular Degeneration Father      Mental Illness Sister         bipolar     Anxiety Disorder Sister      Asthma Sister         eczema     Neurologic Disorder Sister         Cervical Dystonia, treated with Botox     Thyroid Cancer Sister      Coronary Artery Disease Maternal Grandmother      Cerebrovascular Disease Maternal Grandmother      Breast Cancer Maternal Grandmother 65     Skin Cancer Maternal Grandmother      Stomach Cancer Maternal Grandmother         stomach, skin     Prostate Cancer Maternal Grandfather      Cancer Maternal Grandfather      Diabetes Paternal Grandmother      Kidney Cancer Paternal Grandmother      Coronary Artery Disease Paternal Grandfather      Colon Cancer Paternal Grandfather      Cancer Paternal Grandfather      Ovarian Cancer Maternal Aunt 68     Skin Cancer Maternal Aunt         melanoma     Scleroderma Paternal Aunt      Ankylosing Spondylitis Paternal Aunt      Ankylosing Spondylitis Paternal Uncle      Liver Cancer Other         uncle     Glaucoma No family hx of    sister: had severe atopic dermatitis    Previous Labs, Allergy Tests, Dermatopathology, Imaging:  Prick tests    Referred By: Sydney Ruiz MD  [address] [place] [id number]     Allergy Tests:    Past Allergy Test    Order for Future Allergy Testing:    [] Outpatient  [] Inpatient: [facility]..../ Bed  ....       Skin Atopy (atopic dermatitis) [] Yes   [x] No .........  Contact allergies:   [] Yes   [x] No ..........  Hand eczema:   [] Yes   [x] No           Leading hand:   [] R   [] L       [] Ambidextrous         Drug allergies:        [x] Yes   [] No  Which?..Penicillins?    Urticaria/Angioedema  [] Yes   [] No .........  Food Allergy:  [x] Yes   [] No  Which?.diffuse symptoms  Pets :  [x] Yes   [] No  Which?..dog and cat (access to bedroom)        [x]  Rhinitis   [] Conjunctivitis   [] Sinusitis   [] Polyposis   [x] Otitis   [] Pharyngitis         []  none  Operations:   [] Tonsils   [] Septum   [] Sinus   [] Polyposis        [] Asthma bronchiale   [] Coughing      [x]  none  Symptoms (mostly Rhinoconjunctivitis and Asthma) aggravated by:  Season   [] I   [] II   [] III   [] IV   []V   []VI   []VII   []VIII   []IX   []X   []XI   []XI     [x] perennial   Day time      [x] morning   [] noon      [] evening        [] night    [] whole day........  []  none  Location/changes    [] inside        [] outside   [] mountains    [] sea     [] others.............   []  none  Triggers, specific     [] animals     [] plants     [] dust              [] others ...........................    []  none  Triggers, others       [] work          [] psyche    [] sport            [] others .............................  []  none  Irritant                [] phys efforts [] smoke    [] heat/cold     [] odors  []others............... []  none    Order for PATCH TESTS  Reason for tests (suspected allergy): none  Known previous allergies: none  Standardized panels  [] Standard panel (40 tests)  [] Preservatives & Antimicrobials (31 tests)  [] Emulsifiers & Additives (25 tests)   [] Perfumes/Flavours & Plants (25 tests)  [] Hairdresser panel (12 tests)  [] Rubber Chemicals (22 tests)  [] Plastics (26 tests)  [] Colorants/Dyes/Food additives (20 tests)  [] Metals (implants/dental) (24 tests)  [] Local anaesthetics/NSAIDs (13 tests)  []  Antibiotics & Antimycotics (14 tests)   [] Corticosteroids (15 tests)   [] Photopatch test (62 tests)   [] others: ...      [] Patient's own products: ...    DO NOT test if chemical or biological identity is unknown!     always ask from patient the product information and safety sheets (MSDS)       Order for PRICK TESTS    Reason for tests (suspected allergy): perennial Rhinosinusitis HDM/molds/pets?  Known previous allergies: ??    Standardized prick panels  [x] Atopic panel (20 tests)  [] Pediatric Panel (12 tests)  [] Milk, Meat, Eggs, Grains (20 tests)   [] Dust, Epithelia, Feathers (10 tests)  [] Fish, Seafood, Shellfish (17 tests)  [] Nuts, Beans (14 tests)  [] Spice, Vegetable, Fruit (17 tests)  [] Pollen Panel = Tree, Grass, Weed (24 tests)  [] Others: ...      [x] Patient's own products: vanilla and    DO NOT test if chemical or biological identity is unknown!     always ask from patient the product information and safety sheets (MSDS)     Standardized intradermal tests  [x] Penicillium notatum [x] Aspergillus fumigatus [x] House dust mites D.far & D. pteron  [x] Cat    [x] dog  [] Others: ...  [] Bee venom   [] Wasp venom  !!Specific protocol with dilutions!!       Order for Drug allergy tests (prick & Intradermal)    [x] Penicillin G  [x] Ampicillin [] Cefazolin   [] Ceftriaxone   [] Ceftazidime  [] Bactrim    [] Others: ...  Order for ... as test date      Atopy Screen (Placed 10/20/21)    No Substance Readings (15 min) Evaluation   POS Histamine 1mg/ml -    NEG NaCl 0.9% -      No Substance Readings (15 min) Evaluation   1 Alternaria alternata (tenuis)  -    2 Cladosporium herbarum -    3 Aspergillus fumigatus -    4 Penicillium notatum -    5 Dermatophagoides pteronyssinus -    6 Dermatophagoides farinae -    7 Dog epithelium (canis spp) -    8 Cat hair (david catus) -    9 Cockroach   (Blatella americana & germanica) -    10 Grass mix midwest   (Hanh, Orchard, Redtop, Stephan) +    11 Mor grass  (sorghum halepense) -    12 Weed mix   (common Cocklebur, Lamb s quarters, rough redroot Pigweed, Dock/Sorrel) +/++    13 Mug wort (artemisia vulgare) -    14 Ragweed giant/short (ambrosia spp) -    15 White birch (Betula papyrifera) -    16 Tree mix 1 (Pecan, Maple BHR, Oak RVW, american Alpine, black [place]) ++    17 Red cedar (juniperus virginia) -    18 Tree mix 2   (white [place], river/red Birch, black [place]wen, common Ida, american Elm) ++    19 Box elder/Maple mix (acer spp) ++    20 Hickory shagbark (carya ovata) -     21 vanilla           Conclusion      Intradermal Testing (Placed 10/20/21)    No Substance Conc.  Reading (15min)  immediate Papule [mm] / Erythema [mm] Reading   (... days)  delayed Papule [mm] / Erythema [mm] Remarks   DF Standard Dust Mite - D. Farinae 1:10 ++ 7/15  -    DP Standard Dust Mite - D. Pteronyssinus 1:10 ++ 7/15  -    A Aspergillus fumigatus  1:10 -   -    P Penicillium notatum 1:10 -   -    C Cat epithelium 1:10 -   -    D Dog epithelium 1:10 + 6/6  -    Conclusions:     DRUG ALLERGY TEST SERIES 10/20/2021       Prick Tests         Substance/ Allergen Conc Result (15min) Remarks    Benzylpenicillin (Penicillin G) 1 Ky IU/ml (600 mg) -     Ampicillin 100 mg/ml -       Intradermal Tests   immed immed delayed delayed      Substance Conc 1st dil  2nd dil   days  days remarks    Benzylpenicillin (Penicillin G) 1:100 -        Ampicillin 1:10 -       No signs for immediate type reactions to Penicillins. Patient will feed back if any delayed reaction    ________________________________    Assessment & Plan:    ==> Final Diagnosis:     # atopic predisposition with    Positive family history sister    Positive prick tests to various pollens = tree pollens    Morning Rhinitis and headaches = patient has clear immediate sensitization against house dust mites and less dog (not cat!)   Patient will feed back if delayed reactions    Dyshidrotic dermatitis on right hand and middle finger  and folliculitis on legs and trunk  * chronic illness with exacerbation, progression, side effects from treatment      # chronic obstipation and chronic fatigue symptom after certain foods  * stable chronic illness    # drug allergy to Penicillin?  - Unsure about the reaction to Penicillins (got 2 shots in ER and afterwards got hives)  - Prick and intradermal tests to Penicillin G and Ampicillin without immediate type reaction. Patient can take these drugs. Maybe observe her 30min in clinic after first, initial dose.  * stable chronic illness      These conclusions are made at the best of one's knowledge and belief based on the provided evidence such as patient's history and allergy test results and they can change over time or can be incomplete because of missing information's.    ==> Treatment Plan:  >> info given for HDM reduction in bed and bedroom and keep dog off bed  >> try Allegra 180mg evening and Nasonex   >> Amlactin cream 1-2 times daily and 2xweekly (weekend) Mometasone oint      Procedures Performed: Allergy tests, including prick, intradermal and drug allergy tests    Staff: : provider      Follow-up in Derm-Allergy clinic in about 6 weeks   ___________________________      I spent a total of 40 minutes with Priyanka Lundberg during today s  visit. This time was spent discussing all the individual test results, correlating them to the clinical relevance, counseling the patient and/or coordinating care and performing allergy tests or procedures.

## 2021-10-20 NOTE — NURSING NOTE
Chief Complaint   Patient presents with     Allergy Testing Followup     Priyanka is here today for allergy testing, prick and possibly intradermal testing     Abril Reddy

## 2021-10-20 NOTE — PATIENT INSTRUCTIONS
House Dust Mite Allergy        The house dust mite is an arachnid about 0.3 mm in size and not visible to the naked eye. There are around 150 species of house dust mites in the world. One mite produces up to 40 fecal droppings a day. One teaspoonful of bedroom dust contains an average of nearly 1000 mites and 250,000 minute droppings.    Causes and triggers of house dust mite allergy  The house dust mite requires a warm, moist environment without light in order to live and reproduce. Our beds are ideal. The mite feeds on human and animal skin scales. The allergen is mainly contained in the mite's feces. The feces contain allergy-triggering constituents which are spread in fine dust, are breathed in and can cause an allergic reaction.    Symptoms  When the allergens come into contact with the mucous membranes in the eyes, nose, mouth and throat, sufferers develop symptoms typical of an allergic cold (allergic rhinitis) or an allergic inflammation of the conjunctiva (allergic conjunctivitis): blocked or runny nose, sneezing, red, itchy eyes. If all of these symptoms are present, then the condition is also known as rhinoconjunctivitis. Often, the upper respiratory tract becomes chronically inflamed, primarily because house dust mites are present all year round.  The symptoms of house dust mite allergy typically occur in the morning and are more frequent in the cold months of the year.    Therapy and treatment  As a first step, mattress, pillows and duvet/comforter should be placed in mite-proof or anti-mite covers, sometimes known as encasings. Alternatively you can use pillows or comforter that can be washed at over 130 F monthly. At the same time, house dust should be minimized. If necessary, the symptoms can be treated with medication, for example antihistamines in the form of nasal sprays, eye drops and tablets. Desensitization/specific immunotherapy (SIT) is recommended for house dust allergy if all the measures  "above are not sufficient.    Tips and tricks:    Keep room temperature at 66-70 F and relative air humidity at a maximum of 50%.    Ideally, thoroughly air your home two to three times a day for 5 to 10 minutes each time.    Wash bed linens in at least 130 F every week.    Remove stuffed animals or freeze them every other week.    Keep ceiling fans off in the bedroom as they can stir up dust mite allergens.    Remove dust from furniture with a damp cloth and regularly wet mop floors.    Do not put pot and hydroponic plants in the bedroom and also avoid putting too many in living areas, as they increase room humidity.    When staying overnight in other accommodations, we recommend taking your own bed linen and the above anti-mite mattress covers with you.    Remove upholstered furniture from the bedroom and consider removing the carpets. Ideally, use sealed parquet or laminate john, cork tiles or john made of wood, novilon or PVC.    Maybe additionally reduce dust mites in mattress, upholstery, or john using hot steam .      Modified from \"House Dust Mite Allergy\" by [employer] [facility].      "

## 2021-10-20 NOTE — LETTER
10/20/2021         RE: Priyanka Lundberg  [address] [place] [address]        Dear Colleague,    Thank you for referring your patient, Priyanka Lundberg, to the [facility] [place]. Please see a copy of my visit note below.    [facility] Dermato-allergology Note  Office visit  Encounter Date: Oct 20, 2021  ____________________________________________    CC: No chief complaint on file.      HPI:  (10/19/21)  Ms. Priyanka Lundberg is a(n) 41 year old female who presents today as a return patient for allergy consultation  - >> patient should come for prick tests and evaluation of skin  >> no antihistamines 1 week prior  - otherwise feeling well in usual state of health    Physical exam:  General: In no acute distress, well-developed, well-nourished  Eyes: no conjunctivitis  ENT: no signs of rhinitis   Pulmonary: no wheezing or coughing  Skin:Focused examination of the skin on test sites was performed = see test results below    Earlier History and Allergy exams:  - patient seems to have several food allergies diagnosed by prick tests.  - patient has chronic constipation since many years. In addition, recurrent rashes on the body (urticarial?) and patient has after eating tomato and spicy food with some swelling for few hours and after some days some scaling  - patient mostly bothered by fatigue about 7 years ago and has 5 year old child  - if patient eats sweat patient observes itching on ears (Vanilla)  - was with [facility]: in this prick test positives to various allergens    Past Medical History:   Patient Active Problem List   Diagnosis     Joint pain     L4-L5 disc bulge     Attention deficit hyperactivity disorder (ADHD), combined type     Vaginal discharge     History of abnormal cervical Pap smear     Other acne     Benign nevus     History of Clostridium difficile colitis     Cervicalgia     Bilateral thoracic back pain     Class 1  obesity due to excess calories without serious comorbidity with body mass index (BMI) of 30.0 to 30.9 in adult     Gastroesophageal reflux disease with esophagitis     Anxiety     Chronic constipation     Acute pain of left shoulder     Abdominal pain, right upper quadrant     History of food allergy     Other fatigue     Past Medical History:   Diagnosis Date     Abdominal pain 2011    abnormal histamine problem, multiple food allergies     ADHD (attention deficit hyperactivity disorder)     on aderol     Anxiety and depression     on xanax     Arthritis      Breathing problem 2007    shortness of breath after eating, ?allergies     Chronic nausea      Gastroesophageal reflux disease      Heart murmur     closed by time she was 1     Hx of abnormal cervical Pap smear 2011     IBS (irritable bowel syndrome)     lots of mucus in bowel with occassional bleeding     Joint pain 2015    was to have rheumatologist     Kidney stone on right side 2010     Meniere's disease     hyperacusis     Migraines      Reduced vision      Tinnitus        Allergies:  Allergies   Allergen Reactions     [place]      Food      Cantaloupe, cucumbers, green beans, and peppers.      Glover's Quarter (Weed)      Lemon Flavor      Mustard Seed      Onion Difficulty breathing and GI Disturbance     Gage GI Disturbance     Penicillins Hives     Or another medication taken at that time     Redtop Grasses      Vanilla GI Disturbance       Medications:  Current Outpatient Medications   Medication     albuterol (PROAIR HFA/PROVENTIL HFA/VENTOLIN HFA) 108 (90 Base) MCG/ACT inhaler     hydrocortisone, Perianal, (HYDROCORTISONE) 2.5 % cream     ibuprofen (ADVIL/MOTRIN) 200 MG tablet     No current facility-administered medications for this visit.       Social History:  The patient works as a  since 1998 (develops more and more irritant type of lung and nose irritatioin for 24h with headache). But no signs for Eczemas  Mothers caregiver at  home  Patient has the following hobbies or non-occupational exposure.    Family History:  Family History   Problem Relation Age of Onset     Mental Illness Mother         bipolar, schizophrenia     Anxiety Disorder Mother      Osteoporosis Mother      Thyroid Disease Mother      Diabetes Mother      Neurofibromatosis Mother         more likely fibromyalgia     Arthritis Mother      Back Pain Mother      Osteoarthritis Mother      Obesity Mother      Cirrhosis Mother         from fatty liver. with esophageal varices, ..     Gallbladder Disease Mother         cholecystectomy     Hypertension Father      Hyperlipidemia Father      Diabetes Father      Other Cancer Father 67        Neuroendocrine tumor, ? from gall bladder, stage 4  1/2018     Migraines Father      Scleroderma Father      Rheumatoid Arthritis Father      Obesity Father      Ankylosing Spondylitis Father      Macular Degeneration Father      Mental Illness Sister         bipolar     Anxiety Disorder Sister      Asthma Sister         eczema     Neurologic Disorder Sister         Cervical Dystonia, treated with Botox     Thyroid Cancer Sister      Coronary Artery Disease Maternal Grandmother      Cerebrovascular Disease Maternal Grandmother      Breast Cancer Maternal Grandmother 65     Skin Cancer Maternal Grandmother      Stomach Cancer Maternal Grandmother         stomach, skin     Prostate Cancer Maternal Grandfather      Cancer Maternal Grandfather      Diabetes Paternal Grandmother      Kidney Cancer Paternal Grandmother      Coronary Artery Disease Paternal Grandfather      Colon Cancer Paternal Grandfather      Cancer Paternal Grandfather      Ovarian Cancer Maternal Aunt 68     Skin Cancer Maternal Aunt         melanoma     Scleroderma Paternal Aunt      Ankylosing Spondylitis Paternal Aunt      Ankylosing Spondylitis Paternal Uncle      Liver Cancer Other         uncle     Glaucoma No family hx of    sister: had severe atopic  dermatitis    Previous Labs, Allergy Tests, Dermatopathology, Imaging:  Prick tests    Referred By: Sydney Ruiz MD  [address] [place] [id number]     Allergy Tests:    Past Allergy Test    Order for Future Allergy Testing:    [] Outpatient  [] Inpatient: [facility]..../ Bed ....       Skin Atopy (atopic dermatitis) [] Yes   [x] No .........  Contact allergies:   [] Yes   [x] No ..........  Hand eczema:   [] Yes   [x] No           Leading hand:   [] R   [] L       [] Ambidextrous         Drug allergies:        [x] Yes   [] No  Which?..Penicillins?    Urticaria/Angioedema  [] Yes   [] No .........  Food Allergy:  [x] Yes   [] No  Which?.diffuse symptoms  Pets :  [x] Yes   [] No  Which?..dog and cat (access to bedroom)        [x]  Rhinitis   [] Conjunctivitis   [] Sinusitis   [] Polyposis   [x] Otitis   [] Pharyngitis         []  none  Operations:   [] Tonsils   [] Septum   [] Sinus   [] Polyposis        [] Asthma bronchiale   [] Coughing      [x]  none  Symptoms (mostly Rhinoconjunctivitis and Asthma) aggravated by:  Season   [] I   [] II   [] III   [] IV   []V   []VI   []VII   []VIII   []IX   []X   []XI   []XI     [x] perennial   Day time      [x] morning   [] noon      [] evening        [] night    [] whole day........  []  none  Location/changes    [] inside        [] outside   [] mountains    [] sea     [] others.............   []  none  Triggers, specific     [] animals     [] plants     [] dust              [] others ...........................    []  none  Triggers, others       [] work          [] psyche    [] sport            [] others .............................  []  none  Irritant                [] phys efforts [] smoke    [] heat/cold     [] odors  []others............... []  none    Order for PATCH TESTS  Reason for tests (suspected allergy): none  Known previous allergies: none  Standardized panels  [] Standard panel (40 tests)  [] Preservatives & Antimicrobials (31 tests)  []  Emulsifiers & Additives (25 tests)   [] Perfumes/Flavours & Plants (25 tests)  [] Hairdresser panel (12 tests)  [] Rubber Chemicals (22 tests)  [] Plastics (26 tests)  [] Colorants/Dyes/Food additives (20 tests)  [] Metals (implants/dental) (24 tests)  [] Local anaesthetics/NSAIDs (13 tests)  [] Antibiotics & Antimycotics (14 tests)   [] Corticosteroids (15 tests)   [] Photopatch test (62 tests)   [] others: ...      [] Patient's own products: ...    DO NOT test if chemical or biological identity is unknown!     always ask from patient the product information and safety sheets (MSDS)       Order for PRICK TESTS    Reason for tests (suspected allergy): perennial Rhinosinusitis HDM/molds/pets?  Known previous allergies: ??    Standardized prick panels  [x] Atopic panel (20 tests)  [] Pediatric Panel (12 tests)  [] Milk, Meat, Eggs, Grains (20 tests)   [] Dust, Epithelia, Feathers (10 tests)  [] Fish, Seafood, Shellfish (17 tests)  [] Nuts, Beans (14 tests)  [] Spice, Vegetable, Fruit (17 tests)  [] Pollen Panel = Tree, Grass, Weed (24 tests)  [] Others: ...      [x] Patient's own products: vanilla and    DO NOT test if chemical or biological identity is unknown!     always ask from patient the product information and safety sheets (MSDS)     Standardized intradermal tests  [x] Penicillium notatum [x] Aspergillus fumigatus [x] House dust mites D.far & D. pteron  [x] Cat    [x] dog  [] Others: ...  [] Bee venom   [] Wasp venom  !!Specific protocol with dilutions!!       Order for Drug allergy tests (prick & Intradermal)    [x] Penicillin G  [x] Ampicillin [] Cefazolin   [] Ceftriaxone   [] Ceftazidime  [] Bactrim    [] Others: ...  Order for ... as test date      Atopy Screen (Placed 10/20/21)    No Substance Readings (15 min) Evaluation   POS Histamine 1mg/ml -    NEG NaCl 0.9% -      No Substance Readings (15 min) Evaluation   1 Alternaria alternata (tenuis)  -    2 Cladosporium herbarum -    3 Aspergillus fumigatus -     4 Penicillium notatum -    5 Dermatophagoides pteronyssinus -    6 Dermatophagoides farinae -    7 Dog epithelium (canis spp) -    8 Cat hair (david catus) -    9 Cockroach   (Blatella americana & germanica) -    10 Grass mix midwest   (Hanh, Orchard, Redtop, Stephan) +    11 Mor grass (sorghum halepense) -    12 Weed mix   (common Cocklebur, Lamb s quarters, rough redroot Pigweed, Dock/Sorrel) +/++    13 Mug wort (artemisia vulgare) -    14 Ragweed giant/short (ambrosia spp) -    15 White birch (Betula papyrifera) -    16 Tree mix 1 (Pecan, Maple BHR, Oak RVW, american Carbon, black [place]) ++    17 Red cedar (juniperus virginia) -    18 Tree mix 2   (white [place], river/red Birch, black [place], common [place], american Elm) ++    19 Box elder/Maple mix (acer spp) ++    20 Hickory shagbark (carya ovata) -     21 vanilla           Conclusion      Intradermal Testing (Placed 10/20/21)    No Substance Conc.  Reading (15min)  immediate Papule [mm] / Erythema [mm] Reading   (... days)  delayed Papule [mm] / Erythema [mm] Remarks   DF Standard Dust Mite - D. Farinae 1:10 ++ 7/15  -    DP Standard Dust Mite - D. Pteronyssinus 1:10 ++ 7/15  -    A Aspergillus fumigatus  1:10 -   -    P Penicillium notatum 1:10 -   -    C Cat epithelium 1:10 -   -    D Dog epithelium 1:10 + 6/6  -    Conclusions:     DRUG ALLERGY TEST SERIES 10/20/2021       Prick Tests         Substance/ Allergen Conc Result (15min) Remarks    Benzylpenicillin (Penicillin G) 1 Ky IU/ml (600 mg) -     Ampicillin 100 mg/ml -       Intradermal Tests   immed immed delayed delayed      Substance Conc 1st dil  2nd dil   days  days remarks    Benzylpenicillin (Penicillin G) 1:100 -        Ampicillin 1:10 -       No signs for immediate type reactions to Penicillins. Patient will feed back if any delayed reaction    ________________________________    Assessment & Plan:    ==> Final Diagnosis:     # atopic predisposition with    Positive family history  sister    Positive prick tests to various pollens = tree pollens    Morning Rhinitis and headaches = patient has clear immediate sensitization against house dust mites and less dog (not cat!)   Patient will feed back if delayed reactions    Dyshidrotic dermatitis on right hand and middle finger and folliculitis on legs and trunk  * chronic illness with exacerbation, progression, side effects from treatment      # chronic obstipation and chronic fatigue symptom after certain foods  * stable chronic illness    # drug allergy to Penicillin?  - Unsure about the reaction to Penicillins (got 2 shots in ER and afterwards got hives)  - Prick and intradermal tests to Penicillin G and Ampicillin without immediate type reaction. Patient can take these drugs. Maybe observe her 30min in clinic after first, initial dose.  * stable chronic illness      These conclusions are made at the best of one's knowledge and belief based on the provided evidence such as patient's history and allergy test results and they can change over time or can be incomplete because of missing information's.    ==> Treatment Plan:  >> info given for HDM reduction in bed and bedroom and keep dog off bed  >> try Allegra 180mg evening and Nasonex   >> Amlactin cream 1-2 times daily and 2xweekly (weekend) Mometasone oint      Procedures Performed: Allergy tests, including prick, intradermal and drug allergy tests    Staff: : provider      Follow-up in Derm-Allergy clinic in about 6 weeks   ___________________________      I spent a total of 40 minutes with Priyanka Lundberg during today s  visit. This time was spent discussing all the individual test results, correlating them to the clinical relevance, counseling the patient and/or coordinating care and performing allergy tests or procedures.           Again, thank you for allowing me to participate in the care of your patient.        Sincerely,        Alan Jain MD

## 2021-11-02 ENCOUNTER — OFFICE VISIT (OUTPATIENT)
Dept: FAMILY MEDICINE | Facility: CLINIC | Age: 41
End: 2021-11-02
Payer: COMMERCIAL

## 2021-11-02 VITALS
HEART RATE: 74 BPM | BODY MASS INDEX: 35.09 KG/M2 | DIASTOLIC BLOOD PRESSURE: 75 MMHG | OXYGEN SATURATION: 97 % | WEIGHT: 221 LBS | TEMPERATURE: 97 F | RESPIRATION RATE: 13 BRPM | SYSTOLIC BLOOD PRESSURE: 108 MMHG

## 2021-11-02 DIAGNOSIS — R10.2 PELVIC PAIN IN FEMALE: ICD-10-CM

## 2021-11-02 DIAGNOSIS — R39.15 URINARY URGENCY: Primary | ICD-10-CM

## 2021-11-02 DIAGNOSIS — K59.00 CONSTIPATION, UNSPECIFIED CONSTIPATION TYPE: ICD-10-CM

## 2021-11-02 LAB
ALBUMIN UR-MCNC: NEGATIVE MG/DL
APPEARANCE UR: CLEAR
BILIRUB UR QL STRIP: NEGATIVE
COLOR UR AUTO: YELLOW
GLUCOSE UR STRIP-MCNC: NEGATIVE MG/DL
HGB UR QL STRIP: ABNORMAL
KETONES UR STRIP-MCNC: NEGATIVE MG/DL
LEUKOCYTE ESTERASE UR QL STRIP: NEGATIVE
NITRATE UR QL: NEGATIVE
PH UR STRIP: 7 [PH] (ref 5–7)
SP GR UR STRIP: 1.02 (ref 1–1.03)
UROBILINOGEN UR STRIP-ACNC: 0.2 E.U./DL

## 2021-11-02 NOTE — PROGRESS NOTES
"Priyanka Lundberg is a 41 year old female here for the following issues:    Pelvic pain  Priyanka reports bilateral groin discomfort for the past year. This is constant, \"like a muscle strain\". She had a normal pelvic US 10/15/21. Reports pain in her pelvis with leaning forward to do her makeup. She typically has monthly menses, the most recent was about a week late. She is not sexually active.     Priyanka was previously referred to the Pelvic floor clinic. She was last seen there in 2019. She is interested in returning.     Chronic constipation  Priyanka suffers from chronic constipation. She met with the GI team and they recommended trial of Miralax but she is leery of using this medication.  Concerned it may cause cancer if used. Prefers to change her diet instead. She has been walkking more.     Urinary symptoms  Priyanka reports constant cramping in her bladder. Last week, she had urgency, and was not able to sense until the last minute. Denied associated fever, chills.      Abdomen/pelvis CT scan (2/22/2021) showed normal liver, gallbladder, spleen, adrenal glands and pancreas. A nonobstructing 5mm left sided kidney stone was seen on the scan, along with colonic diverticulosis.  She has since met with urology to discuss conservative management with dietary modification.    Anxiety/depression  Priyanka reports she is feeling traumatized by her father's experience with cancer treatment. She felt he was treated poorly by the medical community. Her mother has mental health issues and liver failure, DM, bipolar disorder.     Priyanka attends family counseling, 1x per week and individual therapy once a week.   Her son is 5    No suicidal thoughts    Allergy update  Priyanka met with the allergist. She reports many food intolerances. She had a skin test last week and was positive to dog, dust mites, tree allergies, weeds.  She was given Allegra, Nasal spray, and an ointment . She was instructed to cover her mattress and pillow. She " has concrete floors. She is concerned about food allergies.     Patient Active Problem List   Diagnosis     Joint pain     L4-L5 disc bulge     Attention deficit hyperactivity disorder (ADHD), combined type     Vaginal discharge     History of abnormal cervical Pap smear     Other acne     Benign nevus     History of Clostridium difficile colitis     Cervicalgia     Bilateral thoracic back pain     Class 1 obesity due to excess calories without serious comorbidity with body mass index (BMI) of 30.0 to 30.9 in adult     Gastroesophageal reflux disease with esophagitis     Anxiety     Chronic constipation     Acute pain of left shoulder     Abdominal pain, right upper quadrant     History of food allergy     Other fatigue       Current Outpatient Medications   Medication Sig Dispense Refill     albuterol (PROAIR HFA/PROVENTIL HFA/VENTOLIN HFA) 108 (90 Base) MCG/ACT inhaler Inhale 2 puffs into the lungs every 4 hours as needed for shortness of breath / dyspnea or wheezing (Patient not taking: Reported on 8/27/2021) 1 Inhaler 3     fexofenadine (ALLEGRA) 180 MG tablet Take 1 tablet (180 mg) by mouth every evening 30 tablet 3     hydrocortisone, Perianal, (HYDROCORTISONE) 2.5 % cream Place rectally 2 times daily as needed for hemorrhoids 30 g 1     ibuprofen (ADVIL/MOTRIN) 200 MG tablet Take 400 mg by mouth every 4 hours as needed for mild pain 2 tab every am       mometasone (ELOCON) 0.1 % external ointment Apply topically twice a week On weekends on itchy areas on body and particularly on fingers 45 g 1     mometasone (NASONEX) 50 MCG/ACT nasal spray Spray 2 sprays into both nostrils daily 17 g 1       Allergies   Allergen Reactions     [place]      Food      Cantaloupe, cucumbers, green beans, and peppers.      Glover's Quarter (Weed)      Lemon Flavor      Mustard Seed      Onion Difficulty breathing and GI Disturbance     [place] GI Disturbance     Redtop Grasses      Vanilla GI Disturbance        EXAM  /75 (BP  Location: Right arm, Patient Position: Sitting, Cuff Size: Adult Large)   Pulse 74   Temp 97  F (36.1  C) (Skin)   Resp 13   Wt 100.2 kg (221 lb)   LMP 10/15/2021 (Within Days)   SpO2 97%   BMI 35.09 kg/m    Gen: Alert, pleasant, NAD  COR: S1,S2, no murmur  Lungs: CTA bilaterally, no rhonchi, wheezes or rales  Abdomen: Soft, normal bowel sounds, no HSM or mass  tenderness at the lower quadrants, both sided      Assessment:  (R39.15) Urinary urgency  (primary encounter diagnosis)  Comment: hx of urinary urgency for the past week  Plan: Routine UA with micro reflex to culture, Urine         Culture Aerobic Bacterial        Check for urine infection.   ADDENDUM: dipstick UA done, showing trace of blood, otherwise negative.  Will have Tanuel return to submit urine sample.     (R10.2) Pelvic pain in female  Comment: chronic issue, etiology is unclear, pelvic US normal   Plan: Physical Therapy Referral        Refer back to pelvic floor clinic.     (K59.00) Constipation, unspecified constipation type  Comment: chronic issue, she declines use of medication to treat  Plan: is interested in changing her diet.   Discussed high fiber foods, plenty of fluids.     36 minutes spend on the date of this encounter doing chart review, history and exam, documentation and further activities as noted above.       Sydney Ruiz MD  Internal Medicine/Pediatrics

## 2021-11-02 NOTE — NURSING NOTE
41 year old  Chief Complaint   Patient presents with     Menstrual Problem     spotting with last period with lots of brownish red blood.      UTI     not sure if its just period cramps, or an UTI that comes and goes       Blood pressure 108/75, pulse 74, temperature 97  F (36.1  C), temperature source Skin, resp. rate 13, weight 100.2 kg (221 lb), last menstrual period 10/15/2021, SpO2 97 %, not currently breastfeeding. Body mass index is 35.09 kg/m .  Patient Active Problem List   Diagnosis     Joint pain     L4-L5 disc bulge     Attention deficit hyperactivity disorder (ADHD), combined type     Vaginal discharge     History of abnormal cervical Pap smear     Other acne     Benign nevus     History of Clostridium difficile colitis     Cervicalgia     Bilateral thoracic back pain     Class 1 obesity due to excess calories without serious comorbidity with body mass index (BMI) of 30.0 to 30.9 in adult     Gastroesophageal reflux disease with esophagitis     Anxiety     Chronic constipation     Acute pain of left shoulder     Abdominal pain, right upper quadrant     History of food allergy     Other fatigue       Wt Readings from Last 2 Encounters:   11/02/21 100.2 kg (221 lb)   09/09/21 97.5 kg (215 lb)     BP Readings from Last 3 Encounters:   11/02/21 108/75   08/27/21 122/73   07/27/21 94/64         Current Outpatient Medications   Medication     hydrocortisone, Perianal, (HYDROCORTISONE) 2.5 % cream     ibuprofen (ADVIL/MOTRIN) 200 MG tablet     mometasone (NASONEX) 50 MCG/ACT nasal spray     albuterol (PROAIR HFA/PROVENTIL HFA/VENTOLIN HFA) 108 (90 Base) MCG/ACT inhaler     fexofenadine (ALLEGRA) 180 MG tablet     mometasone (ELOCON) 0.1 % external ointment     Current Facility-Administered Medications   Medication     ampicillin (OMNIPEN) 1 g vial INTRADERMAL     penicillin g potassium [id number] unit vial INTRADERMAL       Social History     Tobacco Use     Smoking status: Never Smoker     Smokeless tobacco:  Never Used   Substance Use Topics     Alcohol use: Not Currently     Alcohol/week: 0.0 standard drinks     Comment: outside of pregnancy, social     Drug use: No       Health Maintenance Due   Topic Date Due     ASTHMA ACTION PLAN  Never done     Pneumococcal Vaccine: Pediatrics (0 to 5 Years) and At-Risk Patients (6 to 64 Years) (1 of 2 - PPSV23) Never done     COVID-19 Vaccine (1) Never done     PREVENTIVE CARE VISIT  06/22/2020     HPV TEST  09/08/2021     PAP  09/08/2021       Lab Results   Component Value Date    PAP NIL 09/08/2016 November 2, 2021 9:19 AM

## 2021-12-28 ENCOUNTER — OFFICE VISIT (OUTPATIENT)
Dept: FAMILY MEDICINE | Facility: CLINIC | Age: 41
End: 2021-12-28
Payer: COMMERCIAL

## 2021-12-28 VITALS
HEART RATE: 91 BPM | DIASTOLIC BLOOD PRESSURE: 59 MMHG | BODY MASS INDEX: 34.69 KG/M2 | OXYGEN SATURATION: 97 % | TEMPERATURE: 98.7 F | HEIGHT: 67 IN | SYSTOLIC BLOOD PRESSURE: 98 MMHG | WEIGHT: 221 LBS

## 2021-12-28 DIAGNOSIS — M79.662 BILATERAL CALF PAIN: Primary | ICD-10-CM

## 2021-12-28 DIAGNOSIS — M79.661 BILATERAL CALF PAIN: Primary | ICD-10-CM

## 2021-12-28 DIAGNOSIS — G89.29 CHRONIC PAIN OF RIGHT KNEE: ICD-10-CM

## 2021-12-28 DIAGNOSIS — M25.561 CHRONIC PAIN OF RIGHT KNEE: ICD-10-CM

## 2021-12-28 ASSESSMENT — MIFFLIN-ST. JEOR: SCORE: 1682.69

## 2021-12-28 ASSESSMENT — PAIN SCALES - GENERAL: PAINLEVEL: MODERATE PAIN (4)

## 2021-12-28 NOTE — PROGRESS NOTES
"Priyanka Lundberg is a 41 year old female here for the following issues:    Right leg pain  Priyanka report pain, stiffness in her posterior right calf which started October 2021. She was trying to get back into dancing, exercising around this time and had to discontinue due to pain. She notes that her pain feels like muscle pain, but she is not able to \"find it\". Stretching does not seem to help. Pressing feet  into the the ground makes the pain \"terrible\". Running and fast movements also trigger pain. With exertion, the pain radiates into her right knee, anterior right shin, and mid inner right thigh. Pain was triggered last night while chasing her son. No leg weakness or \"giving out\". No pain at rest or with lying supine. Priyanka has been taking ibuprofen as her joints get achy in the winter. Denies any unusual shortness of breath. No redness, swelling or asymmetry. Feels now like similar symptoms are starting in her left leg.    Denies significant knee pain, reports a history of limb length discrepancy.     Patient Active Problem List   Diagnosis     Joint pain     L4-L5 disc bulge     Attention deficit hyperactivity disorder (ADHD), combined type     Vaginal discharge     History of abnormal cervical Pap smear     Other acne     Benign nevus     History of Clostridium difficile colitis     Cervicalgia     Bilateral thoracic back pain     Class 1 obesity due to excess calories without serious comorbidity with body mass index (BMI) of 30.0 to 30.9 in adult     Gastroesophageal reflux disease with esophagitis     Anxiety     Chronic constipation     Acute pain of left shoulder     Abdominal pain, right upper quadrant     History of food allergy     Other fatigue       Current Outpatient Medications   Medication Sig Dispense Refill     hydrocortisone, Perianal, (HYDROCORTISONE) 2.5 % cream Place rectally 2 times daily as needed for hemorrhoids 30 g 1     ibuprofen (ADVIL/MOTRIN) 200 MG tablet Take 400 mg by mouth every " "4 hours as needed for mild pain 2 tab every am       mometasone (NASONEX) 50 MCG/ACT nasal spray Spray 2 sprays into both nostrils daily 17 g 1     albuterol (PROAIR HFA/PROVENTIL HFA/VENTOLIN HFA) 108 (90 Base) MCG/ACT inhaler Inhale 2 puffs into the lungs every 4 hours as needed for shortness of breath / dyspnea or wheezing (Patient not taking: Reported on 8/27/2021) 1 Inhaler 3     fexofenadine (ALLEGRA) 180 MG tablet Take 1 tablet (180 mg) by mouth every evening (Patient not taking: Reported on 11/2/2021) 30 tablet 3     mometasone (ELOCON) 0.1 % external ointment Apply topically twice a week On weekends on itchy areas on body and particularly on fingers (Patient not taking: Reported on 11/2/2021) 45 g 1       Allergies   Allergen Reactions     [place]      Food      Cantaloupe, cucumbers, green beans, and peppers.      [place] (Weed)      Lemon Flavor      Mustard Seed      Onion Difficulty breathing and GI Disturbance     [place] GI Disturbance     Redtop Grasses      Vanilla GI Disturbance        EXAM  BP 98/59   Pulse 91   Temp 98.7  F (37.1  C)   Ht 1.69 m (5' 6.54\")   Wt 100.2 kg (221 lb)   LMP 12/07/2021   SpO2 97%   Breastfeeding No   BMI 35.10 kg/m    Gen: Alert, pleasant, NAD  Left Knee: No pain with palpation over bony prominences. Point tenderness over the posterior/medial popliteal fossa.  Tenderness over lateral left gastrocnemius muscle and anterior shin.   Right Knee: Some tenderness along inferior patellar tendon and right patella. Point tenderness over medial and lateral joint lines. Point tenderness over posterior /medial popliteal fossa. Reports muscular tightness with palpation over gastrocnemius m. Right Thigh: Tenderness over medial /posterior right thigh.   No asymmetry of lower extremities. No edema.      Assessment:  (M79.661,  M79.662) Bilateral calf pain  (primary encounter diagnosis)  Comment: calf pain since October 2021, R>L, worsened with fast movements and pushing " foot into the ground. Suspect muscular strain vs Bakers cyst. DVT not likely, no swelling or redness.   Plan: Orthopedic  Referral        Refer for evaluation and treatment.     (M25.561,  G89.29) Chronic pain of right knee  Comment: pain along medial and lateral joint lines as well as pre patellar tendon, cannot recall injury   Plan: Orthopedic  Referral        Refer for evaluation and treatment.  Has never had imaging of right knee.     25 minutes spend on the date of this encounter doing chart review, history and exam, documentation and further activities as noted above.       Sydney Ruiz MD  Internal Medicine/Pediatrics      I, Renée Worthington, am serving as a scribe to document services personally performed by Dr. Sydney Ruiz, based on data collection and the provider's statements to me. Dr. Ruiz has reviewed, edited, and approved the above note.

## 2021-12-28 NOTE — NURSING NOTE
"41 year old  Chief Complaint   Patient presents with     Musculoskeletal Problem     bilateral leg pain, right leg is gettting worse       Blood pressure 98/59, pulse 91, temperature 98.7  F (37.1  C), height 1.69 m (5' 6.54\"), weight 100.2 kg (221 lb), last menstrual period 12/07/2021, SpO2 97 %, not currently breastfeeding. Body mass index is 35.1 kg/m .  Patient Active Problem List   Diagnosis     Joint pain     L4-L5 disc bulge     Attention deficit hyperactivity disorder (ADHD), combined type     Vaginal discharge     History of abnormal cervical Pap smear     Other acne     Benign nevus     History of Clostridium difficile colitis     Cervicalgia     Bilateral thoracic back pain     Class 1 obesity due to excess calories without serious comorbidity with body mass index (BMI) of 30.0 to 30.9 in adult     Gastroesophageal reflux disease with esophagitis     Anxiety     Chronic constipation     Acute pain of left shoulder     Abdominal pain, right upper quadrant     History of food allergy     Other fatigue       Wt Readings from Last 2 Encounters:   12/28/21 100.2 kg (221 lb)   11/02/21 100.2 kg (221 lb)     BP Readings from Last 3 Encounters:   12/28/21 98/59   11/02/21 108/75   08/27/21 122/73         Current Outpatient Medications   Medication     albuterol (PROAIR HFA/PROVENTIL HFA/VENTOLIN HFA) 108 (90 Base) MCG/ACT inhaler     fexofenadine (ALLEGRA) 180 MG tablet     hydrocortisone, Perianal, (HYDROCORTISONE) 2.5 % cream     ibuprofen (ADVIL/MOTRIN) 200 MG tablet     mometasone (ELOCON) 0.1 % external ointment     mometasone (NASONEX) 50 MCG/ACT nasal spray     Current Facility-Administered Medications   Medication     ampicillin (OMNIPEN) 1 g vial INTRADERMAL     penicillin g potassium [id number] unit vial INTRADERMAL       Social History     Tobacco Use     Smoking status: Never Smoker     Smokeless tobacco: Never Used   Substance Use Topics     Alcohol use: Not Currently     Alcohol/week: 0.0 standard " drinks     Comment: outside of pregnancy, social     Drug use: No       Health Maintenance Due   Topic Date Due     ASTHMA ACTION PLAN  Never done     COVID-19 Vaccine (1) Never done     Pneumococcal Vaccine: Pediatrics (0 to 5 Years) and At-Risk Patients (6 to 64 Years) (1 of 2 - PPSV23) Never done     PREVENTIVE CARE VISIT  06/22/2020     HPV TEST  09/08/2021     PAP  09/08/2021       Lab Results   Component Value Date    PAP NIL 09/08/2016 December 28, 2021 10:08 AM

## 2021-12-29 ENCOUNTER — OFFICE VISIT (OUTPATIENT)
Dept: ORTHOPEDICS | Facility: CLINIC | Age: 41
End: 2021-12-29
Payer: COMMERCIAL

## 2021-12-29 DIAGNOSIS — M76.61 ACHILLES TENDINITIS, RIGHT LEG: Primary | ICD-10-CM

## 2021-12-29 DIAGNOSIS — S86.811A STRAIN OF CALF MUSCLE, RIGHT, INITIAL ENCOUNTER: ICD-10-CM

## 2021-12-29 PROCEDURE — 99213 OFFICE O/P EST LOW 20 MIN: CPT | Performed by: FAMILY MEDICINE

## 2021-12-29 NOTE — TELEPHONE ENCOUNTER
DIAGNOSIS: Bilateral calf pain/Chronic right knee pain/no img   APPOINTMENT DATE: 1.5.22   NOTES STATUS DETAILS   OFFICE NOTE from referring provider N/A    OFFICE NOTE from other specialist Internal 12.28.21 Dr Sydney Ruiz, [facility]   DISCHARGE SUMMARY from hospital N/A    DISCHARGE REPORT from the ER N/A    OPERATIVE REPORT N/A    EMG report N/A    MEDICATION LIST Internal    MRI N/A    DEXA (osteoporosis/bone health) N/A    CT SCAN N/A    XRAYS (IMAGES & REPORTS) N/A

## 2021-12-29 NOTE — LETTER
"  12/29/2021      RE: Priyanka Lundberg  [address] [place] [address]       S: Subjective: This 41-year-old female presents with a feeling of \"muscle strain\" involving the posterior lower leg on the right.  She believes it may have started in October, 3 months ago, when she was wearing a set of crocs, and  walking.  However she does not remember any discrete injury.  She initially felt discomfort at the base of her heel and felt it was plantar fasciitis.  The heel pain seemed to resolve.  However she had more focal discomfort that she was present at the calf and the proximal aspect of the Achilles.  Since then it bothers her when she makes motions to rise up on her tiptoes.  She denies pain in the low back.  She denies radicular discomfort into the right lower extremity.  She indicates that she has had sciatica in the past and that this discomfort feels \"more muscular\" and not consistent with the sciatica that she has had in the past.  There are times however when she can feel it in the area of the medial knee and medial thigh.  She notices the pain most prominently when she is going to sprint and push off with her calf muscle.  When that happens she will feel discomfort in the calf and the Achilles tendon on the right.  She will notice this when she is running after her child.    MRI lumbar spine 4/21/2015 in [facility] showed degenerative disc changes at L4-L5 without signs of foraminal or central stenosis. L4 vertebral body hemangioma noted, confirmed on recent CT scan abdomen.      PMH:  Past Medical History:   Diagnosis Date     Abdominal pain 2011    abnormal histamine problem, multiple food allergies     ADHD (attention deficit hyperactivity disorder)     on aderol     Anxiety and depression     on xanax     Arthritis      Breathing problem 2007    shortness of breath after eating, ?allergies     Chronic nausea      Gastroesophageal reflux disease      Heart murmur     closed by time " she was 1     Hx of abnormal cervical Pap smear 2011     IBS (irritable bowel syndrome)     lots of mucus in bowel with occassional bleeding     Joint pain 2015    was to have rheumatologist     Kidney stone on right side 2010     Meniere's disease     hyperacusis     Migraines      Reduced vision      Tinnitus        Active problem list:  Patient Active Problem List   Diagnosis     Joint pain     L4-L5 disc bulge     Attention deficit hyperactivity disorder (ADHD), combined type     Vaginal discharge     History of abnormal cervical Pap smear     Other acne     Benign nevus     History of Clostridium difficile colitis     Cervicalgia     Bilateral thoracic back pain     Class 1 obesity due to excess calories without serious comorbidity with body mass index (BMI) of 30.0 to 30.9 in adult     Gastroesophageal reflux disease with esophagitis     Anxiety     Chronic constipation     Acute pain of left shoulder     Abdominal pain, right upper quadrant     History of food allergy     Other fatigue       FH:  Family History   Problem Relation Age of Onset     Mental Illness Mother         bipolar, schizophrenia     Anxiety Disorder Mother      Osteoporosis Mother      Thyroid Disease Mother      Diabetes Mother      Neurofibromatosis Mother         more likely fibromyalgia     Arthritis Mother      Back Pain Mother      Osteoarthritis Mother      Obesity Mother      Cirrhosis Mother         from fatty liver. with esophageal varices, ..     Gallbladder Disease Mother         cholecystectomy     Hypertension Father      Hyperlipidemia Father      Diabetes Father      Other Cancer Father 67        Neuroendocrine tumor, ? from gall bladder, stage 4  1/2018     Migraines Father      Scleroderma Father      Rheumatoid Arthritis Father      Obesity Father      Ankylosing Spondylitis Father      Macular Degeneration Father      Mental Illness Sister         bipolar     Anxiety Disorder Sister      Asthma Sister         eczema      Neurologic Disorder Sister         Cervical Dystonia, treated with Botox     Thyroid Cancer Sister      Coronary Artery Disease Maternal Grandmother      Cerebrovascular Disease Maternal Grandmother      Breast Cancer Maternal Grandmother 65     Skin Cancer Maternal Grandmother      Stomach Cancer Maternal Grandmother         stomach, skin     Prostate Cancer Maternal Grandfather      Cancer Maternal Grandfather      Diabetes Paternal Grandmother      Kidney Cancer Paternal Grandmother      Coronary Artery Disease Paternal Grandfather      Colon Cancer Paternal Grandfather      Cancer Paternal Grandfather      Ovarian Cancer Maternal Aunt 68     Skin Cancer Maternal Aunt         melanoma     Scleroderma Paternal Aunt      Ankylosing Spondylitis Paternal Aunt      Ankylosing Spondylitis Paternal Uncle      Liver Cancer Other         uncle     Glaucoma No family hx of        SH:  Social History     Socioeconomic History     Marital status:      Spouse name: Not on file     Number of children: 1     Years of education: Not on file     Highest education level: Not on file   Occupational History     Comment: stylist/hairdresser   Tobacco Use     Smoking status: Never Smoker     Smokeless tobacco: Never Used   Substance and Sexual Activity     Alcohol use: Not Currently     Alcohol/week: 0.0 standard drinks     Comment: outside of pregnancy, social     Drug use: No     Sexual activity: Not Currently     Partners: Male     Birth control/protection: Abstinence   Other Topics Concern      Service No     Blood Transfusions No     Caffeine Concern No     Occupational Exposure Yes     Comment:      Hobby Hazards No     Sleep Concern Yes     Comment: harder to sleep now since pregnancy     Stress Concern Yes     Comment: lots of stress due to work and housing     Weight Concern Yes     Comment: has gained 10 lbs in the last month     Special Diet Yes     Comment: has many food allergies     Back Care  Yes     Comment: disc problems, considering cortisone injections     Exercise No     Bike Helmet No     Seat Belt Yes     Self-Exams No     Parent/sibling w/ CABG, MI or angioplasty before 65F 55M? Yes   Social History Narrative     Not on file     Social Determinants of Health     Financial Resource Strain: Not on file   Food Insecurity: Not on file   Transportation Needs: Not on file   Physical Activity: Not on file   Stress: Not on file   Social Connections: Not on file   Intimate Partner Violence: Not on file   Housing Stability: Not on file       MEDS:  See EMR, reviewed  ALL:  See EMR, reviewed    REVIEW OF SYSTEMS:  CONSTITUTIONAL:NEGATIVE for fever, chills, change in weight  INTEGUMENTARY/SKIN: NEGATIVE for worrisome rashes, moles or lesions  EYES: NEGATIVE for vision changes or irritation  ENT/MOUTH: NEGATIVE for ear, mouth and throat problems  RESP:NEGATIVE for significant cough or SOB  BREAST: NEGATIVE for masses, tenderness or discharge  CV: NEGATIVE for chest pain, palpitations or peripheral edema  GI: NEGATIVE for nausea, abdominal pain, heartburn, or change in bowel habits  :NEGATIVE for frequency, dysuria, or hematuria  :NEGATIVE for frequency, dysuria, or hematuria  NEURO: NEGATIVE for weakness, dizziness or paresthesias  ENDOCRINE: NEGATIVE for temperature intolerance, skin/hair changes  HEME/ALLERGY/IMMUNE: NEGATIVE for bleeding problems  PSYCHIATRIC: NEGATIVE for changes in mood or affect      Objective: She has tenderness today broadly across the right Achilles tendon and its attachment at the musculature of her calf.  She is nontender over the distal Achilles tendon or the base of her heel at the plantar fascia.  I can appreciate no visible swelling compared to the nonaffected calf.  Homans' sign is negative.  I do not find any palpable cords or discrete tenderness in the bulk of the calf.  She will do a calf raise bilaterally with good strength but when she does so she will describe some  discomfort in the calf and proximal Achilles on the right.  There is no swelling in the popliteal space.  Straight leg raise is negative bilaterally.  Her extremity strength to flexion extension at hip, knee, ankle including toe strength and foot evertor strength are 5-5 symmetrically bilaterally.  Sensation and pulses are intact distally at the heel.  She has normal range of motion of the bilateral hips to internal rotation external rotation and abduction.    Assessment right-sided Achilles tendinitis and calf discomfort    Plan: She will use a night splint for the right lower extremity for the next 2 weeks.  She will see physical therapy for an eccentric training program for the calf and Achilles tendon.  She will follow-up if not improved.    Nazario Nye MD

## 2021-12-29 NOTE — PROGRESS NOTES
"S: Subjective: This 41-year-old female presents with a feeling of \"muscle strain\" involving the posterior lower leg on the right.  She believes it may have started in October, 3 months ago, when she was wearing a set of crocs, and  walking.  However she does not remember any discrete injury.  She initially felt discomfort at the base of her heel and felt it was plantar fasciitis.  The heel pain seemed to resolve.  However she had more focal discomfort that she was present at the calf and the proximal aspect of the Achilles.  Since then it bothers her when she makes motions to rise up on her tiptoes.  She denies pain in the low back.  She denies radicular discomfort into the right lower extremity.  She indicates that she has had sciatica in the past and that this discomfort feels \"more muscular\" and not consistent with the sciatica that she has had in the past.  There are times however when she can feel it in the area of the medial knee and medial thigh.  She notices the pain most prominently when she is going to sprint and push off with her calf muscle.  When that happens she will feel discomfort in the calf and the Achilles tendon on the right.  She will notice this when she is running after her child.    MRI lumbar spine 4/21/2015 in [facility] showed degenerative disc changes at L4-L5 without signs of foraminal or central stenosis. L4 vertebral body hemangioma noted, confirmed on recent CT scan abdomen.            PMH:  Past Medical History:   Diagnosis Date     Abdominal pain 2011    abnormal histamine problem, multiple food allergies     ADHD (attention deficit hyperactivity disorder)     on aderol     Anxiety and depression     on xanax     Arthritis      Breathing problem 2007    shortness of breath after eating, ?allergies     Chronic nausea      Gastroesophageal reflux disease      Heart murmur     closed by time she was 1     Hx of abnormal cervical Pap smear 2011     IBS (irritable bowel syndrome) "     lots of mucus in bowel with occassional bleeding     Joint pain 2015    was to have rheumatologist     Kidney stone on right side 2010     Meniere's disease     hyperacusis     Migraines      Reduced vision      Tinnitus        Active problem list:  Patient Active Problem List   Diagnosis     Joint pain     L4-L5 disc bulge     Attention deficit hyperactivity disorder (ADHD), combined type     Vaginal discharge     History of abnormal cervical Pap smear     Other acne     Benign nevus     History of Clostridium difficile colitis     Cervicalgia     Bilateral thoracic back pain     Class 1 obesity due to excess calories without serious comorbidity with body mass index (BMI) of 30.0 to 30.9 in adult     Gastroesophageal reflux disease with esophagitis     Anxiety     Chronic constipation     Acute pain of left shoulder     Abdominal pain, right upper quadrant     History of food allergy     Other fatigue       FH:  Family History   Problem Relation Age of Onset     Mental Illness Mother         bipolar, schizophrenia     Anxiety Disorder Mother      Osteoporosis Mother      Thyroid Disease Mother      Diabetes Mother      Neurofibromatosis Mother         more likely fibromyalgia     Arthritis Mother      Back Pain Mother      Osteoarthritis Mother      Obesity Mother      Cirrhosis Mother         from fatty liver. with esophageal varices, ..     Gallbladder Disease Mother         cholecystectomy     Hypertension Father      Hyperlipidemia Father      Diabetes Father      Other Cancer Father 67        Neuroendocrine tumor, ? from gall bladder, stage 4  1/2018     Migraines Father      Scleroderma Father      Rheumatoid Arthritis Father      Obesity Father      Ankylosing Spondylitis Father      Macular Degeneration Father      Mental Illness Sister         bipolar     Anxiety Disorder Sister      Asthma Sister         eczema     Neurologic Disorder Sister         Cervical Dystonia, treated with Botox     Thyroid  Cancer Sister      Coronary Artery Disease Maternal Grandmother      Cerebrovascular Disease Maternal Grandmother      Breast Cancer Maternal Grandmother 65     Skin Cancer Maternal Grandmother      Stomach Cancer Maternal Grandmother         stomach, skin     Prostate Cancer Maternal Grandfather      Cancer Maternal Grandfather      Diabetes Paternal Grandmother      Kidney Cancer Paternal Grandmother      Coronary Artery Disease Paternal Grandfather      Colon Cancer Paternal Grandfather      Cancer Paternal Grandfather      Ovarian Cancer Maternal Aunt 68     Skin Cancer Maternal Aunt         melanoma     Scleroderma Paternal Aunt      Ankylosing Spondylitis Paternal Aunt      Ankylosing Spondylitis Paternal Uncle      Liver Cancer Other         uncle     Glaucoma No family hx of        SH:  Social History     Socioeconomic History     Marital status:      Spouse name: Not on file     Number of children: 1     Years of education: Not on file     Highest education level: Not on file   Occupational History     Comment: stylist/hairdresser   Tobacco Use     Smoking status: Never Smoker     Smokeless tobacco: Never Used   Substance and Sexual Activity     Alcohol use: Not Currently     Alcohol/week: 0.0 standard drinks     Comment: outside of pregnancy, social     Drug use: No     Sexual activity: Not Currently     Partners: Male     Birth control/protection: Abstinence   Other Topics Concern      Service No     Blood Transfusions No     Caffeine Concern No     Occupational Exposure Yes     Comment:      Hobby Hazards No     Sleep Concern Yes     Comment: harder to sleep now since pregnancy     Stress Concern Yes     Comment: lots of stress due to work and housing     Weight Concern Yes     Comment: has gained 10 lbs in the last month     Special Diet Yes     Comment: has many food allergies     Back Care Yes     Comment: disc problems, considering cortisone injections     Exercise No      Bike Helmet No     Seat Belt Yes     Self-Exams No     Parent/sibling w/ CABG, MI or angioplasty before 65F 55M? Yes   Social History Narrative     Not on file     Social Determinants of Health     Financial Resource Strain: Not on file   Food Insecurity: Not on file   Transportation Needs: Not on file   Physical Activity: Not on file   Stress: Not on file   Social Connections: Not on file   Intimate Partner Violence: Not on file   Housing Stability: Not on file       MEDS:  See EMR, reviewed  ALL:  See EMR, reviewed    REVIEW OF SYSTEMS:  CONSTITUTIONAL:NEGATIVE for fever, chills, change in weight  INTEGUMENTARY/SKIN: NEGATIVE for worrisome rashes, moles or lesions  EYES: NEGATIVE for vision changes or irritation  ENT/MOUTH: NEGATIVE for ear, mouth and throat problems  RESP:NEGATIVE for significant cough or SOB  BREAST: NEGATIVE for masses, tenderness or discharge  CV: NEGATIVE for chest pain, palpitations or peripheral edema  GI: NEGATIVE for nausea, abdominal pain, heartburn, or change in bowel habits  :NEGATIVE for frequency, dysuria, or hematuria  :NEGATIVE for frequency, dysuria, or hematuria  NEURO: NEGATIVE for weakness, dizziness or paresthesias  ENDOCRINE: NEGATIVE for temperature intolerance, skin/hair changes  HEME/ALLERGY/IMMUNE: NEGATIVE for bleeding problems  PSYCHIATRIC: NEGATIVE for changes in mood or affect      Objective: She has tenderness today broadly across the right Achilles tendon and its attachment at the musculature of her calf.  She is nontender over the distal Achilles tendon or the base of her heel at the plantar fascia.  I can appreciate no visible swelling compared to the nonaffected calf.  Homans' sign is negative.  I do not find any palpable cords or discrete tenderness in the bulk of the calf.  She will do a calf raise bilaterally with good strength but when she does so she will describe some discomfort in the calf and proximal Achilles on the right.  There is no swelling in  the popliteal space.  Straight leg raise is negative bilaterally.  Her extremity strength to flexion extension at hip, knee, ankle including toe strength and foot evertor strength are 5-5 symmetrically bilaterally.  Sensation and pulses are intact distally at the heel.  She has normal range of motion of the bilateral hips to internal rotation external rotation and abduction.    Assessment right-sided Achilles tendinitis and calf discomfort    Plan: She will use a night splint for the right lower extremity for the next 2 weeks.  She will see physical therapy for an eccentric training program for the calf and Achilles tendon.  She will follow-up if not improved.

## 2022-01-05 ENCOUNTER — PRE VISIT (OUTPATIENT)
Dept: ORTHOPEDICS | Facility: CLINIC | Age: 42
End: 2022-01-05

## 2022-01-23 ENCOUNTER — HEALTH MAINTENANCE LETTER (OUTPATIENT)
Age: 42
End: 2022-01-23

## 2022-01-26 ENCOUNTER — TELEPHONE (OUTPATIENT)
Dept: FAMILY MEDICINE | Facility: CLINIC | Age: 42
End: 2022-01-26
Payer: COMMERCIAL

## 2022-01-26 NOTE — TELEPHONE ENCOUNTER
Who is calling? Patient  Medication name: Strattera  Is this a refill request? No  Have they contacted the pharmacy?  No  Pharmacy:   [facility] [place] - [address]  [address] [place] [address]  Phone: [phone] Fax: [phone]    [facility] [place] - [place], [place] - [address] [address]  Phone: [phone] Fax: [phone]    [id number] - [place], [place] - [address]  [address] [place] [address]  Phone: [phone] Fax: [phone]    Question/Concern: Pt would like to discuss re-starting this medication. Stated Sara and her discussed mental health issues at recent visit  Would patient like a call back? Yes

## 2022-02-02 ENCOUNTER — THERAPY VISIT (OUTPATIENT)
Dept: PHYSICAL THERAPY | Facility: CLINIC | Age: 42
End: 2022-02-02
Attending: FAMILY MEDICINE
Payer: COMMERCIAL

## 2022-02-02 DIAGNOSIS — M76.60 ACHILLES TENDON PAIN: Primary | ICD-10-CM

## 2022-02-02 PROCEDURE — 97110 THERAPEUTIC EXERCISES: CPT | Mod: GP | Performed by: PHYSICAL THERAPIST

## 2022-02-02 PROCEDURE — 97161 PT EVAL LOW COMPLEX 20 MIN: CPT | Mod: GP | Performed by: PHYSICAL THERAPIST

## 2022-02-02 PROCEDURE — 97140 MANUAL THERAPY 1/> REGIONS: CPT | Mod: GP | Performed by: PHYSICAL THERAPIST

## 2022-02-02 NOTE — PROGRESS NOTES
[facility]    OUTPATIENT Physical Therapy ORTHOPEDIC EVALUATION  PLAN OF TREATMENT FOR OUTPATIENT REHABILITATION  (COMPLETE FOR INITIAL CLAIMS ONLY)  Patient's Last Name, First Name, M.I.  YOB: 1980  Priyanka Lundberg    Provider s Name:  [facility]   Medical Record No.  [id number]   Start of Care Date:  02/02/22   Onset Date:   12/29/21   Type:     _X__PT   ___OT Medical Diagnosis:    Encounter Diagnosis   Name Primary?     Achilles tendon pain Yes        Treatment Diagnosis:  R gastroc contractile dysfunction        Goals:     02/02/22 0500   Treating Provider   Treating Provider kristine KAPADIA Cert MDT   Contact Information   Procedure Code: The timed procedures billed today were performed sequentially within this encounter. Therapeutic Exercise;Neuromuscular Re-Education;Manual Therapy;Therapeutic Activities   Minutes: Therapeutic exercise 15   Minutes: Manual therapy 10   Rxs Authorized 6 PT   Rxs Used 1   Diagnosis R gastroc contractile dysfunction   Insurance Medicaid   SOC Date 02/02/22   Beginning of Cert date period 02/02/22   End of Cert period date 02/02/22   Therapy Frequency 1 x week   Predicted Duration of Therapy Intervention (days/wks) 6 weeks   DOI 12/29/21   Date SOAP completed 02/02/22   Date/PN needed/next MD appt 04/02/22   Subjective seeie   Objective seeie   Initial Pain level 6/10   Manual Therapy - Self mobs are part of the HEP unless otherwise noted   STM  Prone   Description lateral middle gastroc   Time 10 min   PTRX Therapeutic Exercise   Therapeutic Exercise 1 Seated Toe Raises/Heel Raises   Therapeutic Exercise Notes 1  HEP - Sets: 1 Reps: 15 Sessions per day: 2 x day No Notes   Therapeutic Exercise 2 Standing Gastroc Stretch   Therapeutic Exercise Notes 2  HEP - Sets: 1 Reps: 10 hold 5 sec Sessions per day: 2 x day No Notes    Therapeutic Exercise 3 Standing Soleus Stretch   Therapeutic Exercise Notes 3  HEP - Sets: 1 Reps: 10 hold for 5 sec Sessions per day: 2 x day No Notes       Therapy Frequency:  1 x week  Predicted Duration of Therapy Intervention:  6 weeks    Rekha Stout, PT                 I CERTIFY THE NEED FOR THESE SERVICES FURNISHED UNDER        THIS PLAN OF TREATMENT AND WHILE UNDER MY CARE     (Physician co-signature of this document indicates review and certification of the therapy plan).                       Certification Date From:  02/02/22   Certification Date To:  02/02/22    Referring Provider:  Nazario Nye    Initial Assessment        See Epic Evaluation SOC Date: 02/02/22

## 2022-02-02 NOTE — PROGRESS NOTES
"[facility] Initial Evaluation -- Lower Extremity    Evaluation Date: February 2, 2022  Priyanka Lundberg is a 41 year old female with a R achilles condition.   Referral: [facility]- ortho  Work mechanical stresses: self employed   Employment status:   Leisure mechanical stresses:   Functional disability score:   VAS score (0-10): 6  Patient goals/expectations:  Walk pain free and with normal gait.    HISTORY:    Present symptoms: R calf belly that radiates down to achilles and up to knee and inner thigh when she limps. At age 15 her back gave out\" bulging discs\" She has had lateral calf pain \"sciatica\" in the past BUT this different. Sx activated by pushing off with toes.  Pain quality (sharp/shooting/stabbing/aching/burning/cramping):  Bruised tight mm    Present since (onset date): 4+ months ago. MD order [id number].   Symptoms (improving/unchaning/worsening):  Improving after getting rid of Crocs but same since then    Symptoms commenced as a result of: running across the street she felt a sudden burn in calf  Condition occurred in the following environment: community    Symptoms at onset:  calf Paresthesia (yes/no):  no  Spinal history:    Cough/Sneeze (pos/neg):  neg    Constant symptoms: Y  Intermittent symptoms:     Symptoms are worse with the following: each time she pushes off with toes always,Other - push off stairs- sometimes  Symptoms are better with the following: nothing    Continued use makes the pain (better/worse/no effect): same    Disturbed night (yes/no): no     Pain at rest (yes/no):  no Site (back/hip/knee/ankle/foot):      Other questions (swelling/clicking/locking/giving way/falling):  none     Previous episodes: none  Previous treatments: none    Specific Questions:  General health (excellent/good/fair/poor): poor  Pertinent medical history includes: Overweight  Medications (nil/NSAIDS/analg/steroids/anticoag/other):  NSAIDS and Other - Sleep  Medical allergies:  none  Imaging " (none/Xray/MRI/other):    Recent or major surgery (yes/no):  no  Night pain (yes/no):  no  Accidents (yes/no):  no  Unexplained weight loss (yes/no):  na  Barriers at home: none  Other red flags: none    Sites for physical examination (back/hip/knee/ankle/foot/other): ankle    EXAMINATION    Posture:  Sitting (good/fair/poor): poor   Correction of Posture (better/worse/no effect/NA): na  Standing (good/fair/poor):   Other observations:      Neurological: (NA/motor/sensory/reflexes/dural):     Baselines (pain or functional activity): gait: pain with push off so she pushes with lateral foot    Extremities (Hip / Knee / Ankle / Foot): ankle    Movement Loss Robb Mod Min Nil Pain   Flexion        Extension        Abduction        Adduction        Internal Rotation        External Rotation        Dorsiflexion    +    Plantarflexion    +    Inversion    +    Eversion    +      Passive Movement (+/- over pressure)/(PDM/ERP):  No loss, end range tightness with standing G/S   Resisted Test Response (pain): wnl but pain with resisted PF, eversion  Other Tests: SLS with pain, worse standing bilat heel rise- produced , worse seated bilat heel rise- no effect, failure at 10 reps    Spine:  Movement loss:   Effect of repeated movements:   Effect of static positioning:   Spine testing (not relevant/relevant/secondary problem):     Baseline Symptoms:   Repeated Tests Symptom Response Mechanical Response   Active/Passive movement, resisted test, functional test During -  Produce, Abolish, Increase, Decrease, NE After -  Better, Worse, NB, NW, NE Effect -   ? or ? ROM, strength or key functional test No   Effect                                       Effect of static positioning                  Provisional Classification (Extremity/Spine):  Extremity - Dysfunction - Contractile      Princicple of Management:   Education:  Contractile dysfunction HEP avoid producing and making worse or if she is limping she needs to get off of her  feet   Equipment provided:    Exercise and dosage:      ASSESSMENT/PLAN:    Patient is a 41 year old female with right side ankle complaints.    Patient has the following significant findings with corresponding treatment plan.                Diagnosis 1:  gastroc dysfunction  Pain -  manual therapy, splint/taping/bracing/orthotics, self management, education and home program  Decreased ROM/flexibility - manual therapy, therapeutic exercise, therapeutic activity and home program  Decreased joint mobility - manual therapy, therapeutic exercise, therapeutic activity and home program  Decreased strength - therapeutic exercise, therapeutic activities and home program  Decreased proprioception - neuro re-education, gait training, therapeutic activities and home program  Impaired gait - gait training and home program  Decreased function - therapeutic activities and home program        Previous and current functional limitations:  (See Goal Flow Sheet for this information)    Short term and Long term goals: (See Goal Flow Sheet for this information)     Communication ability:  Patient appears to be able to clearly communicate and understand verbal and written communication and follow directions correctly.  Treatment Explanation - The following has been discussed with the patient:   RX ordered/plan of care  Anticipated outcomes  Possible risks and side effects  This patient would benefit from PT intervention to resume normal activities.   Rehab potential is good.    Frequency:  1 X week, once daily  Duration:  for 6 weeks  Discharge Plan:  Achieve all LTG.  Independent in home treatment program.    Please refer to the daily flowsheet for treatment today, total treatment time and time spent performing 1:1 timed codes.

## 2022-02-09 ENCOUNTER — THERAPY VISIT (OUTPATIENT)
Dept: PHYSICAL THERAPY | Facility: CLINIC | Age: 42
End: 2022-02-09
Attending: FAMILY MEDICINE
Payer: COMMERCIAL

## 2022-02-09 DIAGNOSIS — M76.60 ACHILLES TENDON PAIN: Primary | ICD-10-CM

## 2022-02-09 PROCEDURE — 97140 MANUAL THERAPY 1/> REGIONS: CPT | Mod: GP | Performed by: PHYSICAL THERAPIST

## 2022-02-09 PROCEDURE — 97112 NEUROMUSCULAR REEDUCATION: CPT | Mod: GP | Performed by: PHYSICAL THERAPIST

## 2022-02-09 PROCEDURE — 97530 THERAPEUTIC ACTIVITIES: CPT | Mod: GP | Performed by: PHYSICAL THERAPIST

## 2022-03-02 ENCOUNTER — THERAPY VISIT (OUTPATIENT)
Dept: PHYSICAL THERAPY | Facility: CLINIC | Age: 42
End: 2022-03-02
Payer: COMMERCIAL

## 2022-03-02 DIAGNOSIS — M76.60 ACHILLES TENDON PAIN: Primary | ICD-10-CM

## 2022-03-02 PROCEDURE — 97140 MANUAL THERAPY 1/> REGIONS: CPT | Mod: GP | Performed by: PHYSICAL THERAPIST

## 2022-03-02 PROCEDURE — 97530 THERAPEUTIC ACTIVITIES: CPT | Mod: GP | Performed by: PHYSICAL THERAPIST

## 2022-03-02 NOTE — PROGRESS NOTES
"DISCHARGE REPORT    Progress reporting period is from [id number] to [id number].     SUBJECTIVE  Subjective changes noted by patient:  Subjective: \"My foot pain is 100% due to shoes- the soft shoes are bad. the lace up Dansko work the best.\" She reports cramps in ball of foot, calf tightness. She has not thought about her foot in past few days because her mother was Dx with CA so has not done exercises.       .     Initial Pain level: 6/10.   Changes in function:  Yes (See Goal flowsheet attached for changes in current functional level)  Adverse reaction to treatment or activity: None    OBJECTIVE  Objective: gr toe ext mod loss medial calf is overactive, ttps. forefoot hypomobile- mainly 3,4,5. gait: normal with effort.       ASSESSMENT/PLAN  Updated problem list and treatment plan:   Diagnosis 1:  Foot pain  Pain -  home program  Decreased joint mobility - home program  Decreased function - home program  STG/LTGs have been met or progress has been made towards goals:  Yes (See Goal flow sheet completed today.)  Assessment of Progress: The patient's condition is improving.  Self Management Plans:  Patient has been instructed in a home treatment program.    Priyanka continues to require the following intervention to meet STG and LTG's:  PT intervention is no longer required to meet STG/LTG.    Recommendations:  This patient is ready to be discharged from therapy and continue their home treatment program.  "

## 2022-04-20 DIAGNOSIS — N64.4 BREAST PAIN, LEFT: Primary | ICD-10-CM

## 2022-04-28 ENCOUNTER — OFFICE VISIT (OUTPATIENT)
Dept: OPHTHALMOLOGY | Facility: CLINIC | Age: 42
End: 2022-04-28
Payer: COMMERCIAL

## 2022-04-28 DIAGNOSIS — H52.13 MYOPIA OF BOTH EYES WITH ASTIGMATISM AND PRESBYOPIA: ICD-10-CM

## 2022-04-28 DIAGNOSIS — H52.203 MYOPIA OF BOTH EYES WITH ASTIGMATISM AND PRESBYOPIA: ICD-10-CM

## 2022-04-28 DIAGNOSIS — H52.4 MYOPIA OF BOTH EYES WITH ASTIGMATISM AND PRESBYOPIA: ICD-10-CM

## 2022-04-28 DIAGNOSIS — H52.32 ANISOMETROPIA AND ANISEIKONIA: Primary | ICD-10-CM

## 2022-04-28 DIAGNOSIS — H52.31 ANISOMETROPIA AND ANISEIKONIA: Primary | ICD-10-CM

## 2022-04-28 PROCEDURE — 92012 INTRM OPH EXAM EST PATIENT: CPT | Performed by: OPTOMETRIST

## 2022-04-28 ASSESSMENT — KERATOMETRY
OS_AXISANGLE_DEGREES: 086
OD_AXISANGLE_DEGREES: 080
OD_AXISANGLE2_DEGREES: 170
OS_AXISANGLE2_DEGREES: 176
OD_K2POWER_DIOPTERS: 46.25
OD_K1POWER_DIOPTERS: 44.25
OS_K2POWER_DIOPTERS: 47.50
OS_K1POWER_DIOPTERS: 43.75
METHOD_AUTO_MANUAL: AUTO-K

## 2022-04-28 ASSESSMENT — REFRACTION_CURRENTRX
OS_BRAND: DUETTE
OD_SPHERE: -3.00
OS_DIAMETER: 14.5
OS_SPHERE: -3.00
OD_BRAND: DUETTE
OD_BASECURVE: 7.3
OS_BASECURVE: 7.1
OD_DIAMETER: 14.5

## 2022-04-28 ASSESSMENT — REFRACTION_MANIFEST
OS_AXIS: 080
OD_AXIS: 081
OS_SPHERE: -9.00
OS_CYLINDER: +4.00
OD_CYLINDER: +2.00
OD_SPHERE: -7.25
METHOD_AUTOREFRACTION: 1

## 2022-04-28 ASSESSMENT — VISUAL ACUITY
OS_CC+: -2
OD_CC: 20/20
CORRECTION_TYPE: CONTACTS
METHOD: SNELLEN - LINEAR
OS_CC: 20/20

## 2022-04-28 ASSESSMENT — REFRACTION_WEARINGRX
OS_AXIS: 087
OS_ADD: +1.25
OD_ADD: +1.25
OS_SPHERE: -9.25
OD_SPHERE: -7.50
OD_CYLINDER: +2.00
OD_AXIS: 090
OS_CYLINDER: +4.50

## 2022-04-28 ASSESSMENT — CONF VISUAL FIELD
OD_NORMAL: 1
OS_NORMAL: 1
METHOD: COUNTING FINGERS

## 2022-04-28 NOTE — NURSING NOTE
Chief Complaints and History of Present Illnesses   Patient presents with     Follow Up     Chief Complaint(s) and History of Present Illness(es)     Follow Up     Laterality: both eyes    Associated symptoms: Negative for haloes, flashes, floaters and itching              Comments     Patient present for contact lens recheck. Patient states after wearing lenses for a few hours they start burning. Patient states she cannot read close up at all and distance is not great.     Bibi Guy, JASMINA April 28, 2022 2:49 PM

## 2022-04-29 ASSESSMENT — EXTERNAL EXAM - RIGHT EYE: OD_EXAM: NORMAL

## 2022-04-29 ASSESSMENT — EXTERNAL EXAM - LEFT EYE: OS_EXAM: NORMAL

## 2022-04-29 ASSESSMENT — SLIT LAMP EXAM - LIDS
COMMENTS: NORMAL
COMMENTS: NORMAL

## 2022-04-29 NOTE — PROGRESS NOTES
A/P  1.) High Myopia/Astigmatism with Aniso OS>OD  -Corrects well in spec Rx, but with significant strain and peripheral distortion.  -Failed standard soft torics (Biofinity, Proclear, AV Oasys, Ultra) with rotation left eye>>right eye. Failed custom soft toric  -Large HVID (12mm right, 13mm left)  -Trial with Duette today, decent initial vision/comfort/fit. Successful I&R - reviewed CL care/hygiene. Option to add progressive for near vision if these work well  -Can also consider scleral lens if these fail - with larger HVID I would recommend starting with 16.0 diam and toric PC's. Some base info given today    Order trials and mail direct. F/u prn pending CL results    I have confirmed where necessary the patient's CC, HPI and reviewed Past Medical History, Past Surgical History, Social History, Family History, Problem List, Medication List and agree with Tech note.     Jodie Oliver, OD FAAO FSLS

## 2022-05-17 NOTE — PROGRESS NOTES
Patient contact and is unable to make her appointment today. She will reschedule for screening mammogram with same day clinic visit with me.     Mary Damon PA-C

## 2022-05-19 ENCOUNTER — OFFICE VISIT (OUTPATIENT)
Dept: SURGERY | Facility: CLINIC | Age: 42
End: 2022-05-19
Attending: PHYSICIAN ASSISTANT
Payer: COMMERCIAL

## 2022-05-19 ENCOUNTER — PATIENT OUTREACH (OUTPATIENT)
Dept: ONCOLOGY | Facility: CLINIC | Age: 42
End: 2022-05-19

## 2022-05-19 VITALS
HEART RATE: 75 BPM | TEMPERATURE: 98.6 F | OXYGEN SATURATION: 97 % | BODY MASS INDEX: 35.07 KG/M2 | DIASTOLIC BLOOD PRESSURE: 63 MMHG | SYSTOLIC BLOOD PRESSURE: 95 MMHG | WEIGHT: 220.8 LBS

## 2022-05-19 DIAGNOSIS — Z91.89 AT HIGH RISK FOR BREAST CANCER: ICD-10-CM

## 2022-05-19 DIAGNOSIS — Z80.41 FAMILY HISTORY OF OVARIAN CANCER: ICD-10-CM

## 2022-05-19 DIAGNOSIS — Z80.3 FAMILY HISTORY OF BREAST CANCER: Primary | ICD-10-CM

## 2022-05-19 DIAGNOSIS — Z12.39 BREAST CANCER SCREENING, HIGH RISK PATIENT: ICD-10-CM

## 2022-05-19 PROCEDURE — 99214 OFFICE O/P EST MOD 30 MIN: CPT | Performed by: PHYSICIAN ASSISTANT

## 2022-05-19 PROCEDURE — G0463 HOSPITAL OUTPT CLINIC VISIT: HCPCS

## 2022-05-19 ASSESSMENT — PAIN SCALES - GENERAL: PAINLEVEL: NO PAIN (1)

## 2022-05-19 NOTE — LETTER
5/19/2022         RE: Priyanka Lundberg  [address] [place] [address]        Dear Colleague,    Thank you for referring your patient, Priyanka Lundberg, to the [facility]. Please see a copy of my visit note below.    FOLLOW UP  May 19, 2022     Priyanka Lundberg is a 42 year old woman who presents with family history of breast cancer and presents today with continued left breast pain. She was referred by Dr. Ruiz.     HPI:     She was seen in 2020 for left breast pain, non spontaneous mutliductal right milky nipple discharge, right subclavicular mass, right axillary mass. Imaging with mammogram and ultrasound at that time was negative. Prolactin was 8.    In September she had mammogram and ultrasound for right breast pain 9:00 13 cm from the nipple and left axillary adenopathy on shoulder MRI without concerning findings.      She has a stable left breast sebaceous cyst, currently asymptomatic. When is flares it does cause breast pain.        Her mother was recently diagnosed with breast cancer. She has a maternal grandmother who had breast cancer, gastric cancer and skin cancer. Her maternal aunt had ovarian cancer and melanoma. See full family history below. She was referred to genetic counseling but has not seen them. Her mother is not able/willing to get genetic testing.     Today she reports continued intermittent left breast pain in the superior and inferior portions of her left breast. The pain has been worse in the last 2 months. She wonders if her breast pain could be related to her stress and anxiety. 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 She also has a 5 year old and her  has a hematology condition. She also reports continued bilateral multiducal white/clear discharge that occurs with massage and expression of the breast. She denies any breast mass, skin change, nipple inversion or nipple discharge.     BREAST-SPECIFIC  HISTORY:     Previous breast imaging: Yes   - 10 b/l Dmammo for nipple cyst and right axillary lump: BI-RADS 0  - 14 left breast ultrasound: BI-RADS 2  - 17 right axillary ultrasound for lump: BI-RADS 2  - 17 b/l Dmammo and right ultrasound: BI-RADS 2  - 16 b/l Dmammo and left axillary ultrasound: BI-RADS 1  - 20 b/l Dmammo and ultrasound BI-RADS 1  - 19 b/l Dmammo and right breast ultrasound for axillary pain and left axillary adenopathy on shoulder MRI: BI-RADS 2     Prior breast biopsies/surgeries: Yes   - left axillary     Prior history of breast cancer: No  Prior radiation history: No   Self breast exams: Yes  Breast density: scattered fibroglandular densities     GYN HISTORY:  . Age at 1st pregnancy: 36. Breastfeeding history: Yes.   Age at menarche: 9  Menopausal: premenopausal   Menopausal hormone replacement therapy: no     RISK ASSESSMENT: < 5% 5 year risk and > 20% lifetime risk   Roxy: 1.7% lifetime risk   JUDAH: 32.3% lifetime risk   Quinten: 18.1%     FAMILY HISTORY:  Breast ca: Yes   - mom 67  - maternal grandmother with breast cancer diagnosed in her 40-60's, stomach cancer and skin cancer.  Ovarian ca: Yes   - maternal aunt, also with skin cancer  Pancreatic ca: No  Prostate: Yes   - maternal grandfather  Gastric ca: Yes   -maternal grandmother with stomach cancer, breast cancer and skin cancer  Melanoma: Yes   - maternal aunt   Kidney cancer: yes  - paternal grandmother  Colon ca: Yes   - paternal grandfather   Other cancer: Yes   - father with neuroendocrine tumor of the gallbladder  - sister with thyroid cancer  - maternal uncle with liver cancer  Other genetic, testing, syndromes, or clotting disorders: no  - no one has had genetic testing     PAST MEDICAL HISTORY  Past Medical History:   Diagnosis Date     Abdominal pain 2011    abnormal histamine problem, multiple food allergies     ADHD (attention deficit hyperactivity disorder)     on aderol     Anxiety  and depression     on xanax     Arthritis      Breathing problem 2007    shortness of breath after eating, ?allergies     Chronic nausea      Gastroesophageal reflux disease      Heart murmur     closed by time she was 1     Hx of abnormal cervical Pap smear 2011     IBS (irritable bowel syndrome)     lots of mucus in bowel with occassional bleeding     Joint pain 2015    was to have rheumatologist     Kidney stone on right side 2010     Meniere's disease     hyperacusis     Migraines      Reduced vision      Tinnitus      PAST SURGICAL HISTORY   Past Surgical History:   Procedure Laterality Date     COLONOSCOPY N/A 2/14/2018    Procedure: COMBINED COLONOSCOPY, SINGLE OR MULTIPLE BIOPSY/POLYPECTOMY BY BIOPSY;  colon and egd;  Surgeon: Joan Willson MD;  Location: [facility] OR     ESOPHAGOSCOPY, GASTROSCOPY, DUODENOSCOPY (EGD), COMBINED N/A 2/14/2018    Procedure: COMBINED ESOPHAGOSCOPY, GASTROSCOPY, DUODENOSCOPY (EGD), BIOPSY SINGLE OR MULTIPLE;;  Surgeon: Joan Willson MD;  Location: [facility] OR     NO HISTORY OF SURGERY       MEDICATIONS  Current Outpatient Medications   Medication Sig Dispense Refill     hydrocortisone, Perianal, (HYDROCORTISONE) 2.5 % cream Place rectally 2 times daily as needed for hemorrhoids 30 g 1     ibuprofen (ADVIL/MOTRIN) 200 MG tablet Take 400 mg by mouth every 4 hours as needed for mild pain 2 tab every am       mometasone (ELOCON) 0.1 % external ointment Apply topically twice a week On weekends on itchy areas on body and particularly on fingers (Patient not taking: No sig reported) 45 g 1     ALLERGIES  Allergies   Allergen Reactions     [place]      Food      Cantaloupe, cucumbers, green beans, and peppers.      [place] (Weed)      Lemon Flavor      Mustard Seed      Onion Difficulty breathing and GI Disturbance     [place] GI Disturbance     [place] Grasses      Vanilla GI Disturbance        SOCIAL HISTORY:  Smokes: No  EtOH: No  Exercise: pacing     ROS:  Change in vision  No  Headaches: no  Respiratory: No shortness of breath, dyspnea on exertion, cough, or hemoptysis   Cardiovascular: negative   Gastrointestinal: negative Abdominal pain: no  Breast: left breast pain  Musculoskeletal: negative Joint pain No Back pain: no  Psychiatric: negative  Hematologic/Lymphatic/Immunologic: negative  Endocrine: negative    EXAM  BP 95/63 (BP Location: Right arm, Patient Position: Fowlers, Cuff Size: Adult Large)   Pulse 75   Temp 98.6  F (37  C) (Oral)   Wt 100.2 kg (220 lb 12.8 oz)   LMP 05/12/2022   SpO2 97%   BMI 35.07 kg/m     PHYSICAL EXAM  Respiratory: breathing non labored.   Breasts: Examination was done in both the upright and supine positions.  Breasts are symmetrical . No dominant fixed or suspicious masses noted. No skin or nipple changes. No nipple discharge.   No clavicular, cervical, or axillary lymphadenopathy.   ASSESSMENT/PLAN:    Priyanka Lundberg is a 42 year old woman with family history of breast cancer and chronic breast pain.     1) Family history of breast cancer  Discussed she meets NCCN guidelines for high risk screening based on family history with lifetime risk for breast cancer of >20%. Screening recommendations based on NCCN guidelines. Be familiar with your breast and promptly report any changes to your health care provider. Clinical encounter every 6-12 months starting now (but not prior to age 21). Annual mammogram with alexandre starting 10 years prior to the youngest family member but not less than age 30 or age 40 ( whichever comes first). Annual breast MRI to begin 10 years prior to the youngest family member diagnosed but not less than 25 years old or age 40 ( whichever comes first).  Counseling was provided with available strategies including lifestyle modifications and risk reducing intervention.   - Breast MRI ordered  - Screening mammogram with clinic visit due: 9/2/22  - Referral to Genetic Counseling    2) Counseling was provided with available  strategies including lifestyle modifications and risk reducing intervention.   - Maintain your best healthy weight. Higher body fat and adult weight gain is associated with increased risk for breast cancer. This increase in risk has been attributed to increase in circulating endogenous estrogen levels from fat tissue.   - Alcohol can raise estrogen. Alcohol consumption, even at moderate levels (1-2 drinks per day), increases breast cancer risk and are best avoided. If you choose to drink alcohol limit alcohol consumption to less than 1 drink per day. (1 ounce of liquor, 6 ounces of wine, or 8 ounces of beer).  - Be active daily and void being sedentary.     3) Breast pain  Discussed breast pain and management strategies provided.     4) Nipple discharge  Discussed nipple discharge is common. Benign nipple discharge is usually bilateral, multiductal, and occurs with breast manipulation. Conversely, the risk of cancer is higher when the discharge is spontaneous, bloody, clear, unilateral, uniductal, associated with a breast mass, and/or occurs in a woman over 40 years of age.   - Educated to stop compression of the breast and report any spontaneous discharge.         30 minutes spent on the date of the encounter doing chart review, review of test results, interpretation of tests, patient visit and documentation.         Again, thank you for allowing me to participate in the care of your patient.      Sincerely,    Mary Damon PA-C

## 2022-05-19 NOTE — PATIENT INSTRUCTIONS
Priyanka Lundberg is a 42 year old woman with family history of breast cancer and chronic breast pain.     1) Family history of breast cancer  Discussed she meets NCCN guidelines for high risk screening based on family history with lifetime risk for breast cancer of >20%. Screening recommendations based on NCCN guidelines. Be familiar with your breast and promptly report any changes to your health care provider. Clinical encounter every 6-12 months starting now (but not prior to age 21). Annual mammogram with alexandre starting 10 years prior to the youngest family member but not less than age 30 or age 40 ( whichever comes first). Annual breast MRI to begin 10 years prior to the youngest family member diagnosed but not less than 25 years old or age 40 ( whichever comes first).  Counseling was provided with available strategies including lifestyle modifications and risk reducing intervention.   - Breast MRI ordered  - Screening mammogram with clinic visit due: 9/2/22  - Referral to Genetic Counseling    2) Counseling was provided with available strategies including lifestyle modifications and risk reducing intervention.   - Maintain your best healthy weight. Higher body fat and adult weight gain is associated with increased risk for breast cancer. This increase in risk has been attributed to increase in circulating endogenous estrogen levels from fat tissue.   - Alcohol can raise estrogen. Alcohol consumption, even at moderate levels (1-2 drinks per day), increases breast cancer risk and are best avoided. If you choose to drink alcohol limit alcohol consumption to less than 1 drink per day. (1 ounce of liquor, 6 ounces of wine, or 8 ounces of beer).  - Be active daily and void being sedentary.     3) Breast pain   Breast pain is common. Up to 70% of women will experience breast pain in their lifetime.   Breast pain is most often related to normal physiologic change (hormonal fluctuations) and is a rare symptom of breast  cancer.     Types of breast pain  Cyclical  Associated with normal hormonal fluctuations of the menstrual cycle, usually presenting in the week prior to menses. Usually bilateral and can be most severe in the upper outer quadrant of the breast.   Can be associated with hormone therapy or oral contraceptive pills.   Non cyclical   May be constant or intermittent and is more likely to be unilateral and variable in its location.   Extramammary   Can be referred pain from chest wall or musculoskeletal disorders.     Management of best pain  Evaluation with imaging is important to determine if the pain is due to normal physiologic changes related to hormonal fluctuations or to a pathologic process.   Physical support  Wear a supportive Bra that is professionally fit and sized. Be sure your bra is not overly compressive or restrictive.   Wear a sports bra when exercising.   Wear comfortable breast support while sleeping.   Improving omega 3 fatty acid intake may improve breast pain. Eat 1-2 tablespoons of flaxseed daily.   Plant-based diets may help with breast pain.  Breast massage. (see handout)   Warm compresses or ice packs can be used  Acupuncture may help with breast pain.   [facility]   Consider eliminating or decrease caffeine (chocolate, tea, coffee, soda) for a two week period of time to see if pain improves/resolves  Evening Primrose Oil starting with 500 mg/day. May increase to 1000 mg 3 times daily for 6 months. Can be tried.   Chasteberry 400-500 mg/day can be tried.   If you are on hormone based medications adjusting or decreasing these medications may help.   If needed over the counter analgesics such as acetaminophen, ibuprofen, or topical diclofenac cream can intermittently be used.     4) Nipple discharge  Discussed nipple discharge is common. Benign nipple discharge is usually bilateral, multiductal, and occurs with breast manipulation. Conversely, the risk of cancer  is higher when the discharge is spontaneous, bloody, clear, unilateral, uniductal, associated with a breast mass, and/or occurs in a woman over 40 years of age.   - Educated to stop compression of the breast and report any spontaneous discharge.

## 2022-05-19 NOTE — NURSING NOTE
"Oncology Rooming Note    May 19, 2022 8:15 AM   Priyanka Lundberg is a 42 year old female who presents for:    Chief Complaint   Patient presents with     Oncology Clinic Visit     Rtn Breast CA      Initial Vitals: BP 95/63 (BP Location: Right arm, Patient Position: Fowlers, Cuff Size: Adult Large)   Pulse 75   Temp 98.6  F (37  C) (Oral)   Wt 100.2 kg (220 lb 12.8 oz)   LMP 05/12/2022   SpO2 97%   BMI 35.07 kg/m   Estimated body mass index is 35.07 kg/m  as calculated from the following:    Height as of 12/28/21: 1.69 m (5' 6.54\").    Weight as of this encounter: 100.2 kg (220 lb 12.8 oz). Body surface area is 2.17 meters squared.  No Pain (1) Comment: Arthritis   Patient's last menstrual period was 05/12/2022.  Allergies reviewed: Yes  Medications reviewed: Yes    Medications: Medication refills not needed today.  Pharmacy name entered into EPIC:    [email or web address] [place] [address] [facility] [place] [address] [id number] - [place] - [address] [facility] [address]    Clinical concerns: Pt thought she was having labs of some sort today, and has questions about that. Pt is experiencing some Left Breast Pain. Pt has no other concerns for their provider on May 19, 2022 and would like to discuss the original chief complaint of the appointment.     Emily Beltran, EMT on May 19, 2022 at 8:17 AM               "

## 2022-08-11 ENCOUNTER — OFFICE VISIT (OUTPATIENT)
Dept: DERMATOLOGY | Facility: CLINIC | Age: 42
End: 2022-08-11
Payer: COMMERCIAL

## 2022-08-11 DIAGNOSIS — L73.9 FOLLICULITIS: Primary | ICD-10-CM

## 2022-08-11 DIAGNOSIS — L82.1 SEBORRHEIC KERATOSIS: ICD-10-CM

## 2022-08-11 DIAGNOSIS — D18.01 CHERRY ANGIOMA: ICD-10-CM

## 2022-08-11 DIAGNOSIS — L81.4 SOLAR LENTIGO: ICD-10-CM

## 2022-08-11 DIAGNOSIS — D22.9 MULTIPLE BENIGN NEVI: ICD-10-CM

## 2022-08-11 PROCEDURE — 99204 OFFICE O/P NEW MOD 45 MIN: CPT | Performed by: DERMATOLOGY

## 2022-08-11 ASSESSMENT — PAIN SCALES - GENERAL: PAINLEVEL: NO PAIN (0)

## 2022-08-11 NOTE — NURSING NOTE
Dermatology Rooming Note    Priyanka Lundberg's goals for this visit include:   Chief Complaint   Patient presents with     Skin Check     Priyanka is her for a skin check. She has discoloration of skin on her face and jaw she would like assessed.     Rosa Isela Darden, Facilitator

## 2022-08-11 NOTE — PROGRESS NOTES
"[facility] Dermatology Note  Encounter Date: Aug 11, 2022  Office Visit     Dermatology Problem List:  1. Acne vulgaris  2. Family history of melanoma  ____________________________________________    Assessment & Plan:    # Acne/folliculitis +/- acne excorie. Chronic, flare.  Discussed etiology and potential treatment options. Patient would like to avoid systemic agents for now. Discussed dilute bleachs, though patient states she has prior had an \"allergy\" to chlorine. Will start with topical chlorhexidine washes in the shower daily and consider re-discussion of systemic agents at next visit.  - Start hibiclen 4% liquid daily.      # Benign lesions: Multiple benign nevi, solar lentigos, seborrheic keratoses, cherry angiomas. Explained to patient benign nature of lesion. No treatment is necessary at this time unless the lesion changes or becomes symptomatic.   - ABCDs of melanoma were discussed and self skin checks were advised.  - Sun precaution was advised including the use of sun screens of SPF 30 or higher, sun protective clothing, and avoidance of tanning beds.    Procedures Performed:   None  Follow-up: 3 month(s) in-person, or earlier for new or changing lesions    Staff and Scribe:     Scribe Disclosure:  I, Cayetano Ko, am serving as a scribe to document services personally performed by Rubens Bryant MD based on data collection and the provider's statements to me.     Provider Disclosure:   The documentation recorded by the scribe accurately reflects the services I personally performed and the decisions made by me.    Rubens Bryant MD    Department of Dermatology  [facility]: Phone: [phone], Fax:[phone]  [facility]: Phone: [phone] Fax: [phone]  ____________________________________________    CC: Skin Check (Priyanka is her for a skin " check. She has discoloration of skin on her face and jaw she would like assessed.)    HPI:  Ms. Priyanka Lundberg is a(n) 42 year old female who presents today as a new patient for acne/folliculitis and FBSE. Last seen in dermatology by Dr. Ramos on 6/6/2017c, at which time patient was started on clindamycin lotion daily prn and bleach baths 2-3x per week for treatment of acne vulgaris.    Today, patient reports:    1. Few brown spots on the thigh she would like examined. Otherwise, no concerning lesions.   2. Acne/folliculitis on the face, trunk, extremities, buttocks. Has had for many years. Tried numerous therapies including topical antibiotic, topical retinoids, oral antibiotics. She is overall hesitant to pursue systemic therapies as she would like to avoid these and take a more natural approach. Not currently treating.     The patient otherwise denies any new or concerning lesions. No bleeding, painful, pruritic, or changing lesions. They report no personal history of skin cancer. There is a family history of skin cancer in multiple aunts and uncles (unsure which type). No history of immunosuppression. No history of indoor tanning. They do use sunscreen and protective clothing when outdoors for sun protection.. Health otherwise stable. No other skin concerns.       Patient is otherwise feeling well, without additional skin concerns.    Labs Reviewed:  N/A    Physical Exam:  Vitals: There were no vitals taken for this visit.  SKIN: Full skin, which includes the head/face, both arms, chest, back, abdomen,both legs, genitalia and/or groin buttocks, digits and/or nails, was examined.  - Brown macules on sun exposed areas  - Brown waxy, stuck-on appearing papules on face, trunk, and extremities.   - Brown macules and papules on trunk and extremities without concerning dermoscopy features  - Red vascular papules on face, trunk and extremities.   - Numerous acneiform or follicular based, excoriated papules on the  bilateral upper and lower extremities, face, buttocks.   - No other lesions of concern on areas examined.     Medications:  Current Outpatient Medications   Medication     chlorhexidine (HIBICLENS) 4 % liquid     hydrocortisone, Perianal, (HYDROCORTISONE) 2.5 % cream     ibuprofen (ADVIL/MOTRIN) 200 MG tablet     mometasone (ELOCON) 0.1 % external ointment     No current facility-administered medications for this visit.      Past Medical History:   Patient Active Problem List   Diagnosis     Joint pain     L4-L5 disc bulge     Attention deficit hyperactivity disorder (ADHD), combined type     Vaginal discharge     History of abnormal cervical Pap smear     Other acne     Benign nevus     History of Clostridium difficile colitis     Cervicalgia     Bilateral thoracic back pain     Class 1 obesity due to excess calories without serious comorbidity with body mass index (BMI) of 30.0 to 30.9 in adult     Gastroesophageal reflux disease with esophagitis     Anxiety     Chronic constipation     Acute pain of left shoulder     Abdominal pain, right upper quadrant     History of food allergy     Other fatigue     Achilles tendon pain     Past Medical History:   Diagnosis Date     Abdominal pain 2011    abnormal histamine problem, multiple food allergies     ADHD (attention deficit hyperactivity disorder)     on aderol     Anxiety and depression     on xanax     Arthritis      Breathing problem 2007    shortness of breath after eating, ?allergies     Chronic nausea      Gastroesophageal reflux disease      Heart murmur     closed by time she was 1     Hx of abnormal cervical Pap smear 2011     IBS (irritable bowel syndrome)     lots of mucus in bowel with occassional bleeding     Joint pain 2015    was to have rheumatologist     Kidney stone on right side 2010     Meniere's disease     hyperacusis     Migraines      Reduced vision      Tinnitus

## 2022-08-11 NOTE — LETTER
"8/11/2022       RE: Priyanka Lundberg  [address] [place] [address]     Dear Colleague,    Thank you for referring your patient, Priyanka Lundberg, to the [facility] DERMATOLOGY [facility] [place] at [facility]. Please see a copy of my visit note below.    [facility] Dermatology Note  Encounter Date: Aug 11, 2022  Office Visit     Dermatology Problem List:  1. Acne vulgaris  2. Family history of melanoma  ____________________________________________    Assessment & Plan:    # Acne/folliculitis +/- acne excorie. Chronic, flare.  Discussed etiology and potential treatment options. Patient would like to avoid systemic agents for now. Discussed dilute bleachs, though patient states she has prior had an \"allergy\" to chlorine. Will start with topical chlorhexidine washes in the shower daily and consider re-discussion of systemic agents at next visit.  - Start hibiclen 4% liquid daily.      # Benign lesions: Multiple benign nevi, solar lentigos, seborrheic keratoses, cherry angiomas. Explained to patient benign nature of lesion. No treatment is necessary at this time unless the lesion changes or becomes symptomatic.   - ABCDs of melanoma were discussed and self skin checks were advised.  - Sun precaution was advised including the use of sun screens of SPF 30 or higher, sun protective clothing, and avoidance of tanning beds.    Procedures Performed:   None  Follow-up: 3 month(s) in-person, or earlier for new or changing lesions    Staff and Scribe:     Scribe Disclosure:  I, Cayetano Ko, am serving as a scribe to document services personally performed by Rubens Bryant MD based on data collection and the provider's statements to me.     Provider Disclosure:   The documentation recorded by the scribe accurately reflects the services I personally performed and the decisions made by me.    Rubens Bryant MD  Assistant " Professor  Department of Dermatology  [facility]: Phone: [phone], Fax:[phone]  [facility]: Phone: [phone] Fax: [phone]  ____________________________________________    CC: Skin Check (Priyanka is her for a skin check. She has discoloration of skin on her face and jaw she would like assessed.)    HPI:  Ms. Priyanka Lundberg is a(n) 42 year old female who presents today as a new patient for acne/folliculitis and FBSE. Last seen in dermatology by Dr. Ramos on 6/6/2017c, at which time patient was started on clindamycin lotion daily prn and bleach baths 2-3x per week for treatment of acne vulgaris.    Today, patient reports:    1. Few brown spots on the thigh she would like examined. Otherwise, no concerning lesions.   2. Acne/folliculitis on the face, trunk, extremities, buttocks. Has had for many years. Tried numerous therapies including topical antibiotic, topical retinoids, oral antibiotics. She is overall hesitant to pursue systemic therapies as she would like to avoid these and take a more natural approach. Not currently treating.     The patient otherwise denies any new or concerning lesions. No bleeding, painful, pruritic, or changing lesions. They report no personal history of skin cancer. There is a family history of skin cancer in multiple aunts and uncles (unsure which type). No history of immunosuppression. No history of indoor tanning. They do use sunscreen and protective clothing when outdoors for sun protection.. Health otherwise stable. No other skin concerns.       Patient is otherwise feeling well, without additional skin concerns.    Labs Reviewed:  N/A    Physical Exam:  Vitals: There were no vitals taken for this visit.  SKIN: Full skin, which includes the head/face, both arms, chest, back, abdomen,both legs, genitalia and/or groin buttocks, digits and/or nails, was  examined.  - Brown macules on sun exposed areas  - Brown waxy, stuck-on appearing papules on face, trunk, and extremities.   - Brown macules and papules on trunk and extremities without concerning dermoscopy features  - Red vascular papules on face, trunk and extremities.   - Numerous acneiform or follicular based, excoriated papules on the bilateral upper and lower extremities, face, buttocks.   - No other lesions of concern on areas examined.     Medications:  Current Outpatient Medications   Medication     chlorhexidine (HIBICLENS) 4 % liquid     hydrocortisone, Perianal, (HYDROCORTISONE) 2.5 % cream     ibuprofen (ADVIL/MOTRIN) 200 MG tablet     mometasone (ELOCON) 0.1 % external ointment     No current facility-administered medications for this visit.      Past Medical History:   Patient Active Problem List   Diagnosis     Joint pain     L4-L5 disc bulge     Attention deficit hyperactivity disorder (ADHD), combined type     Vaginal discharge     History of abnormal cervical Pap smear     Other acne     Benign nevus     History of Clostridium difficile colitis     Cervicalgia     Bilateral thoracic back pain     Class 1 obesity due to excess calories without serious comorbidity with body mass index (BMI) of 30.0 to 30.9 in adult     Gastroesophageal reflux disease with esophagitis     Anxiety     Chronic constipation     Acute pain of left shoulder     Abdominal pain, right upper quadrant     History of food allergy     Other fatigue     Achilles tendon pain     Past Medical History:   Diagnosis Date     Abdominal pain 2011    abnormal histamine problem, multiple food allergies     ADHD (attention deficit hyperactivity disorder)     on aderol     Anxiety and depression     on xanax     Arthritis      Breathing problem 2007    shortness of breath after eating, ?allergies     Chronic nausea      Gastroesophageal reflux disease      Heart murmur     closed by time she was 1     Hx of abnormal cervical Pap smear  2011     IBS (irritable bowel syndrome)     lots of mucus in bowel with occassional bleeding     Joint pain 2015    was to have rheumatologist     Kidney stone on right side 2010     Meniere's disease     hyperacusis     Migraines      Reduced vision      Tinnitus

## 2022-08-11 NOTE — PATIENT INSTRUCTIONS
Patient Education       Checking for Skin Cancer  You can find cancer early by checking your skin each month. There are 3 kinds of skin cancer. They are melanoma, basal cell carcinoma, and squamous cell carcinoma. Doing monthly skin checks is the best way to find new marks or skin changes. Follow the instructions below for checking your skin.   The ABCDEs of checking moles for melanoma   Check your moles or growths for signs of melanoma using ABCDE:   Asymmetry: the sides of the mole or growth don t match  Border: the edges are ragged, notched, or blurred  Color: the color within the mole or growth varies  Diameter: the mole or growth is larger than 6 mm (size of a pencil eraser)  Evolving: the size, shape, or color of the mole or growth is changing (evolving is not shown in the images below)    Checking for other types of skin cancer  Basal cell carcinoma or squamous cell carcinoma have symptoms such as:     A spot or mole that looks different from all other marks on your skin  Changes in how an area feels, such as itching, tenderness, or pain  Changes in the skin's surface, such as oozing, bleeding, or scaliness  A sore that does not heal  New swelling or redness beyond the border of a mole    Who s at risk?  Anyone can get skin cancer. But you are at greater risk if you have:   Fair skin, light-colored hair, or light-colored eyes  Many moles or abnormal moles on your skin  A history of sunburns from sunlight or tanning beds  A family history of skin cancer  A history of exposure to radiation or chemicals  A weakened immune system  If you have had skin cancer in the past, you are at risk for recurring skin cancer.   How to check your skin  Do your monthly skin checkups in front of a full-length mirror. Check all parts of your body, including your:   Head (ears, face, neck, and scalp)  Torso (front, back, and sides)  Arms (tops, undersides, upper, and lower armpits)  Hands (palms, backs, and fingers, including  under the nails)  Buttocks and genitals  Legs (front, back, and sides)  Feet (tops, soles, toes, including under the nails, and between toes)  If you have a lot of moles, take digital photos of them each month. Make sure to take photos both up close and from a distance. These can help you see if any moles change over time.   Most skin changes are not cancer. But if you see any changes in your skin, call your doctor right away. Only he or she can diagnose a problem. If you have skin cancer, seeing your doctor can be the first step toward getting the treatment that could save your life.   [employer] last reviewed this educational content on 4/1/2019 2000-2020 [employer]. [address] [place] [address], [place] [address]. All rights reserved. This information is not intended as a substitute for professional medical care. Always follow your healthcare professional's instructions.       When should I call my doctor?  If you are worsening or not improving, please, contact us or seek urgent care as noted below.     Who should I call with questions (adults)?  [facility] (adult and pediatric): [phone]  [facility] (adult): [phone]  For urgent needs outside of business hours call the [facility] at [phone] and ask for the dermatology resident on call to be paged  If this is a medical emergency and you are unable to reach an ER, Call [phone]    Who should I call with questions (pediatric)?  [facility]- Pediatric Dermatology  Dr. Renu Squires, Dr. Mathew Ruiz, Dr. Pretty Haryr, LORENA Lacey, Dr. Sierra Wright, Dr. Richelle Braden & Dr. Gregorio Reynaga  Non-urgent nurse triage line; [phone]- Radha and Aury RAMACHANDRAN Care Coordinatormarion Loyd (/Complex ) [phone]    If you need a prescription refill, please contact your pharmacy. Refills are approved or denied by our  Physicians during normal business hours, Monday through Fridays  Per office policy, refills will not be granted if you have not been seen within the past year (or sooner depending on your child's condition)    Scheduling Information:  Pediatric Appointment Scheduling and Call Center [phone]  Radiology Scheduling- [phone]  Sedation Unit Scheduling- [phone]  [facility] Scheduling- General [phone]; Pediatric Dermatology [phone]  Main  Services: [phone]  Latvian: [phone]  Cayman Islander: [phone]  Hmong/Samoan/Slovak: [phone]  Preadmission Nursing Department Fax Number: [phone] (Fax all pre-operative paperwork to this number)    For urgent matters arising during evenings, weekends, or holidays that cannot wait for normal business hours please call [phone] and ask for the dermatology resident on call to be paged.

## 2022-09-11 ENCOUNTER — HEALTH MAINTENANCE LETTER (OUTPATIENT)
Age: 42
End: 2022-09-11

## 2022-10-10 NOTE — PROGRESS NOTES
FOLLOW UP  Oct 12, 2022     Priyanka Lundberg is a 42 year old woman who presents with family history of breast cancer.     HPI:     She was seen in  for left breast pain, bilateral multiductal non spontaneous mutliductal milky nipple discharge, right supraclavicular mass, right axillary mass. Imaging with diagnostic mammogram and ultrasound at that time was negative. Prolactin was 8. She has a stable left breast sebaceous cyst      Family history of mother with breast cancer. Her maternal grandmother who had breast cancer, gastric cancer and skin cancer. Her maternal aunt had ovarian cancer and melanoma. See full family history below. She was referred to genetic counseling but has not seen them. Her mother is not able/willing to get genetic testing.     She follows with for high risk screening. She is due for a screening mammogram today. She had breast imaging today before our clinic visit. She received and bilateral diagnostic mammogram and left breast ultrasound for pain. She denies any breast pain or recent breast pain. At one time in her life she had left breast pain but can't remember the location. She denies any breast mass, skin change, nipple inversion or nipple discharge.      BREAST-SPECIFIC HISTORY:     Previous breast imaging: Yes   - 10 b/l Dmammo for nipple cyst and right axillary lump: BI-RADS 0  - 14 left breast ultrasound: BI-RADS 2  - 15 right axillary ultrasound for lump: BI-RADS 2  - 15 b/l Dmammo and right ultrasound: BI-RADS 2  - 16 b/l Dmammo and left axillary ultrasound: BI-RADS 1  - 18 b/l Dmammo and ultrasound BI-RADS 1  - 19 b/l Dmammo and right breast ultrasound for axillary pain and left axillary adenopathy on shoulder MRI: BI-RADS 2     Prior breast biopsies/surgeries: No    Prior history of breast cancer: No  Prior radiation history: No   Self breast exams: Yes  Breast density: scattered fibroglandular densities     GYN HISTORY:  . Age at 1st  pregnancy: 36. Breastfeeding history: Yes.   Age at menarche: 9  Menopausal: premenopausal   Menopausal hormone replacement therapy: no     RISK ASSESSMENT: < 5% 5 year risk and > 20% lifetime risk   Roxy: 1.7% lifetime risk   JUDAH: 25.6% lifetime risk   Quinten: 18.1%     FAMILY HISTORY:  Breast ca: Yes   - mom 67  - maternal grandmother with breast cancer diagnosed in her 40-60's, stomach cancer and skin cancer.  Ovarian ca: Yes   - maternal aunt, also with skin cancer  Pancreatic ca: No  Prostate: Yes   - maternal grandfather  Gastric ca: Yes   -maternal grandmother with stomach cancer, breast cancer and skin cancer  Melanoma: Yes   - maternal aunt   Kidney cancer: yes  - paternal grandmother  Colon ca: Yes   - paternal grandfather   Other cancer: Yes   - father with neuroendocrine tumor of the gallbladder  - sister with thyroid cancer  - maternal uncle with liver cancer  Other genetic, testing, syndromes, or clotting disorders: no  - no one has had genetic testing     PAST MEDICAL HISTORY  Past Medical History:   Diagnosis Date     Abdominal pain 2011    abnormal histamine problem, multiple food allergies     ADHD (attention deficit hyperactivity disorder)     on aderol     Anxiety and depression     on xanax     Arthritis      Breathing problem 2007    shortness of breath after eating, ?allergies     Chronic nausea      Gastroesophageal reflux disease      Heart murmur     closed by time she was 1     Hx of abnormal cervical Pap smear 2011     IBS (irritable bowel syndrome)     lots of mucus in bowel with occassional bleeding     Joint pain 2015    was to have rheumatologist     Kidney stone on right side 2010     Meniere's disease     hyperacusis     Migraines      Reduced vision      Tinnitus      PAST SURGICAL HISTORY   Past Surgical History:   Procedure Laterality Date     COLONOSCOPY N/A 2/14/2018    Procedure: COMBINED COLONOSCOPY, SINGLE OR MULTIPLE BIOPSY/POLYPECTOMY BY BIOPSY;  colon and egd;  Surgeon:  Joan Willson MD;  Location: [facility] OR     ESOPHAGOSCOPY, GASTROSCOPY, DUODENOSCOPY (EGD), COMBINED N/A 2/14/2018    Procedure: COMBINED ESOPHAGOSCOPY, GASTROSCOPY, DUODENOSCOPY (EGD), BIOPSY SINGLE OR MULTIPLE;;  Surgeon: Joan Willson MD;  Location: [facility] OR     NO HISTORY OF SURGERY       MEDICATIONS  Current Outpatient Medications   Medication Sig Dispense Refill     chlorhexidine (HIBICLENS) 4 % liquid Lather onto trunk and extremities, buttock area in shower daily and let sit for 30 seconds before rinsing. 118 mL 11     hydrocortisone, Perianal, (HYDROCORTISONE) 2.5 % cream Place rectally 2 times daily as needed for hemorrhoids 30 g 1     ibuprofen (ADVIL/MOTRIN) 200 MG tablet Take 400 mg by mouth every 4 hours as needed for mild pain 2 tab every am (Patient not taking: Reported on 8/11/2022)       mometasone (ELOCON) 0.1 % external ointment Apply topically twice a week On weekends on itchy areas on body and particularly on fingers (Patient not taking: No sig reported) 45 g 1      ALLERGIES  Allergies   Allergen Reactions     [place]      Food      Cantaloupe, cucumbers, green beans, and peppers.      [place] (Weed)      Lemon Flavor      Mustard Seed      Onion Difficulty breathing and GI Disturbance     [place]bot GI Disturbance     Redtop Grasses      Vanilla GI Disturbance      SOCIAL HISTORY:  Smokes: No  EtOH: 1 drink per week  Exercise: yes,  Walking 20 minutes daily   Mother lives in a TCU. Daughter has hematology condition.     ROS:  Change in vision No  Headaches: yes  Respiratory: No shortness of breath, dyspnea on exertion, cough, or hemoptysis and Shortness of breath- seeing pcp   Cardiovascular: negative   Gastrointestinal: negative Abdominal pain: no  Breast: left breast pain, right axillary mass, right supraclavicular mass, bilateral nipple discharge  Musculoskeletal: negative Joint pain No Back pain: no  Psychiatric: negative  Hematologic/Lymphatic/Immunologic: negative  Endocrine:  negative    EXAM  /70   Pulse 74   Temp 97.7  F (36.5  C) (Oral)   Resp 16   Wt 98 kg (216 lb)   LMP 09/16/2022   SpO2 97%   BMI 34.30 kg/m     PHYSICAL EXAM  Respiratory: breathing non labored.   Breasts: Examination was done in both the upright and supine positions.  Breasts are symmetrical . No dominant fixed or suspicious masses noted. No skin or nipple changes. No nipple discharge.   Left axillary 1 cm lymph node palpated, stable.   No clavicular, cervical, or axillary lymphadenopathy.     INVESTIGATIONS:    10/12/22 bilateral diagnostic mammogram and left breast ultrasound: per Radiology no concerning findings, final report pending.     ASSESSMENT/PLAN:    Priyanka Lundberg is a 42 year old woman with family history of breast cancer. She meets NCCN guidelines for high risk screening and risk reduction.       1) Family history of breast cancer  Discussed she meets NCCN guidelines for high risk screening based on family history with lifetime risk for breast cancer of >20%. Screening recommendations based on NCCN guidelines. Be familiar with your breast and promptly report any changes to your health care provider. Clinical encounter every 6-12 months starting now (but not prior to age 21). Annual mammogram with alexandre starting 10 years prior to the youngest family member but not less than age 30 or age 40 ( whichever comes first). Annual breast MRI to begin 10 years prior to the youngest family member diagnosed but not less than 25 years old or age 40 ( whichever comes first).  Counseling was provided with available strategies including lifestyle modifications and risk reducing intervention.   - Will reschedule breast MRI for 4/2023  - Screening mammogram with clinic visit due 10/13/23     2) Counseling was provided with available strategies including lifestyle modifications and risk reducing intervention.   - Maintain your best healthy weight. Higher body fat and adult weight gain is associated with  increased risk for breast cancer. This increase in risk has been attributed to increase in circulating endogenous estrogen levels from fat tissue.   - Alcohol can raise estrogen. Alcohol consumption, even at moderate levels (1-2 drinks per day), increases breast cancer risk and are best avoided. If you choose to drink alcohol limit alcohol consumption to less than 1 drink per day. (1 ounce of liquor, 6 ounces of wine, or 8 ounces of beer).  - Be active daily and void being sedentary.      Mary Damon PA-C    20 minutes spent on the date of the encounter doing chart review, review of test results, interpretation of tests, patient visit and documentation.

## 2022-10-11 NOTE — PROGRESS NOTES
ASSESSMENT AND PLAN:     (R30.0) Dysuria  (primary encounter diagnosis)  (R10.2) Pelvic pain in female  (N39.41) Urge incontinence of urine  Comment: Two months of intermittent suprapubic discomfort, urinary urgency/frequency, and dysuria with a bland UA.    We discussed the possibility of urethritis - Priyanka is really not worried about this with her sexual history and monogamous relationship.    We discussed the possibility of recurrent kidney stone but I don't feel that her symptoms really fit with this except possibly a bladder stone. We discussed going ahead with a CT to evaluate for this vs meeting with urogynecology.    For now, plan to repeat UA with microscopy to confirm normal findings and have Priyanka meet with urogyn as this is a recurrent problem. If pain worsens, plan to get CT.     Plan: Urinalysis Macroscopic, Routine UA with micro         reflex to culture  Plan: Adult Urology  Referral          Delfin Jackson MD   [facility]  10/14/2022, 6:06 PM      SUBJECTIVE:   Priyanka is a 42 year old female who presents to clinic today for a return visit.    - 2 months of supapubic discomfort, cramping-like, intermittent  - accompanied by burning sensation at urethra after urination, every time  - has had increase in frequency and urgency of urination  - had 3 episodes since this started when she had sudden urinary incontinence while washing hands after urinating  - no hematuria    - no changes in vaginal discharge, no vaginal itching or irritation  - sexually exclusive with her   - has pain with intercourse but this is not new  - last had vaginal sex about 2 months ago, soon before this started    - she has previously done pelvic floor PT for somewhat similar symptoms and didn't find it helpful  - she has a history of kidney stones in the past. The dysuria feels similar to passing a stone but has lasted for 2 months which is longer than what she's had in the past      Patient Active Problem List   Diagnosis     Joint pain     L4-L5 disc bulge     Attention deficit hyperactivity disorder (ADHD), combined type     Vaginal discharge     History of abnormal cervical Pap smear     Other acne     Benign nevus     History of Clostridium difficile colitis     Cervicalgia     Bilateral thoracic back pain     Class 1 obesity due to excess calories without serious comorbidity with body mass index (BMI) of 30.0 to 30.9 in adult     Gastroesophageal reflux disease with esophagitis     Anxiety     Chronic constipation     Acute pain of left shoulder     Abdominal pain, right upper quadrant     History of food allergy     Other fatigue     Achilles tendon pain     Current Outpatient Medications   Medication     hydrocortisone, Perianal, (HYDROCORTISONE) 2.5 % cream     ibuprofen (ADVIL/MOTRIN) 200 MG tablet     chlorhexidine (HIBICLENS) 4 % liquid     mometasone (ELOCON) 0.1 % external ointment     No current facility-administered medications for this visit.       I have reviewed the patient's relevant past medical history.     OBJECTIVE:   /72 (BP Location: Right arm, Patient Position: Sitting, Cuff Size: Adult Large)   Pulse 83   Temp 97.9  F (36.6  C) (Temporal)   Resp 12   Wt 97.1 kg (214 lb)   LMP 10/14/2022 (Exact Date)   SpO2 96%   BMI 33.99 kg/m      Constitutional: well-appearing, appears stated age  Eyes: conjunctivae without erythema, sclera anicteric.   Skin: no rashes, lesions, or wounds  Andomen: Soft, no guarding. No CVA tenderness. Tender to palpation across lower abdomen.    Results for orders placed or performed in visit on 10/14/22   Urinalysis Macroscopic     Status: Abnormal   Result Value Ref Range    Color Urine Dark Yellow (A) Colorless, Straw, Light Yellow, Yellow    Appearance Urine Clear Clear    Glucose Urine Negative Negative mg/dL    Bilirubin Urine Negative Negative    Ketones Urine Trace (A) Negative mg/dL    Specific Gravity Urine 1.025 1.003 -  1.035    Blood Urine Negative Negative    pH Urine 6.0 5.0 - 7.0    Protein Albumin Urine Negative Negative mg/dL    Urobilinogen Urine 0.2 0.2, 1.0 E.U./dL    Nitrite Urine Negative Negative    Leukocyte Esterase Urine Negative Negative

## 2022-10-12 ENCOUNTER — ANCILLARY PROCEDURE (OUTPATIENT)
Dept: MAMMOGRAPHY | Facility: CLINIC | Age: 42
End: 2022-10-12
Attending: PHYSICIAN ASSISTANT
Payer: COMMERCIAL

## 2022-10-12 ENCOUNTER — OFFICE VISIT (OUTPATIENT)
Dept: SURGERY | Facility: CLINIC | Age: 42
End: 2022-10-12
Attending: PHYSICIAN ASSISTANT
Payer: COMMERCIAL

## 2022-10-12 VITALS
RESPIRATION RATE: 16 BRPM | OXYGEN SATURATION: 97 % | HEART RATE: 74 BPM | DIASTOLIC BLOOD PRESSURE: 70 MMHG | BODY MASS INDEX: 34.3 KG/M2 | SYSTOLIC BLOOD PRESSURE: 105 MMHG | WEIGHT: 216 LBS | TEMPERATURE: 97.7 F

## 2022-10-12 DIAGNOSIS — Z80.3 FAMILY HISTORY OF BREAST CANCER: Primary | ICD-10-CM

## 2022-10-12 DIAGNOSIS — Z91.89 AT HIGH RISK FOR BREAST CANCER: ICD-10-CM

## 2022-10-12 DIAGNOSIS — N64.4 BREAST PAIN, LEFT: ICD-10-CM

## 2022-10-12 DIAGNOSIS — Z12.39 BREAST CANCER SCREENING, HIGH RISK PATIENT: ICD-10-CM

## 2022-10-12 DIAGNOSIS — Z80.41 FAMILY HISTORY OF OVARIAN CANCER: ICD-10-CM

## 2022-10-12 PROCEDURE — 76642 ULTRASOUND BREAST LIMITED: CPT | Mod: LT | Performed by: RADIOLOGY

## 2022-10-12 PROCEDURE — 77066 DX MAMMO INCL CAD BI: CPT | Performed by: RADIOLOGY

## 2022-10-12 PROCEDURE — G0279 TOMOSYNTHESIS, MAMMO: HCPCS | Performed by: RADIOLOGY

## 2022-10-12 PROCEDURE — G0463 HOSPITAL OUTPT CLINIC VISIT: HCPCS

## 2022-10-12 PROCEDURE — 99213 OFFICE O/P EST LOW 20 MIN: CPT | Performed by: PHYSICIAN ASSISTANT

## 2022-10-12 ASSESSMENT — PAIN SCALES - GENERAL: PAINLEVEL: NO PAIN (0)

## 2022-10-12 NOTE — PATIENT INSTRUCTIONS
Priyanka Lundberg is a 42 year old woman with family history of breast cancer. She meets NCCN guidelines for high risk screening and risk reduction.       1) Family history of breast cancer  Discussed she meets NCCN guidelines for high risk screening based on family history with lifetime risk for breast cancer of >20%. Screening recommendations based on NCCN guidelines. Be familiar with your breast and promptly report any changes to your health care provider. Clinical encounter every 6-12 months starting now (but not prior to age 21). Annual mammogram with alexandre starting 10 years prior to the youngest family member but not less than age 30 or age 40 ( whichever comes first). Annual breast MRI to begin 10 years prior to the youngest family member diagnosed but not less than 25 years old or age 40 ( whichever comes first).  Counseling was provided with available strategies including lifestyle modifications and risk reducing intervention.   - Will reschedule breast MRI for 4/2023  - Screening mammogram with clinic visit due 10/13/23     2) Counseling was provided with available strategies including lifestyle modifications and risk reducing intervention.   - Maintain your best healthy weight. Higher body fat and adult weight gain is associated with increased risk for breast cancer. This increase in risk has been attributed to increase in circulating endogenous estrogen levels from fat tissue.   - Alcohol can raise estrogen. Alcohol consumption, even at moderate levels (1-2 drinks per day), increases breast cancer risk and are best avoided. If you choose to drink alcohol limit alcohol consumption to less than 1 drink per day. (1 ounce of liquor, 6 ounces of wine, or 8 ounces of beer).  - Be active daily and void being sedentary.

## 2022-10-12 NOTE — LETTER
10/12/2022         RE: Priyanka Lundberg  [address] [place] [address]        Dear Colleague,    Thank you for referring your patient, Priyanka Lundberg, to the [facility]. Please see a copy of my visit note below.    FOLLOW UP  Oct 12, 2022     Priyanka Lundberg is a 42 year old woman who presents with family history of breast cancer.     HPI:     She was seen in 2020 for left breast pain, bilateral multiductal non spontaneous mutliductal milky nipple discharge, right supraclavicular mass, right axillary mass. Imaging with diagnostic mammogram and ultrasound at that time was negative. Prolactin was 8. She has a stable left breast sebaceous cyst      Family history of mother with breast cancer. Her maternal grandmother who had breast cancer, gastric cancer and skin cancer. Her maternal aunt had ovarian cancer and melanoma. See full family history below. She was referred to genetic counseling but has not seen them. Her mother is not able/willing to get genetic testing.     She follows with for high risk screening. She is due for a screening mammogram today. She had breast imaging today before our clinic visit. She received and bilateral diagnostic mammogram and left breast ultrasound for pain. She denies any breast pain or recent breast pain. At one time in her life she had left breast pain but can't remember the location. She denies any breast mass, skin change, nipple inversion or nipple discharge.      BREAST-SPECIFIC HISTORY:     Previous breast imaging: Yes   - 9/17/12 b/l Dmammo for nipple cyst and right axillary lump: BI-RADS 0  - 12/7/16 left breast ultrasound: BI-RADS 2  - 1/18/17 right axillary ultrasound for lump: BI-RADS 2  - 5/1/17 b/l Dmammo and right ultrasound: BI-RADS 2  - 12/21/18 b/l Dmammo and left axillary ultrasound: BI-RADS 1  - 5/21/20 b/l Dmammo and ultrasound BI-RADS 1  - 9/1/21 b/l Dmammo and right breast ultrasound for axillary pain and left  axillary adenopathy on shoulder MRI: BI-RADS 2     Prior breast biopsies/surgeries: No    Prior history of breast cancer: No  Prior radiation history: No   Self breast exams: Yes  Breast density: scattered fibroglandular densities     GYN HISTORY:  . Age at 1st pregnancy: 36. Breastfeeding history: Yes.   Age at menarche: 9  Menopausal: premenopausal   Menopausal hormone replacement therapy: no     RISK ASSESSMENT: < 5% 5 year risk and > 20% lifetime risk   Roxy: 1.7% lifetime risk   JUDAH: 25.6% lifetime risk   Quinten: 18.1%     FAMILY HISTORY:  Breast ca: Yes   - mom 67  - maternal grandmother with breast cancer diagnosed in her 40-60's, stomach cancer and skin cancer.  Ovarian ca: Yes   - maternal aunt, also with skin cancer  Pancreatic ca: No  Prostate: Yes   - maternal grandfather  Gastric ca: Yes   -maternal grandmother with stomach cancer, breast cancer and skin cancer  Melanoma: Yes   - maternal aunt   Kidney cancer: yes  - paternal grandmother  Colon ca: Yes   - paternal grandfather   Other cancer: Yes   - father with neuroendocrine tumor of the gallbladder  - sister with thyroid cancer  - maternal uncle with liver cancer  Other genetic, testing, syndromes, or clotting disorders: no  - no one has had genetic testing     PAST MEDICAL HISTORY  Past Medical History:   Diagnosis Date     Abdominal pain     abnormal histamine problem, multiple food allergies     ADHD (attention deficit hyperactivity disorder)     on aderol     Anxiety and depression     on xanax     Arthritis      Breathing problem 2007    shortness of breath after eating, ?allergies     Chronic nausea      Gastroesophageal reflux disease      Heart murmur     closed by time she was 1     Hx of abnormal cervical Pap smear      IBS (irritable bowel syndrome)     lots of mucus in bowel with occassional bleeding     Joint pain 2015    was to have rheumatologist     Kidney stone on right side      Meniere's disease     hyperacusis      Migraines      Reduced vision      Tinnitus      PAST SURGICAL HISTORY   Past Surgical History:   Procedure Laterality Date     COLONOSCOPY N/A 2/14/2018    Procedure: COMBINED COLONOSCOPY, SINGLE OR MULTIPLE BIOPSY/POLYPECTOMY BY BIOPSY;  colon and egd;  Surgeon: Joan Willson MD;  Location: [facility] OR     ESOPHAGOSCOPY, GASTROSCOPY, DUODENOSCOPY (EGD), COMBINED N/A 2/14/2018    Procedure: COMBINED ESOPHAGOSCOPY, GASTROSCOPY, DUODENOSCOPY (EGD), BIOPSY SINGLE OR MULTIPLE;;  Surgeon: Joan Willson MD;  Location: [facility] OR     NO HISTORY OF SURGERY       MEDICATIONS  Current Outpatient Medications   Medication Sig Dispense Refill     chlorhexidine (HIBICLENS) 4 % liquid Lather onto trunk and extremities, buttock area in shower daily and let sit for 30 seconds before rinsing. 118 mL 11     hydrocortisone, Perianal, (HYDROCORTISONE) 2.5 % cream Place rectally 2 times daily as needed for hemorrhoids 30 g 1     ibuprofen (ADVIL/MOTRIN) 200 MG tablet Take 400 mg by mouth every 4 hours as needed for mild pain 2 tab every am (Patient not taking: Reported on 8/11/2022)       mometasone (ELOCON) 0.1 % external ointment Apply topically twice a week On weekends on itchy areas on body and particularly on fingers (Patient not taking: No sig reported) 45 g 1      ALLERGIES  Allergies   Allergen Reactions     [place]      Food      Cantaloupe, cucumbers, green beans, and peppers.      [place] (Weed)      Lemon Flavor      Mustard Seed      Onion Difficulty breathing and GI Disturbance     [place] GI Disturbance     Redtop Grasses      Vanilla GI Disturbance      SOCIAL HISTORY:  Smokes: No  EtOH: 1 drink per week  Exercise: yes,  Walking 20 minutes daily   Mother lives in a TCU. Daughter has hematology condition.     ROS:  Change in vision No  Headaches: yes  Respiratory: No shortness of breath, dyspnea on exertion, cough, or hemoptysis and Shortness of breath- seeing pcp   Cardiovascular: negative   Gastrointestinal: negative  Abdominal pain: no  Breast: left breast pain, right axillary mass, right supraclavicular mass, bilateral nipple discharge  Musculoskeletal: negative Joint pain No Back pain: no  Psychiatric: negative  Hematologic/Lymphatic/Immunologic: negative  Endocrine: negative    EXAM  /70   Pulse 74   Temp 97.7  F (36.5  C) (Oral)   Resp 16   Wt 98 kg (216 lb)   LMP 09/16/2022   SpO2 97%   BMI 34.30 kg/m     PHYSICAL EXAM  Respiratory: breathing non labored.   Breasts: Examination was done in both the upright and supine positions.  Breasts are symmetrical . No dominant fixed or suspicious masses noted. No skin or nipple changes. No nipple discharge.   No clavicular, cervical, or axillary lymphadenopathy.     INVESTIGATIONS:  10/12/22 bilateral diagnostic mammogram and left breast ultrasound: per Radiology no concerning findings, final report pending.     ASSESSMENT/PLAN:  Priyanka Lundberg is a 42 year old woman with family history of breast cancer. She meets NCCN guidelines for high risk screening and risk reduction.       1) Family history of breast cancer  Discussed she meets NCCN guidelines for high risk screening based on family history with lifetime risk for breast cancer of >20%. Screening recommendations based on NCCN guidelines. Be familiar with your breast and promptly report any changes to your health care provider. Clinical encounter every 6-12 months starting now (but not prior to age 21). Annual mammogram with alexandre starting 10 years prior to the youngest family member but not less than age 30 or age 40 ( whichever comes first). Annual breast MRI to begin 10 years prior to the youngest family member diagnosed but not less than 25 years old or age 40 ( whichever comes first).  Counseling was provided with available strategies including lifestyle modifications and risk reducing intervention.   - Will reschedule breast MRI for 4/2023  - Screening mammogram with clinic visit due 10/13/23     2) Counseling  was provided with available strategies including lifestyle modifications and risk reducing intervention.   - Maintain your best healthy weight. Higher body fat and adult weight gain is associated with increased risk for breast cancer. This increase in risk has been attributed to increase in circulating endogenous estrogen levels from fat tissue.   - Alcohol can raise estrogen. Alcohol consumption, even at moderate levels (1-2 drinks per day), increases breast cancer risk and are best avoided. If you choose to drink alcohol limit alcohol consumption to less than 1 drink per day. (1 ounce of liquor, 6 ounces of wine, or 8 ounces of beer).  - Be active daily and void being sedentary.      20 minutes spent on the date of the encounter doing chart review, review of test results, interpretation of tests, patient visit and documentation.       Again, thank you for allowing me to participate in the care of your patient.      Sincerely,    Mary Damon PA-C

## 2022-10-12 NOTE — NURSING NOTE
"Oncology Rooming Note    October 12, 2022 11:50 AM   Priyanka Lundberg is a 42 year old female who presents for:    Chief Complaint   Patient presents with     Oncology Clinic Visit     Breast Ca     Initial Vitals: /70   Pulse 74   Temp 97.7  F (36.5  C) (Oral)   Resp 16   Wt 98 kg (216 lb)   LMP 09/16/2022   SpO2 97%   BMI 34.30 kg/m   Estimated body mass index is 34.3 kg/m  as calculated from the following:    Height as of 12/28/21: 1.69 m (5' 6.54\").    Weight as of this encounter: 98 kg (216 lb). Body surface area is 2.14 meters squared.  No Pain (0) Comment: Data Unavailable   Patient's last menstrual period was 09/16/2022.  Allergies reviewed: Yes  Medications reviewed: Yes    Medications: Medication refills not needed today.  Pharmacy name entered into EPIC:    [facility] [place] [address] [facility] [address] [id number] - [place] - [address] [facility] [address]    Clinical concerns:        Ninoska Sibley CMA              "

## 2022-10-14 ENCOUNTER — OFFICE VISIT (OUTPATIENT)
Dept: FAMILY MEDICINE | Facility: CLINIC | Age: 42
End: 2022-10-14
Payer: COMMERCIAL

## 2022-10-14 VITALS
TEMPERATURE: 97.9 F | SYSTOLIC BLOOD PRESSURE: 103 MMHG | HEART RATE: 83 BPM | OXYGEN SATURATION: 96 % | DIASTOLIC BLOOD PRESSURE: 72 MMHG | BODY MASS INDEX: 33.99 KG/M2 | RESPIRATION RATE: 12 BRPM | WEIGHT: 214 LBS

## 2022-10-14 DIAGNOSIS — N39.41 URGE INCONTINENCE OF URINE: ICD-10-CM

## 2022-10-14 DIAGNOSIS — R30.0 DYSURIA: Primary | ICD-10-CM

## 2022-10-14 DIAGNOSIS — R10.2 PELVIC PAIN IN FEMALE: ICD-10-CM

## 2022-10-14 LAB
ALBUMIN UR-MCNC: NEGATIVE MG/DL
APPEARANCE UR: CLEAR
BILIRUB UR QL STRIP: NEGATIVE
COLOR UR AUTO: ABNORMAL
GLUCOSE UR STRIP-MCNC: NEGATIVE MG/DL
HGB UR QL STRIP: NEGATIVE
KETONES UR STRIP-MCNC: ABNORMAL MG/DL
LEUKOCYTE ESTERASE UR QL STRIP: NEGATIVE
NITRATE UR QL: NEGATIVE
PH UR STRIP: 6 [PH] (ref 5–7)
SP GR UR STRIP: 1.02 (ref 1–1.03)
UROBILINOGEN UR STRIP-ACNC: 0.2 E.U./DL

## 2022-10-14 NOTE — NURSING NOTE
42 year old  Chief Complaint   Patient presents with     Imm/Inj     Flu Shot     UTI     2 months.     URI     Cold for 6 days, brown phlegm.        Blood pressure 103/72, pulse 83, temperature 97.9  F (36.6  C), temperature source Temporal, resp. rate 12, weight 97.1 kg (214 lb), last menstrual period 10/14/2022, SpO2 96 %, not currently breastfeeding. Body mass index is 33.99 kg/m .  Patient Active Problem List   Diagnosis     Joint pain     L4-L5 disc bulge     Attention deficit hyperactivity disorder (ADHD), combined type     Vaginal discharge     History of abnormal cervical Pap smear     Other acne     Benign nevus     History of Clostridium difficile colitis     Cervicalgia     Bilateral thoracic back pain     Class 1 obesity due to excess calories without serious comorbidity with body mass index (BMI) of 30.0 to 30.9 in adult     Gastroesophageal reflux disease with esophagitis     Anxiety     Chronic constipation     Acute pain of left shoulder     Abdominal pain, right upper quadrant     History of food allergy     Other fatigue     Achilles tendon pain       Wt Readings from Last 2 Encounters:   10/14/22 97.1 kg (214 lb)   10/12/22 98 kg (216 lb)     BP Readings from Last 3 Encounters:   10/14/22 103/72   10/12/22 105/70   05/19/22 95/63         Current Outpatient Medications   Medication     hydrocortisone, Perianal, (HYDROCORTISONE) 2.5 % cream     ibuprofen (ADVIL/MOTRIN) 200 MG tablet     chlorhexidine (HIBICLENS) 4 % liquid     mometasone (ELOCON) 0.1 % external ointment     No current facility-administered medications for this visit.       Social History     Tobacco Use     Smoking status: Never     Smokeless tobacco: Never   Substance Use Topics     Alcohol use: Not Currently     Alcohol/week: 0.0 standard drinks     Comment: outside of pregnancy, social     Drug use: No       Health Maintenance Due   Topic Date Due     ASTHMA ACTION PLAN  Never done     ADVANCE CARE PLANNING  Never done      HEPATITIS B IMMUNIZATION (1 of 3 - 3-dose series) Never done     COVID-19 Vaccine (1) Never done     YEARLY PREVENTIVE VISIT  06/22/2020     HPV TEST  09/08/2021     PAP  09/08/2021     ASTHMA CONTROL TEST  02/27/2022     LIPID  07/27/2022     INFLUENZA VACCINE (1) 09/01/2022       Lab Results   Component Value Date    PAP NIL 09/08/2016 October 14, 2022 9:58 AM

## 2022-10-19 ENCOUNTER — ANCILLARY PROCEDURE (OUTPATIENT)
Dept: CARDIOLOGY | Facility: CLINIC | Age: 42
End: 2022-10-19
Attending: INTERNAL MEDICINE
Payer: COMMERCIAL

## 2022-10-19 ENCOUNTER — OFFICE VISIT (OUTPATIENT)
Dept: FAMILY MEDICINE | Facility: CLINIC | Age: 42
End: 2022-10-19
Payer: COMMERCIAL

## 2022-10-19 ENCOUNTER — HOSPITAL ENCOUNTER (OUTPATIENT)
Facility: CLINIC | Age: 42
Discharge: HOME | End: 2022-10-19
Admitting: INTERNAL MEDICINE
Payer: COMMERCIAL

## 2022-10-19 VITALS
WEIGHT: 214 LBS | OXYGEN SATURATION: 97 % | TEMPERATURE: 98 F | HEART RATE: 91 BPM | DIASTOLIC BLOOD PRESSURE: 78 MMHG | BODY MASS INDEX: 33.99 KG/M2 | SYSTOLIC BLOOD PRESSURE: 120 MMHG

## 2022-10-19 DIAGNOSIS — R55 NEAR SYNCOPE: ICD-10-CM

## 2022-10-19 DIAGNOSIS — R00.0 TACHYCARDIA: Primary | ICD-10-CM

## 2022-10-19 DIAGNOSIS — R00.0 TACHYCARDIA: ICD-10-CM

## 2022-10-19 DIAGNOSIS — R10.2 PELVIC PAIN IN FEMALE: ICD-10-CM

## 2022-10-19 DIAGNOSIS — Z13.1 SCREENING FOR DIABETES MELLITUS: ICD-10-CM

## 2022-10-19 DIAGNOSIS — R30.0 DYSURIA: ICD-10-CM

## 2022-10-19 LAB
ALBUMIN UR-MCNC: 10 MG/DL
ANION GAP SERPL CALCULATED.3IONS-SCNC: 13 MMOL/L (ref 7–15)
APPEARANCE UR: CLEAR
BILIRUB UR QL STRIP: NEGATIVE
BUN SERPL-MCNC: 11.6 MG/DL (ref 6–20)
CALCIUM SERPL-MCNC: 9.5 MG/DL (ref 8.6–10)
CAOX CRY #/AREA URNS HPF: ABNORMAL /HPF
CHLORIDE SERPL-SCNC: 104 MMOL/L (ref 98–107)
COLOR UR AUTO: YELLOW
CREAT SERPL-MCNC: 0.68 MG/DL (ref 0.51–0.95)
DEPRECATED HCO3 PLAS-SCNC: 24 MMOL/L (ref 22–29)
ERYTHROCYTE [DISTWIDTH] IN BLOOD BY AUTOMATED COUNT: 13.4 % (ref 10–15)
GFR SERPL CREATININE-BSD FRML MDRD: >90 ML/MIN/1.73M2
GLUCOSE SERPL-MCNC: 101 MG/DL (ref 70–99)
GLUCOSE UR STRIP-MCNC: NEGATIVE MG/DL
HBA1C MFR BLD: 5.4 % (ref 0–5.6)
HCT VFR BLD AUTO: 40.4 % (ref 35–47)
HGB BLD-MCNC: 13.3 G/DL (ref 11.7–15.7)
HGB UR QL STRIP: NEGATIVE
KETONES UR STRIP-MCNC: NEGATIVE MG/DL
LEUKOCYTE ESTERASE UR QL STRIP: NEGATIVE
MCH RBC QN AUTO: 28.7 PG (ref 26.5–33)
MCHC RBC AUTO-ENTMCNC: 32.9 G/DL (ref 31.5–36.5)
MCV RBC AUTO: 87 FL (ref 78–100)
MUCOUS THREADS #/AREA URNS LPF: PRESENT /LPF
NITRATE UR QL: NEGATIVE
PH UR STRIP: 6.5 [PH] (ref 5–7)
PLATELET # BLD AUTO: 328 10E3/UL (ref 150–450)
POTASSIUM SERPL-SCNC: 4.3 MMOL/L (ref 3.4–5.3)
RBC # BLD AUTO: 4.63 10E6/UL (ref 3.8–5.2)
RBC URINE: 1 /HPF
SODIUM SERPL-SCNC: 141 MMOL/L (ref 136–145)
SP GR UR STRIP: 1.02 (ref 1–1.03)
SQUAMOUS EPITHELIAL: 1 /HPF
UROBILINOGEN UR STRIP-MCNC: NORMAL MG/DL
WBC # BLD AUTO: 6.4 10E3/UL (ref 4–11)
WBC URINE: <1 /HPF

## 2022-10-19 PROCEDURE — 81001 URINALYSIS AUTO W/SCOPE: CPT | Performed by: FAMILY MEDICINE

## 2022-10-19 PROCEDURE — 93270 REMOTE 30 DAY ECG REV/REPORT: CPT

## 2022-10-19 PROCEDURE — 93228 REMOTE 30 DAY ECG REV/REPORT: CPT | Performed by: INTERNAL MEDICINE

## 2022-10-19 PROCEDURE — 82310 ASSAY OF CALCIUM: CPT | Performed by: INTERNAL MEDICINE
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
## 2022-10-19 NOTE — PROGRESS NOTES
Per Sydney Maznano, patient to have 30 day Cardiac Event monitor placed.  Diagnosis: R00.0, Tachycardia, R55, New Syncope  Monitor placed: Yes  Patient Instructed: Yes  Patient verbalized understanding: Yes  Holter # [id number]    Monitor placed by Christopher Kathleen CMA  2:20 PM

## 2022-10-19 NOTE — TELEPHONE ENCOUNTER
MEDICAL RECORDS REQUEST   [facility]  Urology Clinic  [address] [place] [address]  PHONE: [phone]  Fax: [phone]        FUTURE VISIT INFORMATION                                                   Priyanka Lundberg, : 1980 scheduled for future visit at [facility]    APPOINTMENT INFORMATION:    Date: 2022    Provider:  Zahra Valle MD    Reason for Visit/Diagnosis: incontinence    REFERRAL INFORMATION:    Referring provider:  Delfin Jackson MD in [facility]      RECORDS REQUESTED FOR VISIT                                                     NOTES  STATUS/DETAILS   OFFICE NOTE from referring provider  yes, 10/14/2022 -- Delfin Jackson MD in [facility]   OFFICE NOTE from other specialist  yes, 2021 -- Sydney Ruiz MD in [facility]   MEDICATION LIST  yes   LABS     URINALYSIS (UA)  yes     PRE-VISIT CHECKLIST      Record collection complete Yes   Appointment appropriately scheduled           (right time/right provider) Yes   Joint diagnostic appointment coordinated correctly          (ensure right order & amount of time) Yes   MyChart activation Yes   Questionnaire complete If no, please explain PENDING

## 2022-10-20 DIAGNOSIS — R10.9 RIGHT FLANK PAIN: Primary | ICD-10-CM

## 2022-10-20 DIAGNOSIS — R55 NEAR SYNCOPE: Primary | ICD-10-CM

## 2022-10-27 NOTE — PROGRESS NOTES
"CARDIOLOGY Initial Visit    HPI:  Priyanka Lundberg is a 42 year old female who receives primary care from Dr. Ruiz. Priyanka was referred to Cardiology clinic by Dr. Ruiz for evaluation of palpitations/near syncope.    Priyanka is a pleasant female with no significant cardiovascular history. Her risk factors for cardiovascular disease are primarily obesity and sedentary lifestyle. She notes a family history of cardiac disease, though from her recounting this does not sound like a family history of premature CAD or other high risk family history. She notes that her mother has maybe had a heart attack in the past but she is unsure. An uncle had coronary disease. There is no history of other PCI, CABG, CVA, or PAD. Otherwise, she does not have a personal history of HTN, HLD, DM2, or any prior history of ASCVD. She has a limited history of smoking and quit over ten years ago. She has no history of heart failure.    She does have episodes of palpitations which she states have been occurring for many years dating back to her teens. The episodes are fleeting, lasting less than a minute, though she notes they seem to be more frequent lately. She does note that emotional distress and anxiety are triggers for these episodes, but they do also occur outside of that context as well. She notes occasions of \"graying out\" that do not sound like true syncope by description. These episodes improve with sitting or lying down. She does not particularly note palpitations of any type during those episodes. Of note, she has a history of multiple episodes of dehydration, kidney stones, and reported orthostatic hypotension.     She describes significant fatigue, often lasting for days at a time. She thinks these episodes are triggered by emotional distress. She notes shortness of breath and fatigue limit her physical activity. She gets short of breath with activity, but no exertional chest pain. Her exertional symptoms do not improve with " rest.     The 10-year ASCVD risk score (Héctor BARRIGA, et al., 2019) is: 0.7%    Values used to calculate the score:      Age: 42 years      Sex: Female      Is Non- : No      Diabetic: No      Tobacco smoker: No      Systolic Blood Pressure: 110 mmHg      Is BP treated: No      HDL Cholesterol: 50 mg/dL      Total Cholesterol: 206 mg/dL     ASSESSMENT AND PLAN  Priyanka Lundberg is a 42 year old female with no significant cardiovascular history or risk factors who presents to clinic for evaluation of several symptoms as described in the HPI.    #Palpitations  My suspicion is that these are premature complexes based on the provided history. There are no red-flags concerning for malignant arrhythmias.  - currently wearing Biotel MCOT; return to clinic if any sustained arrythmias    #Near-syncope  By history, these episodes are most suspicious for neuro/vagal mediated. There is also a reported history of dehydration and orthostasis, though orthostatic vital signs are normal today (was recently counseled on adequate hydration and recommended dietary salt intake by PCP).    - provided counseling and educational materials on recommended hydration, exercise, and physical maneuvers to limit orthostasis and prevent sycnope  - TTE to evaluate cardiac function and structure  - encouraged regular exercise/physical activity targeting 20 minutes of sustained activity 5-6 days/week    #Shortness of breath/fatigue  By history these symptoms are non-cardiac/non-anginal. She is low risk for CAD/ischemic heart disease. Cardiac stress test is of limited clinical benefit in such patients with a low pre-test probability.   - screening TTE as above  - encouraged regular exercise/physical activity targeting 20 minutes of sustained activity 5-6 days/week    We discussed the assessment and plan at length with the patient and provided the relevant recommendations/counseling. The patient is to return to cardiology clinic on  an as needed basis pending results of the above ordered testing and pending MCOT.    The patient was seen and examined with the staff cardiologist, Dr. Kristy Bhatt, who is in agreement with the assessment and plan.    Josias Anderson MD  Cardiology Fellow    Thank you for the opportunity to participate in the care of Ms. Priyanka Lundberg. Please feel free to contact me with any additional questions or concerns.    Corona Bhatt MD    Preventive Cardiology  Pager: [phone]    Attending: Patient seen and examined with Dr. Anderson. The history and physical findings are accurate as recorded. My additional findings, if any, have been incorporated into the body of the note. All relevant labs and imaging studies and new ECG data have been reviewed personally. The assessment and recommendations outlined reflect our joint decision making.     The total time of this encounter amounted to 45 minutes. This time included time spent with the patient, reviewing records, ordering tests, and performing post visit documentation.     PAST MEDICAL HISTORY:  Patient Active Problem List   Diagnosis     Joint pain     L4-L5 disc bulge     Attention deficit hyperactivity disorder (ADHD), combined type     Vaginal discharge     History of abnormal cervical Pap smear     Other acne     Benign nevus     History of Clostridium difficile colitis     Cervicalgia     Bilateral thoracic back pain     Class 1 obesity due to excess calories without serious comorbidity with body mass index (BMI) of 30.0 to 30.9 in adult     Gastroesophageal reflux disease with esophagitis     Anxiety     Chronic constipation     Acute pain of left shoulder     Abdominal pain, right upper quadrant     History of food allergy     Other fatigue     Achilles tendon pain     Past Medical History:   Diagnosis Date     Abdominal pain 2011    abnormal histamine problem, multiple food allergies     ADHD (attention deficit hyperactivity disorder)     on  deniserol     Anxiety and depression     on xanax     Arthritis      Breathing problem 2007    shortness of breath after eating, ?allergies     Chronic nausea      Gastroesophageal reflux disease      Heart murmur     closed by time she was 1     Hx of abnormal cervical Pap smear 2011     IBS (irritable bowel syndrome)     lots of mucus in bowel with occassional bleeding     Joint pain 2015    was to have rheumatologist     Kidney stone on right side 2010     Meniere's disease     hyperacusis     Migraines      Reduced vision      Tinnitus        CURRENT MEDICATIONS:  Current Outpatient Medications   Medication Sig Dispense Refill     hydrocortisone, Perianal, (HYDROCORTISONE) 2.5 % cream Place rectally 2 times daily as needed for hemorrhoids 30 g 1     ibuprofen (ADVIL/MOTRIN) 200 MG tablet Take 400 mg by mouth every 4 hours as needed for mild pain 2 tab every am       melatonin 3 MG tablet Take 1 mg by mouth nightly as needed for sleep       multivitamin w/minerals (THERA-VIT-M) tablet Take by mouth daily         PAST SURGICAL HISTORY:  Past Surgical History:   Procedure Laterality Date     COLONOSCOPY N/A 2/14/2018    Procedure: COMBINED COLONOSCOPY, SINGLE OR MULTIPLE BIOPSY/POLYPECTOMY BY BIOPSY;  colon and egd;  Surgeon: Joan Willson MD;  Location: [facility] OR     ESOPHAGOSCOPY, GASTROSCOPY, DUODENOSCOPY (EGD), COMBINED N/A 2/14/2018    Procedure: COMBINED ESOPHAGOSCOPY, GASTROSCOPY, DUODENOSCOPY (EGD), BIOPSY SINGLE OR MULTIPLE;;  Surgeon: Joan Willson MD;  Location: [facility] OR     NO HISTORY OF SURGERY         ALLERGIES  [place], Food, Lamb's quarter (weed), Lemon flavor, Mustard seed, Onion, Peach, Red[place] grasses, and Vanilla    FAMILY HX:  Family History   Problem Relation Age of Onset     Mental Illness Mother         bipolar, schizophrenia     Anxiety Disorder Mother      Osteoporosis Mother      Thyroid Disease Mother      Diabetes Mother      Neurofibromatosis Mother         more likely fibromyalgia      Arthritis Mother      Back Pain Mother      Osteoarthritis Mother      Obesity Mother      Cirrhosis Mother         from fatty liver. with esophageal varices, ..     Gallbladder Disease Mother         cholecystectomy     Hypertension Father      Hyperlipidemia Father      Diabetes Father      Other Cancer Father 67        Neuroendocrine tumor, ? from gall bladder, stage 4  1/2018     Migraines Father      Scleroderma Father      Rheumatoid Arthritis Father      Obesity Father      Ankylosing Spondylitis Father      Macular Degeneration Father      Mental Illness Sister         bipolar     Anxiety Disorder Sister      Asthma Sister         eczema     Neurologic Disorder Sister         Cervical Dystonia, treated with Botox     Thyroid Cancer Sister      Coronary Artery Disease Maternal Grandmother      Cerebrovascular Disease Maternal Grandmother      Breast Cancer Maternal Grandmother 65     Skin Cancer Maternal Grandmother      Stomach Cancer Maternal Grandmother         stomach, skin     Prostate Cancer Maternal Grandfather      Cancer Maternal Grandfather      Diabetes Paternal Grandmother      Kidney Cancer Paternal Grandmother      Coronary Artery Disease Paternal Grandfather      Colon Cancer Paternal Grandfather      Cancer Paternal Grandfather      Ovarian Cancer Maternal Aunt 68     Skin Cancer Maternal Aunt         melanoma     Scleroderma Paternal Aunt      Ankylosing Spondylitis Paternal Aunt      Ankylosing Spondylitis Paternal Uncle      Liver Cancer Other         uncle     Glaucoma No family hx of        SOCIAL HX:  Social History     Tobacco Use     Smoking status: Never     Smokeless tobacco: Never   Substance Use Topics     Alcohol use: Not Currently     Alcohol/week: 0.0 standard drinks     Comment: outside of pregnancy, social     Drug use: No        ROS:  Comprehensive ROS is negative except as noted in HPI.    VITAL SIGNS:  /76 (BP Location: Right arm, Patient Position: Chair, Cuff Size:  "Adult Large)   Pulse 72   Ht 1.715 m (5' 7.52\")   Wt 98.4 kg (216 lb 14.4 oz)   LMP 10/14/2022 (Exact Date)   SpO2 97%   BMI 33.45 kg/m    Body mass index is 33.45 kg/m .  Wt Readings from Last 2 Encounters:   10/28/22 98.4 kg (216 lb 14.4 oz)   10/19/22 97.1 kg (214 lb)       PHYSICAL EXAM  Gen: pleasant female sitting comfortably in NAD  Head: nc/at  Eyes: pupils equal, anicteric  Neck: supple, trachea midline  CV: nml s1/s2, soft holosystolic murmur loudest at cardiac apex Grade II; no JVD  Chest: clear lungs bilaterally without w/c/r  Abd: soft, NT  Ext: warm, no LE edema  Skin: no rash on limited exam  Neuro: awake, alert, oriented, nml speech  Vasc: 2+ bilateral radial pulses; no bruits noted    LABS: personally reviewed  CMP  Recent Labs   Lab Test 10/19/22  1037 08/27/21  1422 07/27/21  1517 02/15/21  1600 11/06/18  1333 08/13/18  1508 05/19/17  0033 05/20/16  1258 04/21/16  0933 04/21/16  0933    141 141 142.0 141   < > 143 142   < >  --    POTASSIUM 4.3 4.2 4.0 3.9 4.1   < > 3.7 3.6   < >  --    CHLORIDE 104 107 109 107.0 104   < > 106 109   < >  --    CO2 24 29 31 31.0 28   < > 30 23   < >  --    ANIONGAP 13 5 1*  --  8  --  7 10   < >  --    * 97 98 104.0* 92   < > 101* 91   < >  --    BUN 11.6 9 7 12.0 9   < > 20 6*   < >  --    CR 0.68 0.50* 0.80 0.8 0.67   < > 0.72 0.52  --  0.50*   GFRESTIMATED >90 >90 >90  --  >90  --  89 >90  Non  GFR Calc    --  >90  Non  GFR Calc     GFRESTBLACK  --   --   --   --  >90  --  >90   GFR Calc   >90   GFR Calc    --  >90   GFR Calc     TORY 9.5 9.2 9.2 9.1 8.5   < > 9.0 8.4*   < >  --    PROTTOTAL  --  7.2 7.2 7.0 7.3  --   --   --   --   --    ALBUMIN  --  3.9 3.9 3.6 3.9  --   --   --   --   --    BILITOTAL  --  0.5 0.6 0.6 0.2  --   --   --   --   --    ALKPHOS  --  50 57 50.0 50  --   --   --   --   --    AST  --  25 29 20.0 19  --   --  18  --  17   ALT  --  18 17 " 16.0 21  --   --  27  --  22    < > = values in this interval not displayed.     CBC  Recent Labs   Lab Test 10/19/22  1037 08/27/21  1422 07/27/21  1517 02/15/21  1600 08/13/18  1514 05/19/17  0033   WBC 6.4 5.9 6.0  --   --  6.0   RBC 4.63 4.64 4.51  --   --  4.50   HGB 13.3 13.5 13.4 13.3   < > 13.3   HCT 40.4 40.7 39.9 41.1   < > 39.5   MCV 87 88 89 89.3   < > 88   MCH 28.7 29.1 29.7 28.9   < > 29.6   MCHC 32.9 33.2 33.6 32.4   < > 33.7   RDW 13.4 13.1 13.2 13.1   < > 13.3    293 255  --   --  234    < > = values in this interval not displayed.     INRNo lab results found.  Recent Labs   Lab Test 07/27/21  1517 06/22/19  1140   CHOL 206* 192.0   HDL 50 59.0   * 122.0   TRIG 79 57.0   CHOLHDLRATIO  --  3.3     Recent Labs   Lab Test 10/19/22  1037 07/27/21  1522   A1C 5.4 5.2       Most recent EKG: reviewed and discussed with the patient   5/19/2017 - normal sinus  10/28/22 - normal

## 2022-10-28 ENCOUNTER — OFFICE VISIT (OUTPATIENT)
Dept: CARDIOLOGY | Facility: CLINIC | Age: 42
End: 2022-10-28
Attending: INTERNAL MEDICINE
Payer: COMMERCIAL

## 2022-10-28 VITALS
HEIGHT: 68 IN | SYSTOLIC BLOOD PRESSURE: 112 MMHG | OXYGEN SATURATION: 97 % | BODY MASS INDEX: 32.87 KG/M2 | DIASTOLIC BLOOD PRESSURE: 76 MMHG | HEART RATE: 72 BPM | WEIGHT: 216.9 LBS

## 2022-10-28 DIAGNOSIS — R00.2 PALPITATIONS: Primary | ICD-10-CM

## 2022-10-28 DIAGNOSIS — R06.02 SHORTNESS OF BREATH: ICD-10-CM

## 2022-10-28 DIAGNOSIS — R55 NEAR SYNCOPE: ICD-10-CM

## 2022-10-28 PROCEDURE — 93005 ELECTROCARDIOGRAM TRACING: CPT

## 2022-10-28 PROCEDURE — 99204 OFFICE O/P NEW MOD 45 MIN: CPT | Mod: GC | Performed by: INTERNAL MEDICINE

## 2022-10-28 PROCEDURE — G0463 HOSPITAL OUTPT CLINIC VISIT: HCPCS | Mod: 25

## 2022-10-28 RX ORDER — MULTIPLE VITAMINS W/ MINERALS TAB 9MG-400MCG
TAB ORAL DAILY
COMMUNITY
End: 2023-07-31

## 2022-10-28 RX ORDER — LANOLIN ALCOHOL/MO/W.PET/CERES
1 CREAM (GRAM) TOPICAL
COMMUNITY
End: 2024-04-03

## 2022-10-28 ASSESSMENT — PAIN SCALES - GENERAL
PAINLEVEL: NO PAIN (0)

## 2022-10-28 NOTE — PATIENT INSTRUCTIONS
October 28, 2022    Cardiology Provider You Saw During Your Visit: Dr. Kristy Bhatt      Medication Changes: None    Schedule an echocardiogram (heart ultrasound) whenever it is convenient.     Follow Up: As needed.     --------------------------------------------------------------------------------------------------------------  Please review the instructions below in order to help manage your symptoms:     1. Try to drink 60-70 ounces of electrolyte fluids per day.  1/2 water, 1/2 pedialyte is best  Examples: Propel, Pedialyte, Nuun tablets or tomato juice.      2. Standing Training.  Follow instructions below included. Work up to maximum time gradually. You need to be consistent with these exercises.      3.  Supine and isometric exercises with or without band. The band will give you more resistence as you progress. These exercises will assist in increasing your intravascular pressure.  They also include swimming, rowing, recumbent bike.     4. During hot summer hours, stay well hydrated, try to stay in cool -air conditioned climates.     Standing Training Protocol is as follows:    Stand 15 cm from a wall with ankles together than lean backwards against the wall. Do not bend your knees or flex you calf muscles. Stand quietly in a fixed upright posture for 5 minutes twice a day for one week. Increase the standing training time by 5 minutes every week:       5 min 2 x daily 1st week  10 min 2 x daily 2nd week  15 min 2 x daily 3rd week  20 min 2 x daily 4th week  25 min 2 x daily 5th week  30 min 2 x daily 6th week    Continue doing this every week after the 6th week....  -----------------------------------------------------------------------------------------------------    Follow the American Heart Association Diet and Lifestyle Recommendations:  -Limit saturated fat, trans fat, sodium, red meat, sweets and sugar-sweetened beverages. If you choose to eat red meat, compare labels and select the leanest cuts  available.  -Aim for at least 150 minutes of moderate physical activity or 75 minutes of vigorous physical activity - or an equal combination of both - each week.      To Reach Us:  -During business hours: [phone], press option # 1 to schedule an appointment or to leave a message for your care team.     -After hours, weekends or holidays: [phone], press option #4 and ask to speak to the on-call cardiologist. Inform them you are a patient at the [facility].      Priscila Marrufo RN  Cardiology Care Coordinator - General Cardiology  [facility] [place]

## 2022-10-28 NOTE — NURSING NOTE
"Chief Complaint   Patient presents with     New Patient     New Cardiology- New Van'mary Bhatt pt, referred d/t near syncope + \"frequent fainting spells, orthostatic hypotension, referred for event monitor\"      Vitals were taken and medications reconciled.    Edvin Rose, EMT  9:21 AM     "

## 2022-10-28 NOTE — LETTER
"10/28/2022      RE: Priyanka Lundberg  [address] [place] [address]       Dear Colleague,    Thank you for the opportunity to participate in the care of your patient, Priyanka Lundberg, at the [facility] [place] [facility]. Please see a copy of my visit note below.    CARDIOLOGY Initial Visit    HPI:  Priyanka Lundberg is a 42 year old female who receives primary care from Dr. Ruiz. Priyanka was referred to Cardiology clinic by Dr. Ruiz for evaluation of palpitations/near syncope.    Priyanka is a pleasant female with no significant cardiovascular history. Her risk factors for cardiovascular disease are primarily obesity and sedentary lifestyle. She notes a family history of cardiac disease, though from her recounting this does not sound like a family history of premature CAD or other high risk family history. She notes that her mother has maybe had a heart attack in the past but she is unsure. An uncle had coronary disease. There is no history of other PCI, CABG, CVA, or PAD. Otherwise, she does not have a personal history of HTN, HLD, DM2, or any prior history of ASCVD. She has a limited history of smoking and quit over ten years ago. She has no history of heart failure.    She does have episodes of palpitations which she states have been occurring for many years dating back to her teens. The episodes are fleeting, lasting less than a minute, though she notes they seem to be more frequent lately. She does note that emotional distress and anxiety are triggers for these episodes, but they do also occur outside of that context as well. She notes occasions of \"graying out\" that do not sound like true syncope by description. These episodes improve with sitting or lying down. She does not particularly note palpitations of any type during those episodes. Of note, she has a history of multiple episodes of dehydration, kidney stones, and " reported orthostatic hypotension.     She describes significant fatigue, often lasting for days at a time. She thinks these episodes are triggered by emotional distress. She notes shortness of breath and fatigue limit her physical activity. She gets short of breath with activity, but no exertional chest pain. Her exertional symptoms do not improve with rest.     The 10-year ASCVD risk score (Héctor BARRIGA, et al., 2019) is: 0.7%    Values used to calculate the score:      Age: 42 years      Sex: Female      Is Non- : No      Diabetic: No      Tobacco smoker: No      Systolic Blood Pressure: 110 mmHg      Is BP treated: No      HDL Cholesterol: 50 mg/dL      Total Cholesterol: 206 mg/dL     ASSESSMENT AND PLAN  Priyanka Lundberg is a 42 year old female with no significant cardiovascular history or risk factors who presents to clinic for evaluation of several symptoms as described in the HPI.    #Palpitations  My suspicion is that these are premature complexes based on the provided history. There are no red-flags concerning for malignant arrhythmias.  - currently wearing Biotel MCOT; return to clinic if any sustained arrythmias    #Near-syncope  By history, these episodes are most suspicious for neuro/vagal mediated. There is also a reported history of dehydration and orthostasis, though orthostatic vital signs are normal today (was recently counseled on adequate hydration and recommended dietary salt intake by PCP).    - provided counseling and educational materials on recommended hydration, exercise, and physical maneuvers to limit orthostasis and prevent sycnope  - TTE to evaluate cardiac function and structure  - encouraged regular exercise/physical activity targeting 20 minutes of sustained activity 5-6 days/week    #Shortness of breath/fatigue  By history these symptoms are non-cardiac/non-anginal. She is low risk for CAD/ischemic heart disease. Cardiac stress test is of limited clinical  benefit in such patients with a low pre-test probability.   - screening TTE as above  - encouraged regular exercise/physical activity targeting 20 minutes of sustained activity 5-6 days/week    We discussed the assessment and plan at length with the patient and provided the relevant recommendations/counseling. The patient is to return to cardiology clinic on an as needed basis pending results of the above ordered testing and pending MCOT.    The patient was seen and examined with the staff cardiologist, Dr. Krsity Bhatt, who is in agreement with the assessment and plan.    Josias Anderson MD  Cardiology Fellow    Thank you for the opportunity to participate in the care of Ms. Priyanka Lundberg. Please feel free to contact me with any additional questions or concerns.    Corona Bhatt MD    Preventive Cardiology  Pager: [phone]    Attending: Patient seen and examined with Dr. Anderson. The history and physical findings are accurate as recorded. My additional findings, if any, have been incorporated into the body of the note. All relevant labs and imaging studies and new ECG data have been reviewed personally. The assessment and recommendations outlined reflect our joint decision making.     The total time of this encounter amounted to 45 minutes. This time included time spent with the patient, reviewing records, ordering tests, and performing post visit documentation.     PAST MEDICAL HISTORY:  Patient Active Problem List   Diagnosis     Joint pain     L4-L5 disc bulge     Attention deficit hyperactivity disorder (ADHD), combined type     Vaginal discharge     History of abnormal cervical Pap smear     Other acne     Benign nevus     History of Clostridium difficile colitis     Cervicalgia     Bilateral thoracic back pain     Class 1 obesity due to excess calories without serious comorbidity with body mass index (BMI) of 30.0 to 30.9 in adult     Gastroesophageal reflux disease with esophagitis      Anxiety     Chronic constipation     Acute pain of left shoulder     Abdominal pain, right upper quadrant     History of food allergy     Other fatigue     Achilles tendon pain     Past Medical History:   Diagnosis Date     Abdominal pain 2011    abnormal histamine problem, multiple food allergies     ADHD (attention deficit hyperactivity disorder)     on aderol     Anxiety and depression     on xanax     Arthritis      Breathing problem 2007    shortness of breath after eating, ?allergies     Chronic nausea      Gastroesophageal reflux disease      Heart murmur     closed by time she was 1     Hx of abnormal cervical Pap smear 2011     IBS (irritable bowel syndrome)     lots of mucus in bowel with occassional bleeding     Joint pain 2015    was to have rheumatologist     Kidney stone on right side 2010     Meniere's disease     hyperacusis     Migraines      Reduced vision      Tinnitus        CURRENT MEDICATIONS:  Current Outpatient Medications   Medication Sig Dispense Refill     hydrocortisone, Perianal, (HYDROCORTISONE) 2.5 % cream Place rectally 2 times daily as needed for hemorrhoids 30 g 1     ibuprofen (ADVIL/MOTRIN) 200 MG tablet Take 400 mg by mouth every 4 hours as needed for mild pain 2 tab every am       melatonin 3 MG tablet Take 1 mg by mouth nightly as needed for sleep       multivitamin w/minerals (THERA-VIT-M) tablet Take by mouth daily         PAST SURGICAL HISTORY:  Past Surgical History:   Procedure Laterality Date     COLONOSCOPY N/A 2/14/2018    Procedure: COMBINED COLONOSCOPY, SINGLE OR MULTIPLE BIOPSY/POLYPECTOMY BY BIOPSY;  colon and egd;  Surgeon: Joan Willson MD;  Location: [facility] OR     ESOPHAGOSCOPY, GASTROSCOPY, DUODENOSCOPY (EGD), COMBINED N/A 2/14/2018    Procedure: COMBINED ESOPHAGOSCOPY, GASTROSCOPY, DUODENOSCOPY (EGD), BIOPSY SINGLE OR MULTIPLE;;  Surgeon: Joan Willson MD;  Location: [facility] OR     NO HISTORY OF SURGERY         ALLERGIES  Parker, Food, Lamb's quarter (weed), Lemon  flavor, Mustard seed, Onion, Peach, Redtop grasses, and Vanilla    FAMILY HX:  Family History   Problem Relation Age of Onset     Mental Illness Mother         bipolar, schizophrenia     Anxiety Disorder Mother      Osteoporosis Mother      Thyroid Disease Mother      Diabetes Mother      Neurofibromatosis Mother         more likely fibromyalgia     Arthritis Mother      Back Pain Mother      Osteoarthritis Mother      Obesity Mother      Cirrhosis Mother         from fatty liver. with esophageal varices, ..     Gallbladder Disease Mother         cholecystectomy     Hypertension Father      Hyperlipidemia Father      Diabetes Father      Other Cancer Father 67        Neuroendocrine tumor, ? from gall bladder, stage 4  1/2018     Migraines Father      Scleroderma Father      Rheumatoid Arthritis Father      Obesity Father      Ankylosing Spondylitis Father      Macular Degeneration Father      Mental Illness Sister         bipolar     Anxiety Disorder Sister      Asthma Sister         eczema     Neurologic Disorder Sister         Cervical Dystonia, treated with Botox     Thyroid Cancer Sister      Coronary Artery Disease Maternal Grandmother      Cerebrovascular Disease Maternal Grandmother      Breast Cancer Maternal Grandmother 65     Skin Cancer Maternal Grandmother      Stomach Cancer Maternal Grandmother         stomach, skin     Prostate Cancer Maternal Grandfather      Cancer Maternal Grandfather      Diabetes Paternal Grandmother      Kidney Cancer Paternal Grandmother      Coronary Artery Disease Paternal Grandfather      Colon Cancer Paternal Grandfather      Cancer Paternal Grandfather      Ovarian Cancer Maternal Aunt 68     Skin Cancer Maternal Aunt         melanoma     Scleroderma Paternal Aunt      Ankylosing Spondylitis Paternal Aunt      Ankylosing Spondylitis Paternal Uncle      Liver Cancer Other         uncle     Glaucoma No family hx of        SOCIAL HX:  Social History     Tobacco Use      "Smoking status: Never     Smokeless tobacco: Never   Substance Use Topics     Alcohol use: Not Currently     Alcohol/week: 0.0 standard drinks     Comment: outside of pregnancy, social     Drug use: No        ROS:  Comprehensive ROS is negative except as noted in HPI.    VITAL SIGNS:  /76 (BP Location: Right arm, Patient Position: Chair, Cuff Size: Adult Large)   Pulse 72   Ht 1.715 m (5' 7.52\")   Wt 98.4 kg (216 lb 14.4 oz)   LMP 10/14/2022 (Exact Date)   SpO2 97%   BMI 33.45 kg/m    Body mass index is 33.45 kg/m .  Wt Readings from Last 2 Encounters:   10/28/22 98.4 kg (216 lb 14.4 oz)   10/19/22 97.1 kg (214 lb)       PHYSICAL EXAM  Gen: pleasant female sitting comfortably in NAD  Head: nc/at  Eyes: pupils equal, anicteric  Neck: supple, trachea midline  CV: nml s1/s2, soft holosystolic murmur loudest at cardiac apex Grade II; no JVD  Chest: clear lungs bilaterally without w/c/r  Abd: soft, NT  Ext: warm, no LE edema  Skin: no rash on limited exam  Neuro: awake, alert, oriented, nml speech  Vasc: 2+ bilateral radial pulses; no bruits noted    LABS: personally reviewed  CMP  Recent Labs   Lab Test 10/19/22  1037 08/27/21  1422 07/27/21  1517 02/15/21  1600 11/06/18  1333 08/13/18  1508 05/19/17  0033 05/20/16  1258 04/21/16  0933 04/21/16  0933    141 141 142.0 141   < > 143 142   < >  --    POTASSIUM 4.3 4.2 4.0 3.9 4.1   < > 3.7 3.6   < >  --    CHLORIDE 104 107 109 107.0 104   < > 106 109   < >  --    CO2 24 29 31 31.0 28   < > 30 23   < >  --    ANIONGAP 13 5 1*  --  8  --  7 10   < >  --    * 97 98 104.0* 92   < > 101* 91   < >  --    BUN 11.6 9 7 12.0 9   < > 20 6*   < >  --    CR 0.68 0.50* 0.80 0.8 0.67   < > 0.72 0.52  --  0.50*   GFRESTIMATED >90 >90 >90  --  >90  --  89 >90  Non  GFR Calc    --  >90  Non  GFR Calc     GFRESTBLACK  --   --   --   --  >90  --  >90   GFR Calc   >90   GFR Calc    --  >90   " American GFR Calc     TORY 9.5 9.2 9.2 9.1 8.5   < > 9.0 8.4*   < >  --    PROTTOTAL  --  7.2 7.2 7.0 7.3  --   --   --   --   --    ALBUMIN  --  3.9 3.9 3.6 3.9  --   --   --   --   --    BILITOTAL  --  0.5 0.6 0.6 0.2  --   --   --   --   --    ALKPHOS  --  50 57 50.0 50  --   --   --   --   --    AST  --  25 29 20.0 19  --   --  18  --  17   ALT  --  18 17 16.0 21  --   --  27  --  22    < > = values in this interval not displayed.     CBC  Recent Labs   Lab Test 10/19/22  1037 08/27/21  1422 07/27/21  1517 02/15/21  1600 08/13/18  1514 05/19/17  0033   WBC 6.4 5.9 6.0  --   --  6.0   RBC 4.63 4.64 4.51  --   --  4.50   HGB 13.3 13.5 13.4 13.3   < > 13.3   HCT 40.4 40.7 39.9 41.1   < > 39.5   MCV 87 88 89 89.3   < > 88   MCH 28.7 29.1 29.7 28.9   < > 29.6   MCHC 32.9 33.2 33.6 32.4   < > 33.7   RDW 13.4 13.1 13.2 13.1   < > 13.3    293 255  --   --  234    < > = values in this interval not displayed.     INRNo lab results found.  Recent Labs   Lab Test 07/27/21  1517 06/22/19  1140   CHOL 206* 192.0   HDL 50 59.0   * 122.0   TRIG 79 57.0   CHOLHDLRATIO  --  3.3     Recent Labs   Lab Test 10/19/22  1037 07/27/21  1522   A1C 5.4 5.2       Most recent EKG: reviewed and discussed with the patient   5/19/2017 - normal sinus  10/28/22 - normal      Please do not hesitate to contact me if you have any questions/concerns.     Sincerely,     Corona Bhatt MD

## 2022-10-31 LAB
ATRIAL RATE - MUSE: 69 BPM
DIASTOLIC BLOOD PRESSURE - MUSE: NORMAL MMHG
INTERPRETATION ECG - MUSE: NORMAL
P AXIS - MUSE: 54 DEGREES
PR INTERVAL - MUSE: 166 MS
QRS DURATION - MUSE: 84 MS
QT - MUSE: 394 MS
QTC - MUSE: 422 MS
R AXIS - MUSE: 55 DEGREES
SYSTOLIC BLOOD PRESSURE - MUSE: NORMAL MMHG
T AXIS - MUSE: 53 DEGREES
VENTRICULAR RATE- MUSE: 69 BPM

## 2022-11-03 NOTE — TELEPHONE ENCOUNTER
FUTURE VISIT INFORMATION      FUTURE VISIT INFORMATION:    Date: 12/28/2022    Time: 330pm    Location: [facility]  REFERRAL INFORMATION:    Referring provider:  Self     Referring providers clinic:      Reason for visit/diagnosis  Follow-up     RECORDS REQUESTED FROM:       Clinic name Comments Records Status Imaging Status   Internal Dr. Denis-10/29/2019, 4/12/2019    MR Brain-11/3/2019    CT Facial Bones-3/16/2019    Dr. Ruiz-10/19/2022 Epic PACS

## 2022-11-07 ENCOUNTER — ANCILLARY PROCEDURE (OUTPATIENT)
Dept: CARDIOLOGY | Facility: CLINIC | Age: 42
End: 2022-11-07
Attending: INTERNAL MEDICINE
Payer: COMMERCIAL

## 2022-11-07 DIAGNOSIS — R06.02 SHORTNESS OF BREATH: ICD-10-CM

## 2022-11-07 LAB — LVEF ECHO: NORMAL

## 2022-11-07 PROCEDURE — 93306 TTE W/DOPPLER COMPLETE: CPT | Performed by: STUDENT IN AN ORGANIZED HEALTH CARE EDUCATION/TRAINING PROGRAM

## 2022-11-17 ENCOUNTER — TRANSFERRED RECORDS (OUTPATIENT)
Dept: HEALTH INFORMATION MANAGEMENT | Facility: CLINIC | Age: 42
End: 2022-11-17

## 2022-12-07 ENCOUNTER — PRE VISIT (OUTPATIENT)
Dept: UROLOGY | Facility: CLINIC | Age: 42
End: 2022-12-07

## 2022-12-07 NOTE — TELEPHONE ENCOUNTER
Reason for visit: Bladder pain and pressure     Relevant information: history of kidney stones    Records/imaging/labs/orders: saw Jayde Catherine NP in 2021    Pt called: no need for a call    At Rooming: collect a urine/pvr      Cee Oconnor CMA  12/7/2022  9:28 AM

## 2022-12-08 ENCOUNTER — OFFICE VISIT (OUTPATIENT)
Dept: UROLOGY | Facility: CLINIC | Age: 42
End: 2022-12-08
Attending: FAMILY MEDICINE
Payer: COMMERCIAL

## 2022-12-08 ENCOUNTER — PRE VISIT (OUTPATIENT)
Dept: UROLOGY | Facility: CLINIC | Age: 42
End: 2022-12-08

## 2022-12-08 VITALS
HEART RATE: 76 BPM | DIASTOLIC BLOOD PRESSURE: 76 MMHG | HEIGHT: 68 IN | SYSTOLIC BLOOD PRESSURE: 116 MMHG | WEIGHT: 212 LBS | BODY MASS INDEX: 32.13 KG/M2

## 2022-12-08 DIAGNOSIS — M62.89 PELVIC FLOOR DYSFUNCTION: ICD-10-CM

## 2022-12-08 DIAGNOSIS — R39.15 URINARY URGENCY: Primary | ICD-10-CM

## 2022-12-08 DIAGNOSIS — N39.41 URGE INCONTINENCE OF URINE: ICD-10-CM

## 2022-12-08 PROCEDURE — 99213 OFFICE O/P EST LOW 20 MIN: CPT | Performed by: UROLOGY

## 2022-12-08 ASSESSMENT — PAIN SCALES - GENERAL: PAINLEVEL: NO PAIN (0)

## 2022-12-08 NOTE — LETTER
2022       RE: Priyanka Lundberg  [address] [place] [address]     Dear Colleague,    Thank you for referring your patient, Priyanka Lundberg, to the [facility] [place] at [facility]. Please see a copy of my visit note below.    2022    Priyanka was seen today for follow up.    Diagnoses and all orders for this visit:    Urinary urgency  -     Physical Therapy Referral; Future    Urge incontinence of urine  -     Adult Urology  Referral  -     Physical Therapy Referral; Future    Pelvic floor dysfunction  -     Physical Therapy Referral; Future    Reviewed etiologies of urinary urgency/UUI - infection vs. OAB vs. less likely related to stone disease. Patient would like to avoid medications if possible. States that PFPT worked well for her after her prior delivery, and she would like to resume this. Referral placed. Can obtain follow-up imaging for nephrolithiasis if no improvement in symptoms after PFPT.     Addendum:    The patient was seen and evaluated with the resident.  The plan was formulated in conjunction with me and I agree with the note with changes made as necessary.    10 minutes were spent today on the date of the encounter in reviewing the EMR including recent UA, direct patient care, coordination of care, and documentation.    Zahra Valle MD MPH  (she/her/hers)   of Urology  [facility]    Subjective  Priyanka is a 42yoF , otherwise healthy, previously seen by urology for hx of kidney stones, presenting for evaluation of urinary urgency and urge-related incontinence. Reports worsening of these symptoms over last few months. Wears a pad for leakage. Seen by primary care physician in 10/2022; UA negative. Denies associated dysuria/hematuria/flank pain.      Re: hx of kidney stones - last imaged in 2021 with 5mm non-obstructing stone in L kidney. Has never  "undergone any procedures. Denies any current symptoms related to stones.     Still having regular menstrual cycles.     /76   Pulse 76   Ht 1.715 m (5' 7.5\")   Wt 96.2 kg (212 lb)   LMP 12/05/2022 (Exact Date)   BMI 32.71 kg/m    GENERAL: healthy, alert and no distress  EYES: Eyes grossly normal to inspection, conjunctivae and sclerae normal  HENT: normal cephalic/atraumatic.  External ears, nose and mouth without ulcers or lesions.  RESP: no audible wheeze, cough, or visible cyanosis.  No visible retractions or increased work of breathing.  Able to speak fully in complete sentences.  NEURO: Cranial nerves grossly intact, mentation intact and speech normal  PSYCH: mentation appears normal, affect normal/bright, judgement and insight intact, normal speech and appearance well-groomed    Labs/imaging/pathology  UA x 2 in 10/2022 - negative     CC  Patient Care Team:  Sydney Ruiz MD as PCP - General (Internal Medicine)  Ana Paula Mcmahan MD as MD (OB/Gyn)  Oly Rivas APRN CNP as Nurse Practitioner (Nurse Practitioner)  Fariha Thornton MD as MD (Family Practice)  Diana Boothe MD as MD (Otolaryngology)  Demetri Denis MD as MD (Neurology)  Jayde Catherine CNP as Nurse Practitioner (Urology)  Deedee Juares, DUARTE as Registered Nurse  Sydney Ruiz MD as Assigned PCP  Gregorio Sy DO as Assigned Musculoskeletal Provider  Nikita Ross PA-C as Physician Assistant (Gastroenterology)  Jodie Oliver OD as MD (Optometry)  Nikita Ross PA-C as Assigned Gastroenterology Provider  Jodie Oliver OD as Assigned Surgical Provider  Mary Damon PA-C as Assigned Cancer Care Provider  Corona Galvez MD as MD (Cardiovascular Disease)  Corona Galvez MD as Assigned Heart and Vascular Provider  PARTH SOTELO  "

## 2022-12-08 NOTE — NURSING NOTE
"Chief Complaint   Patient presents with     Follow Up     Incontinence and pain at end of urination       Blood pressure 116/76, pulse 76, height 1.715 m (5' 7.5\"), weight 96.2 kg (212 lb), last menstrual period 12/05/2022, not currently breastfeeding. Body mass index is 32.71 kg/m .    Patient Active Problem List   Diagnosis     Joint pain     L4-L5 disc bulge     Attention deficit hyperactivity disorder (ADHD), combined type     Vaginal discharge     History of abnormal cervical Pap smear     Other acne     Benign nevus     History of Clostridium difficile colitis     Cervicalgia     Bilateral thoracic back pain     Class 1 obesity due to excess calories without serious comorbidity with body mass index (BMI) of 30.0 to 30.9 in adult     Gastroesophageal reflux disease with esophagitis     Anxiety     Chronic constipation     Acute pain of left shoulder     Abdominal pain, right upper quadrant     History of food allergy     Other fatigue     Achilles tendon pain       Allergies   Allergen Reactions     Eddy      Food      Cantaloupe, cucumbers, green beans, and peppers.      Glover's Quarter (Weed)      Lemon Flavor      Mustard Seed      Onion Difficulty breathing and GI Disturbance     [place] GI Disturbance     Redtop Grasses      Vanilla GI Disturbance       Current Outpatient Medications   Medication Sig Dispense Refill     hydrocortisone, Perianal, (HYDROCORTISONE) 2.5 % cream Place rectally 2 times daily as needed for hemorrhoids 30 g 1     ibuprofen (ADVIL/MOTRIN) 200 MG tablet Take 400 mg by mouth every 4 hours as needed for mild pain 2 tab every am       melatonin 3 MG tablet Take 1 mg by mouth nightly as needed for sleep       multivitamin w/minerals (THERA-VIT-M) tablet Take by mouth daily         Social History     Tobacco Use     Smoking status: Never     Smokeless tobacco: Never   Substance Use Topics     Alcohol use: Not Currently     Alcohol/week: 0.0 standard drinks     Comment: outside of " pregnancy, social     Drug use: No       Teresageoff Anderson  12/8/2022  3:24 PM

## 2022-12-08 NOTE — PATIENT INSTRUCTIONS
Websites with free information:    [employer] patient website: www.voicesforpfd.org    Total Control Program: www.totalcontrolprogram.com    Please see one of the dedicated pelvic floor physical therapist. If you have not heard from the scheduling office within 2 business days, please call [phone] for [employer]    Please return to see me in 6 months, sooner if needed    It was a pleasure meeting with you today.  Thank you for allowing me and my team the privilege of caring for you today.  YOU are the reason we are here, and I truly hope we provided you with the excellent service you deserve.  Please let us know if there is anything else we can do for you so that we can be sure you are leaving completely satisfied with your care experience.

## 2022-12-08 NOTE — PROGRESS NOTES
"2022    Priyanka was seen today for follow up.    Diagnoses and all orders for this visit:    Urinary urgency  -     Physical Therapy Referral; Future    Urge incontinence of urine  -     Adult Urology  Referral  -     Physical Therapy Referral; Future    Pelvic floor dysfunction  -     Physical Therapy Referral; Future    Reviewed etiologies of urinary urgency/UUI - infection vs. OAB vs. less likely related to stone disease. Patient would like to avoid medications if possible. States that PFPT worked well for her after her prior delivery, and she would like to resume this. Referral placed. Can obtain follow-up imaging for nephrolithiasis if no improvement in symptoms after PFPT.     Addendum:    The patient was seen and evaluated with the resident.  The plan was formulated in conjunction with me and I agree with the note with changes made as necessary.    10 minutes were spent today on the date of the encounter in reviewing the EMR including recent UA, direct patient care, coordination of care, and documentation.    Zahra Valle MD MPH  (she/her/hers)   of Urology  [facility]    Subjective  Priyanka is a 42yoF , otherwise healthy, previously seen by urology for hx of kidney stones, presenting for evaluation of urinary urgency and urge-related incontinence. Reports worsening of these symptoms over last few months. Wears a pad for leakage. Seen by primary care physician in 10/2022; UA negative. Denies associated dysuria/hematuria/flank pain.      Re: hx of kidney stones - last imaged in 2021 with 5mm non-obstructing stone in L kidney. Has never undergone any procedures. Denies any current symptoms related to stones.     Still having regular menstrual cycles.     /76   Pulse 76   Ht 1.715 m (5' 7.5\")   Wt 96.2 kg (212 lb)   LMP 2022 (Exact Date)   BMI 32.71 kg/m    GENERAL: healthy, alert and no distress  EYES: Eyes grossly normal to inspection, " conjunctivae and sclerae normal  HENT: normal cephalic/atraumatic.  External ears, nose and mouth without ulcers or lesions.  RESP: no audible wheeze, cough, or visible cyanosis.  No visible retractions or increased work of breathing.  Able to speak fully in complete sentences.  NEURO: Cranial nerves grossly intact, mentation intact and speech normal  PSYCH: mentation appears normal, affect normal/bright, judgement and insight intact, normal speech and appearance well-groomed    Labs/imaging/pathology  UA x 2 in 10/2022 - negative     CC  Patient Care Team:  Sydney Ruiz MD as PCP - General (Internal Medicine)  Ana Paula Mcmahan MD as MD (OB/Gyn)  Oly Rivas APRN CNP as Nurse Practitioner (Nurse Practitioner)  Fariha Thornton MD as MD (Family Practice)  Diana Boothe MD as MD (Otolaryngology)  Demetri Denis MD as MD (Neurology)  Jayde Catherine CNP as Nurse Practitioner (Urology)  Deedee Juares, DUARTE as Registered Nurse  Sydney Ruiz MD as Assigned PCP  Gregorio Sy DO as Assigned Musculoskeletal Provider  Nikita Ross PA-C as Physician Assistant (Gastroenterology)  Jodie Oliver OD as MD (Optometry)  Nikita Ross PA-C as Assigned Gastroenterology Provider  Jodie Oliver OD as Assigned Surgical Provider  Mary Damon PA-C as Assigned Cancer Care Provider  Corona Galvez MD as MD (Cardiovascular Disease)  Corona Galvez MD as Assigned Heart and Vascular Provider  PARTH SOTELO

## 2022-12-28 ENCOUNTER — PRE VISIT (OUTPATIENT)
Dept: NEUROLOGY | Facility: CLINIC | Age: 42
End: 2022-12-28

## 2023-01-17 ENCOUNTER — THERAPY VISIT (OUTPATIENT)
Dept: PHYSICAL THERAPY | Facility: CLINIC | Age: 43
End: 2023-01-17
Attending: UROLOGY
Payer: COMMERCIAL

## 2023-01-17 DIAGNOSIS — M62.89 PELVIC FLOOR DYSFUNCTION: ICD-10-CM

## 2023-01-17 PROCEDURE — 97530 THERAPEUTIC ACTIVITIES: CPT | Mod: GP | Performed by: PHYSICAL THERAPIST

## 2023-01-17 PROCEDURE — 97161 PT EVAL LOW COMPLEX 20 MIN: CPT | Mod: GP | Performed by: PHYSICAL THERAPIST

## 2023-01-17 NOTE — PROGRESS NOTES
Physical Therapy Initial Evaluation  Subjective:  The history is provided by the patient. No  was used.   Patient Health History  Priyanka Lundberg being seen for incontinence/ ache, intermittent.     Date of Onset: date of order 12/8/22.   Problem occurred: ?   Pain is reported as 3/10 on pain scale.  General health as reported by patient is fair.  Health conditions: overweight.   Other pertinent conditions: Right sided flank pain.             Current occupation is self employed.   Primary job tasks include:  Computer work, driving, lifting/carrying, prolonged sitting, prolonged standing, pushing/pulling and other.                   Therapist Assessment:   Clinical Impression: Pt presents with primary complaint of urge urinary incontinence, chronic constipation, history of dyspareunia, and pelvic pressure/heaviness.  Per clinical examination, pt with right sided abdominal distention and poor coordination and mechanics when simulating pushign for a bowel movement. Due to time constraints will further assess pelvic floor muscles and proximal muscles at next session.  Pt will benefit from skilled physical therapy for coordination and strength training as well as bladder health and bowel health education.    Chief Complaint:  About 2 decades ago she would get strong urge to urinate when she walks in the door, and will have leaking walking to the bathroom. About 4 months ago she started having right abdominal/flank pain. She also started noticing urinary incontinence without any urge when standing during the day. She also notes suprapubic and urethral burning pain after urinating. Priyanka also has been noticing more heaviness in her suprapubic region, which she notices mostly in sitting.     The pt has had lifelong constipation. She will have right lower abdominal pain that she feels is due to her constipation.    Priyanka is not interested in medication or surgeries.    She walks around with a lot of  trauma and grief and she has been working with art therapy to help with this    Goals: reduce pain    Urination   Do you leak on the way to the bathroom or with a strong urge to void? yes  Do you leak with cough, sneeze, jumping, running?yes, dancing  Any other activities that cause leaking? yes  Do you have triggers that make you feel you can't wait to go to the bathroom? yes What are they? Coming home  Type of pad and number used per day? 0  When you leak what is the amount? Small drops  How long can you delay the need to urinate? depends  How many times do you get up to urinate at night? 0  How many times do you urinate during the day? Pt will put off urge for hours, but has been trying to go more often  Can you stop the flow of urine when on the toilet? no  Is the volume of urine passed usually: medium  Do you strain to pass urine? sometimes  Do you have a slow or hesitant urinary stream? no  Do you have difficulty initiating the urine stream? no  How many bladder infections have you had in the last 12 months? 0  What is you fluid intake per day? Water (8oz) 50oz  Caffeine 8oz  Alcohol 0  Do you feel like you empty completely when voiding? yes    Bowel Habits  Frequency of bowel movements? 2-3x a week  Consistency of stool? Kendall stool scale? About 2-3x a week she will have type 2-3, some type 4 (skinny) after taking miralax  Do you ignore the urge to defecate? yes  Do you strain to pass stool? yes  Do you feel that you empty completely? No  The pt notes tissue coming out of rectum. She has been using hemorrhoid steroids for the past year. She also notes fecal smearing/seepage if she is very constipated or if there is a food trigger. This will happen and she will feel the seepage when walking around.  She will only have the urge to have a bowel movement after drinking coffee.    About 2 months ago she has been having right side lower rib spasms.     Pelvic Pain  When do you have pelvic pain? Seems heaviness  above pubic bone and pelvic region.  Are you sexually active? No, she has always had pain with intercourse in the past, pain with initial and deeper penetration  Do you use lubrication? no  Marinoff Scale:Level 2  (Level 3: Abstinence from intercourse because of severe pain. Level 2: Painful intercourse which limites frequency of activity. Level 1: Painful intercourse not severe enough to prevent activity.)  Have you given birth? Yes 1 vaginal delivery, baby was mariaa side  Have you ever been worried for your physical safety? no  Any abdominal or pelvic surgeries? no  Are you having regular exercise? no  Have you practiced the PF (kegel) exercise for 4 or more weeks? Sometimes      Objective:    POSTURE: forward head    OBSERVATION: chest breathing  Instructed pt to demonstrate pushing when on toilet for bowel movement: valsalva and concentric activation of abdominals     ABDOMINAL WALL: distention of right upper and lower quadrant along ascending colon    EXTERNAL ASSESSMENT:- will assess at next session due to time restraints     INTERNAL VAGINAL ASSESSMENT:- will assess at next session due to time restraints      Assessment/Plan:    Patient is a 42 year old female with pelvic complaints.    Patient has the following significant findings with corresponding treatment plan.                Diagnosis 1:  Pelvic floor dysfunction  Pain -  hot/cold therapy, US, electric stimulation, mechanical traction, manual therapy, STS, splint/taping/bracing/orthotics, self management, education, directional preference exercise and home program  Impaired muscle performance - biofeedback, electric stimulation, neuro re-education and home program  Decreased function - therapeutic activities, home program and functional performance testing    Therapy Evaluation Codes:   Cumulative Therapy Evaluation is: Low complexity.    Previous and current functional limitations:  (See Goal Flow Sheet for this information)    Short term and Long  term goals: (See Goal Flow Sheet for this information)     Communication ability:  Patient appears to be able to clearly communicate and understand verbal and written communication and follow directions correctly.  Treatment Explanation - The following has been discussed with the patient:   RX ordered/plan of care  Anticipated outcomes  Possible risks and side effects  This patient would benefit from PT intervention to resume normal activities.   Rehab potential is good.    Frequency:  1 X week, once daily  Duration:  for 12 weeks  Discharge Plan:  Achieve all LTG.  Independent in home treatment program.  Reach maximal therapeutic benefit.    Please refer to the daily flowsheet for treatment today, total treatment time and time spent performing 1:1 timed codes.

## 2023-01-17 NOTE — PROGRESS NOTES
[facility]    OUTPATIENT Physical Therapy ORTHOPEDIC EVALUATION  PLAN OF TREATMENT FOR OUTPATIENT REHABILITATION  (COMPLETE FOR INITIAL CLAIMS ONLY)  Patient's Last Name, First Name, M.I.  YOB: 1980  Priyanka Lundberg    Provider s Name:  [facility]   Medical Record No.  [id number]   Start of Care Date:  01/17/23   Onset Date:   12/08/22   Treatment Diagnosis:  pelvic floor dysfunction Medical Diagnosis:  Pelvic floor dysfunction       Goals:     01/17/23 0700   Constipation/Obstructive Defecation   Current Functional Level Frequency of bowel movements   Performance Level 2x a week   STG Target Performance Increase frequency of bowel movements to   Performance Level 3x a week   Rationale Work toward normal voiding or evacuation patterns to focus on ADLS, work, or school   Due Date 02/28/23   LTG Target Performance Increase frequency of bowel movements to   Performance Level 5x a week   Rationale Work toward normal voiding or evacuation patterns to focus on ADLS, work, or school   Due Date 04/11/23         Therapy Frequency:  1x a week  Predicted Duration of Therapy Intervention:  12 weeks    Genet Lubin, PT                 I CERTIFY THE NEED FOR THESE SERVICES FURNISHED UNDER        THIS PLAN OF TREATMENT AND WHILE UNDER MY CARE     (Physician attestation of this document indicates review and certification of the therapy plan).                     Certification Date From:  01/17/23   Certification Date To:  04/11/23    Referring Provider:  Zahra Valle    Initial Assessment        See Epic Evaluation SOC Date: 01/17/23

## 2023-02-01 ENCOUNTER — THERAPY VISIT (OUTPATIENT)
Dept: PHYSICAL THERAPY | Facility: CLINIC | Age: 43
End: 2023-02-01
Payer: COMMERCIAL

## 2023-02-01 DIAGNOSIS — M62.89 PELVIC FLOOR DYSFUNCTION: Primary | ICD-10-CM

## 2023-02-01 PROCEDURE — 97140 MANUAL THERAPY 1/> REGIONS: CPT | Mod: GP | Performed by: PHYSICAL THERAPIST

## 2023-02-01 PROCEDURE — 97530 THERAPEUTIC ACTIVITIES: CPT | Mod: GP | Performed by: PHYSICAL THERAPIST

## 2023-02-03 ENCOUNTER — TELEPHONE (OUTPATIENT)
Dept: NEUROLOGY | Facility: CLINIC | Age: 43
End: 2023-02-03
Payer: COMMERCIAL

## 2023-02-03 NOTE — TELEPHONE ENCOUNTER
[facility] Call Center    Phone Message    May a detailed message be left on voicemail: yes     Reason for Call: Appointment Intake    Diagnosis and/or Symptoms: Pot syndrome     Pt was scheduled for 12/28/2023 and her apointment was canceled day before. Offered pt next availble 07/24/2023. Pt is upset and requesting a message be sent to department to see if there is anyway she can be seen sooner?    Please follow up with pt.    Phone number to reach patient:  Cell number on file:    Telephone Information:   Mobile [phone]       Action Taken: Message routed to: [facility]    Travel Screening: Not Applicable    Charles Levin on 2/3/2023 at 4:47 PM  Neurology Intake Representative

## 2023-02-06 NOTE — TELEPHONE ENCOUNTER
Apologized to pt that her appt with Dr. Vazquez was cancelled and pt stated she did not want to see him.  Informed her I do not see any available new pt appts with Dr. Wray, prior to her scheduled appt. And we can put her on the wait list and she would get a call if a sooner appt became available and she stated she was already on the wait list.

## 2023-02-07 ENCOUNTER — OFFICE VISIT (OUTPATIENT)
Dept: FAMILY MEDICINE | Facility: CLINIC | Age: 43
End: 2023-02-07
Payer: COMMERCIAL

## 2023-02-07 VITALS
TEMPERATURE: 98.1 F | DIASTOLIC BLOOD PRESSURE: 73 MMHG | WEIGHT: 221.08 LBS | HEIGHT: 68 IN | SYSTOLIC BLOOD PRESSURE: 124 MMHG | BODY MASS INDEX: 33.51 KG/M2 | HEART RATE: 71 BPM | OXYGEN SATURATION: 96 %

## 2023-02-07 DIAGNOSIS — F32.2 SEVERE MAJOR DEPRESSION (H): ICD-10-CM

## 2023-02-07 DIAGNOSIS — F41.9 ANXIETY: ICD-10-CM

## 2023-02-07 DIAGNOSIS — M79.671 RIGHT FOOT PAIN: Primary | ICD-10-CM

## 2023-02-07 DIAGNOSIS — M54.42 CHRONIC BILATERAL LOW BACK PAIN WITH BILATERAL SCIATICA: ICD-10-CM

## 2023-02-07 DIAGNOSIS — M54.41 CHRONIC BILATERAL LOW BACK PAIN WITH BILATERAL SCIATICA: ICD-10-CM

## 2023-02-07 DIAGNOSIS — G89.29 CHRONIC BILATERAL LOW BACK PAIN WITH BILATERAL SCIATICA: ICD-10-CM

## 2023-02-07 DIAGNOSIS — M25.551 HIP PAIN, BILATERAL: ICD-10-CM

## 2023-02-07 DIAGNOSIS — M62.838 MUSCLE SPASM: ICD-10-CM

## 2023-02-07 DIAGNOSIS — R10.9 RIGHT FLANK PAIN: ICD-10-CM

## 2023-02-07 DIAGNOSIS — M25.552 HIP PAIN, BILATERAL: ICD-10-CM

## 2023-02-07 DIAGNOSIS — M79.10 MUSCLE TENSION PAIN: ICD-10-CM

## 2023-02-07 DIAGNOSIS — K59.09 CHRONIC CONSTIPATION: ICD-10-CM

## 2023-02-07 DIAGNOSIS — R41.840 INATTENTION: ICD-10-CM

## 2023-02-07 RX ORDER — CYCLOBENZAPRINE HCL 10 MG
10 TABLET ORAL 2 TIMES DAILY PRN
Qty: 14 TABLET | Refills: 0 | Status: SHIPPED | OUTPATIENT
Start: 2023-02-07 | End: 2023-07-31

## 2023-02-07 ASSESSMENT — ANXIETY QUESTIONNAIRES
2. NOT BEING ABLE TO STOP OR CONTROL WORRYING: MORE THAN HALF THE DAYS
GAD7 TOTAL SCORE: 12
6. BECOMING EASILY ANNOYED OR IRRITABLE: SEVERAL DAYS
IF YOU CHECKED OFF ANY PROBLEMS ON THIS QUESTIONNAIRE, HOW DIFFICULT HAVE THESE PROBLEMS MADE IT FOR YOU TO DO YOUR WORK, TAKE CARE OF THINGS AT HOME, OR GET ALONG WITH OTHER PEOPLE: EXTREMELY DIFFICULT
3. WORRYING TOO MUCH ABOUT DIFFERENT THINGS: NEARLY EVERY DAY
5. BEING SO RESTLESS THAT IT IS HARD TO SIT STILL: NOT AT ALL
1. FEELING NERVOUS, ANXIOUS, OR ON EDGE: NEARLY EVERY DAY
GAD7 TOTAL SCORE: 12
7. FEELING AFRAID AS IF SOMETHING AWFUL MIGHT HAPPEN: MORE THAN HALF THE DAYS

## 2023-02-07 ASSESSMENT — PATIENT HEALTH QUESTIONNAIRE - PHQ9
SUM OF ALL RESPONSES TO PHQ QUESTIONS 1-9: 21
5. POOR APPETITE OR OVEREATING: SEVERAL DAYS

## 2023-02-07 ASSESSMENT — ASTHMA QUESTIONNAIRES: ACT_TOTALSCORE: 24

## 2023-02-07 NOTE — PROGRESS NOTES
"Priyanka Lundberg is a 42 year old female with history of chronic constipation, anxiety, joint pain, GERD, history of multiple food allergies, and kidney stones, various urinary symptoms, depression and anxiety. She is here for the following issues:    Low back pain with radiculopathy  Priyanka reports longstanding history of low back pain which radiates down the right leg to the calf also radiates to the left upper thigh.  Symptoms have been ongoing, since she was a teenager.  She cannot recall an injury but tells me a chiropractor once told her she had the back of a 60-year-old.  She had done some physical therapy in the past and found this helpful.  Reports that she has a handout on these exercises at home but does not do them.  May be interested in a physical therapy referral.    Right-sided abdominal spasm  Priyanka is particularly concerned about a 3-month history of spasms which occur at the right side of her abdomen, extending to just beneath her right breast.  Spasms happen several times a day and rubbing over the spasm seems to help it.  She is very concerned that the spasms may represent an underlying malignancy.  She endorses significant muscle tension in her neck back chest and lower back but she feels that this is not simply a muscle spasm.    Abdominal pain/flank pain  Priyanka has longstanding history of urinary complaints.  She is currently working with a physical therapist at the pelvic floor center and finds that this is helping although slowly.  She had seen a urologist, , at the [facility].  She was referred for pelvic floor work, no imaging was obtained.    Chronic constipation  She has longstanding constipation.  Had been leery to use MiraLAX in the past because she feels it \"heats up when it hits water\".  Two weeks ago, she used Fleets enemas and  MiraLAX.  She typically avoids the MiraLAX unless she is in \"dire straits\".  Pain is mainly over the right side of her abdomen.  She " "also has flank pain and has previous history of a kidney stone.  She currently denies hematuria.  Her father was diagnosed with a neuroendocrine tumor, source was a gallbladder and he  of this disease.  Priyanka is very worried that she may have a cancer.    Diet: does not like meat, limited dairy.   Likes cheese, loves fruits and veggies  Heavy on potato and bread and sometimes rice/pasta    Right foot pain  Several years ago, she started having pain over the top of her right foot above the fourth and fifth metatarsal bones.  She tried changing shoe but this was not very helpful.  She had seen a podiatrist at one point but did not find that helpful.  She is interested in a new referral.  She cannot recall an injury.  No swelling or redness.    Lump on neck  Priyanka reports that there is a lump just beneath her hairline on the right posterior neck that \"comes and goes\". Sometimes it feels very tender.  She also points out a nodule on the posterior right scalp that sometimes gets tender.  She was told by a dermatologist that this was a benign process.  She is worried this is something more serious.    Depression and anxiety  Priyanka endorses significant depression and anxiety.  She is not on any current medications and prefers not to take anything.  She has met with the same therapist for the past 5 years and finds that helpful.  She describes his difficult social situation.  She is , describes her partner as aloof, he also has significant mental health struggles. They just started couples counseling.  She also cares for her mother who is \"mean to me\".  Still, she cares for her because \"that is my mother\".     She reports previous history of alcoholism, has not drank since .  No use of drugs.  No suicidal ideation.    Priyanka reports that she was previously diagnosed with ADHD when she lived in [place] but \"those records are no longer available\".  She is interested in seeking a formal " "evaluation.    PHQ 4/9/2020 7/27/2021 2/7/2023   PHQ-9 Total Score 6 9 21   Q9: Thoughts of better off dead/self-harm past 2 weeks Not at all Not at all Not at all     BIN-7 SCORE 4/9/2020 7/27/2021 2/7/2023   Total Score 6 (mild anxiety) - -   Total Score 6 9 12       Patient Active Problem List   Diagnosis     Joint pain     L4-L5 disc bulge     Attention deficit hyperactivity disorder (ADHD), combined type     Vaginal discharge     History of abnormal cervical Pap smear     Other acne     Benign nevus     History of Clostridium difficile colitis     Cervicalgia     Bilateral thoracic back pain     Class 1 obesity due to excess calories without serious comorbidity with body mass index (BMI) of 30.0 to 30.9 in adult     Gastroesophageal reflux disease with esophagitis     Anxiety     Chronic constipation     Acute pain of left shoulder     Abdominal pain, right upper quadrant     History of food allergy     Other fatigue     Achilles tendon pain     Pelvic floor dysfunction       Current Outpatient Medications   Medication Sig Dispense Refill     hydrocortisone, Perianal, (HYDROCORTISONE) 2.5 % cream Place rectally 2 times daily as needed for hemorrhoids 30 g 1     ibuprofen (ADVIL/MOTRIN) 200 MG tablet Take 400 mg by mouth every 4 hours as needed for mild pain 2 tab every am       melatonin 3 MG tablet Take 1 mg by mouth nightly as needed for sleep       multivitamin w/minerals (THERA-VIT-M) tablet Take by mouth daily         Allergies   Allergen Reactions     [place]      Food      Cantaloupe, cucumbers, green beans, and peppers.      [place] (Weed)      Lemon Flavor      Mustard Seed      Onion Difficulty breathing and GI Disturbance     [place] GI Disturbance     [place] Grasses      Vanilla GI Disturbance        EXAM  /73   Pulse 71   Temp 98.1  F (36.7  C)   Ht 1.715 m (5' 7.52\")   Wt 100.3 kg (221 lb 1.3 oz)   SpO2 96%   BMI 34.09 kg/m    Gen: pleasant, appears overwhelmed, sad, " anxious  Scalp. 0.5cm subcutaneous nodule nontender at right posterior scalp, near hairline.   No LAD over neck. Tanuel points to SCM on the right as area where she can get an enlarging and shrinking mass but none present today.   COR: S1,S2, no murmur  Chest wall: point tenderness with palpation over pec major and minor muscles bilaterally  Lungs: CTA bilaterally, no rhonchi, wheezes or rales  Abdomen: soft, tenderness at the right upper, mid and lower quadrants. No R/G. Active BS, no palpable mass.   Ext: no peripheral edema, pulses full  MS: point tenderness at the suboccipital and paracervical muscles   Point tenderness at the upper trapezius and rhomboid muscles   Tenderness at para thoracic and paralumbar m, tenderness at latissimus dorsi  MS: point tenderness with palpation over SI joint R>>L  Right foot: No redness swelling or warmth.  There is point tenderness with palpation over the  second third fourth and fifth metatarsals.  No tenderness at the anterior ankle, malleoli,  Achilles tendon  Calf: No tenderness over gastrocnemius muscles.  She has point tenderness along the medial right calf, extending inferiorly and stops above the medial malleolus .    Assessment:  (M79.671) Right foot pain  (primary encounter diagnosis)  Comment: Longstanding pain over the metatarsals of the right foot.  This does not impair her walking.  She has seen a podiatrist in the past but would like a new referral.  I suspect tendinitis  Plan: Orthopedic  Referral        Discussed RICE.  Gave referral to Dr. Vilma Peterson, podiatrist in the [facility]    (R10.9) Right flank pain  Comment: History of kidney stones, right flank pain and abdominal pain, may be constipation or other underlying cause.  Plan: CT Abdomen Pelvis w/o Contrast        Recommend CT scan of the abdomen and pelvis to rule out stone or other pathology    (K59.09) Chronic constipation  Comment: This is a longstanding  problem.  Fearful of using MiraLAX but willing to give it a try.  She is working with the pelvic floor clinic, using a squatty potty  Plan: Gave recommendations for MiraLAX modified bowel prep.  Discussed need for high-fiber foods, plenty of fluids    (M54.42,  M54.41,  G89.29) Chronic bilateral low back pain with bilateral sciatica  Comment: Longstanding history of low back pain.  No current imaging but she may very well have a herniated disc.  No red flags . She has a significant muscular tension and has benefit in the past from physical therapy.  Has home exercises but is not currently doing them.  Plan: At this point, she seems overwhelmed with many issues and we discussed some simple stretching exercises.  May consider formal referral to PT when we sort things out.  For now, massage, heat.  Activity as tolerated    (M25.551,  M25.552) Hip pain, bilateral  Comment: pain over right SI joint >>Left  Plan: Discussed cool packs to the SI joint, gentle stretches, analgesics, consider referral to physical therapy in the future    (M79.10) Muscle tension pain  Comment: Significant muscle tension at the anterior chest wall, upper back and shoulders, paralumbar, parathoracic muscles.  She endorses significant anxiety and depression and situational stress which I think is playing a role here  Plan: Recommend breathing exercises, relaxation of muscles, heat, massage, continue work with therapist    (F32.2) Severe major depression (H)  (F41.9) Anxiety  Comment: Longstanding mental health concerns.  She is not currently on medications and prefers not to take them.  She meets with a therapist weekly and has done so for the past 5 years.  Recently started couples counseling with her spouse.  Describes toxic relationships  Plan: Recommend continuing with counseling.  She may consider referral to psychiatrist to start medications but we will discuss that at a future visit. She is not currently suicidal.  No use of  alcohol.    (B41.528) Inattention  Comment: She reports previous history of attention deficit disorder when she lived in [place]  Plan: Adult Mental Health  Referral        I have placed a formal referral for ADHD evaluation    (Q99.346) Muscle spasm  Comment: New onset of muscle spasm over the right side of her ribs and upper abdomen in the past 3 months.  She is concerned this is a sign of malignancy.  She has significant muscle tension in her torso and chest.  She is requesting refill of muscle relaxant  Plan: cyclobenzaprine (FLEXERIL) 10 MG tablet        Okay to use this at bedtime.  Recommended stretching heat relaxation techniques.    65 minutes spend on the date of this encounter doing chart review, history and exam, documentation and further activities as noted above.       Follow-up in 6-8 weeks for reevaluation of above    Sydney Ruiz MD  Internal Medicine/Pediatrics

## 2023-02-07 NOTE — PATIENT INSTRUCTIONS
Dr. Vilma Peterson  Podiatrist  [address]  [place], [place] [id number] [phone]  For foot pain      Constipation  Modified bowel prep  Take 10mg dose of  bisacodyl (Dulcolax) with a glass of water.     Two hours later mix 3/4 c MiraLAX  powder with 32 oz Gatorade. Mix until completely dissolved, 10-15 minutes.   Then drink 1 (8-ounce) glass of the mixture every 15 minutes until the mixture is gone.     Bowel movements usually begin within 1 -3 hour of drinking the first dose, however it may take longer for some people.    If you don t start to have loose stools after drinking the 4 cups of the MiraLAX/Gatorade mix then you have 2 options.    1) Repeat above steps the following day   OR  2) Take more of the prep that same day. No need to repeat the Dulocolax dose if extending the prep.  Mix an additional 3/4 cup of MiraLAX powder with 32 oz Gatorade and mix until completely dissolved. Drink 1 c (8oz) every 15 minutes until gone.     Apply petroleum jelly (Vaseline ) or A & D  ointment to the skin around your anus after every bowel movement. This helps prevent irritation.

## 2023-03-02 ENCOUNTER — OFFICE VISIT (OUTPATIENT)
Dept: PODIATRY | Facility: CLINIC | Age: 43
End: 2023-03-02
Attending: INTERNAL MEDICINE
Payer: COMMERCIAL

## 2023-03-02 ENCOUNTER — HOSPITAL ENCOUNTER (OUTPATIENT)
Dept: GENERAL RADIOLOGY | Facility: HOSPITAL | Age: 43
Discharge: HOME | End: 2023-03-02
Attending: PODIATRIST | Admitting: PODIATRIST
Payer: COMMERCIAL

## 2023-03-02 VITALS — OXYGEN SATURATION: 97 % | BODY MASS INDEX: 33.49 KG/M2 | WEIGHT: 221 LBS | HEIGHT: 68 IN | HEART RATE: 75 BPM

## 2023-03-02 DIAGNOSIS — M21.6X2 PRONATION DEFORMITY OF BOTH FEET: Primary | ICD-10-CM

## 2023-03-02 DIAGNOSIS — M62.40 CONTRACTED, TENDON: ICD-10-CM

## 2023-03-02 DIAGNOSIS — M79.671 RIGHT FOOT PAIN: ICD-10-CM

## 2023-03-02 DIAGNOSIS — M21.6X1 PRONATION DEFORMITY OF BOTH FEET: Primary | ICD-10-CM

## 2023-03-02 PROCEDURE — 73630 X-RAY EXAM OF FOOT: CPT | Mod: RT

## 2023-03-02 PROCEDURE — 99203 OFFICE O/P NEW LOW 30 MIN: CPT | Performed by: PODIATRIST

## 2023-03-02 PROCEDURE — 73630 X-RAY EXAM OF FOOT: CPT | Mod: 26 | Performed by: PODIATRIST

## 2023-03-02 ASSESSMENT — PAIN SCALES - GENERAL: PAINLEVEL: MODERATE PAIN (4)

## 2023-03-02 NOTE — PROGRESS NOTES
FOOT AND ANKLE SURGERY/PODIATRY CONSULT NOTE         ASSESSMENT:   Pronation deformity  Contracted Achilles tendon bilaterally      TREATMENT:  An x-ray of the right foot was taken today.  The x-rays were read and interpreted by this physician.  I informed the patient the x-rays were negative for any fractures or dislocations.  I told the patient I do believe her symptoms are from her significant pronation and contracted Achilles tendon.  I am recommending a prescription orthotic with a heel lift.  After wearing her orthotics consistently for 2 to 3 months if her pain persist I asked patient return to the clinic at which time I will recommend an MRI of the right foot.        HPI: I was asked to see Wilfredoandrew TAMMY Lundberg today to evaluate and treat right foot pain.  The patient indicated that she has had a dull ache involving her right foot for approximately 1 year.  The pain is moderate.  It is aggravated with weightbearing and ambulation.  She denies any trauma to the right foot.  The pain is located on the top of the foot near the base of the fourth metatarsal.  She has tried shoe modification with very little relief.  She has not had any associated redness or swelling.  She has minimal pain while resting.  The patient was seen in consultation at the request of Sydney Ruiz MD for evaluation and treatment of right foot pain.     Past Medical History:   Diagnosis Date     Abdominal pain 2011    abnormal histamine problem, multiple food allergies     ADHD (attention deficit hyperactivity disorder)     on aderol     Anxiety and depression     on xanax     Arthritis      Breathing problem 2007    shortness of breath after eating, ?allergies     Chronic nausea      Gastroesophageal reflux disease      Heart murmur     closed by time she was 1     Hx of abnormal cervical Pap smear 2011     IBS (irritable bowel syndrome)     lots of mucus in bowel with occassional bleeding     Joint pain 2015    was to have rheumatologist      Kidney stone on right side 2010     Meniere's disease     hyperacusis     Migraines      Reduced vision      Tinnitus        Social History     Socioeconomic History     Marital status:      Spouse name: Not on file     Number of children: 1     Years of education: Not on file     Highest education level: Not on file   Occupational History     Comment: stylist/hairdresser   Tobacco Use     Smoking status: Never     Smokeless tobacco: Never   Substance and Sexual Activity     Alcohol use: Not Currently     Alcohol/week: 0.0 standard drinks     Comment: outside of pregnancy, social     Drug use: No     Sexual activity: Not Currently     Partners: Male     Birth control/protection: Abstinence   Other Topics Concern      Service No     Blood Transfusions No     Caffeine Concern No     Occupational Exposure Yes     Comment:      Hobby Hazards No     Sleep Concern Yes     Comment: harder to sleep now since pregnancy     Stress Concern Yes     Comment: lots of stress due to work and housing     Weight Concern Yes     Comment: has gained 10 lbs in the last month     Special Diet Yes     Comment: has many food allergies     Back Care Yes     Comment: disc problems, considering cortisone injections     Exercise No     Bike Helmet No     Seat Belt Yes     Self-Exams No     Parent/sibling w/ CABG, MI or angioplasty before 65F 55M? Yes   Social History Narrative     Not on file     Social Determinants of Health     Financial Resource Strain: Not on file   Food Insecurity: Not on file   Transportation Needs: Not on file   Physical Activity: Not on file   Stress: Not on file   Social Connections: Not on file   Intimate Partner Violence: Not on file   Housing Stability: Not on file          Allergies   Allergen Reactions     [place]      Food      Cantaloupe, cucumbers, green beans, and peppers.      [place] (Weed)      Lemon Flavor      Mustard Seed      Onion Difficulty breathing and GI  Disturbance     [place] GI Disturbance     Redtop Grasses      Vanilla GI Disturbance          Current Outpatient Medications:      cyclobenzaprine (FLEXERIL) 10 MG tablet, Take 1 tablet (10 mg) by mouth 2 times daily as needed for muscle spasms, Disp: 14 tablet, Rfl: 0     hydrocortisone, Perianal, (HYDROCORTISONE) 2.5 % cream, Place rectally 2 times daily as needed for hemorrhoids, Disp: 30 g, Rfl: 1     ibuprofen (ADVIL/MOTRIN) 200 MG tablet, Take 400 mg by mouth every 4 hours as needed for mild pain 2 tab every am, Disp: , Rfl:      melatonin 3 MG tablet, Take 1 mg by mouth nightly as needed for sleep, Disp: , Rfl:      multivitamin w/minerals (THERA-VIT-M) tablet, Take by mouth daily, Disp: , Rfl:      Family History   Problem Relation Age of Onset     Mental Illness Mother         bipolar, schizophrenia     Anxiety Disorder Mother      Osteoporosis Mother      Thyroid Disease Mother      Diabetes Mother      Neurofibromatosis Mother         more likely fibromyalgia     Arthritis Mother      Back Pain Mother      Osteoarthritis Mother      Obesity Mother      Cirrhosis Mother         from fatty liver. with esophageal varices, ..     Gallbladder Disease Mother         cholecystectomy     Hypertension Father      Hyperlipidemia Father      Diabetes Father      Other Cancer Father 67        Neuroendocrine tumor, ? from gall bladder, stage 4  1/2018     Migraines Father      Scleroderma Father      Rheumatoid Arthritis Father      Obesity Father      Ankylosing Spondylitis Father      Macular Degeneration Father      Mental Illness Sister         bipolar     Anxiety Disorder Sister      Asthma Sister         eczema     Neurologic Disorder Sister         Cervical Dystonia, treated with Botox     Thyroid Cancer Sister      Coronary Artery Disease Maternal Grandmother      Cerebrovascular Disease Maternal Grandmother      Breast Cancer Maternal Grandmother 65     Skin Cancer Maternal Grandmother      Stomach Cancer  Maternal Grandmother         stomach, skin     Prostate Cancer Maternal Grandfather      Cancer Maternal Grandfather      Diabetes Paternal Grandmother      Kidney Cancer Paternal Grandmother      Coronary Artery Disease Paternal Grandfather      Colon Cancer Paternal Grandfather      Cancer Paternal Grandfather      Ovarian Cancer Maternal Aunt 68     Skin Cancer Maternal Aunt         melanoma     Scleroderma Paternal Aunt      Ankylosing Spondylitis Paternal Aunt      Ankylosing Spondylitis Paternal Uncle      Liver Cancer Other         uncle     Glaucoma No family hx of         Social History     Socioeconomic History     Marital status:      Spouse name: Not on file     Number of children: 1     Years of education: Not on file     Highest education level: Not on file   Occupational History     Comment: stylist/hairdresser   Tobacco Use     Smoking status: Never     Smokeless tobacco: Never   Substance and Sexual Activity     Alcohol use: Not Currently     Alcohol/week: 0.0 standard drinks     Comment: outside of pregnancy, social     Drug use: No     Sexual activity: Not Currently     Partners: Male     Birth control/protection: Abstinence   Other Topics Concern      Service No     Blood Transfusions No     Caffeine Concern No     Occupational Exposure Yes     Comment:      Hobby Hazards No     Sleep Concern Yes     Comment: harder to sleep now since pregnancy     Stress Concern Yes     Comment: lots of stress due to work and housing     Weight Concern Yes     Comment: has gained 10 lbs in the last month     Special Diet Yes     Comment: has many food allergies     Back Care Yes     Comment: disc problems, considering cortisone injections     Exercise No     Bike Helmet No     Seat Belt Yes     Self-Exams No     Parent/sibling w/ CABG, MI or angioplasty before 65F 55M? Yes   Social History Narrative     Not on file     Social Determinants of Health     Financial Resource Strain: Not on  file   Food Insecurity: Not on file   Transportation Needs: Not on file   Physical Activity: Not on file   Stress: Not on file   Social Connections: Not on file   Intimate Partner Violence: Not on file   Housing Stability: Not on file        Review of Systems - Patient denies fever, chills, rash, wound, stiffness, limping, numbness, weakness, heart burn, blood in stool, chest pain with activity, calf pain when walking, shortness of breath with activity, chronic cough, easy bleeding/bruising, swelling of ankles, excessive thirst, fatigue, depression, anxiety.  Patient admits to right foot pain.      OBJECTIVE:  Appearance: alert, well appearing, and in no distress.    There were no vitals taken for this visit.     There is no height or weight on file to calculate BMI.     General appearance: Patient is alert and fully cooperative with history & exam.  No sign of distress is noted during the visit.  Psychiatric: Affect is pleasant & appropriate.  Patient appears motivated to improve health.  Respiratory: Breathing is regular & unlabored while sitting.  HEENT: Hearing is intact to spoken word.  Speech is clear.  No gross evidence of visual impairment that would impact ambulation.    Vascular: Dorsalis pedis and posterior tibial pulses are palpable. There is pedal hair growth bilaterally.  CFT < 3 sec from anterior tibial surface to distal digits bilaterally. There is no appreciable edema noted.  Dermatologic: Turgor and texture are within normal limits. No coloration or temperature changes. No primary or secondary lesions noted.  Neurologic: All epicritic and proprioceptive sensations are grossly intact bilaterally.  Musculoskeletal: All active and passive ankle, subtalar, midtarsal, and 1st MPJ range of motion are grossly intact without pain or crepitus, with the exception of none. Manual muscle strength is within normal limits bilaterally. All dorsiflexors, plantarflexors, invertors, evertors are intact bilaterally.  Tenderness present to the dorsal lateral aspect of both feet on palpation.  No tenderness to bilateral feet or ankles with range of motion.  There is flattening of the medial longitudinal arch noted bilaterally.  There is significant decrease in amount of dorsiflexion available at both ankles when the feet are maximally dorsiflexed.  Calf is soft/non-tender without warmth/induration    Imaging:       No images are attached to the encounter or orders placed in the encounter.     No results found.   No results found.         Darinel Null DPM  [facility] Foot & Ankle Surgery/Podiatry

## 2023-03-02 NOTE — LETTER
3/2/2023         RE: Priyanka Lundberg  [address] [place] [address]        Dear Colleague,    Thank you for referring your patient, Priyanka Lundberg, to the [facility]. Please see a copy of my visit note below.    FOOT AND ANKLE SURGERY/PODIATRY CONSULT NOTE         ASSESSMENT:   Pronation deformity  Contracted Achilles tendon bilaterally      TREATMENT:  An x-ray of the right foot was taken today.  The x-rays were read and interpreted by this physician.  I informed the patient the x-rays were negative for any fractures or dislocations.  I told the patient I do believe her symptoms are from her significant pronation and contracted Achilles tendon.  I am recommending a prescription orthotic with a heel lift.  After wearing her orthotics consistently for 2 to 3 months if her pain persist I asked patient return to the clinic at which time I will recommend an MRI of the right foot.        HPI: I was asked to see Priyanka Lundberg today to evaluate and treat right foot pain.  The patient indicated that she has had a dull ache involving her right foot for approximately 1 year.  The pain is moderate.  It is aggravated with weightbearing and ambulation.  She denies any trauma to the right foot.  The pain is located on the top of the foot near the base of the fourth metatarsal.  She has tried shoe modification with very little relief.  She has not had any associated redness or swelling.  She has minimal pain while resting.  The patient was seen in consultation at the request of Sydney Ruiz MD for evaluation and treatment of right foot pain.     Past Medical History:   Diagnosis Date     Abdominal pain 2011    abnormal histamine problem, multiple food allergies     ADHD (attention deficit hyperactivity disorder)     on aderol     Anxiety and depression     on xanax     Arthritis      Breathing problem 2007    shortness of breath after eating, ?allergies     Chronic nausea       Gastroesophageal reflux disease      Heart murmur     closed by time she was 1     Hx of abnormal cervical Pap smear 2011     IBS (irritable bowel syndrome)     lots of mucus in bowel with occassional bleeding     Joint pain 2015    was to have rheumatologist     Kidney stone on right side 2010     Meniere's disease     hyperacusis     Migraines      Reduced vision      Tinnitus        Social History     Socioeconomic History     Marital status:      Spouse name: Not on file     Number of children: 1     Years of education: Not on file     Highest education level: Not on file   Occupational History     Comment: stylist/hairdresser   Tobacco Use     Smoking status: Never     Smokeless tobacco: Never   Substance and Sexual Activity     Alcohol use: Not Currently     Alcohol/week: 0.0 standard drinks     Comment: outside of pregnancy, social     Drug use: No     Sexual activity: Not Currently     Partners: Male     Birth control/protection: Abstinence   Other Topics Concern      Service No     Blood Transfusions No     Caffeine Concern No     Occupational Exposure Yes     Comment:      Hobby Hazards No     Sleep Concern Yes     Comment: harder to sleep now since pregnancy     Stress Concern Yes     Comment: lots of stress due to work and housing     Weight Concern Yes     Comment: has gained 10 lbs in the last month     Special Diet Yes     Comment: has many food allergies     Back Care Yes     Comment: disc problems, considering cortisone injections     Exercise No     Bike Helmet No     Seat Belt Yes     Self-Exams No     Parent/sibling w/ CABG, MI or angioplasty before 65F 55M? Yes   Social History Narrative     Not on file     Social Determinants of Health     Financial Resource Strain: Not on file   Food Insecurity: Not on file   Transportation Needs: Not on file   Physical Activity: Not on file   Stress: Not on file   Social Connections: Not on file   Intimate Partner Violence: Not on  file   Housing Stability: Not on file          Allergies   Allergen Reactions     [place]      Food      Cantaloupe, cucumbers, green beans, and peppers.      [place] (Weed)      Lemon Flavor      Mustard Seed      Onion Difficulty breathing and GI Disturbance     [place] GI Disturbance     Redtop Grasses      Vanilla GI Disturbance          Current Outpatient Medications:      cyclobenzaprine (FLEXERIL) 10 MG tablet, Take 1 tablet (10 mg) by mouth 2 times daily as needed for muscle spasms, Disp: 14 tablet, Rfl: 0     hydrocortisone, Perianal, (HYDROCORTISONE) 2.5 % cream, Place rectally 2 times daily as needed for hemorrhoids, Disp: 30 g, Rfl: 1     ibuprofen (ADVIL/MOTRIN) 200 MG tablet, Take 400 mg by mouth every 4 hours as needed for mild pain 2 tab every am, Disp: , Rfl:      melatonin 3 MG tablet, Take 1 mg by mouth nightly as needed for sleep, Disp: , Rfl:      multivitamin w/minerals (THERA-VIT-M) tablet, Take by mouth daily, Disp: , Rfl:      Family History   Problem Relation Age of Onset     Mental Illness Mother         bipolar, schizophrenia     Anxiety Disorder Mother      Osteoporosis Mother      Thyroid Disease Mother      Diabetes Mother      Neurofibromatosis Mother         more likely fibromyalgia     Arthritis Mother      Back Pain Mother      Osteoarthritis Mother      Obesity Mother      Cirrhosis Mother         from fatty liver. with esophageal varices, ..     Gallbladder Disease Mother         cholecystectomy     Hypertension Father      Hyperlipidemia Father      Diabetes Father      Other Cancer Father 67        Neuroendocrine tumor, ? from gall bladder, stage 4  1/2018     Migraines Father      Scleroderma Father      Rheumatoid Arthritis Father      Obesity Father      Ankylosing Spondylitis Father      Macular Degeneration Father      Mental Illness Sister         bipolar     Anxiety Disorder Sister      Asthma Sister         eczema     Neurologic Disorder Sister         Cervical  Dystonia, treated with Botox     Thyroid Cancer Sister      Coronary Artery Disease Maternal Grandmother      Cerebrovascular Disease Maternal Grandmother      Breast Cancer Maternal Grandmother 65     Skin Cancer Maternal Grandmother      Stomach Cancer Maternal Grandmother         stomach, skin     Prostate Cancer Maternal Grandfather      Cancer Maternal Grandfather      Diabetes Paternal Grandmother      Kidney Cancer Paternal Grandmother      Coronary Artery Disease Paternal Grandfather      Colon Cancer Paternal Grandfather      Cancer Paternal Grandfather      Ovarian Cancer Maternal Aunt 68     Skin Cancer Maternal Aunt         melanoma     Scleroderma Paternal Aunt      Ankylosing Spondylitis Paternal Aunt      Ankylosing Spondylitis Paternal Uncle      Liver Cancer Other         uncle     Glaucoma No family hx of         Social History     Socioeconomic History     Marital status:      Spouse name: Not on file     Number of children: 1     Years of education: Not on file     Highest education level: Not on file   Occupational History     Comment: stylist/hairdresser   Tobacco Use     Smoking status: Never     Smokeless tobacco: Never   Substance and Sexual Activity     Alcohol use: Not Currently     Alcohol/week: 0.0 standard drinks     Comment: outside of pregnancy, social     Drug use: No     Sexual activity: Not Currently     Partners: Male     Birth control/protection: Abstinence   Other Topics Concern      Service No     Blood Transfusions No     Caffeine Concern No     Occupational Exposure Yes     Comment:      Hobby Hazards No     Sleep Concern Yes     Comment: harder to sleep now since pregnancy     Stress Concern Yes     Comment: lots of stress due to work and housing     Weight Concern Yes     Comment: has gained 10 lbs in the last month     Special Diet Yes     Comment: has many food allergies     Back Care Yes     Comment: disc problems, considering cortisone  injections     Exercise No     Bike Helmet No     Seat Belt Yes     Self-Exams No     Parent/sibling w/ CABG, MI or angioplasty before 65F 55M? Yes   Social History Narrative     Not on file     Social Determinants of Health     Financial Resource Strain: Not on file   Food Insecurity: Not on file   Transportation Needs: Not on file   Physical Activity: Not on file   Stress: Not on file   Social Connections: Not on file   Intimate Partner Violence: Not on file   Housing Stability: Not on file        Review of Systems - Patient denies fever, chills, rash, wound, stiffness, limping, numbness, weakness, heart burn, blood in stool, chest pain with activity, calf pain when walking, shortness of breath with activity, chronic cough, easy bleeding/bruising, swelling of ankles, excessive thirst, fatigue, depression, anxiety.  Patient admits to right foot pain.      OBJECTIVE:  Appearance: alert, well appearing, and in no distress.    There were no vitals taken for this visit.     There is no height or weight on file to calculate BMI.     General appearance: Patient is alert and fully cooperative with history & exam.  No sign of distress is noted during the visit.  Psychiatric: Affect is pleasant & appropriate.  Patient appears motivated to improve health.  Respiratory: Breathing is regular & unlabored while sitting.  HEENT: Hearing is intact to spoken word.  Speech is clear.  No gross evidence of visual impairment that would impact ambulation.    Vascular: Dorsalis pedis and posterior tibial pulses are palpable. There is pedal hair growth bilaterally.  CFT < 3 sec from anterior tibial surface to distal digits bilaterally. There is no appreciable edema noted.  Dermatologic: Turgor and texture are within normal limits. No coloration or temperature changes. No primary or secondary lesions noted.  Neurologic: All epicritic and proprioceptive sensations are grossly intact bilaterally.  Musculoskeletal: All active and passive  ankle, subtalar, midtarsal, and 1st MPJ range of motion are grossly intact without pain or crepitus, with the exception of none. Manual muscle strength is within normal limits bilaterally. All dorsiflexors, plantarflexors, invertors, evertors are intact bilaterally. Tenderness present to the dorsal lateral aspect of both feet on palpation.  No tenderness to bilateral feet or ankles with range of motion.  There is flattening of the medial longitudinal arch noted bilaterally.  There is significant decrease in amount of dorsiflexion available at both ankles when the feet are maximally dorsiflexed.  Calf is soft/non-tender without warmth/induration    Imaging:       No images are attached to the encounter or orders placed in the encounter.     No results found.   No results found.         Darinel Null DPM  [facility] Foot & Ankle Surgery/Podiatry         Again, thank you for allowing me to participate in the care of your patient.        Sincerely,        Darinel Willis DPM

## 2023-03-02 NOTE — PATIENT INSTRUCTIONS
What are Prescription Custom Orthotics?  Custom orthotics are specially-made devices designed to support and comfort your feet. Prescription orthotics are crafted for you and no one else. They match the contours of your feet precisely and are designed for the way you move. Orthotics are only manufactured after a podiatrist has conducted a complete evaluation of your feet, ankles, and legs, so the orthotic can accommodate your unique foot structure and pathology.  Prescription orthotics are divided into two categories:  Functional orthotics are designed to control abnormal motion. They may be used to treat foot pain caused by abnormal motion; they can also be used to treat injuries such as shin splints or tendinitis. Functional orthotics are usually crafted of a semi-rigid material such as plastic or graphite.  Accommodative orthotics are softer and meant to provide additional cushioning and support. They can be used to treat diabetic foot ulcers, painful calluses on the bottom of the foot, and other uncomfortable conditions.  Podiatrists use orthotics to treat foot problems such as plantar fasciitis, bursitis, tendinitis, diabetic foot ulcers, and foot, ankle, and heel pain. Clinical research studies have shown that podiatrist-prescribed foot orthotics decrease foot pain and improve function.  Orthotics typically cost more than shoe inserts purchased in a retail store, but the additional cost is usually well worth it. Unlike shoe inserts, orthotics are molded to fit each individual foot, so you can be sure that your orthotics fit and do what they're supposed to do. Prescription orthotics are also made of top-notch materials and last many years when cared for properly. Insurance often helps pay for prescription orthotics.  What are Shoe Inserts?   You've seen them at the grocery store and at the mall. You've probably even seen them on TV and online. Shoe inserts are any kind of non-prescription foot support designed  to be worn inside a shoe. Pre-packaged, mass produced, arch supports are shoe inserts. So are the  custom-made  insoles and foot supports that you can order online or at retail stores. Unless the device has been prescribed by a doctor and crafted for your specific foot, it's a shoe insert, not a custom orthotic device--despite what the ads might say.  Shoe inserts can be very helpful for a variety of foot ailments, including flat arches and foot and leg pain. They can cushion your feet, provide comfort, and support your arches, but they can't correct biomechanical foot problems or cure long-standing foot issues.  The most common types of shoe inserts are:  Arch supports: Some people have high arches. Others have low arches or flat feet. Arch supports generally have a  bumped-up  appearance and are designed to support the foot's natural arch.   Insoles: Insoles slip into your shoe to provide extra cushioning and support. Insoles are often made of gel, foam, or plastic.   Heel liners: Heel liners, sometimes called heel pads or heel cups, provide extra cushioning in the heel region. They may be especially useful for patients who have foot pain caused by age-related thinning of the heels' natural fat pads.   Foot cushions: Do your shoes rub against your heel or your toes? Foot cushions come in many different shapes and sizes and can be used as a barrier between you and your shoe.  Choosing an Over-the-Counter Shoe Insert  Selecting a shoe insert from the wide variety of devices on the market can be overwhelming. Here are some podiatrist-tested tips to help you find the insert that best meets your needs:  Consider your health. Do you have diabetes? Problems with circulation? An over-the-counter insert may not be your best bet. Diabetes and poor circulation increase your risk of foot ulcers and infections, so schedule an appointment with a podiatrist. He or she can help you select a solution that won't cause additional  health problems.   Think about the purpose. Are you planning to run a marathon, or do you just need a little arch support in your work shoes? Look for a product that fits your planned level of activity.   Bring your shoes. For the insert to be effective, it has to fit into your shoes. So bring your sneakers, dress shoes, or work boots--whatever you plan to wear with your insert. Look for an insert that will fit the contours of your shoe.   Try them on. If all possible, slip the insert into your shoe and try it out. Walk around a little. How does it feel? Don't assume that feelings of pressure will go away with continued wear. (If you can't try the inserts at the store, ask about the store's return policy and hold on to your receipt.)    Please call one of the [place] locations below to schedule an appointment. If you received a prescription please bring it with you to your appointment. Some locations are limited to what they carry.    Office Locations    [facility]  [address] [place] [address]  Home Medical Equipment, [address]   Phone: [phone]   Orthotics and Prosthetics, [address]   Phone: [phone]    [facility]  [address]  [address]   [place] [address]   Phone: [phone]    [facility]ldg[facility]  [address]  [place] [address]  Phone: [phone]    [facility]   [address]  [place] [address]  Phone: [phone]    [facility]  [address]  [place] [address]  Phone: [phone]    [facility]  [address]  [place] [address]  Phone: [phone]    [place]   [address] [place] [address]   Phone: [phone]    WEARING YOUR CUSTOM FOOT ORTHOTICS   Most  insurance plans cover one pair of orthotics per year. You must check with your   insurance plan to see what your payment responsibility will be. Please call your   insurance company by calling the number on the back of your insurance card.   Orthotic's are non-refundable and non-returnable.   Orthotics are made of various designs. Some orthotics are covered with material that extends beyond your toes. If your orthotic is of this design, you will likely need to trim the toe end to get a proper fit. The insole from your shoe can be used as a template. Simply overlay the shoe insert on top of the custom orthotic. Align the heel end while tracing the length of the insert onto the custom orthotic. Use a large scissor to trim the toe end until you get a proper fit in the shoe.   The orthotic needs to be pushed as far back in the shoe as possible. The heel portion should not ride forward so as not to irritate your heel.   Orthotics are designed to work with socks. Excessive perspiration will shorten the life span of the orthotics. Remove the orthotic from the shoe frequently for proper drying.   The break-in period lasts for weeks. People new to orthotics will likely experience new aches and pains. The orthotic is forcing your foot into a new position. Arch, foot and leg muscle aches and fatigue are common during these weeks. Minor discomfort can be considered normal break in phenomenon. Start wearing your orthotic around your home your first day. Limited activity for one to two hours is recommended. You can increase one or two additional hours each day provided the aches and pains are subsiding. The degree of discomfort, fatigue and problems will dictate the speed of break in. You may require multiple weeks to work up to full time use.   Do not continue wearing your orthotics if they are creating problems such as blisters or sores. Do not hesitate to call the clinic to speak with a nurse regarding orthotic   break in,  fit, trimming, etc. You may also need to see the doctor if the orthotics are   simply not working out. Adjustments are sometimes made to improve orthotic   function.     Orthotics will only work in certain styles and types of shoes. Orthotics rarely work in dress shoes. Slip-ons, clogs, sandals and heels are particularly troublesome. Specially designed orthotics may be necessary for these types of shoes. Your custom orthotic was designed for activities that require appropriate walking or running shoes. Lace up athletic shoes, walking shoes or work boots should work appropriately. You may need a wider or longer shoe. Shoes with a removable  or insert work best. In general, you want to remove an insert from the shoe before placing the orthotic into the shoe. Shoes without a removable liner may not work as well.     When purchasing new shoes, bring your orthotics along to get a proper fit. Shop at stores that are familiar with orthotics.   Frequent washing of the orthotic may shorten the life span of the top cover. The top cover can be replaced but will generally last one to five years depending on use and foot perspiration.

## 2023-03-07 ENCOUNTER — ANCILLARY PROCEDURE (OUTPATIENT)
Dept: CT IMAGING | Facility: CLINIC | Age: 43
End: 2023-03-07
Attending: INTERNAL MEDICINE
Payer: COMMERCIAL

## 2023-03-07 DIAGNOSIS — R10.9 RIGHT FLANK PAIN: ICD-10-CM

## 2023-03-07 PROCEDURE — 74176 CT ABD & PELVIS W/O CONTRAST: CPT | Performed by: RADIOLOGY

## 2023-03-08 ENCOUNTER — THERAPY VISIT (OUTPATIENT)
Dept: PHYSICAL THERAPY | Facility: CLINIC | Age: 43
End: 2023-03-08
Payer: COMMERCIAL

## 2023-03-08 DIAGNOSIS — M62.89 PELVIC FLOOR DYSFUNCTION: Primary | ICD-10-CM

## 2023-03-08 PROCEDURE — 97530 THERAPEUTIC ACTIVITIES: CPT | Mod: GP | Performed by: PHYSICAL THERAPIST

## 2023-03-08 PROCEDURE — 97110 THERAPEUTIC EXERCISES: CPT | Mod: GP | Performed by: PHYSICAL THERAPIST

## 2023-03-09 ENCOUNTER — MYC MEDICAL ADVICE (OUTPATIENT)
Dept: FAMILY MEDICINE | Facility: CLINIC | Age: 43
End: 2023-03-09

## 2023-03-09 DIAGNOSIS — M51.369 L4-L5 DISC BULGE: Primary | ICD-10-CM

## 2023-03-27 ENCOUNTER — THERAPY VISIT (OUTPATIENT)
Dept: PHYSICAL THERAPY | Facility: CLINIC | Age: 43
End: 2023-03-27
Payer: COMMERCIAL

## 2023-03-27 DIAGNOSIS — M62.89 PELVIC FLOOR DYSFUNCTION: Primary | ICD-10-CM

## 2023-03-27 PROCEDURE — 97110 THERAPEUTIC EXERCISES: CPT | Mod: GP | Performed by: PHYSICAL THERAPIST

## 2023-03-27 PROCEDURE — 97530 THERAPEUTIC ACTIVITIES: CPT | Mod: GP | Performed by: PHYSICAL THERAPIST

## 2023-04-04 ENCOUNTER — TELEPHONE (OUTPATIENT)
Dept: SURGERY | Facility: CLINIC | Age: 43
End: 2023-04-04
Payer: COMMERCIAL

## 2023-04-04 NOTE — TELEPHONE ENCOUNTER
TELEPHONE CALL  2023    Received a message her insurance denied coverage of breast MRI. Patient was called to discuss and to make a plan going forward. There was no answer and a message was left.     Charito Ramsey PA-C sent to Mary Damon PA-C  Procedure Authorization Denial Notification     Patient Name: Priyanka Lundberg   : 1980   MRN: [id number]     Dear Mary:     The authorization for procedure MR BREAST BILATERAL W/O & W CONTRAST on date of service 23 has been denied. We were unsuccessful in obtaining approval through clinical review.     The Insurance has Denied the services for the following reason:   - A similar study was recently approved. ([facility])  Needed results of the previous study and reason for the repeat exam. ([employer] will not tell us where the MRI was approved or the date of service it was approved for)   - Also denied stating MRI Breast allowed only 1 time per year.     Insurance: [employer]   Auth Vendor: [employer]   Phone: [phone]   Patient ID: [id number]   Case Ref #      [id number]     Patient contact: YES   Patient Phone #: [phone]   Patient response: Advised not approved. Advised Pt to reach out to your office for next steps     Thank you,     Charito BLAKELY [facility] Prior Authorizations Dept

## 2023-04-05 NOTE — TELEPHONE ENCOUNTER
Miguel Gaby Renita on 4/5/2023 at 1:32 PM   Action Taken Attempted to call Pt to ask if they have been seen anywhere outside the [facility] system for their POTS and if they have had any imaging done. No answer, unable to leave VM as  box was full. YUSUF    Gaby Houston on 4/27/2023 at 9:06 AM Called Pt Again; Pt Stated that they have not been seen outside the [facility] for their POTS and that the only imaging they have had was done at [facility]. -SONIA       RECORDS RECEIVED FROM: Care Everywhere   REASON FOR VISIT: Pot syndrome   Date of Appt: 5/12/23    NOTES (FOR ALL VISITS) STATUS DETAILS   OFFICE NOTE from referring provider Received Pt Self Referral   OFFICE NOTE from other specialist Care Everywhere 12/28/22 Corona Galvez MD @[facility]    10/19/22, 7/27/21 Sydney Ruiz MD @ [facility]    4/28/22, 9/16/21 Jodie Oliver, right eye @ [facility]   DISCHARGE SUMMARY from hospital In process    DISCHARGE REPORT from the ER Care Everywhere 5/24/20 Jl Carroll M.D. @ [facility]   MEDICATION LIST Internal    IMAGING  (FOR ALL VISITS)     CT (HEAD, NECK, SPINE) N/A No Relevant Imaging per Pt

## 2023-04-06 ENCOUNTER — MYC MEDICAL ADVICE (OUTPATIENT)
Dept: SURGERY | Facility: CLINIC | Age: 43
End: 2023-04-06
Payer: COMMERCIAL

## 2023-04-06 NOTE — TELEPHONE ENCOUNTER
4/6/23 Peer to Peer completed for breast MRI approval. Insurance provider indicated that Priyanka should be covered for a breast MRI and the problem was an MRI was previously approved 10/2022 however this MRI was not completed. They will resubmit the request and update the approval status in 24-48 hours. Spoke with Priyanka over the phone and updated her on the status.     Mary Damon PA-C

## 2023-04-25 ENCOUNTER — THERAPY VISIT (OUTPATIENT)
Dept: PHYSICAL THERAPY | Facility: CLINIC | Age: 43
End: 2023-04-25
Payer: COMMERCIAL

## 2023-04-25 DIAGNOSIS — M62.89 PELVIC FLOOR DYSFUNCTION: Primary | ICD-10-CM

## 2023-04-25 PROCEDURE — 97110 THERAPEUTIC EXERCISES: CPT | Mod: 59 | Performed by: PHYSICAL THERAPIST

## 2023-04-25 PROCEDURE — 97530 THERAPEUTIC ACTIVITIES: CPT | Mod: GP | Performed by: PHYSICAL THERAPIST

## 2023-04-25 NOTE — PROGRESS NOTES
[facility]    OUTPATIENT Physical Therapy ORTHOPEDIC EVALUATION  PLAN OF TREATMENT FOR OUTPATIENT REHABILITATION  (COMPLETE FOR INITIAL CLAIMS ONLY)  Patient's Last Name, First Name, M.I.  YOB: 1980  Priyanka Lundberg    Provider s Name:  [facility]   Medical Record No.  [id number]   Start of Care Date:  01/17/23   Onset Date:   12/08/22   Treatment Diagnosis:  pelvic floor dysfunction Medical Diagnosis:  Pelvic floor dysfunction       Goals:     04/25/23 0700   Constipation/Obstructive Defecation   Current Functional Level Frequency of bowel movements   Performance Level 2x a week   STG Target Performance Increase frequency of bowel movements to   Performance Level 3x a week   Rationale Work toward normal voiding or evacuation patterns to focus on ADLS, work, or school   Due Date 04/04/23   Date Goal Met 04/25/23   LTG Target Performance Increase frequency of bowel movements to   Performance Level 5x a week   Rationale Work toward normal voiding or evacuation patterns to focus on ADLS, work, or school   Due Date 07/18/23  (date extended, pt not consistent with HEP, not consistent with appointment frequency, and had an exacerbation of LBP)          Therapy Frequency:  1x a week  Predicted Duration of Therapy Intervention:  12 weeks    Genet Lubin, PT                 I CERTIFY THE NEED FOR THESE SERVICES FURNISHED UNDER        THIS PLAN OF TREATMENT AND WHILE UNDER MY CARE     (Physician attestation of this document indicates review and certification of the therapy plan).                     Certification Date From:  04/12/23   Certification Date To:  07/18/23    Referring Provider:  Zahra Valle    Initial Assessment        See Epic Evaluation SOC Date: 01/17/23

## 2023-04-25 NOTE — PROGRESS NOTES
"PROGRESS  REPORT    SUBJECTIVE  Subjective: Pt arrived 11 minutes late. She feels \"broken\" today. Her back and arms are really sore and painful. She is able to do bridges again for her exercises.     Initial Pain level: 7/10.   Changes in function:  Yes (See Goal flowsheet attached for changes in current functional level)  Adverse reaction to treatment or activity: None    OBJECTIVE  Changes noted in objective findings:  Yes,   Objective: posture: rounded shoulders, decreased lumbar lordosis, observation: good tra activaiton seated and supine with minimal to no cueing, no increase in pain symptoms     ASSESSMENT/PLAN  Updated problem list and treatment plan: Diagnosis 1:  Pelvic pain  Pain -  hot/cold therapy, US, electric stimulation, mechanical traction, manual therapy, STS, splint/taping/bracing/orthotics, self management, education, directional preference exercise and home program  Decreased ROM/flexibility - manual therapy, therapeutic exercise, therapeutic activity and home program  Decreased strength - therapeutic exercise, therapeutic activities and home program  Impaired muscle performance - biofeedback, electric stimulation, neuro re-education and home program  Decreased function - therapeutic activities, home program and functional performance testing  Impaired posture - neuro re-education, therapeutic activities and home program  STG/LTGs have been met or progress has been made towards goals:  Yes (See Goal flow sheet completed today.)  Assessment of Progress: The patient's condition is improving.  The patient's condition has potential to improve.  Self Management Plans:  Patient has been instructed in a home treatment program.  Patient  has been instructed in self management of symptoms.  I have re-evaluated this patient and find that the nature, scope, duration and intensity of the therapy is appropriate for the medical condition of the patient.  Priyanka continues to require the following intervention " to meet STG and LTG's:  PT    Recommendations:  This patient would benefit from continued therapy.     Frequency:  1 X week, once daily  Duration:  for 12 weeks        Please refer to the daily flowsheet for treatment today, total treatment time and time spent performing 1:1 timed codes.

## 2023-04-26 ENCOUNTER — MYC MEDICAL ADVICE (OUTPATIENT)
Dept: SURGERY | Facility: CLINIC | Age: 43
End: 2023-04-26

## 2023-04-27 NOTE — TELEPHONE ENCOUNTER
Spoke with Priyanka over the phone regarding her concern with the gadolinium contrast used for the breast MRI. Discussed for breast cancer screening contrasted is needed with the MRI. Discussed alternative options of screening with annual mammogram with alex[facility]re or annual contrast mammogram with a[facility]re. She will think about it and let me know.     Mary Damon PA-C

## 2023-04-30 ENCOUNTER — HEALTH MAINTENANCE LETTER (OUTPATIENT)
Age: 43
End: 2023-04-30

## 2023-05-09 ENCOUNTER — VIRTUAL VISIT (OUTPATIENT)
Dept: PHYSICAL THERAPY | Facility: CLINIC | Age: 43
End: 2023-05-09
Payer: COMMERCIAL

## 2023-05-09 DIAGNOSIS — M62.89 PELVIC FLOOR DYSFUNCTION: Primary | ICD-10-CM

## 2023-05-09 PROCEDURE — 97530 THERAPEUTIC ACTIVITIES: CPT | Mod: GP | Performed by: PHYSICAL THERAPIST

## 2023-05-12 ENCOUNTER — PRE VISIT (OUTPATIENT)
Dept: NEUROLOGY | Facility: CLINIC | Age: 43
End: 2023-05-12

## 2023-05-12 ENCOUNTER — VIRTUAL VISIT (OUTPATIENT)
Dept: NEUROLOGY | Facility: CLINIC | Age: 43
End: 2023-05-12
Payer: COMMERCIAL

## 2023-05-12 DIAGNOSIS — R53.83 OTHER FATIGUE: Primary | ICD-10-CM

## 2023-05-12 PROCEDURE — 99205 OFFICE O/P NEW HI 60 MIN: CPT | Mod: VID | Performed by: PSYCHIATRY & NEUROLOGY

## 2023-05-12 PROCEDURE — G2212 PROLONG OUTPT/OFFICE VIS: HCPCS | Performed by: PSYCHIATRY & NEUROLOGY

## 2023-05-12 NOTE — PROGRESS NOTES
Buffy is a 43 year old who is being evaluated via a billable video visit.      How would you like to obtain your AVS? MyChart  If the video visit is dropped, the invitation should be resent by: Send to e-mail at: [email or web address]  Will anyone else be joining your video visit? No        Video-Visit Details    Type of service:  Video Visit   Video Start Time: 345  Video End Time:4:35 PM    Originating Location (pt. Location): Home    Distant Location (provider location):  On-site  Platform used for Video Visit: [employer]
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
## 2023-05-15 ENCOUNTER — TELEPHONE (OUTPATIENT)
Dept: NEUROLOGY | Facility: CLINIC | Age: 43
End: 2023-05-15
Payer: COMMERCIAL

## 2023-05-15 NOTE — TELEPHONE ENCOUNTER
Left Voicemail (1st Attempt) and Sent Mychart (1st Attempt) for the patient to call back and schedule the following:    Appointment type: follow up  Provider: Charles Vazquez DO  Return date: 4 months   Specialty phone number: [phone]  Additional appointment(s) needed:   -Labs  -EMG  Additonal Notes: Video visit  LVM, sent MyC 5/15/23 NP

## 2023-05-17 ENCOUNTER — LAB (OUTPATIENT)
Dept: LAB | Facility: CLINIC | Age: 43
End: 2023-05-17
Payer: COMMERCIAL

## 2023-05-17 DIAGNOSIS — R53.83 OTHER FATIGUE: ICD-10-CM

## 2023-05-17 LAB
ALBUMIN SERPL BCG-MCNC: 4.5 G/DL (ref 3.5–5.2)
ALP SERPL-CCNC: 56 U/L (ref 35–104)
ALT SERPL W P-5'-P-CCNC: 17 U/L (ref 10–35)
ANION GAP SERPL CALCULATED.3IONS-SCNC: 11 MMOL/L (ref 7–15)
AST SERPL W P-5'-P-CCNC: 19 U/L (ref 10–35)
BASOPHILS # BLD AUTO: 0.1 10E3/UL (ref 0–0.2)
BASOPHILS NFR BLD AUTO: 1 %
BILIRUB SERPL-MCNC: 0.2 MG/DL
BUN SERPL-MCNC: 10.1 MG/DL (ref 6–20)
CALCIUM SERPL-MCNC: 9.5 MG/DL (ref 8.6–10)
CHLORIDE SERPL-SCNC: 103 MMOL/L (ref 98–107)
CREAT SERPL-MCNC: 0.7 MG/DL (ref 0.51–0.95)
CRP SERPL-MCNC: <3 MG/L
DEPRECATED HCO3 PLAS-SCNC: 27 MMOL/L (ref 22–29)
EOSINOPHIL # BLD AUTO: 0.2 10E3/UL (ref 0–0.7)
EOSINOPHIL NFR BLD AUTO: 2 %
ERYTHROCYTE [DISTWIDTH] IN BLOOD BY AUTOMATED COUNT: 13.5 % (ref 10–15)
ERYTHROCYTE [SEDIMENTATION RATE] IN BLOOD BY WESTERGREN METHOD: 8 MM/HR (ref 0–20)
FERRITIN SERPL-MCNC: 38 NG/ML (ref 6–175)
GFR SERPL CREATININE-BSD FRML MDRD: >90 ML/MIN/1.73M2
GLUCOSE SERPL-MCNC: 113 MG/DL (ref 70–99)
HCT VFR BLD AUTO: 41.9 % (ref 35–47)
HGB BLD-MCNC: 13.5 G/DL (ref 11.7–15.7)
IMM GRANULOCYTES # BLD: 0 10E3/UL
IMM GRANULOCYTES NFR BLD: 0 %
IRON BINDING CAPACITY (ROCHE): 304 UG/DL (ref 240–430)
IRON SATN MFR SERPL: 23 % (ref 15–46)
IRON SERPL-MCNC: 71 UG/DL (ref 37–145)
LYMPHOCYTES # BLD AUTO: 2.2 10E3/UL (ref 0.8–5.3)
LYMPHOCYTES NFR BLD AUTO: 30 %
MCH RBC QN AUTO: 28.5 PG (ref 26.5–33)
MCHC RBC AUTO-ENTMCNC: 32.2 G/DL (ref 31.5–36.5)
MCV RBC AUTO: 89 FL (ref 78–100)
MONOCYTES # BLD AUTO: 0.3 10E3/UL (ref 0–1.3)
MONOCYTES NFR BLD AUTO: 4 %
NEUTROPHILS # BLD AUTO: 4.6 10E3/UL (ref 1.6–8.3)
NEUTROPHILS NFR BLD AUTO: 63 %
NRBC # BLD AUTO: 0 10E3/UL
NRBC BLD AUTO-RTO: 0 /100
PLATELET # BLD AUTO: 326 10E3/UL (ref 150–450)
POTASSIUM SERPL-SCNC: 4.1 MMOL/L (ref 3.4–5.3)
PROT SERPL-MCNC: 6.9 G/DL (ref 6.4–8.3)
RBC # BLD AUTO: 4.73 10E6/UL (ref 3.8–5.2)
SODIUM SERPL-SCNC: 141 MMOL/L (ref 136–145)
TSH SERPL DL<=0.005 MIU/L-ACNC: 2.9 UIU/ML (ref 0.3–4.2)
VIT B12 SERPL-MCNC: 472 PG/ML (ref 232–1245)
WBC # BLD AUTO: 7.3 10E3/UL (ref 4–11)

## 2023-05-17 PROCEDURE — 80053 COMPREHEN METABOLIC PANEL: CPT | Mod: ORL | Performed by: PSYCHIATRY & NEUROLOGY

## 2023-05-17 PROCEDURE — 86376 MICROSOMAL ANTIBODY EACH: CPT | Mod: ORL | Performed by: PSYCHIATRY & NEUROLOGY

## 2023-05-17 PROCEDURE — 85025 COMPLETE CBC W/AUTO DIFF WBC: CPT | Mod: ORL | Performed by: PSYCHIATRY & NEUROLOGY

## 2023-05-17 PROCEDURE — 86334 IMMUNOFIX E-PHORESIS SERUM: CPT | Mod: 26

## 2023-05-17 PROCEDURE — 86334 IMMUNOFIX E-PHORESIS SERUM: CPT | Mod: ORL | Performed by: STUDENT IN AN ORGANIZED HEALTH CARE EDUCATION/TRAINING PROGRAM

## 2023-05-17 PROCEDURE — 83519 RIA NONANTIBODY: CPT | Mod: ORL | Performed by: PSYCHIATRY & NEUROLOGY

## 2023-05-17 PROCEDURE — 84165 PROTEIN E-PHORESIS SERUM: CPT | Mod: TC,ORL | Performed by: STUDENT IN AN ORGANIZED HEALTH CARE EDUCATION/TRAINING PROGRAM

## 2023-05-17 PROCEDURE — 83550 IRON BINDING TEST: CPT | Mod: ORL | Performed by: PSYCHIATRY & NEUROLOGY

## 2023-05-17 PROCEDURE — 82607 VITAMIN B-12: CPT | Mod: ORL | Performed by: PSYCHIATRY & NEUROLOGY

## 2023-05-17 PROCEDURE — 84165 PROTEIN E-PHORESIS SERUM: CPT | Mod: 26

## 2023-05-17 PROCEDURE — 84443 ASSAY THYROID STIM HORMONE: CPT | Mod: ORL | Performed by: PSYCHIATRY & NEUROLOGY

## 2023-05-17 PROCEDURE — 82728 ASSAY OF FERRITIN: CPT | Mod: ORL | Performed by: PSYCHIATRY & NEUROLOGY

## 2023-05-17 PROCEDURE — 84155 ASSAY OF PROTEIN SERUM: CPT | Mod: ORL | Performed by: PSYCHIATRY & NEUROLOGY

## 2023-05-17 PROCEDURE — 86140 C-REACTIVE PROTEIN: CPT | Mod: ORL | Performed by: PSYCHIATRY & NEUROLOGY

## 2023-05-17 PROCEDURE — 85652 RBC SED RATE AUTOMATED: CPT | Mod: ORL | Performed by: PSYCHIATRY & NEUROLOGY

## 2023-05-18 LAB
THYROPEROXIDASE AB SERPL-ACNC: 336 IU/ML
TOTAL PROTEIN SERUM FOR ELP: 7 G/DL (ref 6.4–8.3)

## 2023-05-19 ENCOUNTER — TELEPHONE (OUTPATIENT)
Dept: NEUROLOGY | Facility: CLINIC | Age: 43
End: 2023-05-19
Payer: COMMERCIAL

## 2023-05-19 LAB
ALBUMIN SERPL ELPH-MCNC: 4.4 G/DL (ref 3.7–5.1)
ALPHA1 GLOB SERPL ELPH-MCNC: 0.3 G/DL (ref 0.2–0.4)
ALPHA2 GLOB SERPL ELPH-MCNC: 0.6 G/DL (ref 0.5–0.9)
B-GLOBULIN SERPL ELPH-MCNC: 0.8 G/DL (ref 0.6–1)
GAMMA GLOB SERPL ELPH-MCNC: 0.9 G/DL (ref 0.7–1.6)
M PROTEIN SERPL ELPH-MCNC: 0 G/DL
PROT PATTERN SERPL ELPH-IMP: NORMAL
PROT PATTERN SERPL IFE-IMP: NORMAL

## 2023-05-19 NOTE — TELEPHONE ENCOUNTER
Left Voicemail (2nd Attempt) for the patient to call back and schedule the following:    Appointment type: follow up  Provider: Dr. Vazquez  Return date: 4 months  Specialty phone number: [phone]  Additional appointment(s) needed: N/A  Additonal Notes: LVM x2, sent MyC   Pily Aden on 5/19/2023 at 12:07 PM

## 2023-05-22 LAB — MAYO MISC RESULT: NORMAL

## 2023-05-23 ENCOUNTER — TELEPHONE (OUTPATIENT)
Dept: NEUROLOGY | Facility: CLINIC | Age: 43
End: 2023-05-23

## 2023-05-23 LAB
ACHR BIND IGG+IGM SER IA-SCNC: 0 NMOL/L
IMMUNOLOGIST REVIEW: NORMAL

## 2023-05-23 NOTE — TELEPHONE ENCOUNTER
Per Dr. Vazquez,  This patient is inappropriately scheduled. I wanted to follow up with her once her testing was completed, and there are multiple pending tests as well as her EMG yet to be done.  At this point, there is little to discuss with her, so I would ask she reschedule for the prior stated time in my visit with her dated 5/12/2023.

## 2023-05-23 NOTE — TELEPHONE ENCOUNTER
Called pt and informed her Dr. Vazquez's note and she cancelled the appt.  Informed her I will send a note to the schedulers regarding a 3-4 month follow up appt and she verbally understood.  Pt stated she sent a my chart note regarding her thyroid test and I informed her the message was sent to Dr. Vazquez and she will be receiving a reply.  Apologized for the scheduling snafu and she understood.

## 2023-05-30 LAB — SCANNED LAB RESULT: NORMAL

## 2023-06-14 ENCOUNTER — THERAPY VISIT (OUTPATIENT)
Dept: PHYSICAL THERAPY | Facility: CLINIC | Age: 43
End: 2023-06-14
Payer: COMMERCIAL

## 2023-06-14 DIAGNOSIS — M62.89 PELVIC FLOOR DYSFUNCTION: Primary | ICD-10-CM

## 2023-06-14 PROCEDURE — 97140 MANUAL THERAPY 1/> REGIONS: CPT | Mod: GP | Performed by: PHYSICAL THERAPIST

## 2023-06-14 PROCEDURE — 97530 THERAPEUTIC ACTIVITIES: CPT | Mod: GP | Performed by: PHYSICAL THERAPIST

## 2023-06-14 PROCEDURE — 97112 NEUROMUSCULAR REEDUCATION: CPT | Mod: GP | Performed by: PHYSICAL THERAPIST

## 2023-06-16 ENCOUNTER — TELEPHONE (OUTPATIENT)
Dept: NEUROLOGY | Facility: CLINIC | Age: 43
End: 2023-06-16
Payer: COMMERCIAL

## 2023-06-16 NOTE — TELEPHONE ENCOUNTER
"No imaging and \"more difficult bowel movements\" is not a red flag.   Patient contacted after hours as this message was sent at 4:18 on a Friday and added to a surgeons schedule for the next business day    Needs to be rescheduled with ARINA    Attempted to reach out to patient, no answer. Left voice message for them to call clinic back to further discuss.     mychart sent detailing the scheduling error and options for rescheduling  Currently have appt on 6/21 at 3:00 with Aquilino on hold   "

## 2023-06-19 ENCOUNTER — TELEPHONE (OUTPATIENT)
Dept: NEUROSURGERY | Facility: CLINIC | Age: 43
End: 2023-06-19

## 2023-06-19 NOTE — CONFIDENTIAL NOTE
"Patient presented to her appointment today with the surgeon  She was 15 minutes late   She reports she \"got the voicemail on the way over\"   Writer met patient in [facility] and asked if she is comfortable speaking about her appt in the lob. She agrees.     Advised that she was called on Friday, sent a [employer] message and called at least 3 times today.   She denies getting any of the other calls     Patient was removed from Dr Maciel's schedule and moved to the following day where an appt was saved for her    Started to explain that it was a scheduling error. She did not let writer explain why or what we need for an appointment  She replies that she \"doesn't appreciate\" appointments being made for her. Explained that it was intended to provider her with the next available appointment and says \"I will find care elsewhere\" and got up and left.     Appt with Chemo tomorrow cancelled     Updated management via email      "

## 2023-06-21 ENCOUNTER — THERAPY VISIT (OUTPATIENT)
Dept: PHYSICAL THERAPY | Facility: CLINIC | Age: 43
End: 2023-06-21
Payer: COMMERCIAL

## 2023-06-21 DIAGNOSIS — M62.89 PELVIC FLOOR DYSFUNCTION: Primary | ICD-10-CM

## 2023-06-21 PROCEDURE — 97110 THERAPEUTIC EXERCISES: CPT | Mod: GP | Performed by: PHYSICAL THERAPIST

## 2023-06-26 DIAGNOSIS — M54.2 NECK PAIN: Primary | ICD-10-CM

## 2023-07-29 NOTE — PROGRESS NOTES
Annual GYN exam  2023    Reason for visit: Pap, concerns for menopause    HPI: Patient is a 42 yo  who presents today for annual GYN exam and pap smear.  Patient is also concerned about menopause.  Patient complaints of increasing hot flushes, hair loss and weight gain.  She is still having fairly regular menstrual cycles but have become shorter in length lasting 4 days with only 1st day with heavier flow.  She does have associated cramping, mostly in the week prior to the onset of menses.  She has additional symptoms of headache and fatigue during this time.  Patient also notes having more moodiness during this time.  Does have sleep disturbance but relates this to her son coming to sleep with them often.  She also complaints of significant issues with bloating, but does have baseline constipation (has BM twice weekly maybe).  She has seen GI about this.  Does state she doesn't have the best diet but has recently started to exercise more, did get a membership to the [employer].      In addition to this patient notes that she is looking for assistance in finding someone who could help with medication treatment for her ADD.  She has been on medication in the past, but then lost her provider.  She has a therapist whom she meets with often, but feels she does need medication intervention for this issue as her symptoms are bothersome.    Past OB/GYN History:  :  x 1  Menses: As per HPI  Pap smear History: Pap NILM, HPV negative in 2016, due for cotesting today   Sexually active: With   STI History: None  Contraception: None     Past Medical History:   Diagnosis Date    Abdominal pain 2011    abnormal histamine problem, multiple food allergies    ADHD (attention deficit hyperactivity disorder)     on aderol    Anxiety and depression     on xanax    Arthritis     Breathing problem 2007    shortness of breath after eating, ?allergies    Chronic nausea     Gastroesophageal reflux disease     Heart murmur      closed by time she was 1    Hx of abnormal cervical Pap smear 2011    IBS (irritable bowel syndrome)     lots of mucus in bowel with occassional bleeding    Joint pain 2015    was to have rheumatologist    Kidney stone on right side 2010    Meniere's disease     hyperacusis    Migraines     Reduced vision     Tinnitus       Past Surgical History:   Procedure Laterality Date    COLONOSCOPY N/A 2/14/2018    Procedure: COMBINED COLONOSCOPY, SINGLE OR MULTIPLE BIOPSY/POLYPECTOMY BY BIOPSY;  colon and egd;  Surgeon: Joan Willson MD;  [facility]    ESOPHAGOSCOPY, GASTROSCOPY, DUODENOSCOPY (EGD), COMBINED N/A 2/14/2018    Procedure: COMBINED ESOPHAGOSCOPY, GASTROSCOPY, DUODENOSCOPY (EGD), BIOPSY SINGLE OR MULTIPLE;;  Surgeon: Joan Willson MD;  Location: [facility]    NO HISTORY OF SURGERY        hydrocortisone, Perianal, (HYDROCORTISONE) 2.5 % cream, Place rectally 2 times daily as needed for hemorrhoids  ibuprofen (ADVIL/MOTRIN) 200 MG tablet, Take 400 mg by mouth every 4 hours as needed for mild pain 2 tab every am  melatonin 3 MG tablet, Take 1 mg by mouth nightly as needed for sleep    No current facility-administered medications on file prior to visit.     Allergies   Allergen Reactions    [place]     Food      Cantaloupe, cucumbers, green beans, and peppers.     Lamb's Quarter (Weed)     Lemon Flavor     Mustard Seed     Onion Difficulty breathing and GI Disturbance    [place] GI Disturbance    Redtop Grasses     Vanilla GI Disturbance      Social History     Tobacco Use    Smoking status: Never    Smokeless tobacco: Never   Vaping Use    Vaping Use: Never used   Substance Use Topics    Alcohol use: Not Currently     Alcohol/week: 0.0 standard drinks of alcohol     Comment: outside of pregnancy, social    Drug use: No      Family History   Problem Relation Age of Onset    Mental Illness Mother         bipolar, schizophrenia    Anxiety Disorder Mother     Osteoporosis Mother     Thyroid Disease Mother      "Diabetes Mother     Neurofibromatosis Mother         more likely fibromyalgia    Arthritis Mother     Back Pain Mother     Osteoarthritis Mother     Obesity Mother     Cirrhosis Mother         from fatty liver. with esophageal varices, ..    Gallbladder Disease Mother         cholecystectomy    Hypertension Father     Hyperlipidemia Father     Diabetes Father     Other Cancer Father 67        Neuroendocrine tumor, ? from gall bladder, stage 4  1/2018    Migraines Father     Scleroderma Father     Rheumatoid Arthritis Father     Obesity Father     Ankylosing Spondylitis Father     Macular Degeneration Father     Mental Illness Sister         bipolar    Anxiety Disorder Sister     Asthma Sister         eczema    Neurologic Disorder Sister         Cervical Dystonia, treated with Botox    Thyroid Cancer Sister     Coronary Artery Disease Maternal Grandmother     Cerebrovascular Disease Maternal Grandmother     Breast Cancer Maternal Grandmother 65    Skin Cancer Maternal Grandmother     Stomach Cancer Maternal Grandmother         stomach, skin    Prostate Cancer Maternal Grandfather     Cancer Maternal Grandfather     Diabetes Paternal Grandmother     Kidney Cancer Paternal Grandmother     Coronary Artery Disease Paternal Grandfather     Colon Cancer Paternal Grandfather     Cancer Paternal Grandfather     Ovarian Cancer Maternal Aunt 68    Skin Cancer Maternal Aunt         melanoma    Scleroderma Paternal Aunt     Ankylosing Spondylitis Paternal Aunt     Ankylosing Spondylitis Paternal Uncle     Liver Cancer Other         uncle    Glaucoma No family hx of       ROS: A complete 10 point ROS was conducted and was negative aside from that noted in the HPI    O:  /79 (BP Location: Right arm, Patient Position: Chair)   Pulse 76   Ht 1.702 m (5' 7\")   Wt 103.6 kg (228 lb 4.8 oz)   BMI 35.76 kg/m       General: NAD, A&Ox3  Breasts: Symmetrical, No lymphadenopathy, skin changes, nipple discharge or nodules " appreciated bilaterally  Abdomen: Soft, NT, ND  Genitourinary:   External Genitalia:  General appearance; normal, Hair distribution; normal, Lesions absent  Urethral Meatus:  Size normal, Location normal, Lesions absent  Urethra:  Fullness absent, Masses absent  Bladder:  Fullness absent, Masses absent, Tenderness absent  Vagina:  General appearance normal, Estrogen effect normal, Discharge absent, Lesions absent  Cervix:  General appearance normal, Lesions absent, Discharge absent, Tenderness absent, Enlargement absent  Uterus:  Size normal, Position normal, Masses absent, Did have discomfort with bimanual exam secondary to bloating  Adenexa:  Noted fullness in right adnexa>left.  Again discomfort throughout abdominal palpation on exam likely related to bloating.        A/P: 42 yo  presents for annual GYN exam  1) Normal breast and pelvic exam  2) Screening for malignant neoplasm of cervix: Due for cotesting today, collected  3) Breast cancer screening: Due for mammogram 10/2023, patient does have a breast specialist she follows with due to her family history.  Has multiple family members with breast cancer and another with ovarian cancer.  No one has had genetic testing and Priyanka has been resistant to getting tested and the impact it would have on her son for future ability to access insurance, etc..  We discussed the benefits of testing today, specifically to help direct appropriate therapies and followup to watch her closely.  We discussed in patient's who are BRCA carriers we frequently will discuss at her age the benefits of BS or BSO for ovarian cancer prevention.  Patient appreciate discussion today and she will consider this more now, she will also talk to her other physician about this more.  4) Colon cancer screening: UTD, had colonoscopy 2/2018 with hyperplastic polyp, plan repeat in 10 years  5) Abdominal bloating: Plan pelvic US given her exam and family history  6) ADHD: Mental health referral  placed today to assist with management moving forward  7) Menopausal symptoms: Plan for FSH/Estradiol/LH/AMH/Prolactin/Hgab1c on day 2-4 of next menses.  Recent TSH normal.  8) RTC with any concerns moving forward    Ana Paula Mcmahan MD

## 2023-07-31 ENCOUNTER — OFFICE VISIT (OUTPATIENT)
Dept: OBGYN | Facility: CLINIC | Age: 43
End: 2023-07-31
Attending: OBSTETRICS & GYNECOLOGY
Payer: COMMERCIAL

## 2023-07-31 VITALS
WEIGHT: 228.3 LBS | DIASTOLIC BLOOD PRESSURE: 79 MMHG | BODY MASS INDEX: 35.83 KG/M2 | HEIGHT: 67 IN | SYSTOLIC BLOOD PRESSURE: 115 MMHG | HEART RATE: 76 BPM

## 2023-07-31 DIAGNOSIS — Z12.4 SCREENING FOR MALIGNANT NEOPLASM OF CERVIX: ICD-10-CM

## 2023-07-31 DIAGNOSIS — L65.9 HAIR LOSS: ICD-10-CM

## 2023-07-31 DIAGNOSIS — Z12.31 ENCOUNTER FOR SCREENING MAMMOGRAM FOR BREAST CANCER: ICD-10-CM

## 2023-07-31 DIAGNOSIS — Z01.419 ENCOUNTER FOR ANNUAL ROUTINE GYNECOLOGICAL EXAMINATION: Primary | ICD-10-CM

## 2023-07-31 DIAGNOSIS — F98.8 ATTENTION DEFICIT DISORDER, UNSPECIFIED HYPERACTIVITY PRESENCE: ICD-10-CM

## 2023-07-31 DIAGNOSIS — R14.0 ABDOMINAL BLOATING: ICD-10-CM

## 2023-07-31 DIAGNOSIS — N95.1 PERIMENOPAUSE: ICD-10-CM

## 2023-07-31 PROCEDURE — 99386 PREV VISIT NEW AGE 40-64: CPT | Performed by: OBSTETRICS & GYNECOLOGY

## 2023-07-31 PROCEDURE — 87624 HPV HI-RISK TYP POOLED RSLT: CPT | Performed by: OBSTETRICS & GYNECOLOGY

## 2023-07-31 PROCEDURE — G0463 HOSPITAL OUTPT CLINIC VISIT: HCPCS | Performed by: OBSTETRICS & GYNECOLOGY

## 2023-07-31 PROCEDURE — G0145 SCR C/V CYTO,THINLAYER,RESCR: HCPCS | Performed by: OBSTETRICS & GYNECOLOGY

## 2023-07-31 ASSESSMENT — ANXIETY QUESTIONNAIRES
7. FEELING AFRAID AS IF SOMETHING AWFUL MIGHT HAPPEN: SEVERAL DAYS
5. BEING SO RESTLESS THAT IT IS HARD TO SIT STILL: NOT AT ALL
6. BECOMING EASILY ANNOYED OR IRRITABLE: SEVERAL DAYS
1. FEELING NERVOUS, ANXIOUS, OR ON EDGE: NOT AT ALL
3. WORRYING TOO MUCH ABOUT DIFFERENT THINGS: SEVERAL DAYS
GAD7 TOTAL SCORE: 6
GAD7 TOTAL SCORE: 6
2. NOT BEING ABLE TO STOP OR CONTROL WORRYING: NOT AT ALL

## 2023-07-31 ASSESSMENT — PATIENT HEALTH QUESTIONNAIRE - PHQ9
5. POOR APPETITE OR OVEREATING: NEARLY EVERY DAY
SUM OF ALL RESPONSES TO PHQ QUESTIONS 1-9: 8

## 2023-07-31 NOTE — NURSING NOTE
Chief Complaint   Patient presents with    Physical     Pap due. Having symptoms such as cycle changes and wondering if she's entering perimenopause.       See Joyce, MYA 7/31/2023

## 2023-07-31 NOTE — LETTER
2023       RE: Priyanka Lundberg  [address] [place] [address]     Dear Colleague,    Thank you for referring your patient, Priyanka Lundberg, to the [facility] [place] [facility]. Please see a copy of my visit note below.    Annual GYN exam  2023    Reason for visit: Pap, concerns for menopause    HPI: Patient is a 42 yo  who presents today for annual GYN exam and pap smear.  Patient is also concerned about menopause.  Patient complaints of increasing hot flushes, hair loss and weight gain.  She is still having fairly regular menstrual cycles but have become shorter in length lasting 4 days with only 1st day with heavier flow.  She does have associated cramping, mostly in the week prior to the onset of menses.  She has additional symptoms of headache and fatigue during this time.  Patient also notes having more moodiness during this time.  Does have sleep disturbance but relates this to her son coming to sleep with them often.  She also complaints of significant issues with bloating, but does have baseline constipation (has BM twice weekly maybe).  She has seen GI about this.  Does state she doesn't have the best diet but has recently started to exercise more, did get a membership to the [employer].      In addition to this patient notes that she is looking for assistance in finding someone who could help with medication treatment for her ADD.  She has been on medication in the past, but then lost her provider.  She has a therapist whom she meets with often, but feels she does need medication intervention for this issue as her symptoms are bothersome.    Past OB/GYN History:  :  x 1  Menses: As per HPI  Pap smear History: Pap NILM, HPV negative in 2016, due for cotesting today   Sexually active: With   STI History: None  Contraception: None     Past Medical History:   Diagnosis Date    Abdominal pain  2011    abnormal histamine problem, multiple food allergies    ADHD (attention deficit hyperactivity disorder)     on aderol    Anxiety and depression     on xanax    Arthritis     Breathing problem 2007    shortness of breath after eating, ?allergies    Chronic nausea     Gastroesophageal reflux disease     Heart murmur     closed by time she was 1    Hx of abnormal cervical Pap smear 2011    IBS (irritable bowel syndrome)     lots of mucus in bowel with occassional bleeding    Joint pain 2015    was to have rheumatologist    Kidney stone on right side 2010    Meniere's disease     hyperacusis    Migraines     Reduced vision     Tinnitus       Past Surgical History:   Procedure Laterality Date    COLONOSCOPY N/A 2/14/2018    Procedure: COMBINED COLONOSCOPY, SINGLE OR MULTIPLE BIOPSY/POLYPECTOMY BY BIOPSY;  colon and egd;  Surgeon: Joan Willson MD;  [facility]    ESOPHAGOSCOPY, GASTROSCOPY, DUODENOSCOPY (EGD), COMBINED N/A 2/14/2018    Procedure: COMBINED ESOPHAGOSCOPY, GASTROSCOPY, DUODENOSCOPY (EGD), BIOPSY SINGLE OR MULTIPLE;;  Surgeon: Joan Willson MD;  [facility]    NO HISTORY OF SURGERY        hydrocortisone, Perianal, (HYDROCORTISONE) 2.5 % cream, Place rectally 2 times daily as needed for hemorrhoids  ibuprofen (ADVIL/MOTRIN) 200 MG tablet, Take 400 mg by mouth every 4 hours as needed for mild pain 2 tab every am  melatonin 3 MG tablet, Take 1 mg by mouth nightly as needed for sleep    No current facility-administered medications on file prior to visit.     Allergies   Allergen Reactions    [place]     Food      Cantaloupe, cucumbers, green beans, and peppers.     Lamb's Quarter (Weed)     Lemon Flavor     Mustard Seed     Onion Difficulty breathing and GI Disturbance    [place] GI Disturbance    Redtop Grasses     Vanilla GI Disturbance      Social History     Tobacco Use    Smoking status: Never    Smokeless tobacco: Never   Vaping Use    Vaping Use: Never used   Substance Use Topics     Alcohol use: Not Currently     Alcohol/week: 0.0 standard drinks of alcohol     Comment: outside of pregnancy, social    Drug use: No      Family History   Problem Relation Age of Onset    Mental Illness Mother         bipolar, schizophrenia    Anxiety Disorder Mother     Osteoporosis Mother     Thyroid Disease Mother     Diabetes Mother     Neurofibromatosis Mother         more likely fibromyalgia    Arthritis Mother     Back Pain Mother     Osteoarthritis Mother     Obesity Mother     Cirrhosis Mother         from fatty liver. with esophageal varices, ..    Gallbladder Disease Mother         cholecystectomy    Hypertension Father     Hyperlipidemia Father     Diabetes Father     Other Cancer Father 67        Neuroendocrine tumor, ? from gall bladder, stage 4  1/2018    Migraines Father     Scleroderma Father     Rheumatoid Arthritis Father     Obesity Father     Ankylosing Spondylitis Father     Macular Degeneration Father     Mental Illness Sister         bipolar    Anxiety Disorder Sister     Asthma Sister         eczema    Neurologic Disorder Sister         Cervical Dystonia, treated with Botox    Thyroid Cancer Sister     Coronary Artery Disease Maternal Grandmother     Cerebrovascular Disease Maternal Grandmother     Breast Cancer Maternal Grandmother 65    Skin Cancer Maternal Grandmother     Stomach Cancer Maternal Grandmother         stomach, skin    Prostate Cancer Maternal Grandfather     Cancer Maternal Grandfather     Diabetes Paternal Grandmother     Kidney Cancer Paternal Grandmother     Coronary Artery Disease Paternal Grandfather     Colon Cancer Paternal Grandfather     Cancer Paternal Grandfather     Ovarian Cancer Maternal Aunt 68    Skin Cancer Maternal Aunt         melanoma    Scleroderma Paternal Aunt     Ankylosing Spondylitis Paternal Aunt     Ankylosing Spondylitis Paternal Uncle     Liver Cancer Other         uncle    Glaucoma No family hx of       ROS: A complete 10 point ROS was  "conducted and was negative aside from that noted in the HPI    O:  /79 (BP Location: Right arm, Patient Position: Chair)   Pulse 76   Ht 1.702 m (5' 7\")   Wt 103.6 kg (228 lb 4.8 oz)   BMI 35.76 kg/m       General: NAD, A&Ox3  Breasts: Symmetrical, No lymphadenopathy, skin changes, nipple discharge or nodules appreciated bilaterally  Abdomen: Soft, NT, ND  Genitourinary:   External Genitalia:  General appearance; normal, Hair distribution; normal, Lesions absent  Urethral Meatus:  Size normal, Location normal, Lesions absent  Urethra:  Fullness absent, Masses absent  Bladder:  Fullness absent, Masses absent, Tenderness absent  Vagina:  General appearance normal, Estrogen effect normal, Discharge absent, Lesions absent  Cervix:  General appearance normal, Lesions absent, Discharge absent, Tenderness absent, Enlargement absent  Uterus:  Size normal, Position normal, Masses absent, Did have discomfort with bimanual exam secondary to bloating  Adenexa:  Noted fullness in right adnexa>left.  Again discomfort throughout abdominal palpation on exam likely related to bloating.        A/P: 42 yo  presents for annual GYN exam  1) Normal breast and pelvic exam  2) Screening for malignant neoplasm of cervix: Due for cotesting today, collected  3) Breast cancer screening: Due for mammogram 10/2023, patient does have a breast specialist she follows with due to her family history.  Has multiple family members with breast cancer and another with ovarian cancer.  No one has had genetic testing and Priyanka has been resistant to getting tested and the impact it would have on her son for future ability to access insurance, etc..  We discussed the benefits of testing today, specifically to help direct appropriate therapies and followup to watch her closely.  We discussed in patient's who are BRCA carriers we frequently will discuss at her age the benefits of BS or BSO for ovarian cancer prevention.  Patient appreciate " discussion today and she will consider this more now, she will also talk to her other physician about this more.  4) Colon cancer screening: ANGELA, had colonoscopy 2/2018 with hyperplastic polyp, plan repeat in 10 years  5) Abdominal bloating: Plan pelvic US given her exam and family history  6) ADHD: Mental health referral placed today to assist with management moving forward  7) Menopausal symptoms: Plan for FSH/Estradiol/LH/AMH/Prolactin/Hgab1c on day 2-4 of next menses.  Recent TSH normal.  8) RTC with any concerns moving forward    Ana Paula Mcmahan MD

## 2023-07-31 NOTE — PATIENT INSTRUCTIONS
Thank you for trusting us with your care!     If you need to contact us for questions about:  Symptoms, Scheduling & Medical Questions; Non-urgent (2-3 day response) Mis message, Urgent (needing response today) [phone] (if after 3:30pm next day response)   Prescriptions: Please call your Pharmacy   Billing: Donna [phone] or [facility]:[phone]

## 2023-08-02 LAB
BKR LAB AP GYN ADEQUACY: NORMAL
BKR LAB AP GYN INTERPRETATION: NORMAL
BKR LAB AP HPV REFLEX: NORMAL
BKR LAB AP PREVIOUS ABNORMAL: NORMAL
PATH REPORT.COMMENTS IMP SPEC: NORMAL
PATH REPORT.COMMENTS IMP SPEC: NORMAL
PATH REPORT.RELEVANT HX SPEC: NORMAL

## 2023-08-04 LAB
HUMAN PAPILLOMA VIRUS 16 DNA: NEGATIVE
HUMAN PAPILLOMA VIRUS 18 DNA: NEGATIVE
HUMAN PAPILLOMA VIRUS FINAL DIAGNOSIS: NORMAL
HUMAN PAPILLOMA VIRUS OTHER HR: NEGATIVE

## 2023-08-09 ENCOUNTER — OFFICE VISIT (OUTPATIENT)
Dept: NEUROLOGY | Facility: CLINIC | Age: 43
End: 2023-08-09
Payer: COMMERCIAL

## 2023-08-09 DIAGNOSIS — M62.81 GENERALIZED MUSCLE WEAKNESS: Primary | ICD-10-CM

## 2023-08-09 PROCEDURE — 95937 NEUROMUSCULAR JUNCTION TEST: CPT | Performed by: PSYCHIATRY & NEUROLOGY

## 2023-08-09 PROCEDURE — 95910 NRV CNDJ TEST 7-8 STUDIES: CPT | Performed by: PSYCHIATRY & NEUROLOGY

## 2023-08-09 PROCEDURE — 95885 MUSC TST DONE W/NERV TST LIM: CPT | Performed by: PSYCHIATRY & NEUROLOGY

## 2023-08-09 NOTE — LETTER
2023         RE: Priyanka Lundberg  [address] [place] [address]        Dear Colleague,    Thank you for referring your patient, Priyanka Lundberg, to the [facility]. Please see a copy of my visit note below.                            [facility]  Electrodiagnostic Laboratory                 Department of Neurology                                                                                                         Test Date:  2023    Patient: Priyanka Lundberg : 1980 Physician: Ana Paula Montiel MD   Sex: Male AGE: 43 year Ref Phys: Dr. Vazquez   ID#: [id number]   Technician: iVnnie Chirinos     History and Examination:  Ms Lundberg is a 43-year-old woman was sent for an EMG including repetitive stimulation for concerns of a neuromuscular junction disorder.  The patient reports that she feels weak all over and does not note specifically when this gets worse but thinks it might get worse after eating any size meal as well as after exercise.    Techniques:  Motor conduction studies were done with surface recording electrodes. Sensory conduction studies were performed with surface electrodes, unless indicated otherwise by (n), designating the use of subdermal recording electrodes. Temperature was monitored and recorded throughout the study. Upper extremities were maintained at a temperature of 32 degrees Centigrade or higher.  EMG was done with a concentric needle electrode.     Results:  All nerve conduction studies (as indicated in the following tables) were within normal limits.      Repetitive stimulation of the ulnar nerve on the right is normal.     All F Wave latencies were within normal limits.      All examined muscles (as indicated in the following table) showed no evidence of electrical instability.        Interpretation:    This is a normal EMG.  There is no evidence of neuromuscular junction disorder, large fiber  neuropathy or myopathy.    ___________________________  Ana Paula Montiel MD        Nerve Conduction Studies  Motor Sites      Latency Amplitude Neg. Amp Diff Segment Distance Velocity Neg. Dur Neg Area Diff Temperature Comment   Site (ms) Norm (mV) Norm %  cm m/s Norm ms %  C    Right Fibular (EDB) Motor   Ankle 4.0  < 6.0 2.9  > 2.5  Ankle-EDB 8   4.3  31.6    Bel Fib Head 9.8 - 2.7 - -6.9 Bel Fib Head-Ankle 31.7 55  > 38 5.7 9.7 31.6    Pop Fossa 11.2 - 2.6 - -3.7 Pop Fossa-Bel Fib Head 6.9 49  > 38 5.5 -5.9 6.9    Right Median (APB) Motor   Wrist 3.3  < 4.4 7.7  > 5.0  Wrist-APB 8   4.0  32    Elbow 6.9 - 7.1  > 5.0 -7.8 Elbow-Wrist 20.5 57  > 48 - - 32    Right Tibial (AHB) Motor   Ankle 2.8  < 6.5 11.6  > 5.0  Ankle-AHB 8   5.2  31.5    Knee 12.0 - 5.1 - -56.0 Knee-Ankle 39 42  > 38 5.9 -27.2 31.6    Right Ulnar (ADM) Motor   Wrist 2.2  < 3.5 6.0  > 5.0  Wrist-ADM 8   5.6  32.1    Bel Elbow 5.4 - 5.2 - -13.3 Bel Elbow-Wrist 20 63  > 48 5.4 -10.8 32.1    Abv Elbow 7.4 - 4.7 - -9.6 Abv Elbow-Bel Elbow 12.7 64  > 48 5.3 -9.3 32.1      F-Wave Sites      Min F-Lat Max-Min F-Lat Mean F-Lat   Site (ms) ms ms   Right Tibial F-Wave   Ankle 50.7 0 -     Sensory Sites      Onset Lat Peak Lat Amp (O-P) Amp (P-P) Segment Distance Velocity Temperature Comment   Site ms ms  V Norm  V  cm m/s Norm  C    Right Median Sensory   Wrist-Dig II 2.1 2.7 34  > 10 93 Wrist-Dig II 14 67  > 48 32    Right Radial Sensory   Forearm-Wrist 1.58 2.1 21  > 15 26 Forearm-Wrist 10 63 - 30.8    Right Sural Sensory   Calf-Lat Mall 2.8 3.5 10  > 5 9 Calf-Lat Mall 14 50  > 38 31.9    Right Ulnar Sensory   Wrist-Dig V 2.1 2.6 16  > 8 17 Wrist-Dig V 12.5 60  > 48 32      RNS     Trial # Label Amp 1 (mV)  O-P Amp 4 (mV)  O-P Amp % Dif Area 1 (mV ms) Area 4 (mV ms) Area % Dif Rep Rate Train Length Pause Time (min:sec) Comments   Right Abductor Digiti Minimi   Tr 1 Baseline 8.53 8.65 1.3 22.30 22.13 -0.7 3.00 6 00:30    Tr 2 Post Exercise 9.43 9.68 2.7  27.09 26.42 -2.5 3.00 6 01:00 very good effort   Tr 3 1 min Post 8.67 8.78 1.2 25.53 24.90 -2.5 3.00 6 01:00    Tr 4 2 min Post 8.67 8.74 0.8 24.43 23.86 -2.3 3.00 6 01:00    Tr 5 3 min Post 8.22 8.25 0.4 23.12 22.47 -2.8 3.00 6 00:00        Electromyography     Side Muscle Ins Act Fibs/PSW Fasc HF Amp Dur Poly Recrt Int Pat   Right Triceps Nml None Nml 0 Nml Nml 0 Nml Nml   Right Deltoid Nml None Nml 0 Nml Nml 0 Nml Nml   Right FDI Nml None Nml 0 Nml Nml 0 Nml Nml   Right Tib Anterior Nml None Nml 0 Nml Nml 0 Nml Nml   Right Vastus Lat Nml None Nml 0 Nml Nml 0 Nml Nml   Right Add Longus Nml None Nml 0 Nml Nml 0 Nml Nml   Right TensFascLat Nml None Nml 0 Nml Nml 0 Nml Nml         NCS Waveforms:    Motor                F-Wave       Sensory                                   Again, thank you for allowing me to participate in the care of your patient.        Sincerely,        Ana Paula Montiel MD

## 2023-08-09 NOTE — PROGRESS NOTES
[facility]  Electrodiagnostic Laboratory                 Department of Neurology                                                                                                         Test Date:  2023    Patient: Priyanka Lundberg : 1980 Physician: Ana Paula Montiel MD   Sex: Male AGE: 43 year Ref Phys: Dr. Vazquez   ID#: [id number]   Technician: Vinnie Chirinos     History and Examination:  Ms Lundberg is a 43-year-old woman was sent for an EMG including repetitive stimulation for concerns of a neuromuscular junction disorder.  The patient reports that she feels weak all over and does not note specifically when this gets worse but thinks it might get worse after eating any size meal as well as after exercise.    Techniques:  Motor conduction studies were done with surface recording electrodes. Sensory conduction studies were performed with surface electrodes, unless indicated otherwise by (n), designating the use of subdermal recording electrodes. Temperature was monitored and recorded throughout the study. Upper extremities were maintained at a temperature of 32 degrees Centigrade or higher.  EMG was done with a concentric needle electrode.     Results:  All nerve conduction studies (as indicated in the following tables) were within normal limits.      Repetitive stimulation of the ulnar nerve on the right is normal.     All F Wave latencies were within normal limits.      All examined muscles (as indicated in the following table) showed no evidence of electrical instability.        Interpretation:    This is a normal EMG.  There is no evidence of neuromuscular junction disorder, large fiber neuropathy or myopathy.    ___________________________  Ana Paula Montiel MD        Nerve Conduction Studies  Motor Sites      Latency Amplitude Neg. Amp Diff Segment Distance Velocity Neg. Dur Neg Area Diff Temperature Comment   Site (ms) Norm (mV) Norm %  cm m/s Norm ms %  C    Right  Fibular (EDB) Motor   Ankle 4.0  < 6.0 2.9  > 2.5  Ankle-EDB 8   4.3  31.6    Bel Fib Head 9.8 - 2.7 - -6.9 Bel Fib Head-Ankle 31.7 55  > 38 5.7 9.7 31.6    Pop Fossa 11.2 - 2.6 - -3.7 Pop Fossa-Bel Fib Head 6.9 49  > 38 5.5 -5.9 6.9    Right Median (APB) Motor   Wrist 3.3  < 4.4 7.7  > 5.0  Wrist-APB 8   4.0  32    Elbow 6.9 - 7.1  > 5.0 -7.8 Elbow-Wrist 20.5 57  > 48 - - 32    Right Tibial (AHB) Motor   Ankle 2.8  < 6.5 11.6  > 5.0  Ankle-AHB 8   5.2  31.5    Knee 12.0 - 5.1 - -56.0 Knee-Ankle 39 42  > 38 5.9 -27.2 31.6    Right Ulnar (ADM) Motor   Wrist 2.2  < 3.5 6.0  > 5.0  Wrist-ADM 8   5.6  32.1    Bel Elbow 5.4 - 5.2 - -13.3 Bel Elbow-Wrist 20 63  > 48 5.4 -10.8 32.1    Abv Elbow 7.4 - 4.7 - -9.6 Abv Elbow-Bel Elbow 12.7 64  > 48 5.3 -9.3 32.1      F-Wave Sites      Min F-Lat Max-Min F-Lat Mean F-Lat   Site (ms) ms ms   Right Tibial F-Wave   Ankle 50.7 0 -     Sensory Sites      Onset Lat Peak Lat Amp (O-P) Amp (P-P) Segment Distance Velocity Temperature Comment   Site ms ms  V Norm  V  cm m/s Norm  C    Right Median Sensory   Wrist-Dig II 2.1 2.7 34  > 10 93 Wrist-Dig II 14 67  > 48 32    Right Radial Sensory   Forearm-Wrist 1.58 2.1 21  > 15 26 Forearm-Wrist 10 63 - 30.8    Right Sural Sensory   Calf-Lat Mall 2.8 3.5 10  > 5 9 Calf-Lat Mall 14 50  > 38 31.9    Right Ulnar Sensory   Wrist-Dig V 2.1 2.6 16  > 8 17 Wrist-Dig V 12.5 60  > 48 32      RNS     Trial # Label Amp 1 (mV)  O-P Amp 4 (mV)  O-P Amp % Dif Area 1 (mV ms) Area 4 (mV ms) Area % Dif Rep Rate Train Length Pause Time (min:sec) Comments   Right Abductor Digiti Minimi   Tr 1 Baseline 8.53 8.65 1.3 22.30 22.13 -0.7 3.00 6 00:30    Tr 2 Post Exercise 9.43 9.68 2.7 27.09 26.42 -2.5 3.00 6 01:00 very good effort   Tr 3 1 min Post 8.67 8.78 1.2 25.53 24.90 -2.5 3.00 6 01:00    Tr 4 2 min Post 8.67 8.74 0.8 24.43 23.86 -2.3 3.00 6 01:00    Tr 5 3 min Post 8.22 8.25 0.4 23.12 22.47 -2.8 3.00 6 00:00        Electromyography     Side Muscle Ins Act  Fibs/PSW Fasc HF Amp Dur Poly Recrt Int Pat   Right Triceps Nml None Nml 0 Nml Nml 0 Nml Nml   Right Deltoid Nml None Nml 0 Nml Nml 0 Nml Nml   Right FDI Nml None Nml 0 Nml Nml 0 Nml Nml   Right Tib Anterior Nml None Nml 0 Nml Nml 0 Nml Nml   Right Vastus Lat Nml None Nml 0 Nml Nml 0 Nml Nml   Right Add Longus Nml None Nml 0 Nml Nml 0 Nml Nml   Right TensFascLat Nml None Nml 0 Nml Nml 0 Nml Nml         NCS Waveforms:    Motor                F-Wave       Sensory

## 2023-08-19 ENCOUNTER — LAB (OUTPATIENT)
Dept: LAB | Facility: CLINIC | Age: 43
End: 2023-08-19
Payer: COMMERCIAL

## 2023-08-19 DIAGNOSIS — N95.1 PERIMENOPAUSE: ICD-10-CM

## 2023-08-19 LAB
ESTRADIOL SERPL-MCNC: 25 PG/ML
FSH SERPL IRP2-ACNC: 8.6 MIU/ML
HBA1C MFR BLD: 5.5 %
LH SERPL-ACNC: 6 MIU/ML
MIS SERPL-MCNC: 1.13 NG/ML
PROLACTIN SERPL 3RD IS-MCNC: 9 NG/ML (ref 5–23)

## 2023-08-19 PROCEDURE — 99000 SPECIMEN HANDLING OFFICE-LAB: CPT | Performed by: PATHOLOGY

## 2023-08-19 PROCEDURE — 83520 IMMUNOASSAY QUANT NOS NONAB: CPT | Performed by: OBSTETRICS & GYNECOLOGY

## 2023-08-19 PROCEDURE — 83036 HEMOGLOBIN GLYCOSYLATED A1C: CPT | Performed by: OBSTETRICS & GYNECOLOGY

## 2023-08-19 PROCEDURE — 82670 ASSAY OF TOTAL ESTRADIOL: CPT | Performed by: OBSTETRICS & GYNECOLOGY

## 2023-08-19 PROCEDURE — 36415 COLL VENOUS BLD VENIPUNCTURE: CPT | Performed by: PATHOLOGY

## 2023-08-19 PROCEDURE — 83001 ASSAY OF GONADOTROPIN (FSH): CPT | Performed by: OBSTETRICS & GYNECOLOGY

## 2023-08-19 PROCEDURE — 83002 ASSAY OF GONADOTROPIN (LH): CPT | Performed by: OBSTETRICS & GYNECOLOGY

## 2023-08-19 PROCEDURE — 84146 ASSAY OF PROLACTIN: CPT | Performed by: OBSTETRICS & GYNECOLOGY

## 2023-08-21 ENCOUNTER — ANCILLARY PROCEDURE (OUTPATIENT)
Dept: ULTRASOUND IMAGING | Facility: CLINIC | Age: 43
End: 2023-08-21
Attending: OBSTETRICS & GYNECOLOGY
Payer: COMMERCIAL

## 2023-08-21 DIAGNOSIS — R14.0 ABDOMINAL BLOATING: ICD-10-CM

## 2023-08-21 PROCEDURE — 76830 TRANSVAGINAL US NON-OB: CPT | Mod: 26 | Performed by: OBSTETRICS & GYNECOLOGY

## 2023-08-21 PROCEDURE — 76830 TRANSVAGINAL US NON-OB: CPT

## 2023-08-24 ENCOUNTER — VIRTUAL VISIT (OUTPATIENT)
Dept: NEUROLOGY | Facility: CLINIC | Age: 43
End: 2023-08-24
Payer: COMMERCIAL

## 2023-08-24 DIAGNOSIS — R53.83 OTHER FATIGUE: ICD-10-CM

## 2023-08-24 DIAGNOSIS — M62.81 GENERALIZED MUSCLE WEAKNESS: Primary | ICD-10-CM

## 2023-08-24 PROCEDURE — 99213 OFFICE O/P EST LOW 20 MIN: CPT | Mod: VID | Performed by: PSYCHIATRY & NEUROLOGY

## 2023-08-24 NOTE — PROGRESS NOTES
[facility] Neurology Follow Up Visit    Priyanka Lundberg MRN# [id number]   Age: 43 year old YOB: 1980     Brief history of symptoms: The patient was initially seen in neurologic consultation on 5/12/2023 for evaluation of fatigue and possible POTS. Please see the comprehensive neurologic consultation notes from those dates in the Epic records for details.     Overall impression was for a post-prandial carbohydrate based fatigue, possibly due to a channelopathy or neuromuscular dysfunction.  Serum studies, and EMG were to be done, with consideration to starting acetazolamide in the future to see if improves the post-prandial symptoms.    Interval history:   - EMG 8/9/2023 was normal  - Serum studies 5/17/2023 were normal except for elevated TPO (336).    Today, the patient's symptoms remain the same.  She does indicate not trying sumatriptan for migraine in the past, and confirms her symptoms discussed at our last visit relating to positional and post-prandial dizziness/fatigue are not similar to that of prior migraines.  She is hesitant to start a medication for these symptoms without a diagnosis.     Physical Exam:   General: Seated comfortably in no acute distress.  HEENT: Neck supple with normal range of motion.   Skin: No rashes  Neurologic:     Mental Status: Fully alert, attentive and oriented. Speech clear and fluent, no paraphasic errors.     Cranial Nerves: EOMI with normal smooth pursuit. Facial movements symmetric. Hearing not formally tested but intact to conversation.  No dysarthria.     Motor: No tremors or other abnormal movements observed.          Assessment and Plan:   Assessment:  Post-prandial carbohydrate related fatigue    With the EMG being normal, and including rep stim, it seems a bit less likely the patient is dealing with a neuromuscular junction disorder.  It is still possible that she has sort of channelopathy, but just less likely at this time.  I don't think genetic testing or  neuromuscular referral is needed for exercise tolerance type testing or potassium monitoring with meals at this time.  We discussed trying a medication for the post-prandial symptoms, acetazolamide, as this can aide post-prandial fatigue in some studies for periodic paralysis, but she wasn't interested at this time without having further diagnosis.    She will continue to follow with her PCP, and indicates she will follow up with me if she does consider the medication trial.  She was given information about acetaolzamide in her AVS.     Plan:  Follow up in Neurology clinic as needed should new concerns arise.    ZAIDA Vazquez D.O.   of Neurology    Total time today (25 min) in this patient encounter was spent on pre-charting, counseling and/or coordination of care.

## 2023-08-24 NOTE — LETTER
8/24/2023       RE: Priyanka Lundberg  [address] [place] [address]     Dear Colleague,    Thank you for referring your patient, Priyanka Lundberg, to the [facility] NEUROLOGY CL[facility]IC [place] at [facility]. Please see a copy of my visit note below.    [facility] Neurology Follow Up Visit    Priyanka Lundberg MRN# [id number]   Age: 43 year old YOB: 1980     Brief history of symptoms: The patient was initially seen in neurologic consultation on 5/12/2023 for evaluation of fatigue and possible POTS. Please see the comprehensive neurologic consultation notes from those dates in the Epic records for details.     Overall impression was for a post-prandial carbohydrate based fatigue, possibly due to a channelopathy or neuromuscular dysfunction.  Serum studies, and EMG were to be done, with consideration to starting acetazolamide in the future to see if improves the post-prandial symptoms.    Interval history:   - EMG 8/9/2023 was normal  - Serum studies 5/17/2023 were normal except for elevated TPO (336).    Today, the patient's symptoms remain the same.  She does indicate not trying sumatriptan for migraine in the past, and confirms her symptoms discussed at our last visit relating to positional and post-prandial dizziness/fatigue are not similar to that of prior migraines.  She is hesitant to start a medication for these symptoms without a diagnosis.     Physical Exam:   General: Seated comfortably in no acute distress.  HEENT: Neck supple with normal range of motion.   Skin: No rashes  Neurologic:     Mental Status: Fully alert, attentive and oriented. Speech clear and fluent, no paraphasic errors.     Cranial Nerves: EOMI with normal smooth pursuit. Facial movements symmetric. Hearing not formally tested but intact to conversation.  No dysarthria.     Motor: No tremors or other abnormal movements observed.          Assessment and Plan:    Assessment:  Post-prandial carbohydrate related fatigue    With the EMG being normal, and including rep stim, it seems a bit less likely the patient is dealing with a neuromuscular junction disorder.  It is still possible that she has sort of channelopathy, but just less likely at this time.  I don't think genetic testing or neuromuscular referral is needed for exercise tolerance type testing or potassium monitoring with meals at this time.  We discussed trying a medication for the post-prandial symptoms, acetazolamide, as this can aide post-prandial fatigue in some studies for periodic paralysis, but she wasn't interested at this time without having further diagnosis.    She will continue to follow with her PCP, and indicates she will follow up with me if she does consider the medication trial.  She was given information about acetaolzamide in her AVS.     Plan:  Follow up in Neurology clinic as needed should new concerns arise.      Total time today (25 min) in this patient encounter was spent on pre-charting, counseling and/or coordination of care.         Again, thank you for allowing me to participate in the care of your patient.      Sincerely,    Charles Vazquez, DO

## 2023-08-24 NOTE — PROGRESS NOTES
Virtual Visit Details    Type of service:  Video Visit   Video Start Time:  0730  Video End Time:7:55 AM    Originating Location (pt. Location): Home    Distant Location (provider location):  On-site  Platform used for Video Visit: KrowdPad

## 2023-08-24 NOTE — NURSING NOTE
Is the patient currently in the state of [place]? YES    Visit mode:VIDEO    If the visit is dropped, the patient can be reconnected by: VIDEO VISIT: Send to e-mail at: [email or web address]    Will anyone else be joining the visit? NO  (If patient encounters technical issues they should call [phone]    How would you like to obtain your AVS? MyChart    Are changes needed to the allergy or medication list? No    Reason for visit: Follow Up    Yasmine RICK

## 2023-09-05 ENCOUNTER — VIRTUAL VISIT (OUTPATIENT)
Dept: PSYCHIATRY | Facility: CLINIC | Age: 43
End: 2023-09-05
Payer: COMMERCIAL

## 2023-09-05 DIAGNOSIS — F41.1 GAD (GENERALIZED ANXIETY DISORDER): ICD-10-CM

## 2023-09-05 DIAGNOSIS — F10.21 ALCOHOL USE DISORDER, MODERATE, IN SUSTAINED REMISSION (H): ICD-10-CM

## 2023-09-05 DIAGNOSIS — F90.2 ADHD (ATTENTION DEFICIT HYPERACTIVITY DISORDER), COMBINED TYPE: Primary | ICD-10-CM

## 2023-09-05 PROCEDURE — 99205 OFFICE O/P NEW HI 60 MIN: CPT | Mod: VID | Performed by: NURSE PRACTITIONER

## 2023-09-05 RX ORDER — DEXTROAMPHETAMINE SACCHARATE, AMPHETAMINE ASPARTATE, DEXTROAMPHETAMINE SULFATE AND AMPHETAMINE SULFATE 2.5; 2.5; 2.5; 2.5 MG/1; MG/1; MG/1; MG/1
10 TABLET ORAL EVERY MORNING
Qty: 30 TABLET | Refills: 0 | Status: SHIPPED | OUTPATIENT
Start: 2023-09-05 | End: 2023-11-13

## 2023-09-05 ASSESSMENT — PAIN SCALES - GENERAL: PAINLEVEL: NO PAIN (0)

## 2023-09-05 ASSESSMENT — PATIENT HEALTH QUESTIONNAIRE - PHQ9
SUM OF ALL RESPONSES TO PHQ QUESTIONS 1-9: 16
SUM OF ALL RESPONSES TO PHQ QUESTIONS 1-9: 16
10. IF YOU CHECKED OFF ANY PROBLEMS, HOW DIFFICULT HAVE THESE PROBLEMS MADE IT FOR YOU TO DO YOUR WORK, TAKE CARE OF THINGS AT HOME, OR GET ALONG WITH OTHER PEOPLE: EXTREMELY DIFFICULT

## 2023-09-05 NOTE — PROGRESS NOTES
"Virtual Visit Details    Type of service:  Video Visit   Video Start Time: 1:06 PM  Video End Time: 2:11 PM    Originating Location (pt. Location): Other parents home in [facility]    Distant Location (provider location):  Off-site  Platform used for Video Visit: [facility]        OUTPATIENT PSYCHIATRIC EVALUATION         2023    Provider: SALLIE Michel CNP        Name: Priyanka Lundberg   : 1980                    Preferred Name: Priyanka    Identification : Priyanka Lundberg is 43 year old who is referred from OB/GYN     Persons Present in Session / Source of Information:  alone.       Screening Tools      PHQ-9 scores:      2023    11:02 AM 2023     2:21 PM 2023    12:49 PM   PHQ-9 SCORE   PHQ-9 Total Score MyChart   16 (Moderately severe depression)   PHQ-9 Total Score 21 8 16       BIN-7 scores:      2021     2:34 PM 2023    11:02 AM 2023     2:21 PM   BIN-7 SCORE   Total Score 9 12 6       Chief Complaint       \" I like to interview people first\"      History of Present Illness      Presents to establish care with psychiatry today.  States that she prefers to interview people first.  States that she can be bull headed.  Currently has a therapist that she sees weekly.  States that she can be quite black in white in her thinking.  Finds that words often fall out of her head.  Struggles to have a filter when speaking.  Experiences negative self talk such as saying that she is lazy.  Often is distracted.  Ever since excepting that she has ADHD feels like there has been overall improvement.  Finds that however she struggles to be compliant with medications in general.  Often does not  medications that have been prescribed.  This goes for both medical and none.  Worries about side effects/long-term effects.  Often finds that she believes that people are \"too slow\".  Experiences decision paralysis.  Often will sit \"and it\" and gets overwhelmed.  Feels sometimes she " "goes into \"robot mode\".  Struggles with organization.  Describes being a hoarder with garbage up the sides of her vee.  Reports that she has at least 5 years of mail that she has yet to go through.  Struggles in school historically.  Appetite is typically over eating and considers herself to be engaging in binge eating behaviors.  Denies other symptoms of eating disorders.  Weight has been increasing over time.  History of impulsivity.  As a child was in trouble for shoplifting and would engage in underage drinking.  Reports that while mother kids were drinking 1 or 2 drinks she would be binge drinking.  Now describes impulsivity in relationship to not having a verbal filter.  History of speeding tickets.  Has not done her taxes in some time.  Finds this to be overwhelming.  Often procrastinates.  Describes sleep as \"terrible\".  Often has difficulty initiating sleep due to thinking about worst-case scenarios.  Does find that she does often worry and has catastrophic thinking.  Gives example of her child starting school and his classroom is located near the entrance.  Makes her think of school shootings.  History of panic attacks but have not occurred in the last 8 years.  Engages in skin picking and describes this as constant.  Triggered by being overly tired and or stress.  Sometimes will take a chewable to a store to extract it.  Also finds that her hands are mindlessly wandering.  Reports a history of being described as a depressed person but that therapist believes that it is due to having lack of stable environment for several years.  Reports that once she had stability depressive symptoms improved drastically.  History of passive suicidal thoughts in the context of drinking alcohol.  Has never attempted suicide.  Protective factor is her son.  Denies history of self harming behaviors.  Denies history of homicidal ideation.  Denies history of discrete manic episodes.  Denies history of psychosis.  Psychiatric " "Review of Systems      Comprehensive review of symptoms completed, pertinent positives noted below  Depression: No symptoms and Irritability  Rosita: No Symptoms  Psychosis:No Symptoms  Anxiety: Excessive worry, Nervousness, Sleep disturbance, Ruminations, Poor concentration, and Irritability   Panic: no symptoms  OCD: No Symptoms   Trauma: Experienced traumatic event see social history    Eating D/O: Binging  Disruptive/Impulse/Conduct: No symptoms  ADHD: Inattentive, Difficulties listening, Poor task completion, Poor organizational skills, Distractibility, Forgetful, Interrupts, Impulsive, Restlessness/fidgety, Hyperverbal, and Hyperactive     Past Psychiatric History        Previous diagnosis: ADHD, alcohol use d/o, depression     Previous psychiatric hospitalization: No     TMS/ECT treatments: No     History of Civil Commitment: No     Residential: No     Outpatient Programming: No    Neuropsychological Testing: No     ADHD Testing: Yes. Age 11 yo.      Previous psychiatrist: Yes:      Previous medication trials: Yes  - Strattera: Felt side effects were dirty.   - Adderall  - Ritalin : more agitated, snappy  - Valium: darkness.     Medication Compliance: No                 Pharmacogenomic Testing Completed: No     Previous therapy trials:   - starting therapy at age 9-13 yo     Current therapist: Yes  - adriane Underwood.     Previous suicide attempts: No.     Previous SIB: No     Psychosis Hx: No     Violence / Aggressive Hx: No     Eating d/o Hx: Yes:   - Binge eating.         Substance Use History       Substance(s) / Description of Use:   Alcohol. 30-32 yo. Binge drinking, daily drinking. Blacking out intentionally. Now drinking is 2 drink limit. Has reduced, limited. Doesn't like the risk for cancer, how it makes her feel.     Bzn. Klonopin. 30-32 yo. \"Party drug\"      Longest Period of Sobriety: apprx 10 years.      CD Treatment History: No  - engaged in outpatient individual therapy      Detox Admits: No   "   DWIs: No. Should have.      Caffeine Use: Yes: 1 cup in the morning. If drinks passed 2 pm will cause insomnia     Nicotine Use: No     Medications Prior to Appointment       Current Outpatient Medications   Medication Sig Dispense Refill    hydrocortisone, Perianal, (HYDROCORTISONE) 2.5 % cream Place rectally 2 times daily as needed for hemorrhoids 30 g 1    ibuprofen (ADVIL/MOTRIN) 200 MG tablet Take 400 mg by mouth every 4 hours as needed for mild pain 2 tab every am      melatonin 3 MG tablet Take 1 mg by mouth nightly as needed for sleep             Medical History       History of head injuries: Yes: Several. Medical attention sought for some.   History of seizures: No  History of cardiac events: No  History of Tardive Dyskinesia: No        Primary Care Provider: Sydney Ruiz     Past Medical History:   Diagnosis Date    Abdominal pain 2011    abnormal histamine problem, multiple food allergies    ADHD (attention deficit hyperactivity disorder)     on aderol    Anxiety and depression     on xanax    Arthritis     Breathing problem 2007    shortness of breath after eating, ?allergies    Chronic nausea     Gastroesophageal reflux disease     Heart murmur     closed by time she was 1    Hx of abnormal cervical Pap smear 2011    IBS (irritable bowel syndrome)     lots of mucus in bowel with occassional bleeding    Joint pain 2015    was to have rheumatologist    Kidney stone on right side 2010    Meniere's disease     hyperacusis    Migraines     Reduced vision     Tinnitus      Past Surgical History:   Procedure Laterality Date    COLONOSCOPY N/A 2/14/2018    Procedure: COMBINED COLONOSCOPY, SINGLE OR MULTIPLE BIOPSY/POLYPECTOMY BY BIOPSY;  colon and egd;  Surgeon: Joan Willson MD;  Location: [facility]    ESOPHAGOSCOPY, GASTROSCOPY, DUODENOSCOPY (EGD), COMBINED N/A 2/14/2018    Procedure: COMBINED ESOPHAGOSCOPY, GASTROSCOPY, DUODENOSCOPY (EGD), BIOPSY SINGLE OR MULTIPLE;;  Surgeon: Joan Willson MD;   "Location:  OR    NO HISTORY OF SURGERY          Labs      BP Readings from Last 1 Encounters:   07/31/23 115/79       Pulse Readings from Last 1 Encounters:   07/31/23 76     Wt Readings from Last 1 Encounters:   07/31/23 103.6 kg (228 lb 4.8 oz)       Ht Readings from Last 1 Encounters:   07/31/23 1.702 m (5' 7\")     Estimated body mass index is 35.76 kg/m  as calculated from the following:    Height as of 7/31/23: 1.702 m (5' 7\").    Weight as of 7/31/23: 103.6 kg (228 lb 4.8 oz).    Most recent laboratory results reviewed and pertinent results include:     Recent Labs   Lab Test 05/17/23  1603 08/13/18  1514 05/19/17  0033   WBC 7.3   < > 6.0   HGB 13.5   < > 13.3   HCT 41.9   < > 39.5   MCV 89   < > 88      < > 234   ANEU  --   --  2.5    < > = values in this interval not displayed.     Recent Labs   Lab Test 05/17/23  1603      POTASSIUM 4.1   CHLORIDE 103   CO2 27   *   TORY 9.5   BUN 10.1   CR 0.70   GFRESTIMATED >90   ALBUMIN 4.5   PROTTOTAL 6.9   AST 19   ALT 17   ALKPHOS 56   BILITOTAL 0.2     Recent Labs   Lab Test 08/19/23  1226 07/27/21  1522 07/27/21  1517   CHOL  --   --  206*   LDL  --   --  140*   HDL  --   --  50   TRIG  --   --  79   A1C 5.5   < >  --     < > = values in this interval not displayed.     Recent Labs   Lab Test 05/17/23  1603   TSH 2.90     No components found for: VITD     EKG: Normal EKG.      Medical Review of Systems      10 systems (general, cardiovascular, respiratory, eyes, ENT, endocrine, GI, , M/S, neurological) were reviewed.   Review of Systems   All other systems reviewed and are negative.     The remaining systems are all unremarkable.    Contraception:none No LMP recorded. (Menstrual status: Irregular Periods).  Pregnancy status: Not pregnant      Allergies       Allergies   Allergen Reactions    [place]     Food      Cantaloupe, cucumbers, green beans, and peppers.     Lamb's Quarter (Weed)     Lemon Flavor     Mustard Seed     Onion " Difficulty breathing and GI Disturbance    Adams GI Disturbance    Redtop Grasses     Vanilla GI Disturbance        Family History       Psychiatric:   - Mother: schizoaffective d/o  - Maternal side     Substance use:   - Sister: substance use      Suicide: Denies     Family History   Problem Relation Age of Onset    Mental Illness Mother         bipolar, schizophrenia    Anxiety Disorder Mother     Osteoporosis Mother     Thyroid Disease Mother     Diabetes Mother     Neurofibromatosis Mother         more likely fibromyalgia    Arthritis Mother     Back Pain Mother     Osteoarthritis Mother     Obesity Mother     Cirrhosis Mother         from fatty liver. with esophageal varices, ..    Gallbladder Disease Mother         cholecystectomy    Hypertension Father     Hyperlipidemia Father     Diabetes Father     Other Cancer Father 67        Neuroendocrine tumor, ? from gall bladder, stage 4  1/2018    Migraines Father     Scleroderma Father     Rheumatoid Arthritis Father     Obesity Father     Ankylosing Spondylitis Father     Macular Degeneration Father     Mental Illness Sister         bipolar    Anxiety Disorder Sister     Asthma Sister         eczema    Neurologic Disorder Sister         Cervical Dystonia, treated with Botox    Thyroid Cancer Sister     Coronary Artery Disease Maternal Grandmother     Cerebrovascular Disease Maternal Grandmother     Breast Cancer Maternal Grandmother 65    Skin Cancer Maternal Grandmother     Stomach Cancer Maternal Grandmother         stomach, skin    Prostate Cancer Maternal Grandfather     Cancer Maternal Grandfather     Diabetes Paternal Grandmother     Kidney Cancer Paternal Grandmother     Coronary Artery Disease Paternal Grandfather     Colon Cancer Paternal Grandfather     Cancer Paternal Grandfather     Ovarian Cancer Maternal Aunt 68    Skin Cancer Maternal Aunt         melanoma    Scleroderma Paternal Aunt     Ankylosing Spondylitis Paternal Aunt     Ankylosing  "Spondylitis Paternal Uncle     Liver Cancer Other         uncle    Glaucoma No family hx of            Social History      Pt was born in [place]. Moved around often due to father being in the  (state side and international[place]  Parents were . Father passed five years ago. Dad was a huge supporter, \"biggest cheerleader\". Feels father sheltered patient from mother. Pt has 1 siblings, daughter.       Cultural/Spiritual/ethnic background: Raised Episcopal. [place] based.   Highest level of education completed: bachelor's degree Attended school in [place]. [employer].   Trauma/Abuse history: Denies    Relationship status: . Pt has 1 (8 yo) children.  Sexual History: Not obtained.              - Intentions to become pregnant in near future No       Current lives [place] [address] with Spouse/Partner and Son.  Supports: Family and Friends      history: No  Legal History: No: Patient denies any legal history  Firearms: No: Patient denies    Employment: Part time         Mental Status Exam      Appearance: awake, alert, adequately groomed, appeared stated age and no apparent distress  Attitude:  cooperative   Eye Contact:  good  Gait and Station: normal, no gross abnormalities noted by observation  Psychomotor Behavior:  no evidence of tardive dyskinesia, dystonia, or tics  Oriented to:  person, place, time, and situation  Attention Span and Concentration: Moderate impairment  Speech:  clear, coherent, regular rate, rhythm, and volume  Language: intact  Mood: \"Okay\"  Affect:  appropriate and in normal range  Associations:  no loose associations  Thought Process: Circumstantial.  Needs redirection  Thought Content:  no evidence of suicidal ideation or homicidal ideation, no evidence of psychotic thought, no auditory hallucinations present and no visual hallucinations present  Recent and Remote Memory:  Intact to interview. Not formally assessed. No amnesia.  Fund of Knowledge: " appropriate  Insight: Partial to full  Judgment:  intact, adequate for safety  Impulse Control:  intact     Vitals        Virtual visit. Not completed today.       Risk Assessment       Suicide assessment  Acute: Low  Chronic:Low  Imminent: Low     Risk factors  History of suicide attempts: No  History of self-injurious behavior: No  Rbob. Axis I psychiatric diagnoses: Yes  Substance use disorder: Yes  Symptoms:  impulsivity, insomnia, feeling inadequate  Family history of completed suicide or attempted suicide: No  Accessibility to firearms: No  Interpersonal factors: Caregiving role,  parent/close family member with mental illness     Protective factors  Ability to cope with the stress: Yes  Family responsibility and supportive:Yes  Positive therapeutic relationships: Yes  Social support:Yes  Protestant beliefs:  Yes  Connectedness with mental health providers: Yes     Homicidal Risk  Acute: Low  Chronic: Low  Imminent: Low        Assessment     Priyanka Lundberg is 43 year old female with a past psychiatry history of ADHD, alcohol use disorder, depression, anxiety who has been referred for medication management from OB/GYN.  No known history of psychiatric inpatient hospitalizations.  No known suicide attempts.  No known self harming behaviors.  Chemical health history significant for alcohol use disorder and benzodiazepine use.  Has not used in approximately 10 years.     Meets criteria for ADHD.  During today's interview patient was easily distracted by environmental factors.  Also lost thought process on occasion and needed reminders of what she previously was discussing.  We discussed treatment of ADHD.  Previously on stimulant medication.  I was able to review records from the age of 34 years old when she received treatment in [place].  She was on dextroamphetamine at that time.  We discussed stimulant versus nonstimulant options.  More likely would benefit from combination.  However we will start with  stimulant medication.  EKG was reviewed and normal.  Consideration to clonidine in the future to help target irritability, anxiety, insomnia.  We did discuss impact of stimulants on irritability and anxiety.  Patient does meet criteria for generalized anxiety disorder as well.  No imminent safety concerns.      Pharmacologic:   -Adderall IR 10 mg by mouth every morning    *We did discuss clonidine 0.1 mg by mouth every bedtime.  Patient will hold off on this for now and read about this medication further.  We did discuss the use of this as as needed at bedtime at this dosing as well.       Psychosocial: Would benefit from individual therapy with focus of Cognitive Behavioral Therapy (CBT). This form of therapy will be helpful in addressing cognitive distortions, improving distress tolerance, and developing helpful / healthy coping strategies to address stressors.          Diagnosis      ADHD, combined presentation  BIN  Alcohol use disorder, in sustained remission    Plan         1) Medications: Adderall IR 10 mg by mouth every morning              MNPMP: I have queried the [place] and/or [place] Prescription Monitoring Program for this patient for the preceding 12 months, or reviewed the report provided by my proxy delegate. I have not identified any concerns.  2) Risk vs benefits of medications reviewed: Yes  3) Life style modifications: sleep hygiene, exercise, healthy diet  4) Medical concerns:    - No acute concerns  5) Other:   -Continue individual therapy  6) Refrain from drinking alcohol and/or use of drugs.   7) Please secure all prescription and OTC medications, sharps, and caustic substances. Please remove all firearms and ammunition.  8) Review outside records, get HAO's, coordinate with outside providers  9) In case of emergency call 911 or go to the nearest ER, this includes patient voicing thought of harming self or others as well as additional safety concerns   10) Follow-up: 4 weeks, or sooner if  needed.      Administrative Billing:   Supportive therapy was provided, focusing on reflective listening and solution focused problem solving.    Total time preparing to see this patient, face-to-face time, documenting in the EHR, and coordinating care time on the same calendar date: 83 minutes         Signed:   SALLIE Michel CNP on 9/5/2023 at 2:29 PM     Disclaimer: This note consists of symbols derived from keyboarding, dictation and/or voice recognition software. As a result, there may be errors in the script that have gone undetected. Please consider this when interpreting information found in this chart.

## 2023-09-05 NOTE — PATIENT INSTRUCTIONS
Treatment plan today   Follow up in 4 weeks (or sooner as needed)  Medication changes   Adderall IR 10 mg by mouth every morning  Continue individual therapy  Look into clonidine.  This is second line treatment for ADHD.  It might be helpful for anxiety, insomnia, irritability.  Communication  Call the psychiatric nurse line with medication questions or concerns at [phone] or [phone].  BoxFoxhart may be used to communicate with your care team, but this is not intended to be used for emergencies.  You can call the above number to make appointments, leave a message with our nursing team, and inquire about any mental health referrals I have placed.  Please call your pharmacy to request a refill of your medications listed above if needed between appointments.   Safety Plan - see below for crisis resources   Call or text [phone] for mental health crisis.   Call 911 or use ER for potentially life-threatening situations.     SALLIE Fletcher, WILMERP  Panel Psychiatry Service   [facility]         RESOURCES     Crisis Resources  For emergency help, please call 911 or go to the nearest Emergency Department.     Emergency Walk-In Options:   EmPATH Unit @ [facility] ([place]): [phone] - Specialized mental health emergency area designed to be calming  [facility] ([place]): [phone]  [facility] ([place]): [phone]  [facility] ([place]): [phone]    [facility] Crisis Information:   [place]: [phone]  [place]: [phone]  [place] Angel ([place]) - Adult: [phone]     Child: [phone]  [place] - Adult: [phone]     Child: [phone]  [place]: [phone]  [facility], [place], [facility], LTAC, located within [facility], and the [facility]: [phone] or TEXT  [place]  to [phone]  List of all [facility] resources:   https://[place].gov/dhs/people-we-serve/adults/health-care/mental-health/resources/crisis-contacts.jsp    National Crisis  "Information:   National Suicide & Crisis Lifeline: Call 988   For online chat options, visit https://suicidepreventionlifeline.org/chat/  Poison Control Center: 1-841.462.1803  Poison Control Center: 1-479.159.6876  Trans Lifeline: 1-481.159.8135 - Hotline for transgender people of all ages  The [place] Project: 1-459.581.4592 - Hotline for LGBT youth     For Non-Emergency Support:   Fast Tracker: Mental Health & Substance Use Disorder Resources -   https://www.Parachute.org/    Additional Resources  Financial Assistance [phone]  [facility] Billing [phone]  Central Billing Office, [facility]: [phone]  [facility] Billing [phone]  Medical Records [phone]  [place] Patient Bill of Rights [email or web address]         Patient Education   The Panel Psychiatry Program  What to Expect  Here's what to expect in the Panel Psychiatry Program.   About the program  You'll be meeting with a psychiatric doctor to check your mental health. A psychiatric doctor helps you deal with troubling thoughts and feelings by giving you medicine. They'll make sure you know the plan for your care. You may see them for a long time. When you're feeling better, they may refer you back to seeing your family doctor.   If you have any questions, we'll be glad to talk to you.  About visits  Be open  At your visits, please talk openly about your problems. It may feel hard, but it's the best way for us to help you.  Cancelling visits  If you can't come to your visit, please call us right away at [phone]. If you don't cancel at least 24 hours (1 full day) before your visit, that's \"late cancellation.\"  Not showing up for your visits  Being very late is the same as not showing up. You'll be a \"no show\" if:  You're more than 15 minutes late for a 30-minute (half hour) visit.  You're more than 30 minutes late for a 60-minute (full hour) visit.  If you cancel late or " don't show up 2 times within 6 months, we may end your care.  Getting help between visits  If you need help between visits, you can call us Monday to Friday from 8 a.m. to 4:30 p.m. at [phone].  Emergency care  Call 911 or go to the nearest emergency department if your life or someone else's life is in danger.  Call 988 anytime to reach the national Suicide and Crisis hotline.  Medicine refills  To refill your medicine, call your pharmacy. You can also call [facility]'s Behavioral Access at [phone], Monday to Friday, 8 a.m. to 4:30 p.m. It can take 1 to 3 business days to get a refill.   Forms, letters, and tests  You may have papers to fill out, like FMLA, short-term disability, and workability. We can help you with these forms at your visits, but you must have an appointment. You may need more than 1 visit for this, to be in an intensive therapy program, or both.  Before we can give you medicine for ADHD, we may refer you to get tested for it or confirm it another way.  We may not be able to give you an emotional support animal letter.  We don't do mental health checks ordered by the court.   We don't do mental health testing, but we can refer you to get tested.   Thank you for choosing us for your care.  For informational purposes only. Not to replace the advice of your health care provider. Copyright   2022 [facility]. All rights reserved. [employer] [id number] - 12/22.

## 2023-09-05 NOTE — NURSING NOTE
Is the patient currently in the state of [place]? YES    Visit mode:VIDEO    If the visit is dropped, the patient can be reconnected by: VIDEO VISIT: Text to cell phone:   Telephone Information:   Mobile [phone]       Will anyone else be joining the visit? NO  (If patient encounters technical issues they should call [phone]    How would you like to obtain your AVS? MyChart    Are changes needed to the allergy or medication list? No    Reason for visit: RECHECK    Rani Booker F    Care team has reviewed attendance agreement with patient. Patient advised that two failed appointments within 6 months may lead to termination of current episode of care.      PHQ complete, other qnrs incomplete due to time

## 2023-11-09 ENCOUNTER — MYC MEDICAL ADVICE (OUTPATIENT)
Dept: SURGERY | Facility: CLINIC | Age: 43
End: 2023-11-09
Payer: COMMERCIAL

## 2023-11-09 DIAGNOSIS — R46.89 CONCERN ABOUT APPEARANCE OF BREAST: Primary | ICD-10-CM

## 2023-11-13 ENCOUNTER — VIRTUAL VISIT (OUTPATIENT)
Dept: PSYCHIATRY | Facility: CLINIC | Age: 43
End: 2023-11-13
Payer: COMMERCIAL

## 2023-11-13 DIAGNOSIS — F10.21 ALCOHOL USE DISORDER, MODERATE, IN SUSTAINED REMISSION (H): ICD-10-CM

## 2023-11-13 DIAGNOSIS — F41.1 GAD (GENERALIZED ANXIETY DISORDER): ICD-10-CM

## 2023-11-13 DIAGNOSIS — F90.2 ADHD (ATTENTION DEFICIT HYPERACTIVITY DISORDER), COMBINED TYPE: Primary | ICD-10-CM

## 2023-11-13 PROCEDURE — 99214 OFFICE O/P EST MOD 30 MIN: CPT | Mod: VID | Performed by: NURSE PRACTITIONER

## 2023-11-13 RX ORDER — CLONIDINE HYDROCHLORIDE 0.1 MG/1
0.1 TABLET ORAL AT BEDTIME
Qty: 30 TABLET | Refills: 0 | Status: SHIPPED | OUTPATIENT
Start: 2023-11-13 | End: 2024-01-24

## 2023-11-13 RX ORDER — METHYLPHENIDATE HYDROCHLORIDE 5 MG/1
5 TABLET ORAL DAILY
Qty: 30 TABLET | Refills: 0 | Status: SHIPPED | OUTPATIENT
Start: 2023-11-13 | End: 2024-01-24

## 2023-11-13 NOTE — PROGRESS NOTES
"Virtual Visit Details     Type of service:  Video Visit     Originating Location (pt. Location): Home    Distant Location (provider location):  Off-site  Platform used for Video Visit: [facility]          OUTPATIENT PSYCHIATRIC FOLLOW-UP      2023    Provider: SALLIE Michel CNP      Appointment Start Time: 1230 PM  Appointment End Time: 104 PM     Name: Priyanka Lundberg   : 1980                    Preferred Name: Priyanka      Screening Tools       PHQ-9 scores:      2023    11:02 AM 2023     2:21 PM 2023    12:49 PM   PHQ-9 SCORE   PHQ-9 Total Score MyChart   16 (Moderately severe depression)   PHQ-9 Total Score 21 8 16       BIN-7 scores:      2021     2:34 PM 2023    11:02 AM 2023     2:21 PM   BIN-7 SCORE   Total Score 9 12 6          History of Present Illness      Patient attended the session alone.     Interim History:  I last saw Priyanka Lundberg for outpatient psychiatry Consultation on 23. During that appointment, we initiated Adderall IR 10mg .     Current stressors include: Financial Difficulties, Symptoms, Caregiving Stress, and Occupational Difficulties     Coping mechanisms and supports include: Family    Side effects: Denies    Medication adherence: Reports inconsistent med adherence.    Psychiatric Review of Systems      Priyanka Lundberg reports mood has been: \"I got distracted\"  - anxious because of war going on.  - continues therapist   - ongoing stressors as care giving for mom  - finances are stressor    Depression has been: \"Situational with constant drama\".     Anxiety has been: Physical sx - headaches. \"Grouchy.. people stealing my life minutes\".  Anxiety of \"the to do list\".     Sleep has been: Waking in middle of night for waking. Anxious d/t stressors. Brain fog without sleeping solidly.    Rosita sxs: Denies    Psychosis sxs: Denies    ADHD sxs: Took Adderall for five days. Didn't like it, hard to go to sleep.Realized it may have been from " "baseline stress as sleep issues persisted for the past 1+ month despite not taking adderall.  Was hyper focusing while on medication.. Re started 2.5mg then increased to 5mg. Gritting teeth x1 occurrence. Doesn't feel he has given adequate trial. Does feel she can be more productive on adderall. \"Feels internal cheerleader\" is silenced.     PTSD sxs: Denies    SI/SIB: Denies      Medications Prior to Appointment       Current Outpatient Medications   Medication Sig Dispense Refill    amphetamine-dextroamphetamine (ADDERALL) 10 MG tablet Take 1 tablet (10 mg) by mouth every morning 30 tablet 0    hydrocortisone, Perianal, (HYDROCORTISONE) 2.5 % cream Place rectally 2 times daily as needed for hemorrhoids 30 g 1    ibuprofen (ADVIL/MOTRIN) 200 MG tablet Take 400 mg by mouth every 4 hours as needed for mild pain 2 tab every am      melatonin 3 MG tablet Take 1 mg by mouth nightly as needed for sleep          Previous medication trials include but not limited to:  - Strattera: Felt side effects were dirty.   - Adderall  - Ritalin : more agitated, snappy  - Valium: darkness.                 Medication Compliance: No                 Pharmacogenomic Testing Completed: No      Medical History      History of head injuries: Yes: Several. Medical attention sought for some.   History of seizures: No  History of cardiac events: No  History of Tardive Dyskinesia: No        Primary Care Provider: Sydney Ruiz      Past Medical History:   Diagnosis Date    Abdominal pain 2011    abnormal histamine problem, multiple food allergies    ADHD (attention deficit hyperactivity disorder)     on aderol    Anxiety and depression     on xanax    Arthritis     Breathing problem 2007    shortness of breath after eating, ?allergies    Chronic nausea     Gastroesophageal reflux disease     Heart murmur     closed by time she was 1    Hx of abnormal cervical Pap smear 2011    IBS (irritable bowel syndrome)     lots of mucus in bowel " with occassional bleeding    Joint pain 2015    was to have rheumatologist    Kidney stone on right side 2010    Meniere's disease     hyperacusis    Migraines     Reduced vision     Tinnitus         Surgery:   Past Surgical History:   Procedure Laterality Date    COLONOSCOPY N/A 2/14/2018    Procedure: COMBINED COLONOSCOPY, SINGLE OR MULTIPLE BIOPSY/POLYPECTOMY BY BIOPSY;  colon and egd;  Surgeon: Joan Willson MD;  Location: [facility] OR    ESOPHAGOSCOPY, GASTROSCOPY, DUODENOSCOPY (EGD), COMBINED N/A 2/14/2018    Procedure: COMBINED ESOPHAGOSCOPY, GASTROSCOPY, DUODENOSCOPY (EGD), BIOPSY SINGLE OR MULTIPLE;;  Surgeon: Joan Willson MD;  Location: [facility] OR    NO HISTORY OF SURGERY       Primary Care Provider: Sydney Ruiz MD        Social History      Current Living situation:  [place], [place] with Spouse/Partner and Son.    Current use of drugs or alcohol: Denies     Tobacco use: No    Employment: Yes: Part time     Relationship Status:      Vitals      Not obtained d/t virtual visit.     There were no vitals taken for this visit.    Labs        Most recent laboratory results reviewed and pertinent results include:   Lab on 08/19/2023   Component Date Value Ref Range Status    Anti-Mullerian Hormone 08/19/2023 1.130  <2.600 ng/mL Final    Prolactin 08/19/2023 9  5 - 23 ng/mL Final    Hemoglobin A1C 08/19/2023 5.5  <5.7 % Final    Normal <5.7%   Prediabetes 5.7-6.4%    Diabetes 6.5% or higher     Note: Adopted from ADA consensus guidelines.    Estradiol 08/19/2023 25  pg/mL Final    Healthy Men:   11.3-43.2 pg/mL    Healthy Postmenopausal Women:  Postmenopause: <5-138 pg/mL    Healthy Pregnant Women:  1st trimester: 154-3243 pg/mL  2nd trimester: 1561-48292 pg/mL  3rd trimester: 8525->92566 pg/mL    Healthy Women Cycle Phase:  Follicular: 30.9-90.4 pg/mL  Ovulation: 60.4-533 pg/mL  Luteal: 60.4-232 pg/mL    Healthy Women Cycle Sub-Phase:  Early Follicular: 20.5-62.8 pg/mL  Intermediate Follicular: 26-79.8  pg/mL  Late Follicular: 49.5-233 pg/mL  Ovulation: 60.4-602 pg/mL  Early Luteal: 51.1-179 pg/mL  Intermediate Luteal: 66.5-305 pg/mL  Late Luteal: 30.2-222 pg/mL    Luteinizing Hormone 08/19/2023 6.0  mIU/mL Final    FEMALE:  Age  0 - 6 mo:  <0.1-8.2 mIU/mL  6 mo - 11 years: <0.1-1.3 mIU/mL   11 - 14 years: <0.1-10 mIU/mL   14 - 19 years: 0.4-25 mIU/mL     19 years and older:   Follicular Phase: 2.4-12.6 mIU/mL  Ovulation Phase: 14.0-95.6 mIU/mL  Luteal Phase: 1.0-11.4  mIU/mL  Postmenopausal: 7.7-58.5 mIU/mL      FSH 08/19/2023 8.6  mIU/mL Final    19 years and older:   Follicular phase: 3.5-12.5 mIU/mL   Ovulation phase: 4.7-21.5 mIU/mL   Luteal phase: 1.7-7.7 mIU/mL   Postmenopause: 25.8-134.8 mIU/mL      Office Visit on 07/31/2023   Component Date Value Ref Range Status    Interpretation 07/31/2023 Negative for Intraepithelial Lesion or Malignancy (NILM)    Final    Comment 07/31/2023    Final                    Value:This result contains rich text formatting which cannot be displayed here.    Specimen Adequacy 07/31/2023 Satisfactory for evaluation, endocervical/transformation zone component present   Final    Clinical Information 07/31/2023    Final                    Value:This result contains rich text formatting which cannot be displayed here.    Reflex Testing 07/31/2023 Yes regardless of result   Final    Previous Abnormal? 07/31/2023    Final                    Value:This result contains rich text formatting which cannot be displayed here.    Performing Labs 07/31/2023    Final                    Value:This result contains rich text formatting which cannot be displayed here.    Other HR HPV 07/31/2023 Negative  Negative Final    HPV16 DNA 07/31/2023 Negative  Negative Final    HPV18 DNA 07/31/2023 Negative  Negative Final    FINAL DIAGNOSIS 07/31/2023    Final                    Value:This result contains rich text formatting which cannot be displayed here.   Lab on 05/17/2023   Component Date Value Ref  Range Status    Iron 05/17/2023 71  37 - 145 ug/dL Final    Iron Binding Capacity 05/17/2023 304  240 - 430 ug/dL Final    Iron Sat Index 05/17/2023 23  15 - 46 % Final    Ferritin 05/17/2023 38  6 - 175 ng/mL Final    Erythrocyte Sedimentation Rate 05/17/2023 8  0 - 20 mm/hr Final    CRP Inflammation 05/17/2023 <3.00  <5.00 mg/L Final    TSH 05/17/2023 2.90  0.30 - 4.20 uIU/mL Final    Thyroid Peroxidase Antibody 05/17/2023 336 (H)  <35 IU/mL Final    ACh Receptor (Muscle) Binding Ab 05/17/2023 0.00  <=0.02 nmol/L Final       -------------------ADDITIONAL INFORMATION-------------------  This test was developed and its performance characteristics   determined by [facility] in a manner consistent with CLIA   requirements. This test has not been cleared or approved by   the U.S. Food and Drug Administration.     Test Performed by:  [facility] [place] [address]  : Matt Suarez M.D. Ph.D.; CLIA# [id number]    MG Interpretive Comments 05/17/2023 SEE NOTE   Final    No informative autoantibodies were detected. A negative   result does not exclude a diagnosis of autoimmune   myasthenia gravis.    See Scanned Result 05/17/2023 LABORATORY MISCELLANEOUS ORDER-Scanned   Final    Sodium 05/17/2023 141  136 - 145 mmol/L Final    Potassium 05/17/2023 4.1  3.4 - 5.3 mmol/L Final    Chloride 05/17/2023 103  98 - 107 mmol/L Final    Carbon Dioxide (CO2) 05/17/2023 27  22 - 29 mmol/L Final    Anion Gap 05/17/2023 11  7 - 15 mmol/L Final    Urea Nitrogen 05/17/2023 10.1  6.0 - 20.0 mg/dL Final    Creatinine 05/17/2023 0.70  0.51 - 0.95 mg/dL Final    Calcium 05/17/2023 9.5  8.6 - 10.0 mg/dL Final    Glucose 05/17/2023 113 (H)  70 - 99 mg/dL Final    Alkaline Phosphatase 05/17/2023 56  35 - 104 U/L Final    AST 05/17/2023 19  10 - 35 U/L Final    ALT 05/17/2023 17  10 - 35 U/L Final    Protein Total 05/17/2023 6.9  6.4 - 8.3 g/dL Final    Albumin 05/17/2023  4.5  3.5 - 5.2 g/dL Final    Bilirubin Total 05/17/2023 0.2  <=1.2 mg/dL Final    GFR Estimate 05/17/2023 >90  >60 mL/min/1.73m2 Final    eGFR calculated using 2021 CKD-EPI equation.    Vitamin B12 05/17/2023 472  232 - 1,245 pg/mL Final    Immunofixation ELP 05/17/2023 No monoclonal protein seen on immunofixation. Pathologic significance requires clinical correlation.  Anu Rodriguez M.D., Ph.D.   Final    WBC Count 05/17/2023 7.3  4.0 - 11.0 10e3/uL Final    RBC Count 05/17/2023 4.73  3.80 - 5.20 10e6/uL Final    Hemoglobin 05/17/2023 13.5  11.7 - 15.7 g/dL Final    Hematocrit 05/17/2023 41.9  35.0 - 47.0 % Final    MCV 05/17/2023 89  78 - 100 fL Final    MCH 05/17/2023 28.5  26.5 - 33.0 pg Final    MCHC 05/17/2023 32.2  31.5 - 36.5 g/dL Final    RDW 05/17/2023 13.5  10.0 - 15.0 % Final    Platelet Count 05/17/2023 326  150 - 450 10e3/uL Final    % Neutrophils 05/17/2023 63  % Final    % Lymphocytes 05/17/2023 30  % Final    % Monocytes 05/17/2023 4  % Final    % Eosinophils 05/17/2023 2  % Final    % Basophils 05/17/2023 1  % Final    % Immature Granulocytes 05/17/2023 0  % Final    NRBCs per 100 WBC 05/17/2023 0  <1 /100 Final    Absolute Neutrophils 05/17/2023 4.6  1.6 - 8.3 10e3/uL Final    Absolute Lymphocytes 05/17/2023 2.2  0.8 - 5.3 10e3/uL Final    Absolute Monocytes 05/17/2023 0.3  0.0 - 1.3 10e3/uL Final    Absolute Eosinophils 05/17/2023 0.2  0.0 - 0.7 10e3/uL Final    Absolute Basophils 05/17/2023 0.1  0.0 - 0.2 10e3/uL Final    Absolute Immature Granulocytes 05/17/2023 0.0  <=0.4 10e3/uL Final    Absolute NRBCs 05/17/2023 0.0  10e3/uL Final    Total Protein Serum for ELP 05/17/2023 7.0  6.4 - 8.3 g/dL Final    Albumin 05/17/2023 4.4  3.7 - 5.1 g/dL Final    Alpha 1 05/17/2023 0.3  0.2 - 0.4 g/dL Final    Alpha 2 05/17/2023 0.6  0.5 - 0.9 g/dL Final    Beta Globulin 05/17/2023 0.8  0.6 - 1.0 g/dL Final    Gamma Globulin 05/17/2023 0.9  0.7 - 1.6 g/dL Final    Monoclonal Peak 05/17/2023 0.0  <=0.0 g/dL  "Final    [facility] Interpretation 05/17/2023 Essentially normal electrophoretic pattern. No obvious monoclonal proteins seen. Pathologic significance requires clinical correlation. Anu Rodriguez M.D., Ph.D.   Final    [facility] Result 05/17/2023 SEE NOTE   Final       Test                             Result        Flag  Unit    RefValue  ----------------------------------------------------------------------  MuSK Autoantibody, S             0.00                nmol/L  0.00-0.02             -------------------ADDITIONAL INFORMATION-------------------      This test was developed using an analyte specific reagent.       Its performance characteristics were determined by [facility] in a manner consistent with CLIA requirements. This       test has not been cleared or approved by the U.S. Food and       Drug Administration.             Test Performed by:      [place] [address]      : Matt Suarez M.D. Ph.D.; CLIA# [id number]     Normal EKG.       Medical Review of Systems      Pertinent positives noted in HPI and below:   ROS     Contraception:none No LMP recorded. (Menstrual status: Irregular Periods).  Pregnancy status: Not pregnant    Mental Status Exam      Appearance: awake, alert, adequately groomed, appeared stated age and no apparent distress  Attitude:  cooperative   Eye Contact:  good  Gait and Station: normal, no gross abnormalities noted by observation  Psychomotor Behavior:  no evidence of tardive dyskinesia, dystonia, or tics  Oriented to:  person, place, time, and situation  Attention Span and Concentration: Moderate impairment  Speech:  clear, coherent, regular rate, rhythm, and volume  Language: intact  Mood: \"Fine\"  Affect:  appropriate and in normal range  Associations:  no loose associations  Thought Process: Circumstantial.  Needs redirection  Thought Content:  no evidence of suicidal ideation or homicidal " ideation, no evidence of psychotic thought, no auditory hallucinations present and no visual hallucinations present  Recent and Remote Memory:  Intact to interview. Not formally assessed. No amnesia.  Fund of Knowledge: appropriate  Insight: limited  Judgment:  intact, adequate for safety  Impulse Control:  intact         Risk Assessment       Updated: 11/13/2023    Suicide assessment  Acute: Low  Chronic:Low  Imminent: Low     Risk factors  History of suicide attempts: No  History of self-injurious behavior: No  Robb. Axis I psychiatric diagnoses: Yes  Substance use disorder: Yes  Symptoms:  impulsivity, insomnia, feeling inadequate  Family history of completed suicide or attempted suicide: No  Accessibility to firearms: No  Interpersonal factors: Caregiving role,  parent/close family member with mental illness     Protective factors  Ability to cope with the stress: Yes  Family responsibility and supportive:Yes  Positive therapeutic relationships: Yes  Social support:Yes  Worship beliefs:  Yes  Connectedness with mental health providers: Yes     Homicidal Risk  Acute: Low  Chronic: Low  Imminent: Low       Assessment     Priyanka Lundberg reports that unfair trial of adderall given. We discussed re trial of ritalin. It was apparent that adderall was helpful during visit today. Took medication prior to visit. Was quite distracted, hyperverbal and towards end of appointment was more organized, linear with thoughts. Main concern is it taking away her personality. She is willing to re trial ritalin for comparison. We discussed past irritability experienced however she also notes that she was taking much higher dose of adderall previously and this has been a different experience then previous.  She is also going to trial clonidine 0.1mg PRN for sleep,anxiety. Most likely will not get benefit for adhd as she is inconsistency with medication.         Pharmacologic:   09/05/23: + adderall 10mg (was only taking 2.5mg -  5mg)    -Stop Adderall IR 10 mg by mouth every morning  -Start Ritalin 5mg by mouth every day  -Start clonidine 0.1mg by mouth every bedtime PRN          Psychosocial: Would benefit from individual therapy with focus of Cognitive Behavioral Therapy (CBT). This form of therapy will be helpful in addressing cognitive distortions, improving distress tolerance, and developing helpful / healthy coping strategies to address stressors.   - Continue individual therapy            Diagnosis       ADHD, combined presentation  BIN  Alcohol use disorder, in sustained remission     Plan         1) Medications:   -Start Ritalin 5mg by mouth every day  -Start clonidine 0.1mg by mouth every bedtime PRN              [place]NPMP: I have queried the [place] and/or [place] Prescription Monitoring Program for this patient for the preceding 12 months, or reviewed the report provided by my proxy delegate. I have not identified any concerns.  2) Risk vs benefits of medications reviewed: Yes  3) Life style modifications: sleep hygiene, exercise, healthy diet  4) Medical concerns:    - No acute concerns  5) Other:   -Continue individual therapy  6) Refrain from drinking alcohol and/or use of drugs.   7) Please secure all prescription and OTC medications, sharps, and caustic substances. Please remove all firearms and ammunition.  8) Review outside records, get HAO's, coordinate with outside providers  9) In case of emergency call 911 or go to the nearest ER, this includes patient voicing thought of harming self or others as well as additional safety concerns   10) Follow-up: 4 weeks, or sooner if needed.      Administrative Billing:   Supportive therapy was provided, focusing on reflective listening and solution focused problem solving.    Total time preparing to see this patient, face-to-face time, documenting in the EHR, and coordinating care time on the same calendar date: 39 minutes         Signed:   SALLIE Michel CNP on 11/13/2023 at 1:21 PM      Disclaimer: This note consists of symbols derived from keyboarding, dictation and/or voice recognition software. As a result, there may be errors in the script that have gone undetected. Please consider this when interpreting information found in this chart.

## 2023-11-13 NOTE — PATIENT INSTRUCTIONS
"Patient Education   The [facility]  What to Expect  Here's what to expect in the Panel Psychiatry Program.   About the program  You'll be meeting with a psychiatric doctor to check your mental health. A psychiatric doctor helps you deal with troubling thoughts and feelings by giving you medicine. They'll make sure you know the plan for your care. You may see them for a long time. When you're feeling better, they may refer you back to seeing your family doctor.   If you have any questions, we'll be glad to talk to you.  About visits  Be open  At your visits, please talk openly about your problems. It may feel hard, but it's the best way for us to help you.  Cancelling visits  If you can't come to your visit, please call us right away at [phone]. If you don't cancel at least 24 hours (1 full day) before your visit, that's \"late cancellation.\"  Not showing up for your visits  Being very late is the same as not showing up. You'll be a \"no show\" if:  You're more than 15 minutes late for a 30-minute (half hour) visit.  You're more than 30 minutes late for a 60-minute (full hour) visit.  If you cancel late or don't show up 2 times within 6 months, we may end your care.  Getting help between visits  If you need help between visits, you can call us Monday to Friday from 8 a.m. to 4:30 p.m. at [phone].  Emergency care  Call 911 or go to the nearest emergency department if your life or someone else's life is in danger.  Call 988 anytime to reach the national Suicide and Crisis hotline.  Medicine refills  To refill your medicine, call your pharmacy. You can also call [facility]'s [facility] at [phone], Monday to Friday, 8 a.m. to 4:30 p.m. It can take 1 to 3 business days to get a refill.   Forms, letters, and tests  You may have papers to fill out, like FMLA, short-term disability, and workability. We can help you with these forms at your visits, but you must have an " appointment. You may need more than 1 visit for this, to be in an intensive therapy program, or both.  Before we can give you medicine for ADHD, we may refer you to get tested for it or confirm it another way.  We may not be able to give you an emotional support animal letter.  We don't do mental health checks ordered by the court.   We don't do mental health testing, but we can refer you to get tested.   Thank you for choosing us for your care.  For informational purposes only. Not to replace the advice of your health care provider. Copyright   2022 [facility]. All rights reserved. [employer] 372321 - 12/22.

## 2023-11-13 NOTE — PROGRESS NOTES
"Virtual Visit Details    Type of service:  Video Visit   Video Start Time: {video visit start/end time for provider to select:216605}  Video End Time:{video visit start/end time for provider to select:011604}    Originating Location (pt. Location): {video visit patient location:094577::\"Home\"}  {PROVIDER LOCATION On-site should be selected for visits conducted from your clinic location or adjoining [facility] hospital, academic office, or other nearby [facility] building. Off-site should be selected for all other provider locations, including home:095283}  Distant Location (provider location):  {virtual location provider:943846}  Platform used for Video Visit: {Virtual Visit Platforms:273139::\"[employer]\"}    UNABLE TO EDIT LAST NOTE!  "

## 2023-11-13 NOTE — NURSING NOTE
Is the patient currently in the state of [place]? YES    Visit mode:VIDEO    If the visit is dropped, the patient can be reconnected by: VIDEO VISIT: Text to cell phone:   Telephone Information:   Mobile [phone]       Will anyone else be joining the visit? NO  (If patient encounters technical issues they should call [phone])    How would you like to obtain your AVS? MyChart    Are changes needed to the allergy or medication list? No    Reason for visit: RECHECK    Kecia RICK

## 2023-11-22 NOTE — NURSING NOTE
"Chief Complaint   Patient presents with     Abdominal Pain     New pt consult for abdominal pain       Vitals:    06/13/18 0709   BP: 98/61   BP Location: Left arm   Patient Position: Sitting   Cuff Size: Adult Regular   Pulse: 64   Resp: 18   Temp: 97.5  F (36.4  C)   TempSrc: Oral   SpO2: 97%   Weight: 197 lb 9.6 oz   Height: 5' 7\"       Body mass index is 30.95 kg/(m^2).      PAULINE Craven, EMT                    "
No

## 2023-11-29 ENCOUNTER — MYC MEDICAL ADVICE (OUTPATIENT)
Dept: SURGERY | Facility: CLINIC | Age: 43
End: 2023-11-29
Payer: COMMERCIAL

## 2023-11-29 DIAGNOSIS — R46.89 CONCERN ABOUT APPEARANCE OF BREAST: Primary | ICD-10-CM

## 2023-12-16 ENCOUNTER — HEALTH MAINTENANCE LETTER (OUTPATIENT)
Age: 43
End: 2023-12-16

## 2023-12-20 ENCOUNTER — TELEPHONE (OUTPATIENT)
Dept: GASTROENTEROLOGY | Facility: CLINIC | Age: 43
End: 2023-12-20
Payer: COMMERCIAL

## 2023-12-20 ENCOUNTER — VIRTUAL VISIT (OUTPATIENT)
Dept: FAMILY MEDICINE | Facility: CLINIC | Age: 43
End: 2023-12-20
Payer: COMMERCIAL

## 2023-12-20 DIAGNOSIS — Z12.11 ENCOUNTER FOR SCREENING COLONOSCOPY: ICD-10-CM

## 2023-12-20 DIAGNOSIS — M54.2 CERVICALGIA: Primary | ICD-10-CM

## 2023-12-20 RX ORDER — DEXTROAMPHETAMINE SACCHARATE, AMPHETAMINE ASPARTATE MONOHYDRATE, DEXTROAMPHETAMINE SULFATE AND AMPHETAMINE SULFATE 2.5; 2.5; 2.5; 2.5 MG/1; MG/1; MG/1; MG/1
10 CAPSULE, EXTENDED RELEASE ORAL DAILY
COMMUNITY
End: 2024-01-24

## 2023-12-20 NOTE — NURSING NOTE
43 year old  Chief Complaint   Patient presents with    Consult     Pt would like to consult before colonoscopy.     Phq declined.        Last menstrual period 11/21/2023, not currently breastfeeding. There is no height or weight on file to calculate BMI.  Patient Active Problem List   Diagnosis    Joint pain    L4-L5 disc bulge    Attention deficit hyperactivity disorder (ADHD), combined type    Vaginal discharge    Other acne    Benign nevus    History of Clostridium difficile colitis    Cervicalgia    Bilateral thoracic back pain    Class 1 obesity due to excess calories without serious comorbidity with body mass index (BMI) of 30.0 to 30.9 in adult    Gastroesophageal reflux disease with esophagitis    Anxiety    Chronic constipation    Acute pain of left shoulder    Abdominal pain, right upper quadrant    History of food allergy    Other fatigue    Achilles tendon pain    Pelvic floor dysfunction    Dysplasia of cervix, low grade (ASUNCION 1)       Wt Readings from Last 2 Encounters:   07/31/23 103.6 kg (228 lb 4.8 oz)   03/02/23 100.2 kg (221 lb)     BP Readings from Last 3 Encounters:   07/31/23 115/79   02/07/23 124/73   12/08/22 116/76         Current Outpatient Medications   Medication    amphetamine-dextroamphetamine (ADDERALL XR) 10 MG 24 hr capsule    cloNIDine (CATAPRES) 0.1 MG tablet    hydrocortisone, Perianal, (HYDROCORTISONE) 2.5 % cream    ibuprofen (ADVIL/MOTRIN) 200 MG tablet    melatonin 3 MG tablet    methylphenidate (RITALIN) 5 MG tablet     No current facility-administered medications for this visit.       Social History     Tobacco Use    Smoking status: Never    Smokeless tobacco: Never   Vaping Use    Vaping Use: Never used   Substance Use Topics    Alcohol use: Not Currently     Alcohol/week: 0.0 standard drinks of alcohol     Comment: outside of pregnancy, social    Drug use: No       Health Maintenance Due   Topic Date Due    ADVANCE CARE PLANNING  Never done    DEPRESSION ACTION PLAN   Never done    HEPATITIS B IMMUNIZATION (1 of 3 - 3-dose series) Never done    COVID-19 Vaccine (1) Never done    LIPID  07/27/2022    INFLUENZA VACCINE (1) 09/01/2023    MAMMO SCREENING  10/12/2023    DEPRESSION 12 MO INDEX REPEAT PHQ-9  12/09/2023       Lab Results   Component Value Date    PAP NIL 09/08/2016 December 20, 2023 3:27 PM

## 2023-12-20 NOTE — PROGRESS NOTES
"Priyanka is a 43 year old who is being evaluated via a billable video visit.      How would you like to obtain your AVS? MyChart  If the video visit is dropped, the invitation should be resent by: Text to cell phone: [phone]  Will anyone else be joining your video visit? No      Start time: 3:31 PM  End time: 3:51 PM    Total : 20 minutes    ASSESSMENT:  (M54.2) Cervicalgia  (primary encounter diagnosis)  Comment: limited range of motion, numbness in 5th digits both hands  Plan: Spine  Referral        Discussed referral due to radicular symptoms    (Z12.11) Encounter for screening colonoscopy  Comment: discussed longstanding history of constipation, due for routine colonoscopy  Plan: Colonoscopy Screening  Referral        Referral is entered    Sydney Ruiz MD  Internal Medicine/Pediatrics        Subjective   Priyanka is a 43 year old, presenting for the following health issues:  Consult (Pt would like to consult before colonoscopy. //Phq declined. )    Chronic constipation  Due for routine colonoscopy but recommended consultation prior to ordering test  Passing hard stools or thinner stools  2018 colonoscopy-- external hemorrhoid, polyp with normal colon mucosa  Does not use Miralax as it \"warms up in the cup\"--does not wish to use regularly but ok with using it as modified or full bowel prep  Uses Bisacodyl at least once a week, takes 24-48 hr.   Saw pelvic floor clinic earlier this year, symptoms getting worse, stools like hard commas, pellets  Feels nauseated in the morning, if she takes 2 tablet of bisacodyl tablets she has a large loose stool 5mg dose, 2 =10mg  I ate just a bagel and feel very bloated    Rectal issue  Seepage, mucous  Irritation, itching  Increased coffees to 2/day --this usually helps but no longer helping      2/2023 visit with me  She has longstanding constipation.  Had been leery to use MiraLAX in the past because she feels it \"heats up when it hits water\".  Two weeks " "ago, she used Fleets enemas and  MiraLAX.  She typically avoids the MiraLAX unless she is in \"dire straits\".  Pain is mainly over the right side of her abdomen.  She also has flank pain and has previous history of a kidney stone.  She currently denies hematuria.  Her father was diagnosed with a neuroendocrine tumor, source was a gallbladder and he  of this disease.  Priyanka is very worried that she may have a cancer    May 2023, neck pain  Thought she might have broken her neck  Habit of cracking her neck to alleviate neck discomfort,rounded neck noted  Tried pressure against a yoga ball and it triggered pain.   Has lost range of motion  Referral to spine clinic  Has numbness of 5th digit both hands with lying supine    Vitals:  No vitals were obtained today due to virtual visit.    Physical Exam   GENERAL: Healthy, alert and no distress  EYES: Eyes grossly normal to inspection.  No discharge or erythema, or obvious scleral/conjunctival abnormalities.  RESP: No audible wheeze, cough, or visible cyanosis.  No visible retractions or increased work of breathing.    SKIN: Visible skin clear. No significant rash, abnormal pigmentation or lesions.  NEURO: Cranial nerves grossly intact.  Mentation and speech appropriate for age.  PSYCH: Mentation appears normal, affect normal/bright, judgement and insight intact, normal speech and appearance well-groomed.    Video-Visit Details    Type of service:  Video Visit Originating Location (pt. Location): Home    Distant Location (provider location):  On-site  Platform used for Video Visit: Zohreh"

## 2023-12-20 NOTE — TELEPHONE ENCOUNTER
"Endoscopy Scheduling Screen    Have you had a positive Covid test in the last 14 days?  No    Are you active on MyChart?   Yes    What insurance is in the chart?  Other:  [facility]    Ordering/Referring Provider:     JULIÁN BURGER      (If ordering provider performs procedure, schedule with ordering provider unless otherwise instructed. )    BMI: Estimated body mass index is 35.76 kg/m  as calculated from the following:    Height as of 7/31/23: 1.702 m (5' 7\").    Weight as of 7/31/23: 103.6 kg (228 lb 4.8 oz).     Sedation Ordered  moderate sedation.   If patient BMI > 50 do not schedule in [facility].    If patient BMI > 45 do not schedule at [facility].    Are you taking methadone or Suboxone?  No    Are you taking any prescription medications for pain 3 or more times per week?   NO - No RN review required.    Do you have a history of malignant hyperthermia or adverse reaction to anesthesia?  No    (Females) Are you currently pregnant?   No     Have you been diagnosed or told you have pulmonary hypertension?   No    Do you have an LVAD?  No    Have you been told you have moderate to severe sleep apnea?  No    Have you been told you have COPD, asthma, or any other lung disease?  No    Do you have any heart conditions?  No     Have you ever had an organ transplant?   No    Have you ever had or are you awaiting a heart or lung transplant?   No    Have you had a stroke or transient ischemic attack (TIA aka \"mini stroke\" in the last 6 months?   No    Have you been diagnosed with or been told you have cirrhosis of the liver?   No    Are you currently on dialysis?   No    Do you need assistance transferring?   No    BMI: Estimated body mass index is 35.76 kg/m  as calculated from the following:    Height as of 7/31/23: 1.702 m (5' 7\").    Weight as of 7/31/23: 103.6 kg (228 lb 4.8 oz).     Is patients BMI > 40 and scheduling location [facility]?  No    Do you take an injectable medication for weight loss or diabetes (excluding " insulin)?  No    Do you take the medication Naltrexone?  No    Do you take blood thinners?  No       Prep   Are you currently on dialysis or do you have chronic kidney disease?  No    Do you have a diagnosis of diabetes?  No    Do you have a diagnosis of cystic fibrosis (CF)?  No    On a regular basis do you go 3 -5 days between bowel movements?  Yes (Extended Prep)    BMI > 40?  No    Preferred Pharmacy:    [facility] [place] [address] [facility] [address]  [address] [place] [address]  Phone: [phone] Fax: [phone]    [facility] [address]  [address] [facility] [address]  Phone: [phone] Fax: [phone]    [id number] - [place], [place] - [address] AT [address] [facility] [address]  [address] [facility] [address] [facility] [address]  [place] [address]  Phone: [phone] Fax: [phone]      Final Scheduling Details   Colonoscopy prep sent?  Golytely Extended Prep    Procedure scheduled  Colonoscopy    Surgeon:  BALA LYNCH     Date of procedure:  1/9     Pre-OP / PAC:   No - Not required for this site.    Location  [facility] - Per order.    Sedation   Moderate Sedation - Per order.      Patient Reminders:   You will receive a call from a Nurse to review instructions and health history.  This assessment must be completed prior to your procedure.  Failure to complete the Nurse assessment may result in the procedure being cancelled.      On the day of your procedure, please designate an adult(s) who can drive you home stay with you for the next 24 hours. The medicines used in the exam will make you sleepy. You will not be able to drive.      You cannot take public transportation, ride share services, or non-medical taxi service without a responsible caregiver.  Medical transport services are allowed with the requirement that a responsible caregiver will receive you at your destination.  We require that drivers and caregivers are confirmed  prior to your procedure.

## 2023-12-27 ENCOUNTER — TELEPHONE (OUTPATIENT)
Dept: GASTROENTEROLOGY | Facility: CLINIC | Age: 43
End: 2023-12-27
Payer: COMMERCIAL

## 2023-12-27 DIAGNOSIS — Z12.11 ENCOUNTER FOR SCREENING COLONOSCOPY: Primary | ICD-10-CM

## 2023-12-27 RX ORDER — BISACODYL 5 MG/1
TABLET, DELAYED RELEASE ORAL
Qty: 4 TABLET | Refills: 0 | Status: SHIPPED | OUTPATIENT
Start: 2023-12-27 | End: 2023-12-28

## 2023-12-27 NOTE — TELEPHONE ENCOUNTER
Attempted to contact patient in order to complete pre assessment questions.     No answer. Left message to return call to [phone] option 4      Procedure details:    Patient scheduled for Colonoscopy  on 1/9/24.     Arrival time: 1345. Procedure time 1445    Pre op exam needed? N/A    Facility location: [facility]; [address] [place] [address], 3rd Floor, [place] [address]    Sedation type: Conscious sedation     Indication for procedure: screening       Chart review:     Electronic implanted devices? No    Recent diagnosis of diverticulitis within the last 6 weeks? No    Diabetic? No    Medication review:    Anticoagulants? No    NSAIDS? Yes.  Ibuprofen (Advil, Motrin).  Holding interval of 1 day before procedure.    Other medication HOLDING recommendations:  N/A      Prep for procedure:     Bowel prep recommendation:  Low volume Golytely (see above note)  Due to: constipation noted or reported.  and provider request/ordered. [place]    Prep instructions sent via [employer]. Bowel prep script sent to    [facility] #[id number] - [place], [place] - [address] [place] [address] AT [facility] [address]      Diana Causey RN  Endoscopy Procedure Pre Assessment RN

## 2023-12-28 RX ORDER — POLYETHYLENE GLYCOL 3350 17 G/17G
POWDER, FOR SOLUTION ORAL
Qty: 238 G | Refills: 0 | Status: SHIPPED | OUTPATIENT
Start: 2023-12-28 | End: 2024-01-05

## 2023-12-28 RX ORDER — BISACODYL 5 MG/1
TABLET, DELAYED RELEASE ORAL
Qty: 4 TABLET | Refills: 0 | Status: SHIPPED | OUTPATIENT
Start: 2023-12-28 | End: 2024-01-05

## 2023-12-28 NOTE — TELEPHONE ENCOUNTER
Pre assessment completed for upcoming procedure.   (Please see previous telephone encounter notes for complete details)    Patient  returned call.       Procedure details:    Arrival time and facility location reviewed.    Pre op exam needed? N/A    Designated  policy reviewed. Instructed to have someone stay 6 hours post procedure.     COVID policy reviewed.      Medication review:    Medications reviewed. Please see supporting documentation below. Holding recommendations discussed (if applicable).       Prep for procedure:     Procedure prep instructions reviewed.      The Pt is going to attempt to have her doctor initiate a PA for the 2 full jugs of Golytely. If this is not approved she will consider doing the low volume Golytely as the alternative. She has concerns about taking Miralax BID leading up to the procedure due to anal leakage and also not feeling comfortable using Miralax in that amount.     She will call back in with any concerns. Discussed both low volume and extended GoLytely prep with the Pt.       Additional information needed?  N/A      Patient  verbalized understanding and had no questions or concerns at this time.      Emily Guerra RN  Endoscopy Procedure Pre Assessment RN  [phone] option 4

## 2023-12-28 NOTE — TELEPHONE ENCOUNTER
Pre assessment team notified that pharmacy indicating that only one container of Golytely is covered so a prior auth is needed. Pre assessment team does not handle prior auths.     Called patient in attempts to complete pre assessment and to discuss changing bowel prep. Per patient they cannot speak now but will return call later. Writer did inform patient regarding insurance not covering bowel prep fully and that an 'updated' bowel prep instructions will be sent to [facility]. Patient verbalized understanding.     Writer will send updated prep instructions for low volume Golytely prep     Bowel prep has been sent to    [facility] #[id number] - [place], [place] - [address] [place], [place] [address] [facility] [address]      Diana Causey RN  Endoscopy Procedure Pre Assessment RN  [phone] option 4

## 2024-01-04 ENCOUNTER — TELEPHONE (OUTPATIENT)
Dept: GASTROENTEROLOGY | Facility: CLINIC | Age: 44
End: 2024-01-04
Payer: COMMERCIAL

## 2024-01-04 ENCOUNTER — TELEPHONE (OUTPATIENT)
Dept: FAMILY MEDICINE | Facility: CLINIC | Age: 44
End: 2024-01-04

## 2024-01-04 NOTE — TELEPHONE ENCOUNTER
Central Prior Authorization Team   Phone: [phone]    Prior Authorization Retail Medication Request    REQUIRES PA FOR QTY - 2 BOTTLES BECAUSE INSURANCE ONLY COVERS 1 - PATIENT IS REQUESTING EXPEDITED - PLEASE SEE CANCELED RX FROM 12/27/23 (WAS CANCELLED DUE TO INSURANCE NOT COVERING 2)     Medication/Dose: polyethylene glycol (GOLYTELY) 236 g suspension  Diagnosis and ICD code (if different than what is on RX):    New/renewal/insurance change PA/secondary ins. PA:  Previously Tried and Failed:    Rationale:      Insurance   Primary:   Insurance ID:      Secondary (if applicable):  Insurance ID:      Pharmacy Information (if different than what is on RX)  Name:    Phone:    Fax:    [place] DEPT FOR [employer] MEDICA[place] - [phone]

## 2024-01-04 NOTE — TELEPHONE ENCOUNTER
"Who is calling? Patient  Reason for Call:Patient wants to know why we did not giverher the Prior Authorization number this morning; main complaint. Her insurance called this morning, not our patient, to ask for PA, and the information was given to her insurance.   Patient stated we\" wasted 2 hours of her time\". I provided her with the Prior Authorization number.           "

## 2024-01-04 NOTE — TELEPHONE ENCOUNTER
Central Prior Authorization Team   Phone: [phone]    PA Initiation    Medication: PEG-3350/ELECTROLYTES 236 G PO SOLR  Insurance Company: [employer] ([facility]) - Phone [phone] Fax [phone]  Pharmacy Filling the Rx: [facility] #[id number] - [place], [place] - [address] [place] [address] N AT [facility] [address]  Filling Pharmacy Phone: [phone]  Filling Pharmacy Fax:    Start Date: 1/4/2024

## 2024-01-05 ENCOUNTER — TELEPHONE (OUTPATIENT)
Dept: FAMILY MEDICINE | Facility: CLINIC | Age: 44
End: 2024-01-05

## 2024-01-05 RX ORDER — BISACODYL 5 MG/1
TABLET, DELAYED RELEASE ORAL
Qty: 4 TABLET | Refills: 0 | Status: SHIPPED | OUTPATIENT
Start: 2024-01-05 | End: 2024-04-03

## 2024-01-05 NOTE — TELEPHONE ENCOUNTER
"Received message from Central Prior Authorization Team:    \"FYI - Patient called yesterday requesting a PA be completed for 2 bottles (8000ml) of PEG-3350, stated that it had been changed to one bottle due to insurance not covering two.   I had submitted a PA request for the 2 bottles and it is approved. I see that the rx had been discontinued - If appropriate please provide pharmacy with an rx for 8000 mL for the extended bowel prep.   Otherwise if you would like patient to continue with current rx of 1 bottle please notify patient.   Thank you,   Fernanda \"    Per prior RN note, patient prefers to complete the Extended Golytely prep instead of the Low Volume Extended Golytely prep. Since PA was approved, writer sent new updated scripts for Extended Golytely prep as the previous scripts have been cancelled.     Writer cancelled the Low Volume Extended Golytely scripts and reordered the Extended Golytely.     Confirmed with patient via telephone that she does NOT want to do the Low Volume Extended Golytely prep and will proceed with the Extended Golytely prep as initially indicated. Patient does NOT want to take Miralax twice a day for 7 days. Informed patient that the prior authorization for 2 jugs of Golytely was approved per message above from Central Prior Authorization team.     Informed patient that scripts have been sent to:  [facility] #[id number] - [place], [place] - [address]    Updated prep instructions sent via [employer] message.     Patient verbalized understanding and had no questions.      Mana Godinez RN  Endoscopy Procedure Pre-Assessment RN  [phone], option 4          "

## 2024-01-05 NOTE — TELEPHONE ENCOUNTER
Prior Authorization Approval    Medication: PEG-3350/ELECTROLYTES 236 G PO SOLR  Authorization Effective Date: 1/4/2024  Authorization Expiration Date: 1/4/2025  Insurance Company: [employer] ([facility]) - Phone [phone] Fax [phone]  Which Pharmacy is filling the prescription: [facility] #[id number] - [place], [place] - [address] [place] [address] AT [facility] [address]  Pharmacy Notified: YES  Patient Notified: YES (pharmacy will notify the patient when ready)         0

## 2024-01-09 ENCOUNTER — HOSPITAL ENCOUNTER (OUTPATIENT)
Facility: CLINIC | Age: 44
Discharge: HOME | End: 2024-01-09
Attending: INTERNAL MEDICINE | Admitting: INTERNAL MEDICINE
Payer: COMMERCIAL

## 2024-01-09 VITALS
OXYGEN SATURATION: 99 % | SYSTOLIC BLOOD PRESSURE: 120 MMHG | HEART RATE: 71 BPM | DIASTOLIC BLOOD PRESSURE: 73 MMHG | RESPIRATION RATE: 16 BRPM

## 2024-01-09 LAB — COLONOSCOPY: NORMAL

## 2024-01-09 PROCEDURE — G0121 COLON CA SCRN NOT HI RSK IND: HCPCS | Performed by: INTERNAL MEDICINE

## 2024-01-09 PROCEDURE — 45378 DIAGNOSTIC COLONOSCOPY: CPT | Performed by: INTERNAL MEDICINE

## 2024-01-09 ASSESSMENT — ACTIVITIES OF DAILY LIVING (ADL): ADLS_ACUITY_SCORE: 35

## 2024-01-09 NOTE — H&P
[facility] Anesthesia Pre-op History and Physical    Priyanka Lundberg MRN# [id number]   Age: 43 year old YOB: 1980      Date of Surgery: 1/9/2024 [facility]      Date of Exam 1/9/2024 Facility (Same day)       Home clinic: unknown  Primary care provider: Sydney Ruiz         Chief Complaint and/or Reason for Procedure:   No chief complaint on file.           Active problem list:     Patient Active Problem List    Diagnosis Date Noted    Pelvic floor dysfunction 01/17/2023     Priority: Medium    Achilles tendon pain 02/02/2022     Priority: Medium    Acute pain of left shoulder 08/01/2021     Priority: Medium    Abdominal pain, right upper quadrant 08/01/2021     Priority: Medium    History of food allergy 08/01/2021     Priority: Medium    Other fatigue 08/01/2021     Priority: Medium    Chronic constipation 02/15/2021     Priority: Medium    Gastroesophageal reflux disease with esophagitis 11/09/2018     Priority: Medium    Anxiety 11/09/2018     Priority: Medium    Class 1 obesity due to excess calories without serious comorbidity with body mass index (BMI) of 30.0 to 30.9 in adult 04/24/2018     Priority: Medium    Cervicalgia 12/28/2017     Priority: Medium    Bilateral thoracic back pain 12/28/2017     Priority: Medium    Other acne 07/08/2017     Priority: Medium    Benign nevus 07/08/2017     Priority: Medium    Vaginal discharge 03/18/2016     Priority: Medium    Joint pain 12/07/2015     Priority: Medium     Dad with scleroderma.  Pt was to follow with rheumatologist to R/O RA  NEED TO CONSIDER RHEUM REFERRAL         L4-L5 disc bulge 12/07/2015     Priority: Medium     Has been using PT to manage symptoms      Attention deficit hyperactivity disorder (ADHD), combined type 12/07/2015     Priority: Medium     Stopped Adderal when she found out she was pregnant      History of Clostridium difficile colitis 10/01/2014     Priority:  Medium    Dysplasia of cervix, low grade (ASUNCION 1) 01/04/2012     Priority: Medium     1/4/12 COLP- ASUNCION 1 (Prior LSIL pap per note)  2012 CRYO (per note in abstracted pap)  8/12 NIL Pap  10/14 NIL pap  2016 NIL pap, neg HPV  7/31/23 NIL pap, Neg . Plan cotest in 3 years              Medications (include herbals and vitamins):   Any Plavix use in the last 7 days? No     No current facility-administered medications for this encounter.             Allergies:      Allergies   Allergen Reactions    [place]     Food      Cantaloupe, cucumbers, green beans, and peppers.     Lamb's Quarter (Weed)     Lemon Flavor     Mustard Seed     Onion Difficulty breathing and GI Disturbance    Rolette GI Disturbance    Redtop Grasses     Vanilla GI Disturbance     Allergy to Latex? No  Allergy to tape?   No  Intolerances: None            Physical Exam:   All vitals have been reviewed  Patient Vitals for the past 8 hrs:   BP Pulse Resp SpO2   01/09/24 1427 (!) 117/92 76 16 97 %     No intake/output data recorded.  Airway assessment:   Patient is able to open mouth wide  Patient is able to stick out tongue}              Lab / Radiology Results:     Lab Results   Component Value Date    WBC 7.3 05/17/2023    WBC 6.0 05/19/2017    RBC 4.73 05/17/2023    RBC 4.50 05/19/2017    HGB 13.5 05/17/2023    HGB 13.3 02/15/2021    HCT 41.9 05/17/2023    HCT 41.1 02/15/2021    MCV 89 05/17/2023    MCV 89.3 02/15/2021    RDW 13.5 05/17/2023    RDW 13.1 02/15/2021     05/17/2023     05/19/2017             Anesthetic risk and/or ASA classification:   Class I    Alec Peterson MD

## 2024-01-09 NOTE — OR NURSING
Patient had colonoscopy NO interventions. Patient tolerated procedure NO need for conscious sedation NOR supplemental oxygen

## 2024-01-09 NOTE — PROGRESS NOTES
FOLLOW UP  2024     Priyanka Lundberg is a 43 year old woman who presents with family history of breast cancer.     HPI:     She was seen in  for left breast pain, bilateral multiductal non spontaneous mutliductal milky nipple discharge, right supraclavicular mass, right axillary mass. Imaging with diagnostic mammogram and ultrasound at that time was negative. Prolactin was 8. She has a stable left breast sebaceous cyst      Family history of mother with breast cancer. Her maternal grandmother who had breast cancer, gastric cancer and skin cancer. Her maternal aunt had ovarian cancer and melanoma. See full family history below. She met with genetic counseling in  but did not have testing. Her mother is not able/willing to get genetic testing.     She is due for a screening mammogram today. She reports right lateral breast lumpiness and a left nipple mole.      BREAST-SPECIFIC HISTORY:     Previous breast imaging: Yes   - 12 b/l Dmammo for nipple cyst and right axillary lump: BI-RADS 0  - 16 left breast ultrasound: BI-RADS 2  - 17 right axillary ultrasound for lump: BI-RADS 2  - 17 b/l Dmammo and right ultrasound: BI-RADS 2  - 18 b/l Dmammo and left axillary ultrasound: BI-RADS 1  - 20 b/l Dmammo and ultrasound BI-RADS 1  - 21 b/l Dmammo and right breast ultrasound for axillary pain and left axillary adenopathy on shoulder MRI: BI-RADS 2  - 10/12/22 b/l Dmammo and left breast ultrasound for pain BI-RADS 1  - 24 b/l Dmammo and bilateral breast ultrasound for right breast pain and left nipple lesion BI-RARDS 1     Prior breast biopsies/surgeries: No    Prior history of breast cancer: No  Prior radiation history: No   Self breast exams: Yes  Breast density: scattered fibroglandular densities     GYN HISTORY:  . Age at 1st pregnancy: 36. Breastfeeding history: Yes.   Age at menarche: 9  Menopausal: premenopausal   Menopausal hormone replacement therapy: no      RISK ASSESSMENT: < 5% 5 year risk and > 20% lifetime risk   Roxy: 1.8% lifetime risk   JUDAH: 20.1% lifetime risk     FAMILY HISTORY:  Breast ca: Yes   - mom, DCIS 67  - maternal grandmother with breast cancer diagnosed in her 40-60's, stomach cancer and skin cancer.  Ovarian ca: Yes   - maternal aunt, diagnosed age 65 passed, also with skin cancer (1)  Pancreatic ca: No  Prostate: Yes   - maternal grandfather  Gastric ca: Yes   -maternal grandmother with stomach cancer, breast cancer and skin cancer  Melanoma: Yes   - maternal aunt   Kidney cancer: yes  - paternal grandmother  Colon ca: Yes   - paternal grandfather   Other cancer: Yes   - father with neuroendocrine tumor of the gallbladder  - sister with thyroid cancer  - maternal uncle with liver cancer  Other genetic, testing, syndromes, or clotting disorders: no  - no one has had genetic testing     PAST MEDICAL HISTORY  Past Medical History:   Diagnosis Date     Abdominal pain 2011    abnormal histamine problem, multiple food allergies     ADHD (attention deficit hyperactivity disorder)     on aderol     Anxiety and depression     on xanax     Arthritis      Breathing problem 2007    shortness of breath after eating, ?allergies     Chronic nausea      Gastroesophageal reflux disease      Heart murmur     closed by time she was 1     Hx of abnormal cervical Pap smear 2011     IBS (irritable bowel syndrome)     lots of mucus in bowel with occassional bleeding     Joint pain 2015    was to have rheumatologist     Kidney stone on right side 2010     Meniere's disease     hyperacusis     Migraines      Reduced vision      Tinnitus      PAST SURGICAL HISTORY   Past Surgical History:   Procedure Laterality Date     COLONOSCOPY N/A 2/14/2018    Procedure: COMBINED COLONOSCOPY, SINGLE OR MULTIPLE BIOPSY/POLYPECTOMY BY BIOPSY;  colon and egd;  Surgeon: Joan Willson MD;  [facility]: [facility]     ESOPHAGOSCOPY, GASTROSCOPY, DUODENOSCOPY (EGD), COMBINED N/A 2/14/2018     Procedure: COMBINED ESOPHAGOSCOPY, GASTROSCOPY, DUODENOSCOPY (EGD), BIOPSY SINGLE OR MULTIPLE;;  Surgeon: Joan Willson MD;  Location: [facility]     NO HISTORY OF SURGERY       MEDICATIONS  No current outpatient medications on file.      ALLERGIES  Allergies   Allergen Reactions     [place]      Food      Cantaloupe, cucumbers, green beans, and peppers.      [place]'s Quarter (Weed)      Lemon Flavor      Mustard Seed      Onion Difficulty breathing and GI Disturbance     [place] GI Disturbance     Redtop Grasses      Vanilla GI Disturbance      SOCIAL HISTORY:  Smokes: No  EtOH: 1 drink per week  Exercise: yes,  Walking 20 minutes daily   Mother lives in a TCU. Daughter has hematology condition. Father passed 6 years ago    ROS:  Change in vision No  Headaches: yes  Respiratory: No shortness of breath, dyspnea on exertion, cough, or hemoptysis and Shortness of breath- seeing pcp   Cardiovascular: negative   Gastrointestinal: negative Abdominal pain: no  Breast: left breast pain, right axillary mass, right supraclavicular mass, bilateral nipple discharge  Musculoskeletal: negative Joint pain No Back pain: no  Psychiatric: negative  Hematologic/Lymphatic/Immunologic: negative  Endocrine: negative    EXAM  /62   Pulse 80   Temp 98.5  F (36.9  C)   Resp 18   Wt 100.5 kg (221 lb 8 oz)   LMP 11/21/2023 (Approximate)   SpO2 97%   BMI 34.69 kg/m     PHYSICAL EXAM  Respiratory: breathing non labored.   Breasts: Examination was done in both the upright and supine positions.  Breasts are symmetrical . No masses noted. No skin or nipple changes. No nipple discharge.   Left areola 9:00 adjacent to the nipple there is a 2 mm brown mole.   No clavicular, cervical, or axillary lymphadenopathy.     INVESTIGATIONS:    1/11/24 bilateral diagnostic mammogram and bilateral breast ultrasound. BI-RADS 1    ASSESSMENT/PLAN:    Priyanka Lundberg is a 43 year old woman with family history of breast cancer. She reports new right  lateral breast lumpiness and left nipple mole. There are no concerning findings on exam, mammogram or ultrasound today.      1) left areolar skin lesion and family history of melanoma.   - follow up with dermatology.     2) Family history of breast cancer  She meets NCCN guidelines for high risk screening based on family history with lifetime risk for breast cancer of >20%. Screening recommendations based on NCCN guidelines. Clinical encounter every 6-12 months. Annual mammogram with alexandre. Annual breast MRI. She would like to continue with annual mammogram for a screening and does not want a breast MRI.   - Be familiar with your breast and promptly report any changes to your health care provider.  - Screening mammogram with clinic visit due 1/12/24     3) Counseling was provided with available strategies including lifestyle modifications and risk reducing intervention.   - Maintain your best healthy weight. Higher body fat and adult weight gain is associated with increased risk for breast cancer. This increase in risk has been attributed to increase in circulating endogenous estrogen levels from fat tissue.   - Alcohol can raise estrogen. Alcohol consumption, even at moderate levels (1-2 drinks per day), increases breast cancer risk and are best avoided. If you choose to drink alcohol limit alcohol consumption to less than 1 drink per day. (1 ounce of liquor, 6 ounces of wine, or 8 ounces of beer).  - Be active daily and void being sedentary.      Mary Damon PA-C    20 minutes spent on the date of the encounter doing chart review, review of test results, interpretation of tests, patient visit and documentation.

## 2024-01-11 ENCOUNTER — ONCOLOGY VISIT (OUTPATIENT)
Dept: SURGERY | Facility: CLINIC | Age: 44
End: 2024-01-11
Attending: PHYSICIAN ASSISTANT
Payer: COMMERCIAL

## 2024-01-11 ENCOUNTER — ANCILLARY PROCEDURE (OUTPATIENT)
Dept: MAMMOGRAPHY | Facility: CLINIC | Age: 44
End: 2024-01-11
Attending: PHYSICIAN ASSISTANT
Payer: COMMERCIAL

## 2024-01-11 VITALS
RESPIRATION RATE: 18 BRPM | WEIGHT: 221.5 LBS | BODY MASS INDEX: 34.69 KG/M2 | SYSTOLIC BLOOD PRESSURE: 105 MMHG | TEMPERATURE: 98.5 F | DIASTOLIC BLOOD PRESSURE: 62 MMHG | OXYGEN SATURATION: 97 % | HEART RATE: 80 BPM

## 2024-01-11 DIAGNOSIS — Z91.89 AT HIGH RISK FOR BREAST CANCER: Primary | ICD-10-CM

## 2024-01-11 DIAGNOSIS — R46.89 CONCERN ABOUT APPEARANCE OF BREAST: ICD-10-CM

## 2024-01-11 DIAGNOSIS — Z80.3 FAMILY HISTORY OF BREAST CANCER: ICD-10-CM

## 2024-01-11 DIAGNOSIS — Z80.41 FAMILY HISTORY OF OVARIAN CANCER: ICD-10-CM

## 2024-01-11 PROCEDURE — 77066 DX MAMMO INCL CAD BI: CPT | Performed by: STUDENT IN AN ORGANIZED HEALTH CARE EDUCATION/TRAINING PROGRAM

## 2024-01-11 PROCEDURE — G0463 HOSPITAL OUTPT CLINIC VISIT: HCPCS | Performed by: PHYSICIAN ASSISTANT

## 2024-01-11 PROCEDURE — 99213 OFFICE O/P EST LOW 20 MIN: CPT | Performed by: PHYSICIAN ASSISTANT

## 2024-01-11 PROCEDURE — 76642 ULTRASOUND BREAST LIMITED: CPT | Mod: LT | Performed by: STUDENT IN AN ORGANIZED HEALTH CARE EDUCATION/TRAINING PROGRAM

## 2024-01-11 PROCEDURE — G0279 TOMOSYNTHESIS, MAMMO: HCPCS | Performed by: STUDENT IN AN ORGANIZED HEALTH CARE EDUCATION/TRAINING PROGRAM

## 2024-01-11 ASSESSMENT — PAIN SCALES - GENERAL: PAINLEVEL: NO PAIN (0)

## 2024-01-11 NOTE — NURSING NOTE
"Oncology Rooming Note    January 11, 2024 10:55 AM   Wilfredoandrew Lundberg is a 43 year old female who presents for:    Chief Complaint   Patient presents with    Oncology Clinic Visit     Breast cancer      Initial Vitals: /62   Pulse 80   Temp 98.5  F (36.9  C)   Resp 18   Wt 100.5 kg (221 lb 8 oz)   LMP 11/21/2023 (Approximate)   SpO2 97%   BMI 34.69 kg/m   Estimated body mass index is 34.69 kg/m  as calculated from the following:    Height as of 7/31/23: 1.702 m (5' 7\").    Weight as of this encounter: 100.5 kg (221 lb 8 oz). Body surface area is 2.18 meters squared.  No Pain (0) Comment: Data Unavailable   Patient's last menstrual period was 11/21/2023 (approximate).  Allergies reviewed: Yes  Medications reviewed: Yes    Medications: Medication refills not needed today.  Pharmacy [place] entered into EPIC:    [facility] [place] [address] [id number] - [place] - [address] [facility] [address]    Frailty Screening:   Is the patient here for a new oncology consult visit in cancer care? 2. No      Clinical concerns: no other complaints      Matt Rivers"

## 2024-01-11 NOTE — PATIENT INSTRUCTIONS
Priyanka Lundberg is a 43 year old woman with family history of breast cancer. She reports new right lateral breast lumpiness and left nipple mole. There are no concerning findings on exam, mammogram or ultrasound today.      1) Family history of breast cancer  She meets NCCN guidelines for high risk screening based on family history with lifetime risk for breast cancer of >20%. Screening recommendations based on NCCN guidelines. Clinical encounter every 6-12 months. Annual mammogram with alexandre. Annual breast MRI. She would like to continue with annual mammogram for a screening and does not want a breast MRI.   - Be familiar with your breast and promptly report any changes to your health care provider.  - Screening mammogram with clinic visit due 1/12/24     2) Counseling was provided with available strategies including lifestyle modifications and risk reducing intervention.   - Maintain your best healthy weight. Higher body fat and adult weight gain is associated with increased risk for breast cancer. This increase in risk has been attributed to increase in circulating endogenous estrogen levels from fat tissue.   - Alcohol can raise estrogen. Alcohol consumption, even at moderate levels (1-2 drinks per day), increases breast cancer risk and are best avoided. If you choose to drink alcohol limit alcohol consumption to less than 1 drink per day. (1 ounce of liquor, 6 ounces of wine, or 8 ounces of beer).  - Be active daily and void being sedentary.

## 2024-01-11 NOTE — LETTER
1/11/2024         RE: Priyanka Lundberg  [address] [place] [address]        Dear Colleague,    Thank you for referring your patient, Priyanka Lundberg, to the [facility]. Please see a copy of my visit note below.    FOLLOW UP  Jan 11, 2024     Priyanka Lundberg is a 43 year old woman who presents with family history of breast cancer.     HPI:     She was seen in 2020 for left breast pain, bilateral multiductal non spontaneous mutliductal milky nipple discharge, right supraclavicular mass, right axillary mass. Imaging with diagnostic mammogram and ultrasound at that time was negative. Prolactin was 8. She has a stable left breast sebaceous cyst      Family history of mother with breast cancer. Her maternal grandmother who had breast cancer, gastric cancer and skin cancer. Her maternal aunt had ovarian cancer and melanoma. See full family history below. She met with genetic counseling in 2016 but did not have testing. Her mother is not able/willing to get genetic testing.     She is due for a screening mammogram today. She reports right lateral breast lumpiness and a left nipple mole.      BREAST-SPECIFIC HISTORY:     Previous breast imaging: Yes   - 9/17/12 b/l Dmammo for nipple cyst and right axillary lump: BI-RADS 0  - 12/7/16 left breast ultrasound: BI-RADS 2  - 1/18/17 right axillary ultrasound for lump: BI-RADS 2  - 5/1/17 b/l Dmammo and right ultrasound: BI-RADS 2  - 12/21/18 b/l Dmammo and left axillary ultrasound: BI-RADS 1  - 5/21/20 b/l Dmammo and ultrasound BI-RADS 1  - 9/1/21 b/l Dmammo and right breast ultrasound for axillary pain and left axillary adenopathy on shoulder MRI: BI-RADS 2  - 10/12/22 b/l Dmammo and left breast ultrasound for pain BI-RADS 1  - 1/11/24 b/l Dmammo and bilateral breast ultrasound for right breast pain and left nipple lesion BI-RARDS 1     Prior breast biopsies/surgeries: No    Prior history of breast cancer: No  Prior radiation  history: No   Self breast exams: Yes  Breast density: scattered fibroglandular densities     GYN HISTORY:  . Age at 1st pregnancy: 36. Breastfeeding history: Yes.   Age at menarche: 9  Menopausal: premenopausal   Menopausal hormone replacement therapy: no     RISK ASSESSMENT: < 5% 5 year risk and > 20% lifetime risk   Roxy: 1.8% lifetime risk   JUDAH: 20.1% lifetime risk     FAMILY HISTORY:  Breast ca: Yes   - mom, DCIS 67  - maternal grandmother with breast cancer diagnosed in her 40-60's, stomach cancer and skin cancer.  Ovarian ca: Yes   - maternal aunt, diagnosed age 65 passed, also with skin cancer (1)  Pancreatic ca: No  Prostate: Yes   - maternal grandfather  Gastric ca: Yes   -maternal grandmother with stomach cancer, breast cancer and skin cancer  Melanoma: Yes   - maternal aunt   Kidney cancer: yes  - paternal grandmother  Colon ca: Yes   - paternal grandfather   Other cancer: Yes   - father with neuroendocrine tumor of the gallbladder  - sister with thyroid cancer  - maternal uncle with liver cancer  Other genetic, testing, syndromes, or clotting disorders: no  - no one has had genetic testing     PAST MEDICAL HISTORY  Past Medical History:   Diagnosis Date    Abdominal pain 2011    abnormal histamine problem, multiple food allergies    ADHD (attention deficit hyperactivity disorder)     on aderol    Anxiety and depression     on xanax    Arthritis     Breathing problem 2007    shortness of breath after eating, ?allergies    Chronic nausea     Gastroesophageal reflux disease     Heart murmur     closed by time she was 1    Hx of abnormal cervical Pap smear     IBS (irritable bowel syndrome)     lots of mucus in bowel with occassional bleeding    Joint pain 2015    was to have rheumatologist    Kidney stone on right side     Meniere's disease     hyperacusis    Migraines     Reduced vision     Tinnitus      PAST SURGICAL HISTORY   Past Surgical History:   Procedure Laterality Date     COLONOSCOPY N/A 2/14/2018    Procedure: COMBINED COLONOSCOPY, SINGLE OR MULTIPLE BIOPSY/POLYPECTOMY BY BIOPSY;  colon and egd;  Surgeon: Joan Willson MD;  [facility]: [facility]    ESOPHAGOSCOPY, GASTROSCOPY, DUODENOSCOPY (EGD), COMBINED N/A 2/14/2018    Procedure: COMBINED ESOPHAGOSCOPY, GASTROSCOPY, DUODENOSCOPY (EGD), BIOPSY SINGLE OR MULTIPLE;;  Surgeon: Joan Wilslon MD;  Location: [facility]    NO HISTORY OF SURGERY       MEDICATIONS  No current outpatient medications on file.      ALLERGIES  Allergies   Allergen Reactions    [place]     Food      Cantaloupe, cucumbers, green beans, and peppers.     Lamb's Quarter (Weed)     Lemon Flavor     Mustard Seed     Onion Difficulty breathing and GI Disturbance    [place] GI Disturbance    Redtop Grasses     Vanilla GI Disturbance      SOCIAL HISTORY:  Smokes: No  EtOH: 1 drink per week  Exercise: yes,  Walking 20 minutes daily   Mother lives in a TCU. Daughter has hematology condition. Father passed 6 years ago    ROS:  Change in vision No  Headaches: yes  Respiratory: No shortness of breath, dyspnea on exertion, cough, or hemoptysis and Shortness of breath- seeing pcp   Cardiovascular: negative   Gastrointestinal: negative Abdominal pain: no  Breast: left breast pain, right axillary mass, right supraclavicular mass, bilateral nipple discharge  Musculoskeletal: negative Joint pain No Back pain: no  Psychiatric: negative  Hematologic/Lymphatic/Immunologic: negative  Endocrine: negative    EXAM  /62   Pulse 80   Temp 98.5  F (36.9  C)   Resp 18   Wt 100.5 kg (221 lb 8 oz)   LMP 11/21/2023 (Approximate)   SpO2 97%   BMI 34.69 kg/m     PHYSICAL EXAM  Respiratory: breathing non labored.   Breasts: Examination was done in both the upright and supine positions.  Breasts are symmetrical . No masses noted. No skin or nipple changes. No nipple discharge.   Left areola 9:00 adjacent to the nipple there is a 2 mm brown mole.   No clavicular, cervical, or axillary  lymphadenopathy.     INVESTIGATIONS:    1/11/24 bilateral diagnostic mammogram and bilateral breast ultrasound. BI-RADS 1    ASSESSMENT/PLAN:    Priyanka Lundberg is a 43 year old woman with family history of breast cancer. She reports new right lateral breast lumpiness and left nipple mole. There are no concerning findings on exam, mammogram or ultrasound today.      1) left areolar skin lesion and family history of melanoma.   - follow up with dermatology.     2) Family history of breast cancer  She meets NCCN guidelines for high risk screening based on family history with lifetime risk for breast cancer of >20%. Screening recommendations based on NCCN guidelines. Clinical encounter every 6-12 months. Annual mammogram with alexandre. Annual breast MRI. She would like to continue with annual mammogram for a screening and does not want a breast MRI.   - Be familiar with your breast and promptly report any changes to your health care provider.  - Screening mammogram with clinic visit due 1/12/24     3) Counseling was provided with available strategies including lifestyle modifications and risk reducing intervention.   - Maintain your best healthy weight. Higher body fat and adult weight gain is associated with increased risk for breast cancer. This increase in risk has been attributed to increase in circulating endogenous estrogen levels from fat tissue.   - Alcohol can raise estrogen. Alcohol consumption, even at moderate levels (1-2 drinks per day), increases breast cancer risk and are best avoided. If you choose to drink alcohol limit alcohol consumption to less than 1 drink per day. (1 ounce of liquor, 6 ounces of wine, or 8 ounces of beer).  - Be active daily and void being sedentary.      Mary Damon PA-C    20 minutes spent on the date of the encounter doing chart review, review of test results, interpretation of tests, patient visit and documentation.

## 2024-01-15 ENCOUNTER — TELEPHONE (OUTPATIENT)
Dept: DERMATOLOGY | Facility: CLINIC | Age: 44
End: 2024-01-15
Payer: COMMERCIAL

## 2024-01-15 NOTE — TELEPHONE ENCOUNTER
Patient Contacted  and schedule the following:    Appointment type: Return  Provider: Svetlana Mares  Return date: 3/12/24  Specialty phone number: [phone]

## 2024-01-15 NOTE — TELEPHONE ENCOUNTER
----- Message from Joaquim Herrera sent at 1/12/2024  1:51 PM CST -----  Regarding: Check out order 1/11/24  Hello,     This pt is being requested to have an dermatology appt, next available     Could you please reach out to this pt??    Thank you,  Joaquim BROWN  Clinic Coordinator   [facility]   [phone]  Option 5, Option 2

## 2024-01-24 ENCOUNTER — VIRTUAL VISIT (OUTPATIENT)
Dept: PSYCHIATRY | Facility: CLINIC | Age: 44
End: 2024-01-24
Payer: COMMERCIAL

## 2024-01-24 DIAGNOSIS — F10.21 ALCOHOL USE DISORDER, MODERATE, IN SUSTAINED REMISSION (H): ICD-10-CM

## 2024-01-24 DIAGNOSIS — F41.1 GAD (GENERALIZED ANXIETY DISORDER): ICD-10-CM

## 2024-01-24 DIAGNOSIS — F90.2 ADHD (ATTENTION DEFICIT HYPERACTIVITY DISORDER), COMBINED TYPE: Primary | ICD-10-CM

## 2024-01-24 PROCEDURE — 99214 OFFICE O/P EST MOD 30 MIN: CPT | Mod: 95 | Performed by: NURSE PRACTITIONER

## 2024-01-24 ASSESSMENT — PAIN SCALES - GENERAL: PAINLEVEL: NO PAIN (0)

## 2024-01-24 NOTE — PROGRESS NOTES
"Virtual Visit Details    Type of service:  Video Visit   Video Start Time: 11:31 AM  Video End Time: 12:03 PM    Originating Location (pt. Location): Home    Distant Location (provider location):  Off-site  Platform used for Video Visit: [facility]        OUTPATIENT PSYCHIATRIC FOLLOW-UP      2024     Provider: SALLIE Michel CNP        Name: Priyanka Lundberg   : 1980                    Preferred Name: Priyanka      Screening Tools       PHQ-9 scores:      2023    11:02 AM 2023     2:21 PM 2023    12:49 PM   PHQ-9 SCORE   PHQ-9 Total Score MyChart   16 (Moderately severe depression)   PHQ-9 Total Score 21 8 16       BIN-7 scores:      2021     2:34 PM 2023    11:02 AM 2023     2:21 PM   BIN-7 SCORE   Total Score 9 12 6          History of Present Illness      Patient attended the session alone.     Interim History:  I last saw Priyanka Lundberg for outpatient psychiatry follow-up on 23. During that appointment, we initiated ritalin and clonidine .     Current stressors include: Financial Difficulties, Symptoms, Caregiving Stress, and Occupational Difficulties     Coping mechanisms and supports include: Family    Side effects: Denies    Medication adherence: Reports inconsistent med adherence.    Psychiatric Review of Systems      Priyanka Lundberg reports mood has been: \"I'm not for meds\"    - Mom has more medical appts.   - Continues therapy   - Didn't pick-up ritalin and clonidine.   - Has been trying to follow through on medical appts.     Depression has been: Situational. Managing.     Anxiety has been: Overall had been doing well.      Sleep has been: Structure of sleep can be challenging. Had been doing better. Some disruptive dreams of unhealthy people. Wakes with the to do list.     Rosita sxs: Denies    Psychosis sxs: Denies    ADHD sxs: On bad days thinks about being on medication. Looks at accepting not being on medication. Would like to use other tools.     PTSD " sxs: Denies    SI/SIB: Denies SI/SIB/HI      Medications Prior to Appointment       Current Outpatient Medications   Medication Sig Dispense Refill    amphetamine-dextroamphetamine (ADDERALL XR) 10 MG 24 hr capsule Take 10 mg by mouth daily Pt breaks them into half and takes them as needed.      bisacodyl (DULCOLAX) 5 MG EC tablet 2 days prior to procedure, take 2 tablets at 4 pm. 1 day prior to procedure, take 2 tablets at 4 pm. For additional instructions refer to your colonoscopy prep instructions. 4 tablet 0    cloNIDine (CATAPRES) 0.1 MG tablet Take 1 tablet (0.1 mg) by mouth at bedtime 30 tablet 0    hydrocortisone, Perianal, (HYDROCORTISONE) 2.5 % cream Place rectally 2 times daily as needed for hemorrhoids 30 g 1    ibuprofen (ADVIL/MOTRIN) 200 MG tablet Take 400 mg by mouth every 4 hours as needed for mild pain 2 tab every am      melatonin 3 MG tablet Take 1 mg by mouth nightly as needed for sleep      methylphenidate (RITALIN) 5 MG tablet Take 1 tablet (5 mg) by mouth daily 30 tablet 0    polyethylene glycol (GOLYTELY) 236 g suspension 2 days prior at 5pm, mix and drink half of a jug of Golytely. Drink an 8 oz. glass of Golytely every 15 minutes until half of the jug is gone. Place remainder of Golytely in the refrigerator. 1 day prior at 5 pm, drink the 2nd half of a jug of Golytely bowel prep. 6 hours before your check-in time, drink an 8 oz. glass of Golytely every 15 minutes until half of the 2nd jug of Golytely is gone. Discard remainder of second jug. 8000 mL 0        Previous medication trials include but not limited to:  - Strattera: Felt side effects were dirty.   - Adderall  - Ritalin : more agitated, snappy  - Valium: darkness.                 Medication Compliance: No                 Pharmacogenomic Testing Completed: No      Medical History      History of head injuries: Yes: Several. Medical attention sought for some.   History of seizures: No  History of cardiac events: No  History of  Tardive Dyskinesia: No        Primary Care Provider: Sydney Ruiz      Past Medical History:   Diagnosis Date    Abdominal pain 2011    abnormal histamine problem, multiple food allergies    ADHD (attention deficit hyperactivity disorder)     on aderol    Anxiety and depression     on xanax    Arthritis     Breathing problem 2007    shortness of breath after eating, ?allergies    Chronic nausea     Gastroesophageal reflux disease     Heart murmur     closed by time she was 1    Hx of abnormal cervical Pap smear 2011    IBS (irritable bowel syndrome)     lots of mucus in bowel with occassional bleeding    Joint pain 2015    was to have rheumatologist    Kidney stone on right side 2010    Meniere's disease     hyperacusis    Migraines     Reduced vision     Tinnitus         Surgery:   Past Surgical History:   Procedure Laterality Date    COLONOSCOPY N/A 2/14/2018    Procedure: COMBINED COLONOSCOPY, SINGLE OR MULTIPLE BIOPSY/POLYPECTOMY BY BIOPSY;  colon and egd;  Surgeon: Joan Willson MD;  [facility]    COLONOSCOPY N/A 1/9/2024    Procedure: Colonoscopy;  Surgeon: Alec Peterson MD;  Location:  GI    ESOPHAGOSCOPY, GASTROSCOPY, DUODENOSCOPY (EGD), COMBINED N/A 2/14/2018    Procedure: COMBINED ESOPHAGOSCOPY, GASTROSCOPY, DUODENOSCOPY (EGD), BIOPSY SINGLE OR MULTIPLE;;  Surgeon: Joan Willson MD;  Location: [facility] OR    NO HISTORY OF SURGERY       Primary Care Provider: Sydney Ruiz MD        Social History      Current Living situation:  [place], [place] with Spouse/Partner and Son.    Current use of drugs or alcohol: Denies     Tobacco use: No    Employment: Yes: Part time     Relationship Status:      Vitals      Not obtained d/t virtual visit.     LMP 11/21/2023 (Approximate)     Labs        Most recent laboratory results reviewed and pertinent results include:   Lab on 08/19/2023   Component Date Value Ref Range Status    Anti-Mullerian Hormone 08/19/2023 1.130  <2.600 ng/mL Final     Prolactin 08/19/2023 9  5 - 23 ng/mL Final    Hemoglobin A1C 08/19/2023 5.5  <5.7 % Final    Normal <5.7%   Prediabetes 5.7-6.4%    Diabetes 6.5% or higher     Note: Adopted from ADA consensus guidelines.    Estradiol 08/19/2023 25  pg/mL Final    Healthy Men:   11.3-43.2 pg/mL    Healthy Postmenopausal Women:  Postmenopause: <5-138 pg/mL    Healthy Pregnant Women:  1st trimester: 154-3243 pg/mL  2nd trimester: 1561-07792 pg/mL  3rd trimester: 8525->40939 pg/mL    Healthy Women Cycle Phase:  Follicular: 30.9-90.4 pg/mL  Ovulation: 60.4-533 pg/mL  Luteal: 60.4-232 pg/mL    Healthy Women Cycle Sub-Phase:  Early Follicular: 20.5-62.8 pg/mL  Intermediate Follicular: 26-79.8 pg/mL  Late Follicular: 49.5-233 pg/mL  Ovulation: 60.4-602 pg/mL  Early Luteal: 51.1-179 pg/mL  Intermediate Luteal: 66.5-305 pg/mL  Late Luteal: 30.2-222 pg/mL    Luteinizing Hormone 08/19/2023 6.0  mIU/mL Final    FEMALE:  Age  0 - 6 mo:  <0.1-8.2 mIU/mL  6 mo - 11 years: <0.1-1.3 mIU/mL   11 - 14 years: <0.1-10 mIU/mL   14 - 19 years: 0.4-25 mIU/mL     19 years and older:   Follicular Phase: 2.4-12.6 mIU/mL  Ovulation Phase: 14.0-95.6 mIU/mL  Luteal Phase: 1.0-11.4  mIU/mL  Postmenopausal: 7.7-58.5 mIU/mL      FSH 08/19/2023 8.6  mIU/mL Final    19 years and older:   Follicular phase: 3.5-12.5 mIU/mL   Ovulation phase: 4.7-21.5 mIU/mL   Luteal phase: 1.7-7.7 mIU/mL   Postmenopause: 25.8-134.8 mIU/mL      Office Visit on 07/31/2023   Component Date Value Ref Range Status    Interpretation 07/31/2023 Negative for Intraepithelial Lesion or Malignancy (NILM)    Final    Comment 07/31/2023    Final                    Value:This result contains rich text formatting which cannot be displayed here.    Specimen Adequacy 07/31/2023 Satisfactory for evaluation, endocervical/transformation zone component present   Final    Clinical Information 07/31/2023    Final                    Value:This result contains rich text formatting which cannot be displayed  here.    Reflex Testing 07/31/2023 Yes regardless of result   Final    Previous Abnormal? 07/31/2023    Final                    Value:This result contains rich text formatting which cannot be displayed here.    Performing Labs 07/31/2023    Final                    Value:This result contains rich text formatting which cannot be displayed here.    Other HR HPV 07/31/2023 Negative  Negative Final    HPV16 DNA 07/31/2023 Negative  Negative Final    HPV18 DNA 07/31/2023 Negative  Negative Final    FINAL DIAGNOSIS 07/31/2023    Final                    Value:This result contains rich text formatting which cannot be displayed here.   Lab on 05/17/2023   Component Date Value Ref Range Status    Iron 05/17/2023 71  37 - 145 ug/dL Final    Iron Binding Capacity 05/17/2023 304  240 - 430 ug/dL Final    Iron Sat Index 05/17/2023 23  15 - 46 % Final    Ferritin 05/17/2023 38  6 - 175 ng/mL Final    Erythrocyte Sedimentation Rate 05/17/2023 8  0 - 20 mm/hr Final    CRP Inflammation 05/17/2023 <3.00  <5.00 mg/L Final    TSH 05/17/2023 2.90  0.30 - 4.20 uIU/mL Final    Thyroid Peroxidase Antibody 05/17/2023 336 (H)  <35 IU/mL Final    ACh Receptor (Muscle) Binding Ab 05/17/2023 0.00  <=0.02 nmol/L Final       -------------------ADDITIONAL INFORMATION-------------------  This test was developed and its performance characteristics   determined by [facility] in a manner consistent with CLIA   requirements. This test has not been cleared or approved by   the U.S. Food and Drug Administration.     Test Performed by:  [facility] [place] [address]  : Matt Suarez M.D. Ph.D.; CLIA# [id number]    MG Interpretive Comments 05/17/2023 SEE NOTE   Final    No informative autoantibodies were detected. A negative   result does not exclude a diagnosis of autoimmune   myasthenia gravis.    See Scanned Result 05/17/2023 LABORATORY MISCELLANEOUS ORDER-Scanned   Final     Sodium 05/17/2023 141  136 - 145 mmol/L Final    Potassium 05/17/2023 4.1  3.4 - 5.3 mmol/L Final    Chloride 05/17/2023 103  98 - 107 mmol/L Final    Carbon Dioxide (CO2) 05/17/2023 27  22 - 29 mmol/L Final    Anion Gap 05/17/2023 11  7 - 15 mmol/L Final    Urea Nitrogen 05/17/2023 10.1  6.0 - 20.0 mg/dL Final    Creatinine 05/17/2023 0.70  0.51 - 0.95 mg/dL Final    Calcium 05/17/2023 9.5  8.6 - 10.0 mg/dL Final    Glucose 05/17/2023 113 (H)  70 - 99 mg/dL Final    Alkaline Phosphatase 05/17/2023 56  35 - 104 U/L Final    AST 05/17/2023 19  10 - 35 U/L Final    ALT 05/17/2023 17  10 - 35 U/L Final    Protein Total 05/17/2023 6.9  6.4 - 8.3 g/dL Final    Albumin 05/17/2023 4.5  3.5 - 5.2 g/dL Final    Bilirubin Total 05/17/2023 0.2  <=1.2 mg/dL Final    GFR Estimate 05/17/2023 >90  >60 mL/min/1.73m2 Final    eGFR calculated using 2021 CKD-EPI equation.    Vitamin B12 05/17/2023 472  232 - 1,245 pg/mL Final    Immunofixation ELP 05/17/2023 No monoclonal protein seen on immunofixation. Pathologic significance requires clinical correlation.  Anu Rodriguez M.D., Ph.D.   Final    WBC Count 05/17/2023 7.3  4.0 - 11.0 10e3/uL Final    RBC Count 05/17/2023 4.73  3.80 - 5.20 10e6/uL Final    Hemoglobin 05/17/2023 13.5  11.7 - 15.7 g/dL Final    Hematocrit 05/17/2023 41.9  35.0 - 47.0 % Final    MCV 05/17/2023 89  78 - 100 fL Final    MCH 05/17/2023 28.5  26.5 - 33.0 pg Final    MCHC 05/17/2023 32.2  31.5 - 36.5 g/dL Final    RDW 05/17/2023 13.5  10.0 - 15.0 % Final    Platelet Count 05/17/2023 326  150 - 450 10e3/uL Final    % Neutrophils 05/17/2023 63  % Final    % Lymphocytes 05/17/2023 30  % Final    % Monocytes 05/17/2023 4  % Final    % Eosinophils 05/17/2023 2  % Final    % Basophils 05/17/2023 1  % Final    % Immature Granulocytes 05/17/2023 0  % Final    NRBCs per 100 WBC 05/17/2023 0  <1 /100 Final    Absolute Neutrophils 05/17/2023 4.6  1.6 - 8.3 10e3/uL Final    Absolute Lymphocytes 05/17/2023 2.2  0.8 - 5.3  10e3/uL Final    Absolute Monocytes 05/17/2023 0.3  0.0 - 1.3 10e3/uL Final    Absolute Eosinophils 05/17/2023 0.2  0.0 - 0.7 10e3/uL Final    Absolute Basophils 05/17/2023 0.1  0.0 - 0.2 10e3/uL Final    Absolute Immature Granulocytes 05/17/2023 0.0  <=0.4 10e3/uL Final    Absolute NRBCs 05/17/2023 0.0  10e3/uL Final    Total Protein Serum for ELP 05/17/2023 7.0  6.4 - 8.3 g/dL Final    Albumin 05/17/2023 4.4  3.7 - 5.1 g/dL Final    Alpha 1 05/17/2023 0.3  0.2 - 0.4 g/dL Final    Alpha 2 05/17/2023 0.6  0.5 - 0.9 g/dL Final    Beta Globulin 05/17/2023 0.8  0.6 - 1.0 g/dL Final    Gamma Globulin 05/17/2023 0.9  0.7 - 1.6 g/dL Final    Monoclonal Peak 05/17/2023 0.0  <=0.0 g/dL Final    ELP Interpretation 05/17/2023 Essentially normal electrophoretic pattern. No obvious monoclonal proteins seen. Pathologic significance requires clinical correlation. Anu Rodriguez M.D., Ph.D.   Final    [place] Result 05/17/2023 SEE NOTE   Final       Test                             Result        Flag  Unit    RefValue  ----------------------------------------------------------------------  MuSK Autoantibody, S             0.00                nmol/L  0.00-0.02             -------------------ADDITIONAL INFORMATION-------------------      This test was developed using an analyte specific reagent.       Its performance characteristics were determined by [facility] in a manner consistent with CLIA requirements. This       test has not been cleared or approved by the U.S. Food and       Drug Administration.             Test Performed by:      [facility] - [address] [address]      : Matt Suarez M.D. Ph.D.; CLIA# [id number]     Normal EKG.       Medical Review of Systems      Pertinent positives noted in HPI and below:   ROS     Contraception:none No LMP recorded. (Menstrual status: Irregular Periods).  Pregnancy status: Not pregnant    Mental Status Exam     "  Appearance: awake, alert, adequately groomed, appeared stated age and no apparent distress  Attitude:  cooperative   Eye Contact:  good  Gait and Station: normal, no gross abnormalities noted by observation  Psychomotor Behavior:  no evidence of tardive dyskinesia, dystonia, or tics  Oriented to:  person, place, time, and situation  Attention Span and Concentration: Moderate impairment  Speech:  clear, coherent, regular rate, rhythm, and volume  Language: intact  Mood: \"Fine\"  Affect:  appropriate and in normal range  Associations:  no loose associations  Thought Process: Circumstantial.  Needs redirection  Thought Content:  no evidence of suicidal ideation or homicidal ideation, no evidence of psychotic thought, no auditory hallucinations present and no visual hallucinations present  Recent and Remote Memory:  Intact to interview. Not formally assessed. No amnesia.  Fund of Knowledge: appropriate  Insight: limited  Judgment:  intact, adequate for safety  Impulse Control:  intact         Risk Assessment       Updated: 1/24/2024     Suicide assessment  Acute: Low  Chronic:Low  Imminent: Low     Risk factors  History of suicide attempts: No  History of self-injurious behavior: No  Robb. Axis I psychiatric diagnoses: Yes  Substance use disorder: Yes  Symptoms:  impulsivity, insomnia, feeling inadequate  Family history of completed suicide or attempted suicide: No  Accessibility to firearms: No  Interpersonal factors: Caregiving role,  parent/close family member with mental illness     Protective factors  Ability to cope with the stress: Yes  Family responsibility and supportive:Yes  Positive therapeutic relationships: Yes  Social support:Yes  Jehovah's witness beliefs:  Yes  Connectedness with mental health providers: Yes     Homicidal Risk  Acute: Low  Chronic: Low  Imminent: Low       Assessment     Priyanka Lundberg reports she hasn't starting either the ritalin and clonidine. Has been re evaluating relationships for more " healthy dynamics, setting boundaries. Would prefer no medication at this point. Discussed use of Mg for sleep. No imminent safety concerns. Using tools and reminders to manage ADHD. Working on exercise, sleep hygiene.         Pharmacologic:   09/05/23: + adderall 10mg (was only taking 2.5mg - 5mg)  11/13/23: + ritalin, clonidine; stop adderall       -Stop Ritalin 5mg by mouth every day  -Stop clonidine 0.1mg by mouth every bedtime PRN  *Never started        Psychosocial: Would benefit from individual therapy with focus of Cognitive Behavioral Therapy (CBT). This form of therapy will be helpful in addressing cognitive distortions, improving distress tolerance, and developing helpful / healthy coping strategies to address stressors.   - Continue individual therapy   - Therapist is going on maternity leave. Finds the supportive therapy, working on boundaries and validation to be helpful.          Diagnosis       ADHD, combined presentation  BIN  Alcohol use disorder, in sustained remission     Plan         1) Medications: None              MNPMP: I have queried the [place] and/or [place] Prescription Monitoring Program for this patient for the preceding 12 months, or reviewed the report provided by my proxy delegate. I have not identified any concerns.  2) Risk vs benefits of medications reviewed: Yes  3) Life style modifications: sleep hygiene, exercise, healthy diet  4) Medical concerns:    - No acute concerns  5) Other:   -Continue individual therapy  6) Refrain from drinking alcohol and/or use of drugs.   7) Please secure all prescription and OTC medications, sharps, and caustic substances. Please remove all firearms and ammunition.  8) Review outside records, get HAO's, coordinate with outside providers  9) In case of emergency call 911 or go to the nearest ER, this includes patient voicing thought of harming self or others as well as additional safety concerns   10) Follow-up:8-12 weeks, or sooner if  needed.      Administrative Billing:   Supportive therapy was provided, focusing on reflective listening and solution focused problem solving.    Total time preparing to see this patient, face-to-face time, documenting in the EHR, and coordinating care time on the same calendar date: 34 minutes         Signed:   SALLIE Michel CNP on 1/24/2024 at 12:02 PM     Disclaimer: This note consists of symbols derived from keyboarding, dictation and/or voice recognition software. As a result, there may be errors in the script that have gone undetected. Please consider this when interpreting information found in this chart.

## 2024-01-24 NOTE — NURSING NOTE
Is the patient currently in the state of [place]? YES    Visit mode:VIDEO    If the visit is dropped, the patient can be reconnected by: VIDEO VISIT: Text to cell phone:   Telephone Information:   Mobile [phone]       Will anyone else be joining the visit? NO  (If patient encounters technical issues they should call [phone]    How would you like to obtain your AVS? MyChart    Are changes needed to the allergy or medication list? No    Reason for visit: RECHECK    Ziggy RICK

## 2024-01-24 NOTE — PATIENT INSTRUCTIONS
"Patient Education   The [facility]  What to Expect  Here's what to expect in the Panel Psychiatry Program.   About the program  You'll be meeting with a psychiatric doctor to check your mental health. A psychiatric doctor helps you deal with troubling thoughts and feelings by giving you medicine. They'll make sure you know the plan for your care. You may see them for a long time. When you're feeling better, they may refer you back to seeing your family doctor.   If you have any questions, we'll be glad to talk to you.  About visits  Be open  At your visits, please talk openly about your problems. It may feel hard, but it's the best way for us to help you.  Cancelling visits  If you can't come to your visit, please call us right away at [phone]. If you don't cancel at least 24 hours (1 full day) before your visit, that's \"late cancellation.\"  Not showing up for your visits  Being very late is the same as not showing up. You'll be a \"no show\" if:  You're more than 15 minutes late for a 30-minute (half hour) visit.  You're more than 30 minutes late for a 60-minute (full hour) visit.  If you cancel late or don't show up 2 times within 6 months, we may end your care.  Getting help between visits  If you need help between visits, you can call us Monday to Friday from 8 a.m. to 4:30 p.m. at [phone].  Emergency care  Call 911 or go to the nearest emergency department if your life or someone else's life is in danger.  Call 988 anytime to reach the national Suicide and Crisis hotline.  Medicine refills  To refill your medicine, call your pharmacy. You can also call [facility]'s [facility] at [phone], Monday to Friday, 8 a.m. to 4:30 p.m. It can take 1 to 3 business days to get a refill.   Forms, letters, and tests  You may have papers to fill out, like FMLA, short-term disability, and workability. We can help you with these forms at your visits, but you must have an " appointment. You may need more than 1 visit for this, to be in an intensive therapy program, or both.  Before we can give you medicine for ADHD, we may refer you to get tested for it or confirm it another way.  We may not be able to give you an emotional support animal letter.  We don't do mental health checks ordered by the court.   We don't do mental health testing, but we can refer you to get tested.   Thank you for choosing us for your care.  For informational purposes only. Not to replace the advice of your health care provider. Copyright   2022 [facility]. All rights reserved. [employer] [id number] - 12/22.

## 2024-02-05 NOTE — TELEPHONE ENCOUNTER
PT WOULD LIKE TO HAVE A TB TEST, OKAY TO SCHEDULE    [phone] .    tham to schedule 3 month follow up with Dr. Nikita Ross, left call center phone number and sent [facility].

## 2024-02-12 ENCOUNTER — TELEPHONE (OUTPATIENT)
Dept: FAMILY MEDICINE | Facility: CLINIC | Age: 44
End: 2024-02-12

## 2024-02-12 NOTE — TELEPHONE ENCOUNTER
"Who is calling? Patient  Reason for Call: nausea, fatigue,and feeling anemic and \"emotional\". Requests standing order.          "

## 2024-02-12 NOTE — TELEPHONE ENCOUNTER
"Pt requesting blood work today because she is feeling malnourished. Offered pt an appointment with Dr. Ruiz on 2/14/24, where she can discuss her concerns, as she is due for her annual wellness visit.  Pt states \"this getting bumped around from doctor to doctor is really not benefiting me and I need my blood work done when I am not feeling well, not two days later, so I'm going to the ER.\" Pt abruptly ended the phone call.    MAGY BaconN, RN  02/12/24, 9:39 AM    "

## 2024-02-14 ENCOUNTER — VIRTUAL VISIT (OUTPATIENT)
Dept: GASTROENTEROLOGY | Facility: CLINIC | Age: 44
End: 2024-02-14
Payer: COMMERCIAL

## 2024-02-14 ENCOUNTER — DOCUMENTATION ONLY (OUTPATIENT)
Dept: GASTROENTEROLOGY | Facility: CLINIC | Age: 44
End: 2024-02-14

## 2024-02-14 ENCOUNTER — MEDICAL CORRESPONDENCE (OUTPATIENT)
Dept: HEALTH INFORMATION MANAGEMENT | Facility: CLINIC | Age: 44
End: 2024-02-14

## 2024-02-14 DIAGNOSIS — K58.1 IRRITABLE BOWEL SYNDROME WITH CONSTIPATION: Primary | ICD-10-CM

## 2024-02-14 PROCEDURE — 99214 OFFICE O/P EST MOD 30 MIN: CPT | Mod: 95 | Performed by: PHYSICIAN ASSISTANT

## 2024-02-14 NOTE — LETTER
2/14/2024         RE: Priyanka Lundberg  [address] [place] [address]        Dear Colleague,    Thank you for referring your patient, Priyanka Lundberg, to the [facility] [place]. Please see a copy of my visit note below.    GI CLINIC VISIT    CC/REFERRING MD:  Referred Self  REASON FOR FOLLOW UP: Constipation, GERD, abdominal pain    ASSESSMENT/PLAN:  43-year-old female with past medical history to include chronic back pain, ADHD, GERD who presents to the GI clinic for follow-up regarding constipation with history of hemorrhoid prolapse.    1. Abdominal pain in the setting of chronic constipation: This has been a chronic problem since patient was a child.  Normal colonoscopy (just recently again 2024), EGD (other than reactive gastropathy) in 2018, HIDA scan, gastric emptying study.  Patient has yet to optimize bowel regimen in a consistent fashion.  She would like to explore other options aside from laxatives. I suggest trying low FODMAP diet and a referral to the pelvic floor center to address bloating and reasons for incomplete evacuation. This may require further interventions such as biofeedback in addition to optimizing her bowel regimen.  Additionally may consider a transit marker study if pelvic floor is unremarkable.  -- Dietitian referral for low FODMAP  -- Pelvic floor referral to be completed +/- biofeedback if intervention required.    2. GERD: Patient is currently managing symptoms with lifestyle modifications which we reviewed today.  Constipation can certainly make this worse.  Patient has identified triggers to include greasy foods, which she rarely eats.  Additional lifestyle modifications reviewed include minimizing fatty foods, greasy foods, foods with high fructose corn syrup and foods with preservatives, allowing at least 2-3 hours after eating before laying down and weight loss.  --Continue with lifestyle modifications and avoid  triggers    3. Colorectal cancer screening: Colonoscopy 1/2024 normal.  FH of colon cancer in grandfather.  Recommend repeat colonoscopy in 10 years.    RTC 3 months    Thank you for this consultation.  32 minutes spent on the date of the encounter doing chart review, history and exam, documentation and further activities as noted above.  It was a pleasure to participate in the care of this patient; please contact us with any further questions.      Nikita Ross PA-C  Division of Gastroenterology, Hepatology and Nutrition  [facility]    HPI  41-year-old female with past medical history to include chronic back pain, ADHD, GERD who presents to the GI clinic for follow-up regarding constipation with history of hemorrhoid prolapse.      Patient had originally presented to the GI clinic due to right-sided central abdominal pain.   Pain is intermittent but can be severe.  She has previously attributed this to chronic constipation.  Workup included normal colonoscopy 2018), upper endoscopy (2018) only remarkable for antral gastropathy, normal HIDA scan and normal gastric emptying study.  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  She notes that she has used MiraLAX in the past with good success but has not taken this on a regular basis.  She notes that she has previously been on adderall in the past for ADHD and this allowed for daily BMs.  She is no longer on any stimulant medications for ADHD. Patient also has a history of hemorrhoids with prolapse.  She has undergone hemorrhoidectomy with colorectal here at the [facility].    Current bowel pattern is 1 bowel movement every 3 days and can be firm and hard to pass.  Occasionally she may have bright red blood per rectum from her  "hemorrhoids.    Additionally she has been evaluated for persistent nausea and GERD.  Her primary triggers for experiencing heartburn symptoms include greasy foods and tomatoes.  She is not on any chronic medications and primarily experiences nausea in the morning.  She continues to breast-feed and has a 2-year-old son.    She takes ibuprofen approximately once per morning for a week straight for joint pain with weather changes and this occurs every couple months.    Interval hx 9/9/21:  Feels like symptoms a little worse, will have days of BRBPR when this occurs. She is having a BM 1-2 times per week. She started to have fecal leakage over the last year and having extreme itching in the perineum area. When she wipes at the end of the day, she will have some fecal smearing.     She started psyllium but is not consistent about it. She also has a stool softener that she takes as needed then if no BM for a couple days then will try a suppository.  She is afraid of using Miralax because she does not understand what is in it.  She has gone in with fecal compaction. Always has a low dull cramp across her abdomen that is cramping during a period. This can change to severe cramping.  She has extreme bloating. Nausea is worse.  She just had an allergy appt and she would like to focus on dietary changes to allow for improvement.     Interval hx 2/14/24:  No significant changes. Has had significant straining and had occasional compaction. Tried to do \"mini preps\" to have BMs. Does not like the \"chemicals\" in meds. Has had trouble with thrombosed hemorrhoids. Had a colonoscopy 1/9/24 showing normal ileum, colon and rectum. Has extreme perineal itching. Has intermittent fecal smearing.      ROS:    No fevers or chills  No weight loss  No blurry vision, double vision or change in vision  No sore throat  No lymphadenopathy  + headache  No paraesthesias, or weakness in a limb  No shortness of breath or wheezing  No chest pain or " pressure  + arthralgias or myalgias (chronic back and joint pain)  + rashes or skin changes (sunburn to shoulders)  No odynophagia or dysphagia  + BRBPR (hemorrhoids)  No melena  No dysuria, frequency or urgency  No hot/cold intolerance or polyria  No anxiety or depression    PROBLEM LIST  Patient Active Problem List    Diagnosis Date Noted    Pelvic floor dysfunction 01/17/2023     Priority: Medium    Achilles tendon pain 02/02/2022     Priority: Medium    Acute pain of left shoulder 08/01/2021     Priority: Medium    Abdominal pain, right upper quadrant 08/01/2021     Priority: Medium    History of food allergy 08/01/2021     Priority: Medium    Other fatigue 08/01/2021     Priority: Medium    Chronic constipation 02/15/2021     Priority: Medium    Gastroesophageal reflux disease with esophagitis 11/09/2018     Priority: Medium    Anxiety 11/09/2018     Priority: Medium    Class 1 obesity due to excess calories without serious comorbidity with body mass index (BMI) of 30.0 to 30.9 in adult 04/24/2018     Priority: Medium    Cervicalgia 12/28/2017     Priority: Medium    Bilateral thoracic back pain 12/28/2017     Priority: Medium    Other acne 07/08/2017     Priority: Medium    Benign nevus 07/08/2017     Priority: Medium    Vaginal discharge 03/18/2016     Priority: Medium    Joint pain 12/07/2015     Priority: Medium     Dad with scleroderma.  Pt was to follow with rheumatologist to R/O RA  NEED TO CONSIDER RHEUM REFERRAL         L4-L5 disc bulge 12/07/2015     Priority: Medium     Has been using PT to manage symptoms      Attention deficit hyperactivity disorder (ADHD), combined type 12/07/2015     Priority: Medium     Stopped Adderal when she found out she was pregnant      History of Clostridium difficile colitis 10/01/2014     Priority: Medium    Dysplasia of cervix, low grade (ASUNCION 1) 01/04/2012     Priority: Medium     1/4/12 COLP- ASUNCION 1 (Prior LSIL pap per note)  2012 CRYO (per note in abstracted  pap)  8/12 NIL Pap  10/14 NIL pap  2016 NIL pap, neg HPV  7/31/23 NIL pap, Neg . Plan cotest in 3 years         PERTINENT PAST MEDICAL HISTORY:  Past Medical History:   Diagnosis Date    Abdominal pain 2011    abnormal histamine problem, multiple food allergies    ADHD (attention deficit hyperactivity disorder)     on aderol    Anxiety and depression     on xanax    Arthritis     Breathing problem 2007    shortness of breath after eating, ?allergies    Chronic nausea     Gastroesophageal reflux disease     Heart murmur     closed by time she was 1    Hx of abnormal cervical Pap smear 2011    IBS (irritable bowel syndrome)     lots of mucus in bowel with occassional bleeding    Joint pain 2015    was to have rheumatologist    Kidney stone on right side 2010    Meniere's disease     hyperacusis    Migraines     Reduced vision     Tinnitus        PREVIOUS SURGERIES:  Past Surgical History:   Procedure Laterality Date    COLONOSCOPY N/A 2/14/2018    Procedure: COMBINED COLONOSCOPY, SINGLE OR MULTIPLE BIOPSY/POLYPECTOMY BY BIOPSY;  colon and egd;  Surgeon: Joan Willson MD;  Location: [facility] OR    COLONOSCOPY N/A 1/9/2024    Procedure: Colonoscopy;  Surgeon: Alec Peterson MD;  Location:  GI    ESOPHAGOSCOPY, GASTROSCOPY, DUODENOSCOPY (EGD), COMBINED N/A 2/14/2018    Procedure: COMBINED ESOPHAGOSCOPY, GASTROSCOPY, DUODENOSCOPY (EGD), BIOPSY SINGLE OR MULTIPLE;;  Surgeon: Joan Willson MD;  Location: [facility] OR    NO HISTORY OF SURGERY       ALLERGIES:     Allergies   Allergen Reactions    [place]     Food      Cantaloupe, cucumbers, green beans, and peppers.     Lamb's Quarter (Weed)     Lemon Flavor     Mustard Seed     Onion Difficulty breathing and GI Disturbance    [place] GI Disturbance    Red[place] Grasses     Vanilla GI Disturbance       PERTINENT MEDICATIONS:    Current Outpatient Medications:     bisacodyl (DULCOLAX) 5 MG EC tablet, 2 days prior to procedure, take 2 tablets at 4 pm. 1 day prior to procedure, take 2  tablets at 4 pm. For additional instructions refer to your colonoscopy prep instructions., Disp: 4 tablet, Rfl: 0    hydrocortisone, Perianal, (HYDROCORTISONE) 2.5 % cream, Place rectally 2 times daily as needed for hemorrhoids, Disp: 30 g, Rfl: 1    ibuprofen (ADVIL/MOTRIN) 200 MG tablet, Take 400 mg by mouth every 4 hours as needed for mild pain 2 tab every am, Disp: , Rfl:     melatonin 3 MG tablet, Take 1 mg by mouth nightly as needed for sleep, Disp: , Rfl:     polyethylene glycol (GOLYTELY) 236 g suspension, 2 days prior at 5pm, mix and drink half of a jug of Golytely. Drink an 8 oz. glass of Golytely every 15 minutes until half of the jug is gone. Place remainder of Golytely in the refrigerator. 1 day prior at 5 pm, drink the 2nd half of a jug of Golytely bowel prep. 6 hours before your check-in time, drink an 8 oz. glass of Golytely every 15 minutes until half of the 2nd jug of Golytely is gone. Discard remainder of second jug., Disp: 8000 mL, Rfl: 0    SOCIAL HISTORY:  Social History     Socioeconomic History    Marital status:      Spouse name: Not on file    Number of children: 1    Years of education: Not on file    Highest education level: Not on file   Occupational History     Comment: stylist/hairdresser   Tobacco Use    Smoking status: Never    Smokeless tobacco: Never   Vaping Use    Vaping Use: Never used   Substance and Sexual Activity    Alcohol use: Not Currently     Alcohol/week: 0.0 standard drinks of alcohol     Comment: outside of pregnancy, social    Drug use: No    Sexual activity: Yes     Partners: Male     Birth control/protection: None   Other Topics Concern     Service No    Blood Transfusions No    Caffeine Concern No    Occupational Exposure Yes     Comment:     Hobby Hazards No    Sleep Concern Yes     Comment: harder to sleep now since pregnancy    Stress Concern Yes     Comment: lots of stress due to work and housing    Weight Concern Yes     Comment:  has gained 10 lbs in the last month    Special Diet Yes     Comment: has many food allergies    Back Care Yes     Comment: disc problems, considering cortisone injections    Exercise No    Bike Helmet No    Seat Belt Yes    Self-Exams No    Parent/sibling w/ CABG, MI or angioplasty before 65F 55M? Yes   Social History Narrative    Not on file     Social Determinants of Health     Financial Resource Strain: Low Risk  (12/20/2023)    Financial Resource Strain     Within the past 12 months, have you or your family members you live with been unable to get utilities (heat, electricity) when it was really needed?: No   Food Insecurity: High Risk (12/20/2023)    Food Insecurity     Within the past 12 months, did you worry that your food would run out before you got money to buy more?: Yes     Within the past 12 months, did the food you bought just not last and you didn t have money to get more?: No   Transportation Needs: Low Risk  (12/20/2023)    Transportation Needs     Within the past 12 months, has lack of transportation kept you from medical appointments, getting your medicines, non-medical meetings or appointments, work, or from getting things that you need?: No   Physical Activity: Not on file   Stress: Not on file   Social Connections: Not on file   Interpersonal Safety: Not At Risk (7/31/2023)    Humiliation, Afraid, Rape, and Kick questionnaire     Fear of Current or Ex-Partner: No     Emotionally Abused: No     Physically Abused: No     Sexually Abused: No   Housing Stability: Low Risk  (12/20/2023)    Housing Stability     Do you have housing? : Yes     Are you worried about losing your housing?: No       FAMILY HISTORY:  Family History   Problem Relation Age of Onset    Mental Illness Mother         bipolar, schizophrenia    Anxiety Disorder Mother     Osteoporosis Mother     Thyroid Disease Mother     Diabetes Mother     Neurofibromatosis Mother         more likely fibromyalgia    Arthritis Mother     Back  Pain Mother     Osteoarthritis Mother     Obesity Mother     Cirrhosis Mother         from fatty liver. with esophageal varices, ..    Gallbladder Disease Mother         cholecystectomy    Hypertension Father     Hyperlipidemia Father     Diabetes Father     Other Cancer Father 67        Neuroendocrine tumor, ? from gall bladder, stage 4  1/2018    Migraines Father     Scleroderma Father     Rheumatoid Arthritis Father     Obesity Father     Ankylosing Spondylitis Father     Macular Degeneration Father     Mental Illness Sister         bipolar    Anxiety Disorder Sister     Asthma Sister         eczema    Neurologic Disorder Sister         Cervical Dystonia, treated with Botox    Thyroid Cancer Sister     Coronary Artery Disease Maternal Grandmother     Cerebrovascular Disease Maternal Grandmother     Breast Cancer Maternal Grandmother 65    Skin Cancer Maternal Grandmother     Stomach Cancer Maternal Grandmother         stomach, skin    Prostate Cancer Maternal Grandfather     Cancer Maternal Grandfather     Diabetes Paternal Grandmother     Kidney Cancer Paternal Grandmother     Coronary Artery Disease Paternal Grandfather     Colon Cancer Paternal Grandfather     Cancer Paternal Grandfather     Ovarian Cancer Maternal Aunt 68    Skin Cancer Maternal Aunt         melanoma    Scleroderma Paternal Aunt     Ankylosing Spondylitis Paternal Aunt     Ankylosing Spondylitis Paternal Uncle     Liver Cancer Other         uncle    Glaucoma No family hx of        Past/family/social history reviewed and no changes    PHYSICAL EXAMINATION:  Constitutional: aaox3, cooperative, pleasant, not dyspneic/diaphoretic, no acute distress  Vitals reviewed: LMP 11/21/2023 (Approximate)   Wt:   Wt Readings from Last 2 Encounters:   01/11/24 100.5 kg (221 lb 8 oz)   07/31/23 103.6 kg (228 lb 4.8 oz)      Constitutional - general appearance is well and in no acute distress. Body habitus normal  Eyes - No redness or discharge  Respiratory -  No cough, unlabored breathing  Musculoskeletal - range of motion intact: Neck and arms  Skin - No discoloration or lesions  Neurological - No tremors, headaches  Psychiatric - No anxiety, alert & oriented      PERTINENT STUDIES:    Orders Only on 05/29/2018   Component Date Value Ref Range Status    Specimen Descrip 05/29/2018 Midstream Urine   Final    N Gonorrhea PCR 05/29/2018 Negative  NEG^Negative Final    Specimen Description 05/29/2018 Midstream Urine   Final    Chlamydia Trachomatis PCR 05/29/2018 Negative  NEG^Negative Final         Again, thank you for allowing me to participate in the care of your patient.      Sincerely,    Nikita Ross PA-C

## 2024-02-14 NOTE — PROGRESS NOTES
Virtual Visit Details    Type of service:  Video Visit   Video Start Time:  1046  Video End Time: 1113    Originating Location (pt. Location): Home    Distant Location (provider location):  Off-site  Platform used for Video Visit: [facility]    GI CLINIC VISIT    CC/REFERRING MD:  Referred Self  REASON FOR FOLLOW UP: Constipation, GERD, abdominal pain    ASSESSMENT/PLAN:  43-year-old female with past medical history to include chronic back pain, ADHD, GERD who presents to the GI clinic for follow-up regarding constipation with history of hemorrhoid prolapse.    1. Abdominal pain in the setting of chronic constipation: This has been a chronic problem since patient was a child.  Normal colonoscopy (just recently again 2024), EGD (other than reactive gastropathy) in 2018, HIDA scan, gastric emptying study.  Patient has yet to optimize bowel regimen in a consistent fashion.  She would like to explore other options aside from laxatives. I suggest trying low FODMAP diet and a referral to the pelvic floor center to address bloating and reasons for incomplete evacuation. This may require further interventions such as biofeedback in addition to optimizing her bowel regimen.  Additionally may consider a transit marker study if pelvic floor is unremarkable.  -- Dietitian referral for low FODMAP  -- Pelvic floor referral to be completed +/- biofeedback if intervention required.    2. GERD: Patient is currently managing symptoms with lifestyle modifications which we reviewed today.  Constipation can certainly make this worse.  Patient has identified triggers to include greasy foods, which she rarely eats.  Additional lifestyle modifications reviewed include minimizing fatty foods, greasy foods, foods with high fructose corn syrup and foods with preservatives, allowing at least 2-3 hours after eating before laying down and weight loss.  --Continue with lifestyle modifications and avoid triggers    3. Colorectal cancer screening:  What date does she need the note for?  Please write patient a letter stating she can return to work    Colonoscopy 1/2024 normal.  FH of colon cancer in grandfather.  Recommend repeat colonoscopy in 10 years.    RTC 3 months    Thank you for this consultation.  32 minutes spent on the date of the encounter doing chart review, history and exam, documentation and further activities as noted above.  It was a pleasure to participate in the care of this patient; please contact us with any further questions.      Nikita Ross PA-C  Division of Gastroenterology, Hepatology and Nutrition  [facility]    HPI  41-year-old female with past medical history to include chronic back pain, ADHD, GERD who presents to the GI clinic for follow-up regarding constipation with history of hemorrhoid prolapse.      Patient had originally presented to the GI clinic due to right-sided central abdominal pain.   Pain is intermittent but can be severe.  She has previously attributed this to chronic constipation.  Workup included normal colonoscopy 2018), upper endoscopy (2018) only remarkable for antral gastropathy, normal HIDA scan and normal gastric emptying study.  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  She notes that she has used MiraLAX in the past with good success but has not taken this on a regular basis.  She notes that she has previously been on adderall in the past for ADHD and this allowed for daily BMs.  She is no longer on any stimulant medications for ADHD. Patient also has a history of hemorrhoids with prolapse.  She has undergone hemorrhoidectomy with colorectal here at the [facility].    Current bowel pattern is 1 bowel movement every 3 days and can be firm and hard to pass.  Occasionally she may have bright red blood per rectum from her hemorrhoids.    Additionally she has been  "evaluated for persistent nausea and GERD.  Her primary triggers for experiencing heartburn symptoms include greasy foods and tomatoes.  She is not on any chronic medications and primarily experiences nausea in the morning.  She continues to breast-feed and has a 2-year-old son.    She takes ibuprofen approximately once per morning for a week straight for joint pain with weather changes and this occurs every couple months.    Interval hx 9/9/21:  Feels like symptoms a little worse, will have days of BRBPR when this occurs. She is having a BM 1-2 times per week. She started to have fecal leakage over the last year and having extreme itching in the perineum area. When she wipes at the end of the day, she will have some fecal smearing.     She started psyllium but is not consistent about it. She also has a stool softener that she takes as needed then if no BM for a couple days then will try a suppository.  She is afraid of using Miralax because she does not understand what is in it.  She has gone in with fecal compaction. Always has a low dull cramp across her abdomen that is cramping during a period. This can change to severe cramping.  She has extreme bloating. Nausea is worse.  She just had an allergy appt and she would like to focus on dietary changes to allow for improvement.     Interval hx 2/14/24:  No significant changes. Has had significant straining and had occasional compaction. Tried to do \"mini preps\" to have BMs. Does not like the \"chemicals\" in meds. Has had trouble with thrombosed hemorrhoids. Had a colonoscopy 1/9/24 showing normal ileum, colon and rectum. Has extreme perineal itching. Has intermittent fecal smearing.      ROS:    No fevers or chills  No weight loss  No blurry vision, double vision or change in vision  No sore throat  No lymphadenopathy  + headache  No paraesthesias, or weakness in a limb  No shortness of breath or wheezing  No chest pain or pressure  + arthralgias or myalgias (chronic " back and joint pain)  + rashes or skin changes (sunburn to shoulders)  No odynophagia or dysphagia  + BRBPR (hemorrhoids)  No melena  No dysuria, frequency or urgency  No hot/cold intolerance or polyria  No anxiety or depression    PROBLEM LIST  Patient Active Problem List    Diagnosis Date Noted    Pelvic floor dysfunction 01/17/2023     Priority: Medium    Achilles tendon pain 02/02/2022     Priority: Medium    Acute pain of left shoulder 08/01/2021     Priority: Medium    Abdominal pain, right upper quadrant 08/01/2021     Priority: Medium    History of food allergy 08/01/2021     Priority: Medium    Other fatigue 08/01/2021     Priority: Medium    Chronic constipation 02/15/2021     Priority: Medium    Gastroesophageal reflux disease with esophagitis 11/09/2018     Priority: Medium    Anxiety 11/09/2018     Priority: Medium    Class 1 obesity due to excess calories without serious comorbidity with body mass index (BMI) of 30.0 to 30.9 in adult 04/24/2018     Priority: Medium    Cervicalgia 12/28/2017     Priority: Medium    Bilateral thoracic back pain 12/28/2017     Priority: Medium    Other acne 07/08/2017     Priority: Medium    Benign nevus 07/08/2017     Priority: Medium    Vaginal discharge 03/18/2016     Priority: Medium    Joint pain 12/07/2015     Priority: Medium     Dad with scleroderma.  Pt was to follow with rheumatologist to R/O RA  NEED TO CONSIDER RHEUM REFERRAL         L4-L5 disc bulge 12/07/2015     Priority: Medium     Has been using PT to manage symptoms      Attention deficit hyperactivity disorder (ADHD), combined type 12/07/2015     Priority: Medium     Stopped Adderal when she found out she was pregnant      History of Clostridium difficile colitis 10/01/2014     Priority: Medium    Dysplasia of cervix, low grade (ASUNCION 1) 01/04/2012     Priority: Medium     1/4/12 COLP- ASUNCION 1 (Prior LSIL pap per note)  2012 CRYO (per note in abstracted pap)  8/12 NIL Pap  10/14 NIL pap  2016 NIL pap, neg  HPV  7/31/23 NIL pap, Neg . Plan cotest in 3 years         PERTINENT PAST MEDICAL HISTORY:  Past Medical History:   Diagnosis Date    Abdominal pain 2011    abnormal histamine problem, multiple food allergies    ADHD (attention deficit hyperactivity disorder)     on aderol    Anxiety and depression     on xanax    Arthritis     Breathing problem 2007    shortness of breath after eating, ?allergies    Chronic nausea     Gastroesophageal reflux disease     Heart murmur     closed by time she was 1    Hx of abnormal cervical Pap smear 2011    IBS (irritable bowel syndrome)     lots of mucus in bowel with occassional bleeding    Joint pain 2015    was to have rheumatologist    Kidney stone on right side 2010    Meniere's disease     hyperacusis    Migraines     Reduced vision     Tinnitus        PREVIOUS SURGERIES:  Past Surgical History:   Procedure Laterality Date    COLONOSCOPY N/A 2/14/2018    Procedure: COMBINED COLONOSCOPY, SINGLE OR MULTIPLE BIOPSY/POLYPECTOMY BY BIOPSY;  colon and egd;  Surgeon: Joan Willson MD;  Location: [facility]    COLONOSCOPY N/A 1/9/2024    Procedure: Colonoscopy;  Surgeon: Alec Peterson MD;  Location:  GI    ESOPHAGOSCOPY, GASTROSCOPY, DUODENOSCOPY (EGD), COMBINED N/A 2/14/2018    Procedure: COMBINED ESOPHAGOSCOPY, GASTROSCOPY, DUODENOSCOPY (EGD), BIOPSY SINGLE OR MULTIPLE;;  Surgeon: Joan Willson MD;  Location: [facility] OR    NO HISTORY OF SURGERY       ALLERGIES:     Allergies   Allergen Reactions    Mobile     Food      Cantaloupe, cucumbers, green beans, and peppers.     Lamb's Quarter (Weed)     Lemon Flavor     Mustard Seed     Onion Difficulty breathing and GI Disturbance    Bertie GI Disturbance    Redtop Grasses     Vanilla GI Disturbance       PERTINENT MEDICATIONS:    Current Outpatient Medications:     bisacodyl (DULCOLAX) 5 MG EC tablet, 2 days prior to procedure, take 2 tablets at 4 pm. 1 day prior to procedure, take 2 tablets at 4 pm. For additional instructions refer to  your colonoscopy prep instructions., Disp: 4 tablet, Rfl: 0    hydrocortisone, Perianal, (HYDROCORTISONE) 2.5 % cream, Place rectally 2 times daily as needed for hemorrhoids, Disp: 30 g, Rfl: 1    ibuprofen (ADVIL/MOTRIN) 200 MG tablet, Take 400 mg by mouth every 4 hours as needed for mild pain 2 tab every am, Disp: , Rfl:     melatonin 3 MG tablet, Take 1 mg by mouth nightly as needed for sleep, Disp: , Rfl:     polyethylene glycol (GOLYTELY) 236 g suspension, 2 days prior at 5pm, mix and drink half of a jug of Golytely. Drink an 8 oz. glass of Golytely every 15 minutes until half of the jug is gone. Place remainder of Golytely in the refrigerator. 1 day prior at 5 pm, drink the 2nd half of a jug of Golytely bowel prep. 6 hours before your check-in time, drink an 8 oz. glass of Golytely every 15 minutes until half of the 2nd jug of Golytely is gone. Discard remainder of second jug., Disp: 8000 mL, Rfl: 0    SOCIAL HISTORY:  Social History     Socioeconomic History    Marital status:      Spouse name: Not on file    Number of children: 1    Years of education: Not on file    Highest education level: Not on file   Occupational History     Comment: stylist/hairdresser   Tobacco Use    Smoking status: Never    Smokeless tobacco: Never   Vaping Use    Vaping Use: Never used   Substance and Sexual Activity    Alcohol use: Not Currently     Alcohol/week: 0.0 standard drinks of alcohol     Comment: outside of pregnancy, social    Drug use: No    Sexual activity: Yes     Partners: Male     Birth control/protection: None   Other Topics Concern     Service No    Blood Transfusions No    Caffeine Concern No    Occupational Exposure Yes     Comment:     Hobby Hazards No    Sleep Concern Yes     Comment: harder to sleep now since pregnancy    Stress Concern Yes     Comment: lots of stress due to work and housing    Weight Concern Yes     Comment: has gained 10 lbs in the last month    Special Diet Yes      Comment: has many food allergies    Back Care Yes     Comment: disc problems, considering cortisone injections    Exercise No    Bike Helmet No    Seat Belt Yes    Self-Exams No    Parent/sibling w/ CABG, MI or angioplasty before 65F 55M? Yes   Social History Narrative    Not on file     Social Determinants of Health     Financial Resource Strain: Low Risk  (12/20/2023)    Financial Resource Strain     Within the past 12 months, have you or your family members you live with been unable to get utilities (heat, electricity) when it was really needed?: No   Food Insecurity: High Risk (12/20/2023)    Food Insecurity     Within the past 12 months, did you worry that your food would run out before you got money to buy more?: Yes     Within the past 12 months, did the food you bought just not last and you didn t have money to get more?: No   Transportation Needs: Low Risk  (12/20/2023)    Transportation Needs     Within the past 12 months, has lack of transportation kept you from medical appointments, getting your medicines, non-medical meetings or appointments, work, or from getting things that you need?: No   Physical Activity: Not on file   Stress: Not on file   Social Connections: Not on file   Interpersonal Safety: Not At Risk (7/31/2023)    Humiliation, Afraid, Rape, and Kick questionnaire     Fear of Current or Ex-Partner: No     Emotionally Abused: No     Physically Abused: No     Sexually Abused: No   Housing Stability: Low Risk  (12/20/2023)    Housing Stability     Do you have housing? : Yes     Are you worried about losing your housing?: No       FAMILY HISTORY:  Family History   Problem Relation Age of Onset    Mental Illness Mother         bipolar, schizophrenia    Anxiety Disorder Mother     Osteoporosis Mother     Thyroid Disease Mother     Diabetes Mother     Neurofibromatosis Mother         more likely fibromyalgia    Arthritis Mother     Back Pain Mother     Osteoarthritis Mother     Obesity Mother      Cirrhosis Mother         from fatty liver. with esophageal varices, ..    Gallbladder Disease Mother         cholecystectomy    Hypertension Father     Hyperlipidemia Father     Diabetes Father     Other Cancer Father 67        Neuroendocrine tumor, ? from gall bladder, stage 4  1/2018    Migraines Father     Scleroderma Father     Rheumatoid Arthritis Father     Obesity Father     Ankylosing Spondylitis Father     Macular Degeneration Father     Mental Illness Sister         bipolar    Anxiety Disorder Sister     Asthma Sister         eczema    Neurologic Disorder Sister         Cervical Dystonia, treated with Botox    Thyroid Cancer Sister     Coronary Artery Disease Maternal Grandmother     Cerebrovascular Disease Maternal Grandmother     Breast Cancer Maternal Grandmother 65    Skin Cancer Maternal Grandmother     Stomach Cancer Maternal Grandmother         stomach, skin    Prostate Cancer Maternal Grandfather     Cancer Maternal Grandfather     Diabetes Paternal Grandmother     Kidney Cancer Paternal Grandmother     Coronary Artery Disease Paternal Grandfather     Colon Cancer Paternal Grandfather     Cancer Paternal Grandfather     Ovarian Cancer Maternal Aunt 68    Skin Cancer Maternal Aunt         melanoma    Scleroderma Paternal Aunt     Ankylosing Spondylitis Paternal Aunt     Ankylosing Spondylitis Paternal Uncle     Liver Cancer Other         uncle    Glaucoma No family hx of        Past/family/social history reviewed and no changes    PHYSICAL EXAMINATION:  Constitutional: aaox3, cooperative, pleasant, not dyspneic/diaphoretic, no acute distress  Vitals reviewed: LMP 11/21/2023 (Approximate)   Wt:   Wt Readings from Last 2 Encounters:   01/11/24 100.5 kg (221 lb 8 oz)   07/31/23 103.6 kg (228 lb 4.8 oz)      Constitutional - general appearance is well and in no acute distress. Body habitus normal  Eyes - No redness or discharge  Respiratory - No cough, unlabored breathing  Musculoskeletal - range of  motion intact: Neck and arms  Skin - No discoloration or lesions  Neurological - No tremors, headaches  Psychiatric - No anxiety, alert & oriented      PERTINENT STUDIES:    Orders Only on 05/29/2018   Component Date Value Ref Range Status    Specimen Descrip 05/29/2018 Midstream Urine   Final    N Gonorrhea PCR 05/29/2018 Negative  NEG^Negative Final    Specimen Description 05/29/2018 Midstream Urine   Final    Chlamydia Trachomatis PCR 05/29/2018 Negative  NEG^Negative Final

## 2024-02-14 NOTE — PROGRESS NOTES
PFC order, demographics, last office note was faxed to the [facility] at [phone]. Order scanned into chart.     Patient scheduled with PFC 3/7/2024    Appointment rescheduled to 3/19/2024.    Appointment rescheduled 4/2/2024.    Patient cancelled appointment on 4/2/2024 and has not rescheduled.    Erin Marino LPN

## 2024-02-14 NOTE — NURSING NOTE
Is the patient currently in the state of [place]? YES    Visit mode:VIDEO    If the visit is dropped, the patient can be reconnected by: VIDEO VISIT: Text to cell phone:   Telephone Information:   Mobile [phone]       Will anyone else be joining the visit? NO  (If patient encounters technical issues they should call [phone]    How would you like to obtain your AVS? MyChart    Are changes needed to the allergy or medication list? No    Reason for visit: RECHECK    Jeni ETIENNEF

## 2024-02-14 NOTE — PATIENT INSTRUCTIONS
It was a pleasure taking care of you today.  I've included a brief summary of our discussion and care plan from today's visit below.  Please review this information with your primary care provider.  ______________________________________________________________________    My recommendations are summarized as follows:    -- Recommend FODMAP diet  -- Pelvic floor center referral        ______________________________________________________________________    How do I schedule labs, imaging studies, or procedures that were ordered in clinic today?     Labs: To schedule lab appointment at the [facility], use my chart or call [phone]. If you have a [place] lab closer to home where you are regularly seen you can give them a call.     Procedures: If a colonoscopy, upper endoscopy, breath test, esophageal manometry, or pH impedence was ordered today, our endoscopy team will call you to schedule this. If you have not heard from our endoscopy team within a week, please call [phone] to schedule.     Imaging Studies: If you were scheduled for a CT scan, X-ray, MRI, ultrasound, HIDA scan or other imaging study, please call [phone] to have this scheduled.     Referral: If a referral to another specialty was ordered, expect a phone call or follow instructions above. If you have not heard from anyone regarding your referral in a week, please call our clinic to check the status.     Who do I call with any questions after my visit?  Please be in touch if there are any further questions that arise following today's visit.  There are multiple ways to contact your gastroenterology care team.      During business hours, you may reach a Gastroenterology nurse at [phone]    To schedule or reschedule an appointment, please call [phone].     You can always send a secure message through MODLOFT.  MODLOFT messages are answered by your nurse or doctor typically within 24 hours.  Please allow extra time  on weekends and holidays.      For urgent/emergent questions after business hours, you may reach the on-call GI Fellow by contacting the [facility]  at [phone].     How will I get the results of any tests ordered?    You will receive all of your results.  If you have signed up for siOPTICAhart, any tests ordered at your visit will be available to you after your physician reviews them.  Typically this takes 1-2 weeks.  If there are urgent results that require a change in your care plan, your physician or nurse will call you to discuss the next steps.      What is WeVideo?  WeVideo is a secure way for you to access all of your healthcare records from the [facility].  It is a web based computer program, so you can sign on to it from any location.  It also allows you to send secure messages to your care team.  I recommend signing up for [employer] access if you have not already done so and are comfortable with using a computer.         Sincerely,    Nikita Ross PA-C  [facility]  Division of Gastroenterology

## 2024-03-04 ENCOUNTER — NURSE TRIAGE (OUTPATIENT)
Dept: NURSING | Facility: CLINIC | Age: 44
End: 2024-03-04
Payer: COMMERCIAL

## 2024-03-04 NOTE — TELEPHONE ENCOUNTER
Nurse Triage SBAR    Is this a 2nd Level Triage? NO    Situation: Swallowed foreign body.    Background: Patient states she was drinking her Kombucha and noticed slivers of glass on the bottom.    Assessment: Reports there were quite a few small pieces of glass, the size of a grain of salt. She states she kept shaking the bottle and drinking it, so unsure of how much she drank. Until one small piece got stuck in her tooth. Denies any symptoms at this time.    Protocol Recommended Disposition:   Home Care    Recommendation: Recommendation is to monitor at home. Advised to watch for blood in stools or vomit, any vomiting or stomach pain. Reviewed care advice and call back instructions. Patient plans to follow advice.          Does the patient meet one of the following criteria for ADS visit consideration? 16+ years old, with an [facility] PCP     TIP  Providers, please consider if this condition is appropriate for management at one of our Acute and Diagnostic Services sites.     If patient is a good candidate, please use dotphrase <dot>triageresponse and select Refer to ADS to document.      Reason for Disposition   Harmless swallowed FB and no symptoms    Additional Information   Negative: Difficulty breathing (e.g., coughing, stridor, wheezing)   Negative: Sounds like a life-threatening emergency to the triager   Negative: Choked on or inhaled food or foreign body (but did not swallow it)   Negative: Swallowed poison   Negative: Symptoms of blocked esophagus (e.g., can't swallow normal secretions, can't swallow water, drooling)   Negative: Symptoms of FB stuck in throat or esophagus (e.g., continued pain in throat or chest, FB sensation, blood-tinged saliva)  (Exception: Pill stuck.)   Negative: Sharp object (e.g., needle, nail, safety pin, toothpick, bone, bottle cap, pull tab, dental bridge work)  (Exceptions: Dental crowns and tiny chips of glass generally pass without any symptoms.)   Negative: Button battery (or  other battery) known or suspected   Negative: Magnet   Negative: Packet of drugs (e.g., condom filled with cocaine)   Negative: Swallowed 2 or more FBs (e.g., multiple objects)   Negative: [1] Swallowed a pill AND [2] SEVERE symptoms of pill stuck in throat or esophagus (e.g., severe pain, bleeding, or can't swallow liquids)   Negative: Swallowed a (non-food) FB on purpose   Negative: Blood in the stools   Negative: [1] Abdominal pain AND [2] FB hasn't passed   Negative: [1] Vomited 2 or more times AND [2] FB hasn't passed   Negative: Patient sounds very sick or weak to the triager   Negative: Patient is confused or is an unreliable provider of information (e.g., dementia, severe intellectual disability, alcohol intoxication)   Negative: [1] Swallowed a pill AND [2] mild symptoms of pill stuck in throat or esophagus (e.g., mild pain in throat or chest, FB sensation) AND [2] no relief after using Care Advice   Negative: [1] Smooth object > 1 inch (2.5 cm) across (e.g., quarter is 2.5 cm) AND [2] no symptoms   Negative: [1] Swallowed FB > 3 days ago AND [2] FB hasn't passed in the stools AND [3] FB is of concern (e.g., sharp, large, valuable, etc.)   Negative: Swallowed object containing lead (e.g., a bullet or lead sinker)   Negative: Swallowed a dental crown   Negative: [1] Swallowed a pill AND [2] mild symptoms of pill stuck in throat or esophagus (e.g., mild pain in throat or chest, FB sensation)   Negative: [1] Swallowed a piece of candy AND [2] mild symptoms of it stuck in throat or esophagus (e.g., mild pain in throat or chest, FB sensation)    Protocols used: Swallowed Foreign Body-A-AH

## 2024-03-12 ENCOUNTER — OFFICE VISIT (OUTPATIENT)
Dept: DERMATOLOGY | Facility: CLINIC | Age: 44
End: 2024-03-12
Payer: COMMERCIAL

## 2024-03-12 DIAGNOSIS — L72.0 EIC (EPIDERMAL INCLUSION CYST): Primary | ICD-10-CM

## 2024-03-12 PROCEDURE — 99213 OFFICE O/P EST LOW 20 MIN: CPT | Performed by: PHYSICIAN ASSISTANT

## 2024-03-12 ASSESSMENT — PAIN SCALES - GENERAL: PAINLEVEL: NO PAIN (0)

## 2024-03-12 NOTE — LETTER
3/12/2024       RE: Priyanka Lundberg  [address] [place] [address]     Dear Colleague,    Thank you for referring your patient, Priyanka Lundberg, to the [facility] [place] at [facility]. Please see a copy of my visit note below.    [facility] Dermatology Note  Encounter Date: Mar 12, 2024  Office Visit     Reviewed patients past medical history and pertinent chart review prior to patients visit today.     Dermatology Problem List:  # Epidermal inclusion cyst  # Tan macule, post-inflammatory hyperpigmentation versus nevus    ____________________________________________    Assessment & Plan:     # Epidermal inclusion cyst  # Tan macule, post-inflammatory hyperpigmentation versus nevus  - We reviewed the etiology of cysts. We reviewed the option to treat with excision. The patient preferred to continue to monitor the lesion. I recommend follow up with any concerning changes.   - The tan macule is reassuring on dermoscopy. I recommended follow up with any concerning changes.   - ABCDEs: Counseled ABCDEs of melanoma: Asymmetry, Border (irregularity), Color (not uniform, changes in color), Diameter (greater than 6 mm which is about the size of a pencil eraser), and Evolving (any changes in preexisting moles).    All risks, benefits and alternatives were discussed with patient.  Patient is in agreement and understands the assessment and plan.  All questions were answered.  Svetlana Mares PA-C  [facility] Dermatology  _______________________________________    CC: Derm Problem (Patient reports lesion of concern on their left breast. The patient reports first noticing the lesion one year ago. The patient reports some discomfort in the area. )    HPI:  Ms. Priyanka Lundberg is a(n) 43 year old female who presents today as a return patient for a lesion involving the left breast. The patient reports a cyst has  been present since she was a teenager. The cyst occasionally becomes painful and the patient picks at it to release material from the site. About 3 years ago she noticed a new mole over the cyst. Patient is otherwise feeling well, without additional skin concerns.      Physical Exam:  SKIN: Focused examination of left breast was performed.  - The left medial areola demonstrates an approximately 0.4 x 0.4 cm well demarcated, mobile subcutaneous nodule with punctum, consistent with an epidermal inclusion cyst. Overlying the cyst is a 0.3 x 0.3 cm homogenous tan macule with reticular network on dermoscopy.     - No other lesions of concern on areas examined.     Medications:  Current Outpatient Medications   Medication    bisacodyl (DULCOLAX) 5 MG EC tablet    hydrocortisone, Perianal, (HYDROCORTISONE) 2.5 % cream    ibuprofen (ADVIL/MOTRIN) 200 MG tablet    melatonin 3 MG tablet    polyethylene glycol (GOLYTELY) 236 g suspension     No current facility-administered medications for this visit.      Past Medical History:   Patient Active Problem List   Diagnosis    Joint pain    L4-L5 disc bulge    Attention deficit hyperactivity disorder (ADHD), combined type    Vaginal discharge    Other acne    Benign nevus    History of Clostridium difficile colitis    Cervicalgia    Bilateral thoracic back pain    Class 1 obesity due to excess calories without serious comorbidity with body mass index (BMI) of 30.0 to 30.9 in adult    Gastroesophageal reflux disease with esophagitis    Anxiety    Chronic constipation    Acute pain of left shoulder    Abdominal pain, right upper quadrant    History of food allergy    Other fatigue    Achilles tendon pain    Pelvic floor dysfunction    Dysplasia of cervix, low grade (ASUNCION 1)     Past Medical History:   Diagnosis Date    Abdominal pain 2011    abnormal histamine problem, multiple food allergies    ADHD (attention deficit hyperactivity disorder)     on aderol    Anxiety and depression      on xanax    Arthritis     Breathing problem 2007    shortness of breath after eating, ?allergies    Chronic nausea     Gastroesophageal reflux disease     Heart murmur     closed by time she was 1    Hx of abnormal cervical Pap smear 2011    IBS (irritable bowel syndrome)     lots of mucus in bowel with occassional bleeding    Joint pain 2015    was to have rheumatologist    Kidney stone on right side 2010    Meniere's disease     hyperacusis    Migraines     Reduced vision     Tinnitus        CC No referring provider defined for this encounter. on close of this encounter.

## 2024-03-12 NOTE — NURSING NOTE
Chief Complaint   Patient presents with    Derm Problem     Patient reports lesion of concern on their left breast. The patient reports first noticing the lesion one year ago. The patient reports some discomfort in the area.      Jayde Orr LPN

## 2024-03-12 NOTE — PATIENT INSTRUCTIONS
Patient Education        Proper skin care from [facility]:     -Eliminate harsh soaps as they strip the natural oils from the skin, often resulting in dry itchy skin ( i.e. Dial, Zest, [place])  -Use mild soaps such as Cetaphil or Dove Sensitive Skin in the shower. You do not need to use soap on arms, legs, and trunk every time you shower unless visibly soiled.   -Avoid hot or cold showers.  -After showering, lightly dry off and apply moisturizing within 2-3 minutes. This will help trap moisture in the skin.   -Aggressive use of a moisturizer at least 1-2 times a day to the entire body (including -Vanicream, Cetaphil, Aquaphor or Cerave) and moisturize hands after every washing.  -We recommend using moisturizers that come in a tub that needs to be scooped out, not a pump. This has more of an oil base. It will hold moisture in your skin much better than a water base moisturizer. The above recommended are non-pore clogging.        Wear a sunscreen with at least SPF 30 on your face, ears, neck and V of the chest daily. Wear sunscreen on other areas of the body if those areas are exposed to the sun throughout the day. Sunscreens can contain physical and/or chemical blockers. Physical blockers are less likely to clog pores, these include zinc oxide and titanium dioxide. Reapply every two hour and after swimming.      Sunscreen examples: https://www.ewg.org/sunscreen/     UV radiation  UVA radiation remains constant throughout the day and throughout the year. It is a longer wavelength than UVB and therefore penetrates deeper into the skin leading to immediate and delayed tanning, photoaging, and skin cancer. 70-80% of UVA and UVB radiation occurs between the hours of 10am-2pm.  UVB radiation  UVB radiation causes the most harmful effects and is more significant during the summer months. However, snow and ice can reflect UVB radiation leading to skin damage during the winter months as well. UVB radiation is  responsible for tanning, burning, inflammation, delayed erythema (pinkness), pigmentation (brown spots), and skin cancer.      I recommend self monthly full body exams and yearly full body exams with a dermatology provider. If you develop a new or changing lesion please follow up for examination. Most skin cancers are pink and scaly or pink and pearly. However, we do see blue/brown/black skin cancers.  Consider the ABCDEs of melanoma when giving yourself your monthly full body exam ( don't forget the groin, buttocks, feet, toes, etc). A-asymmetry, B-borders, C-color, D-diameter, E-elevation or evolving. If you see any of these changes please follow up in clinic. If you cannot see your back I recommend purchasing a hand held mirror to use with a larger wall mirror.       Checking for Skin Cancer  You can find cancer early by checking your skin each month. There are 3 kinds of skin cancer. They are melanoma, basal cell carcinoma, and squamous cell carcinoma. Doing monthly skin checks is the best way to find new marks or skin changes. Follow the instructions below for checking your skin.   The ABCDEs of checking moles for melanoma   Check your moles or growths for signs of melanoma using ABCDE:   Asymmetry: the sides of the mole or growth don t match  Border: the edges are ragged, notched, or blurred  Color: the color within the mole or growth varies  Diameter: the mole or growth is larger than 6 mm (size of a pencil eraser)  Evolving: the size, shape, or color of the mole or growth is changing (evolving is not shown in the images below)    Checking for other types of skin cancer  Basal cell carcinoma or squamous cell carcinoma have symptoms such as:      A spot or mole that looks different from all other marks on your skin  Changes in how an area feels, such as itching, tenderness, or pain  Changes in the skin's surface, such as oozing, bleeding, or scaliness  A sore that does not heal  New swelling or redness beyond  the border of a mole     Who s at risk?  Anyone can get skin cancer. But you are at greater risk if you have:   Fair skin, light-colored hair, or light-colored eyes  Many moles or abnormal moles on your skin  A history of sunburns from sunlight or tanning beds  A family history of skin cancer  A history of exposure to radiation or chemicals  A weakened immune system  If you have had skin cancer in the past, you are at risk for recurring skin cancer.   How to check your skin  Do your monthly skin checkups in front of a full-length mirror. Check all parts of your body, including your:   Head (ears, face, neck, and scalp)  Torso (front, back, and sides)  Arms (tops, undersides, upper, and lower armpits)  Hands (palms, backs, and fingers, including under the nails)  Buttocks and genitals  Legs (front, back, and sides)  Feet (tops, soles, toes, including under the nails, and between toes)  If you have a lot of moles, take digital photos of them each month. Make sure to take photos both up close and from a distance. These can help you see if any moles change over time.   Most skin changes are not cancer. But if you see any changes in your skin, call your doctor right away. Only he or she can diagnose a problem. If you have skin cancer, seeing your doctor can be the first step toward getting the treatment that could save your life.   [employer] last reviewed this educational content on 4/1/2019 2000-2020 [employer]. [address] [place] [address], [place] [address]. All rights reserved. This information is not intended as a substitute for professional medical care. Always follow your healthcare professional's instructions.        When should I call my doctor?  If you are worsening or not improving, please, contact us or seek urgent care as noted below.      Who should I call with questions (adults)?  [facility] (adult and pediatric): [phone]  [facility]  [place] (adult): [phone]  [facility] ([place], [place], [place] and [place]) [phone]  For urgent needs outside of business hours call the [facility] at [phone] and ask for the dermatology resident on call to be paged  If this is a medical emergency and you are unable to reach an ER, Call 911        If you need a prescription refill, please contact your pharmacy. Refills are approved or denied by our Physicians during normal business hours, Monday through Fridays  Per office policy, refills will not be granted if you have not been seen within the past year (or sooner depending on your child's condition)

## 2024-03-21 NOTE — TELEPHONE ENCOUNTER
[facility] Call Center    Phone Message    May a detailed message be left on voicemail: yes     Reason for Call: Other: more difficult bowel movement within the last 2 weeks- L4-L5 disc bulge /Pingel, Sydney Tobin MD /united/ortho scheduled 6/19 Janell HARPER      Action Taken: Message routed to:  Other: ariana Maciel    Travel Screening: Not Applicable                                                                          yes

## 2024-04-03 ENCOUNTER — VIRTUAL VISIT (OUTPATIENT)
Dept: PSYCHIATRY | Facility: CLINIC | Age: 44
End: 2024-04-03
Payer: COMMERCIAL

## 2024-04-03 DIAGNOSIS — F90.2 ADHD (ATTENTION DEFICIT HYPERACTIVITY DISORDER), COMBINED TYPE: Primary | ICD-10-CM

## 2024-04-03 DIAGNOSIS — F41.1 GAD (GENERALIZED ANXIETY DISORDER): ICD-10-CM

## 2024-04-03 DIAGNOSIS — F10.21 ALCOHOL USE DISORDER, MODERATE, IN SUSTAINED REMISSION (H): ICD-10-CM

## 2024-04-03 PROCEDURE — 99214 OFFICE O/P EST MOD 30 MIN: CPT | Mod: 95 | Performed by: NURSE PRACTITIONER

## 2024-04-03 NOTE — NURSING NOTE
Is the patient currently in the state of [place]? YES    Visit mode:VIDEO    If the visit is dropped, the patient can be reconnected by: VIDEO VISIT: Text to cell phone:   Telephone Information:   Mobile [phone]       Will anyone else be joining the visit? No  (If patient encounters technical issues they should call [phone])    How would you like to obtain your AVS? MyChart    Are changes needed to the allergy or medication list? No    Rooming Documentation: Assigned questionnaire(s) completed .    Reason for visit: RECHMERLINE Laughlin

## 2024-04-03 NOTE — PROGRESS NOTES
"Virtual Visit Details    Type of service:  Video Visit   Video Start Time: {video visit start/end time for provider to select:238930}  Video End Time:{video visit start/end time for provider to select:164614}    Originating Location (pt. Location): {video visit patient location:588350::\"Home\"}  {PROVIDER LOCATION On-site should be selected for visits conducted from your clinic location or adjoining [facility] hospital, academic office, or other nearby [facility] building. Off-site should be selected for all other provider locations, including home:897274}  Distant Location (provider location):  {virtual location provider:833274}  Platform used for Video Visit: {Virtual Visit Platforms:109386::\"[email or web address]\"}  "
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
## 2024-05-14 ENCOUNTER — OFFICE VISIT (OUTPATIENT)
Dept: FAMILY MEDICINE | Facility: CLINIC | Age: 44
End: 2024-05-14
Payer: COMMERCIAL

## 2024-05-14 VITALS
SYSTOLIC BLOOD PRESSURE: 122 MMHG | TEMPERATURE: 98 F | OXYGEN SATURATION: 97 % | HEART RATE: 82 BPM | DIASTOLIC BLOOD PRESSURE: 76 MMHG

## 2024-05-14 DIAGNOSIS — R21 RASH: Primary | ICD-10-CM

## 2024-05-14 DIAGNOSIS — S29.012A MUSCLE STRAIN OF LEFT UPPER BACK, INITIAL ENCOUNTER: ICD-10-CM

## 2024-05-14 NOTE — NURSING NOTE
"44 year old  Chief Complaint   Patient presents with    Back Pain     Back pain in between shoulder blades after pulling a window closed. Taking ibuprofen, \"first day upright\" and affecting work. Started Wednesday.    Derm Problem     Rash on back, red and itchy. Feels like sunburn when touched. Some bumps on stomach and around belly button. Started Friday.        Blood pressure 122/76, pulse 82, temperature 98  F (36.7  C), temperature source Skin, SpO2 97%, not currently breastfeeding. There is no height or weight on file to calculate BMI.  Patient Active Problem List   Diagnosis    Joint pain    L4-L5 disc bulge    Attention deficit hyperactivity disorder (ADHD), combined type    Vaginal discharge    Other acne    Benign nevus    History of Clostridium difficile colitis    Cervicalgia    Bilateral thoracic back pain    Class 1 obesity due to excess calories without serious comorbidity with body mass index (BMI) of 30.0 to 30.9 in adult    Gastroesophageal reflux disease with esophagitis    Anxiety    Chronic constipation    Acute pain of left shoulder    Abdominal pain, right upper quadrant    History of food allergy    Other fatigue    Achilles tendon pain    Pelvic floor dysfunction    Dysplasia of cervix, low grade (ASUNCION 1)       Wt Readings from Last 2 Encounters:   01/11/24 100.5 kg (221 lb 8 oz)   07/31/23 103.6 kg (228 lb 4.8 oz)     BP Readings from Last 3 Encounters:   05/14/24 122/76   01/11/24 105/62   01/09/24 120/73         No current outpatient medications on file.     No current facility-administered medications for this visit.       Social History     Tobacco Use    Smoking status: Never    Smokeless tobacco: Never   Vaping Use    Vaping status: Never Used   Substance Use Topics    Alcohol use: Not Currently     Alcohol/week: 0.0 standard drinks of alcohol     Comment: outside of pregnancy, social    Drug use: No       Health Maintenance Due   Topic Date Due    ADVANCE CARE PLANNING  Never done    " DEPRESSION ACTION PLAN  Never done    HEPATITIS B IMMUNIZATION (1 of 3 - 19+ 3-dose series) Never done    LIPID  07/27/2022    COVID-19 Vaccine (1 - 2023-24 season) Never done    PHQ-9  03/05/2024       Lab Results   Component Value Date    PAP NIL 09/08/2016         May 14, 2024 3:11 PM

## 2024-05-14 NOTE — PROGRESS NOTES
"Priyanka Lundberg is a 44 year old female with history of chronic constipation, anxiety, joint pain, GERD, history of multiple food allergies, and kidney stones, various urinary symptoms, depression and anxiety. She is here for the following issues:     Current stressors  Caring for mom who is in assisted living. Mom has mental health issues, has [facility] services  Sister has moved in with her mom, but not helpful  Priyanka thinks mom needs nursing home care      Rash  Reports eruption of bumpy, itchy, burning rash at left lower back 4 days ago  Concerned this is Shingles, has been under lots of stress  Burning pain radiates to belly and there are some small vesicles near navel on left side  Most lesions are beginning to crust over, no surrounding redness  Has never had shingles before    Back pain  Started on Mon, Wed more peristent  Reports 1 wks hx of left upper back pain, began a couple of days after \"pulling down a window, over reaching\"  Pain was \"burning\". Located beneath the L shoulder blade.    2 days later felt muscle spasm at the same side.   \" I couldn't stand upright due to pain at left shoulder blade\"  Took ibuprofen and pushed through pain. Is caring for her mom and didn't have time to do anything for herself  Took 1/2 edible THC 2.5mg, unsure if it helped  Four days ago, took muscle relaxant, helped but makes her groggy.   Took another one 4 d ago.    Previous hx of upper, middle and lower back pain  Has been referred multiple times to PT and the spine clinic but never went    Patient Active Problem List   Diagnosis    Joint pain    L4-L5 disc bulge    Attention deficit hyperactivity disorder (ADHD), combined type    Vaginal discharge    Other acne    Benign nevus    History of Clostridium difficile colitis    Cervicalgia    Bilateral thoracic back pain    Class 1 obesity due to excess calories without serious comorbidity with body mass index (BMI) of 30.0 to 30.9 in adult    Gastroesophageal reflux " disease with esophagitis    Anxiety    Chronic constipation    Acute pain of left shoulder    Abdominal pain, right upper quadrant    History of food allergy    Other fatigue    Achilles tendon pain    Pelvic floor dysfunction    Dysplasia of cervix, low grade (ASUNCION 1)       No current outpatient medications on file.       Allergies   Allergen Reactions    Donie     Food      Cantaloupe, cucumbers, green beans, and peppers.     Lamb's Quarter (Weed)     Lemon Flavor     Mustard Seed     Onion Difficulty breathing and GI Disturbance    Aurora GI Disturbance    Redtop Grasses     Vanilla GI Disturbance        EXAM  /76 (BP Location: Left arm, Patient Position: Sitting, Cuff Size: Adult Large)   Pulse 82   Temp 98  F (36.7  C) (Skin)   SpO2 97%   Gen: Alert, pleasant, appears stressed   MS: Point tenderness over the rhomboid muscles, left side  No pain with palpation over bony C,T,LS spine  Skin: diffuse papular eruption over left lower back and a cluster of lesions just lateral to the umbilicus, left side, no surrounding redness, may are scabbing over        Assessment  (R21) Rash  (primary encounter diagnosis)  Comment: shingles outbreak Left lower back. Too late to treat with valacyclovir  Plan: keep covered, discussed symptomatic cares. Offered gabapentin rx which she declined, may contact me if she changes her mind    (S29.012A) Muscle strain of left upper back, initial encounter  Comment: Left Rhomboid muscle strain  Plan: Physical Therapy  Referral        Gave referral to PT, gave handout from [facility] web page.   May do massage, heat, ice, rest    39 minutes spend on the date of this encounter doing chart review, history and exam, documentation and further activities as noted above.       Sydney Ruiz MD  Internal Medicine/Pediatrics

## 2024-05-28 ENCOUNTER — OFFICE VISIT (OUTPATIENT)
Dept: OPTOMETRY | Facility: CLINIC | Age: 44
End: 2024-05-28
Payer: COMMERCIAL

## 2024-05-28 DIAGNOSIS — H52.31 ANISOMETROPIA AND ANISEIKONIA: Primary | ICD-10-CM

## 2024-05-28 DIAGNOSIS — H52.13 SEVERE MYOPIA OF BOTH EYES: ICD-10-CM

## 2024-05-28 DIAGNOSIS — H52.32 ANISOMETROPIA AND ANISEIKONIA: Primary | ICD-10-CM

## 2024-05-28 ASSESSMENT — CONF VISUAL FIELD
OD_NORMAL: 1
OS_SUPERIOR_TEMPORAL_RESTRICTION: 0
OS_NORMAL: 1
OD_INFERIOR_TEMPORAL_RESTRICTION: 0
OD_SUPERIOR_NASAL_RESTRICTION: 0
OS_INFERIOR_NASAL_RESTRICTION: 0
OS_SUPERIOR_NASAL_RESTRICTION: 0
OD_INFERIOR_NASAL_RESTRICTION: 0
OD_SUPERIOR_TEMPORAL_RESTRICTION: 0
OS_INFERIOR_TEMPORAL_RESTRICTION: 0

## 2024-05-28 ASSESSMENT — TONOMETRY
OS_IOP_MMHG: 15
IOP_METHOD: ICARE
OD_IOP_MMHG: 13

## 2024-05-28 ASSESSMENT — VISUAL ACUITY
OS_CC+: -2
METHOD: SNELLEN - LINEAR
OD_CC+: -1
CORRECTION_TYPE: GLASSES
OS_PH_CC: 20/25
OS_CC: 20/30
OD_CC: 20/20

## 2024-05-28 ASSESSMENT — REFRACTION_WEARINGRX
OS_SPHERE: -9.25
OS_AXIS: 087
OD_CYLINDER: +2.00
OS_CYLINDER: +4.50
OD_SPHERE: -7.50
OS_ADD: +1.25
OD_AXIS: 090
OD_ADD: +1.25

## 2024-05-28 ASSESSMENT — REFRACTION_CURRENTRX
OS_SPHERE: -2.00
OS_BASECURVE: 4.6/7.34
OD_SPHERE: -8.50
OS_DIAMETER: 15.4
OD_BASECURVE: 4.5/7.42
OS_DIAMETER: 14.5
OD_DIAMETER: 15.4
OD_SPHERE: -2.00
OS_SPHERE: -8.50
OD_DIAMETER: 14.5
OD_BRAND: DUETTE
OD_BASECURVE: 7.1
OS_BASECURVE: 7.1
OS_BRAND: DUETTE

## 2024-05-28 ASSESSMENT — REFRACTION_MANIFEST
OS_CYLINDER: +4.00
OD_SPHERE: -7.50
OD_ADD: +1.75
OD_AXIS: 087
OS_ADD: +1.75
OD_CYLINDER: +2.00
OS_AXIS: 085
OS_SPHERE: -9.25

## 2024-05-28 ASSESSMENT — KERATOMETRY
OS_AXISANGLE_DEGREES: 87
OD_K2POWER_DIOPTERS: 47.00
OD_AXISANGLE2_DEGREES: 171
OD_AXISANGLE_DEGREES: 081
OS_K2POWER_DIOPTERS: 48.12
OS_K1POWER_DIOPTERS: 44.25
OS_AXISANGLE2_DEGREES: 177
OD_K1POWER_DIOPTERS: 44.62

## 2024-05-28 ASSESSMENT — SLIT LAMP EXAM - LIDS
COMMENTS: NORMAL
COMMENTS: NORMAL

## 2024-05-28 ASSESSMENT — CUP TO DISC RATIO
OD_RATIO: .45
OS_RATIO: .45

## 2024-05-28 ASSESSMENT — EXTERNAL EXAM - RIGHT EYE: OD_EXAM: NORMAL

## 2024-05-28 ASSESSMENT — EXTERNAL EXAM - LEFT EYE: OS_EXAM: NORMAL

## 2024-05-28 NOTE — NURSING NOTE
Chief Complaints and History of Present Illnesses   Patient presents with    Follow Up     Follow up for eye exam and contact lens fitting.     Chief Complaint(s) and History of Present Illness(es)       Follow Up              Laterality: both eyes    Comments: Follow up for eye exam and contact lens fitting.              Comments    The patient is frustrated with her vision.  She notes she tried the contact lenses originally but panicked when they were difficult to get them out of her eyes.  She recently tried the contact lenses again and realizes she can see the best out of her contact lenses. She notes her eyes hurt after the removal of the contact lenses.   She has eye glasses but it is difficult to adjust to the progressive.  She hasn't had them fitted and she purchased on line.  The patient has an intermittent right eye floater.  Anel López, COA, COA 9:03 AM 05/28/2024

## 2024-05-28 NOTE — PROGRESS NOTES
A/P  1.) High Myopia/Astigmatism with Aniso OS>OD  -Corrects well in spec Rx, but with significant strain and peripheral distortion. Does not like current progressives but will try in person optical  -Failed standard soft torics (Biofinity, Proclear, AV Oasys, Ultra) with rotation left eye>>right eye. Failed custom soft toric  -Failed hybrids (tight fit right eye>left eye, difficult removal, poor comfort)  -Large HVID (12mm right, 13mm left)  -Now ready for scleral lens per discussion today. Baseline measurements taken. Will start DVO and discuss multifocal more next exam  -Dilated ocular health unremarkable OU    Order scleral lenses and HOLD for lens dispense, I&R    I have confirmed where necessary the patient's CC, HPI and reviewed Past Medical History, Past Surgical History, Social History, Family History, Problem List, Medication List and agree with Tech note.     Jodie Oliver, OD FAAO FSLS

## 2024-06-06 ENCOUNTER — TELEPHONE (OUTPATIENT)
Dept: FAMILY MEDICINE | Facility: CLINIC | Age: 44
End: 2024-06-06

## 2024-06-06 NOTE — TELEPHONE ENCOUNTER
Patient wanted to know if Dr. Ruiz had a recommendation of who she could see for primary care closer to her home.    Parul

## 2024-06-10 ENCOUNTER — DOCUMENTATION ONLY (OUTPATIENT)
Dept: OPTOMETRY | Facility: CLINIC | Age: 44
End: 2024-06-10

## 2024-06-10 ENCOUNTER — TELEPHONE (OUTPATIENT)
Dept: OPHTHALMOLOGY | Facility: CLINIC | Age: 44
End: 2024-06-10
Payer: COMMERCIAL

## 2024-06-10 NOTE — TELEPHONE ENCOUNTER
Spoke with patient regarding scheduling a TECH VISIT for : I and R Training at either location (Lenses are at [facility]) . Patient is asking if she can take Trials home and not do I and R Training? Sent request to Technician with  for reply.     Patient was provided direct number for calling back for scheduling once she looks into her schedule.-Per Patient     (Sent message to Brisa)

## 2024-06-12 ENCOUNTER — TELEPHONE (OUTPATIENT)
Dept: OPHTHALMOLOGY | Facility: CLINIC | Age: 44
End: 2024-06-12
Payer: COMMERCIAL

## 2024-06-12 NOTE — TELEPHONE ENCOUNTER
Patient confirmed scheduled appointment:  Date: 6/14/24  Time: 11:00am  Visit type: EYE TECH   Provider: [facility] EYE TECH -  Location: [facility] Location  Testing/imaging: NONE  Additional notes: TECH VISIT for : I and R Training at either location (Lenses are at [facility]) . -Appt Per PT      Provided appointment details for [facility] Location. Patient is aware of date,time and location.-Per Patient

## 2024-06-14 ENCOUNTER — ALLIED HEALTH/NURSE VISIT (OUTPATIENT)
Dept: OPTOMETRY | Facility: CLINIC | Age: 44
End: 2024-06-14
Payer: COMMERCIAL

## 2024-06-14 DIAGNOSIS — H52.13 SEVERE MYOPIA OF BOTH EYES: Primary | ICD-10-CM

## 2024-06-14 ASSESSMENT — VISUAL ACUITY
OD_CC: 20/70
METHOD: SNELLEN - LINEAR
CORRECTION_TYPE: CONTACTS
OS_CC: 20/20
OD_CC: 20/20
OS_CC: 20/70

## 2024-06-17 ENCOUNTER — OFFICE VISIT (OUTPATIENT)
Dept: OPTOMETRY | Facility: CLINIC | Age: 44
End: 2024-06-17
Payer: COMMERCIAL

## 2024-06-17 DIAGNOSIS — H52.31 ANISOMETROPIA AND ANISEIKONIA: Primary | ICD-10-CM

## 2024-06-17 DIAGNOSIS — H52.32 ANISOMETROPIA AND ANISEIKONIA: Primary | ICD-10-CM

## 2024-06-17 DIAGNOSIS — H52.13 SEVERE MYOPIA OF BOTH EYES: ICD-10-CM

## 2024-06-17 ASSESSMENT — REFRACTION_CURRENTRX
OS_ADDL_SPECS: BOSTON XO BLUE
OS_DIAMETER: 15.4
OD_BASECURVE: 4.6/7.34
OD_DIAMETER: 15.4
OD_SPHERE: -6.50
OS_BRAND: ZENLENS RC
OS_BASECURVE: 4.6/7.34
OD_BRAND: ZENLENS RC

## 2024-06-17 NOTE — PROGRESS NOTES
No office visit. CL order only. Billing for lenses dispensed at 6/14/24 [facility] visit. Pt has scheduled follow-up     Contact Lens Billing  V-Code:  - GP scleral  Final Contact Lens Rx         Brand Base Curve Diameter Sphere Lens Addl. Specs    Right Zenlens RC 4.6/7.34 15.4 -6.50 1 flat H x 3 steep V Tavernier XO clear    Left Zenlens RC 4.6/7.34 15.4 -7.00 (slightly underminused) 1 flat H x 3 steep V Tavernier XO blue           # of units: 2  Price per Unit: $215    This patient requires contact lenses that are medically necessary for either improvement in vision over spectacles, support of the ocular surface, or other therapeutic benefit. These are not cosmetic contact lenses.     Encounter Diagnoses   Name Primary?    Severe myopia of both eyes     Anisometropia and aniseikonia Yes        Date of last eye exam: 5/28/24

## 2024-06-24 ENCOUNTER — OFFICE VISIT (OUTPATIENT)
Dept: OPTOMETRY | Facility: CLINIC | Age: 44
End: 2024-06-24
Payer: COMMERCIAL

## 2024-06-24 DIAGNOSIS — H52.13 SEVERE MYOPIA OF BOTH EYES: Primary | ICD-10-CM

## 2024-06-24 DIAGNOSIS — H52.32 ANISOMETROPIA AND ANISEIKONIA: ICD-10-CM

## 2024-06-24 DIAGNOSIS — H52.31 ANISOMETROPIA AND ANISEIKONIA: ICD-10-CM

## 2024-06-24 ASSESSMENT — REFRACTION_CURRENTRX
OD_BRAND: ZENLENS RC
OS_BASECURVE: 4.6/7.34
OD_DIAMETER: 15.4
OS_BRAND: ZENLENS RC
OD_SPHERE: -6.50
OS_DIAMETER: 15.4
OD_BASECURVE: 4.6/7.34
OS_ADDL_SPECS: BOSTON XO BLUE

## 2024-06-24 ASSESSMENT — SLIT LAMP EXAM - LIDS
COMMENTS: NORMAL
COMMENTS: NORMAL

## 2024-06-24 ASSESSMENT — EXTERNAL EXAM - LEFT EYE: OS_EXAM: NORMAL

## 2024-06-24 ASSESSMENT — VISUAL ACUITY
CORRECTION_TYPE: CONTACTS
OD_CC: 20/20
METHOD: SNELLEN - LINEAR
OS_CC: 20/20

## 2024-06-24 ASSESSMENT — EXTERNAL EXAM - RIGHT EYE: OD_EXAM: NORMAL

## 2024-06-24 NOTE — PROGRESS NOTES
Rx check only  A/P  1.) High Myopia/Astigmatism with Aniso OS>OD  -Corrects well in spec Rx, but with significant strain and peripheral distortion.   -Failed standard soft torics (Biofinity, Proclear, AV Oasys, Ultra) with rotation left eye>>right eye. Failed custom soft toric  -Failed hybrids (tight fit right eye>left eye, difficult removal, poor comfort)  -Large HVID (12mm right, 13mm left)  -Slightly tight in scleral lens. Loose PC's and flatten bc. Push more plus for mini-mono left eye    Order new lenses and mail direct to below address. Can return before 8/28/24 if unable to tolerate sclerals      [address] [facility] [address]    I have confirmed where necessary the patient's CC, HPI and reviewed Past Medical History, Past Surgical History, Social History, Family History, Problem List, Medication List and agree with Tech note.     Jodie Oliver, OD FAAO FSLS

## 2024-06-24 NOTE — NURSING NOTE
"Chief Complaints and History of Present Illnesses   Patient presents with    Contact Lens Follow Up     Pt here for scleral lens follow up.     Chief Complaint(s) and History of Present Illness(es)       Contact Lens Follow Up              Comments: Pt here for scleral lens follow up.              Comments    Pt doing well with inserting and removing. Both lenses feel like they are \"torn\" left eye> right eye. She was able to wear them for 5 hours but eyes became very irritated afterwards and left eye was \"gooping\" and right eye is itchy.     CARLOS MANUEL Sims on 6/24/2024 at 9:27 AM                     "

## 2024-09-21 ENCOUNTER — HEALTH MAINTENANCE LETTER (OUTPATIENT)
Age: 44
End: 2024-09-21

## 2024-10-31 ASSESSMENT — ANXIETY QUESTIONNAIRES: GAD7 TOTAL SCORE: 3

## 2025-05-17 ENCOUNTER — HEALTH MAINTENANCE LETTER (OUTPATIENT)
Age: 45
End: 2025-05-17

## 2025-08-06 ENCOUNTER — TELEPHONE (OUTPATIENT)
Dept: SURGERY | Facility: CLINIC | Age: 45
End: 2025-08-06

## 2025-09-02 ENCOUNTER — VIRTUAL VISIT (OUTPATIENT)
Dept: NEUROLOGY | Facility: CLINIC | Age: 45
End: 2025-09-02
Payer: COMMERCIAL

## 2025-09-02 VITALS — HEIGHT: 68 IN | BODY MASS INDEX: 32.58 KG/M2 | WEIGHT: 215 LBS

## 2025-09-02 DIAGNOSIS — H90.3 BILATERAL SENSORINEURAL HEARING LOSS: ICD-10-CM

## 2025-09-02 DIAGNOSIS — R41.89 COGNITIVE CHANGE: ICD-10-CM

## 2025-09-02 DIAGNOSIS — H93.13 TINNITUS, BILATERAL: ICD-10-CM

## 2025-09-02 DIAGNOSIS — M54.2 CERVICALGIA: Primary | ICD-10-CM

## 2025-09-02 DIAGNOSIS — H53.8 BLURRING OF VISUAL IMAGE: ICD-10-CM

## 2025-09-02 PROCEDURE — 98006 SYNCH AUDIO-VIDEO EST MOD 30: CPT | Performed by: PSYCHIATRY & NEUROLOGY

## 2025-09-02 PROCEDURE — G2211 COMPLEX E/M VISIT ADD ON: HCPCS | Mod: 95 | Performed by: PSYCHIATRY & NEUROLOGY

## 2025-09-02 ASSESSMENT — PAIN SCALES - GENERAL: PAINLEVEL_OUTOF10: NO PAIN (0)

## 2025-09-03 ENCOUNTER — OFFICE VISIT (OUTPATIENT)
Dept: FAMILY MEDICINE | Facility: CLINIC | Age: 45
End: 2025-09-03
Payer: COMMERCIAL

## 2025-09-03 ENCOUNTER — PATIENT OUTREACH (OUTPATIENT)
Dept: CARE COORDINATION | Facility: CLINIC | Age: 45
End: 2025-09-03
Payer: COMMERCIAL

## 2025-09-03 VITALS
OXYGEN SATURATION: 97 % | WEIGHT: 216 LBS | TEMPERATURE: 98 F | DIASTOLIC BLOOD PRESSURE: 74 MMHG | SYSTOLIC BLOOD PRESSURE: 108 MMHG | BODY MASS INDEX: 33.33 KG/M2 | RESPIRATION RATE: 16 BRPM | HEART RATE: 70 BPM

## 2025-09-03 DIAGNOSIS — F48.9 POOR MENTAL HEALTH: ICD-10-CM

## 2025-09-03 DIAGNOSIS — M62.89 PELVIC FLOOR DYSFUNCTION: ICD-10-CM

## 2025-09-03 DIAGNOSIS — M54.41 CHRONIC RIGHT-SIDED LOW BACK PAIN WITH RIGHT-SIDED SCIATICA: ICD-10-CM

## 2025-09-03 DIAGNOSIS — F10.21 ALCOHOLISM IN REMISSION (H): ICD-10-CM

## 2025-09-03 DIAGNOSIS — M79.18 MYALGIA, MULTIPLE SITES: ICD-10-CM

## 2025-09-03 DIAGNOSIS — G89.29 CHRONIC RIGHT-SIDED LOW BACK PAIN WITH RIGHT-SIDED SCIATICA: ICD-10-CM

## 2025-09-03 DIAGNOSIS — M54.50 ACUTE LEFT-SIDED LOW BACK PAIN WITHOUT SCIATICA: ICD-10-CM

## 2025-09-03 DIAGNOSIS — K90.49 FOOD INTOLERANCE: ICD-10-CM

## 2025-09-03 DIAGNOSIS — Z88.9 HISTORY OF MULTIPLE ALLERGIES: ICD-10-CM

## 2025-09-03 DIAGNOSIS — R19.8 GASTROINTESTINAL COMPLAINTS: Primary | ICD-10-CM

## 2025-09-03 DIAGNOSIS — F50.819 BINGE EATING DISORDER, UNSPECIFIED SEVERITY: ICD-10-CM

## 2025-09-03 PROBLEM — D22.9 BENIGN NEVUS: Status: RESOLVED | Noted: 2017-07-08 | Resolved: 2025-09-03

## 2025-09-03 PROBLEM — M25.512 ACUTE PAIN OF LEFT SHOULDER: Status: RESOLVED | Noted: 2021-08-01 | Resolved: 2025-09-03

## 2025-09-03 PROBLEM — M76.60 ACHILLES TENDON PAIN: Status: RESOLVED | Noted: 2022-02-02 | Resolved: 2025-09-03

## 2025-09-03 PROBLEM — L70.8 OTHER ACNE: Status: RESOLVED | Noted: 2017-07-08 | Resolved: 2025-09-03

## 2025-09-03 ASSESSMENT — ANXIETY QUESTIONNAIRES
3. WORRYING TOO MUCH ABOUT DIFFERENT THINGS: NOT AT ALL
4. TROUBLE RELAXING: NOT AT ALL
7. FEELING AFRAID AS IF SOMETHING AWFUL MIGHT HAPPEN: NOT AT ALL
2. NOT BEING ABLE TO STOP OR CONTROL WORRYING: NOT AT ALL
GAD7 TOTAL SCORE: 0
6. BECOMING EASILY ANNOYED OR IRRITABLE: NOT AT ALL
1. FEELING NERVOUS, ANXIOUS, OR ON EDGE: NOT AT ALL
GAD7 TOTAL SCORE: 0
7. FEELING AFRAID AS IF SOMETHING AWFUL MIGHT HAPPEN: NOT AT ALL
8. IF YOU CHECKED OFF ANY PROBLEMS, HOW DIFFICULT HAVE THESE MADE IT FOR YOU TO DO YOUR WORK, TAKE CARE OF THINGS AT HOME, OR GET ALONG WITH OTHER PEOPLE?: NOT DIFFICULT AT ALL
5. BEING SO RESTLESS THAT IT IS HARD TO SIT STILL: NOT AT ALL
GAD7 TOTAL SCORE: 0
IF YOU CHECKED OFF ANY PROBLEMS ON THIS QUESTIONNAIRE, HOW DIFFICULT HAVE THESE PROBLEMS MADE IT FOR YOU TO DO YOUR WORK, TAKE CARE OF THINGS AT HOME, OR GET ALONG WITH OTHER PEOPLE: NOT DIFFICULT AT ALL

## 2025-09-03 ASSESSMENT — PATIENT HEALTH QUESTIONNAIRE - PHQ9
SUM OF ALL RESPONSES TO PHQ QUESTIONS 1-9: 0
SUM OF ALL RESPONSES TO PHQ QUESTIONS 1-9: 0
10. IF YOU CHECKED OFF ANY PROBLEMS, HOW DIFFICULT HAVE THESE PROBLEMS MADE IT FOR YOU TO DO YOUR WORK, TAKE CARE OF THINGS AT HOME, OR GET ALONG WITH OTHER PEOPLE: NOT DIFFICULT AT ALL

## 2025-09-03 ASSESSMENT — PAIN SCALES - GENERAL: PAINLEVEL_OUTOF10: MODERATE PAIN (6)

## 2025-09-04 ENCOUNTER — PATIENT OUTREACH (OUTPATIENT)
Dept: CARE COORDINATION | Facility: CLINIC | Age: 45
End: 2025-09-04
Payer: COMMERCIAL

## (undated) DEVICE — SPECIMEN BAG MEDIVAC SUCTION WHITE SOCK 65652-122

## (undated) DEVICE — TAPE DURAPORE 3" SILK 1538-3

## (undated) DEVICE — ENDO FORCEP ENDOJAW BIOPSY 2.8MMX230CM FB-220U

## (undated) DEVICE — JELLY LUBRICATING SURGILUBE 2OZ TUBE

## (undated) DEVICE — DRAPE SHEET MED 44X70" 9355

## (undated) DEVICE — ENDO CONNECTOR ENDOGATOR AUX WATER JET FOR OLYMPUS SCOPE

## (undated) DEVICE — ENDO BITE BLOCK ADULT OMNI-BLOC

## (undated) DEVICE — ENDO FORCEP ENDOJAW BIOPSY 2.8MMX160CM FB-220K

## (undated) DEVICE — SYR 30ML SLIP TIP W/O NDL 302833

## (undated) DEVICE — SPECIMEN CONTAINER 3OZ W/FORMALIN 59901

## (undated) DEVICE — BASIN SET SINGLE STERILE 13752-624

## (undated) DEVICE — SUCTION MANIFOLD NEPTUNE 2 SYS 4 PORT 0702-020-000

## (undated) DEVICE — SOL WATER IRRIG 1000ML BOTTLE 2F7114

## (undated) DEVICE — WIPE PREMOIST CLEANSING WASHCLOTHS 7988

## (undated) DEVICE — ENDO CAP AND TUBING STERILE FOR ENDOGATOR  100130

## (undated) RX ORDER — LIDOCAINE HYDROCHLORIDE AND EPINEPHRINE 10; 10 MG/ML; UG/ML
INJECTION, SOLUTION INFILTRATION; PERINEURAL
Status: DISPENSED
Start: 2019-05-14

## (undated) RX ORDER — SIMETHICONE 40MG/0.6ML
SUSPENSION, DROPS(FINAL DOSAGE FORM)(ML) ORAL
Status: DISPENSED
Start: 2024-01-09

## (undated) RX ORDER — AMPICILLIN 250 MG/1
INJECTION, POWDER, FOR SOLUTION INTRAMUSCULAR; INTRAVENOUS
Status: DISPENSED
Start: 2021-10-20

## (undated) RX ORDER — LIDOCAINE HYDROCHLORIDE AND EPINEPHRINE 10; 10 MG/ML; UG/ML
INJECTION, SOLUTION INFILTRATION; PERINEURAL
Status: DISPENSED
Start: 2019-11-14

## (undated) RX ORDER — FENTANYL CITRATE 50 UG/ML
INJECTION, SOLUTION INTRAMUSCULAR; INTRAVENOUS
Status: DISPENSED
Start: 2018-02-14

## (undated) RX ORDER — PENICILLIN G POTASSIUM 5000000 [IU]/1
INJECTION, POWDER, FOR SOLUTION INTRAMUSCULAR; INTRAVENOUS
Status: DISPENSED
Start: 2021-10-20

## (undated) RX ORDER — DIPHENHYDRAMINE HYDROCHLORIDE 50 MG/ML
INJECTION INTRAMUSCULAR; INTRAVENOUS
Status: DISPENSED
Start: 2018-02-14